# Patient Record
Sex: FEMALE | Race: WHITE | NOT HISPANIC OR LATINO | Employment: OTHER | URBAN - METROPOLITAN AREA
[De-identification: names, ages, dates, MRNs, and addresses within clinical notes are randomized per-mention and may not be internally consistent; named-entity substitution may affect disease eponyms.]

---

## 2017-02-07 ENCOUNTER — COMMUNICATION - HEALTHEAST (OUTPATIENT)
Dept: FAMILY MEDICINE | Facility: CLINIC | Age: 77
End: 2017-02-07

## 2017-02-10 ENCOUNTER — COMMUNICATION - HEALTHEAST (OUTPATIENT)
Dept: FAMILY MEDICINE | Facility: CLINIC | Age: 77
End: 2017-02-10

## 2017-02-18 ENCOUNTER — COMMUNICATION - HEALTHEAST (OUTPATIENT)
Dept: FAMILY MEDICINE | Facility: CLINIC | Age: 77
End: 2017-02-18

## 2017-02-18 DIAGNOSIS — F41.9 ANXIETY DISORDER: ICD-10-CM

## 2017-04-22 ENCOUNTER — OFFICE VISIT (OUTPATIENT)
Dept: URGENT CARE | Facility: URGENT CARE | Age: 77
End: 2017-04-22
Payer: COMMERCIAL

## 2017-04-22 VITALS
TEMPERATURE: 97.8 F | BODY MASS INDEX: 22.13 KG/M2 | WEIGHT: 121 LBS | HEART RATE: 89 BPM | OXYGEN SATURATION: 98 % | DIASTOLIC BLOOD PRESSURE: 95 MMHG | SYSTOLIC BLOOD PRESSURE: 186 MMHG

## 2017-04-22 DIAGNOSIS — K08.89 PAIN, DENTAL: ICD-10-CM

## 2017-04-22 DIAGNOSIS — M26.609 TMJ (TEMPOROMANDIBULAR JOINT SYNDROME): ICD-10-CM

## 2017-04-22 DIAGNOSIS — K04.7 DENTAL ABSCESS: Primary | ICD-10-CM

## 2017-04-22 PROCEDURE — 99213 OFFICE O/P EST LOW 20 MIN: CPT | Performed by: FAMILY MEDICINE

## 2017-04-22 RX ORDER — CLINDAMYCIN HCL 300 MG
300 CAPSULE ORAL 4 TIMES DAILY
Qty: 40 CAPSULE | Refills: 0 | Status: SHIPPED | OUTPATIENT
Start: 2017-04-22 | End: 2017-05-02

## 2017-04-22 RX ORDER — HYDROCODONE BITARTRATE AND ACETAMINOPHEN 5; 325 MG/1; MG/1
1 TABLET ORAL EVERY 6 HOURS PRN
Qty: 6 TABLET | Refills: 0 | Status: SHIPPED | OUTPATIENT
Start: 2017-04-22 | End: 2017-07-06

## 2017-04-22 NOTE — MR AVS SNAPSHOT
"              After Visit Summary   4/22/2017    July Huang    MRN: [id number]           Patient Information     Date Of Birth          1940        Visit Information        Provider Department      4/22/2017 9:15 AM Kahlil Bey MD [facility]        Today's Diagnoses     Dental abscess    -  1    TMJ (temporomandibular joint syndrome)        Pain, dental          Care Instructions    Take full course of antibiotic.  Okay to take norco for worse pain.      Dental Abscess    An abscess is a pocket of pus at the tip of a tooth root in your jaw bone. It is caused by an infection at the root of the tooth. It can cause pain and swelling of the gum, cheek, or jaw. Pain may spread from the tooth to your ear or the area of your jaw on the same side. If the abscess isn t treated, it spreads to the gum near the tooth. This causes more swelling and pain. More serious infections cause your face to swell.  A dental abscess usually starts with a crack or cavity in the tooth. The pain is often made worse by drinking hot or cold fluids, or biting on hard foods.  Home care  Follow these guidelines when caring for yourself at home:    Avoid hot and cold foods and drinks. Your tooth may be sensitive to changes in temperature. Don t chew on the side of the infected tooth.    If your tooth is chipped or cracked, or if there is a large open cavity, put oil of cloves directly on the tooth to relieve pain. You can buy oil of cloves at drugstores. Some pharmacies carry an over-the-counter \"toothache kit.\" This contains a paste that you can put on the exposed tooth to make it less sensitive.    Put a cold pack on your jaw over the sore area to help reduce pain.    You may use acetaminophen or ibuprofen to ease pain, unless another medicine was prescribed. Note: If you have chronic liver or kidney disease, talk with your health care provider before using these medicines. Also talk with your provider if " you ve had a stomach ulcer or GI bleeding.    An antibiotic will be prescribed. Take it until finished, even if you are feeling better after a few days.  Follow-up care  Follow up as directed with your dentist or an oral surgeon. Once an infection occurs in a tooth, it will continue to be a problem until the infection is drained. This is done through surgery or a root canal. Or you may need to have your tooth pulled.  When to seek medical advice  Call your health care provider right away if any of these occur:    Your face becomes more swollen or red    Your eyelids become swollen    Pain gets worse or spreads to your neck    Fever over 100.4  F (38.0  C)    Unusual drowsiness    Headache or stiff neck    Weakness or fainting    Pus drains from the tooth    Difficulty swallowing or breathing    7797-6810 [employer]. [address] [place] [address]. All rights reserved. This information is not intended as a substitute for professional medical care. Always follow your healthcare professional's instructions.              Follow-ups after your visit        Who to contact     If you have questions or need follow up information about today's clinic visit or your schedule please contact [facility] directly at [phone].  Normal or non-critical lab and imaging results will be communicated to you by Butl[employer]art, letter or phone within 4 business days after the clinic has received the results. If you do not hear from us within 7 days, please contact the clinic through Butlrhart or phone. If you have a critical or abnormal lab result, we will notify you by phone as soon as possible.  Submit refill requests through [employer] or call your pharmacy and they will forward the refill request to us. Please allow 3 business days for your refill to be completed.          Additional Information About Your Visit        [employer] Information     [employer] lets you send messages to your doctor, view  "your test results, renew your prescriptions, schedule appointments and more. To sign up, go to [email or web address] . Click on \"Log in\" on the left side of the screen, which will take you to the Welcome page. Then click on \"Sign up Now\" on the right side of the page.     You will be asked to enter the access code listed below, as well as some personal information. Please follow the directions to create your username and password.     Your access code is: [email or web address]  Expires: 2017  9:43 AM     Your access code will  in 90 days. If you need help or a new code, please call your [place] clinic or [phone].        Care EveryWhere ID     This is your Care EveryWhere ID. This could be used by other organizations to access your [place] medical records  [id number]        Your Vitals Were     Pulse Temperature Pulse Oximetry BMI (Body Mass Index)          89 97.8  F (36.6  C) (Tympanic) 98% 22.13 kg/m2         Blood Pressure from Last 3 Encounters:   17 (!) 186/95   14 (!) 138/92   12 168/80    Weight from Last 3 Encounters:   17 121 lb (54.9 kg)   14 120 lb (54.4 kg)   12 120 lb (54.4 kg)              Today, you had the following     No orders found for display         Today's Medication Changes          These changes are accurate as of: 17  9:43 AM.  If you have any questions, ask your nurse or doctor.               Start taking these medicines.        Dose/Directions    clindamycin 300 MG capsule   Commonly known as:  CLEOCIN   Used for:  Dental abscess   Started by:  Kahlil Bey MD        Dose:  300 mg   Take 1 capsule (300 mg) by mouth 4 times daily for 10 days   Quantity:  40 capsule   Refills:  0       HYDROcodone-acetaminophen 5-325 MG per tablet   Commonly known as:  NORCO   Used for:  Pain, dental   Started by:  Kahlil Bey MD        Dose:  1 tablet   Take 1 tablet by mouth every 6 hours as needed for moderate to severe pain   Quantity:  6 tablet "   Refills:  0       tiZANidine 4 MG tablet   Commonly known as:  ZANAFLEX   Used for:  TMJ (temporomandibular joint syndrome)   Started by:  Kahlil Bey MD        Dose:  4 mg   Take 1 tablet (4 mg) by mouth every 8 hours as needed for muscle spasms   Quantity:  30 tablet   Refills:  0            Where to get your medicines      These medications were sent to [id number] - [place], [place] - [address] AT [facility] [address]  [address], [place] [address]     Phone:  [phone]     clindamycin 300 MG capsule         Some of these will need a paper prescription and others can be bought over the counter.  Ask your nurse if you have questions.     Bring a paper prescription for each of these medications     HYDROcodone-acetaminophen 5-325 MG per tablet    tiZANidine 4 MG tablet                Primary Care Provider Office Phone # Fax #    Florence Koo [phone]       [address] [address]        Thank you!     Thank you for choosing [facility]  for your care. Our goal is always to provide you with excellent care. Hearing back from our patients is one way we can continue to improve our services. Please take a few minutes to complete the written survey that you may receive in the mail after your visit with us. Thank you!             Your Updated Medication List - Protect others around you: Learn how to safely use, store and throw away your medicines at www.disposemymeds.org.          This list is accurate as of: 4/22/17  9:43 AM.  Always use your most recent med list.                   Brand Name Dispense Instructions for use    TOMEKA-MAG PO      Daily       clindamycin 300 MG capsule    CLEOCIN    40 capsule    Take 1 capsule (300 mg) by mouth 4 times daily for 10 days       CO Q-10 PO      Take  by mouth.       COD LIVER OIL PO      Take  by mouth.       conjugated estrogens cream    PREMARIN    42.5 g     Place  vaginally twice a week.       fluconazole 150 MG tablet    DIFLUCAN    2 tablet    Take 1 tablet by mouth every 3 days.       HYDROcodone-acetaminophen 5-325 MG per tablet    NORCO    6 tablet    Take 1 tablet by mouth every 6 hours as needed for moderate to severe pain       MULTI-VITAMIN PO      Daily       tiZANidine 4 MG tablet    ZANAFLEX    30 tablet    Take 1 tablet (4 mg) by mouth every 8 hours as needed for muscle spasms       vitamin D 1000 UNITS capsule      Take 1 capsule by mouth daily. 5000 unit 2-3 times per week

## 2017-04-22 NOTE — NURSING NOTE
"Chief Complaint   Patient presents with     Urgent Care     Pt in clinic to have eval for tooth pain.     Dental Pain       Initial BP (!) 186/95 (BP Location: Right arm, Patient Position: Chair, Cuff Size: Adult Regular)  Pulse 89  Temp 97.8  F (36.6  C) (Tympanic)  Wt 121 lb (54.9 kg)  SpO2 98%  BMI 22.13 kg/m2 Estimated body mass index is 22.13 kg/(m^2) as calculated from the following:    Height as of 8/30/14: 5' 2\" (1.575 m).    Weight as of this encounter: 121 lb (54.9 kg).  Medication Reconciliation: complete   Abida Srivastava/ MA    "

## 2017-04-22 NOTE — PROGRESS NOTES
SUBJECTIVE:   July Huang is a 76 year old female presenting with a chief complaint of dental pain X 1week.    Patient states that was seen by new dentist for cleaning several months back and told that old fillings needed to be replaced, did not have any pain with teeth at that time.  Patient states that last week did have filling replaced but now developed pain in the tooth that had work done.  Patient states that had a lot of scraping done, when she tries to floss that will get stuck and rip so feels like the filling is too rough.  Patient states that had TMJ in the past and now having more clicking and pain in right jaw area.  Patient tried calling dentist when tooth pain started worsening 3 days ago but dentist was not available.  Patient contacted prior dentist and does have appointment on Monday.  Patient states that usually takes clindamycin for dental problems in the past and done well.    Patient with HTN, states that has struggled with this in the past and usually runs 130-140's.  Patient feels that BP elevated due to pain.    Past Medical History:   Diagnosis Date     Cancer of ovary (H)      Irregular heart beat 2009    nl CT angiogram     S/P coronary angiogram 2005    negative     Current Outpatient Prescriptions   Medication Sig Dispense Refill     conjugated estrogens (PREMARIN) vaginal cream Place  vaginally twice a week. 42.5 g 12     Coenzyme Q10 (CO Q-10 PO) Take  by mouth.       COD LIVER OIL PO Take  by mouth.       Cholecalciferol (VITAMIN D) 1000 UNIT capsule Take 1 capsule by mouth daily. 5000 unit 2-3 times per week       MULTI-VITAMIN OR Daily       TOMEKA-MAG OR Daily       fluconazole (DIFLUCAN) 150 MG tablet Take 1 tablet by mouth every 3 days. (Patient not taking: Reported on 4/22/2017) 2 tablet 0     Social History   Substance Use Topics     Smoking status: Former Smoker     Quit date: 11/17/1994     Smokeless tobacco: Never Used     Alcohol use Yes      Comment: ocas        ROS:  CONSTITUTIONAL:NEGATIVE for fever, chills, change in weight and POSITIVE  for fatigue  ENT/MOUTH: POSITIVE for tooth pain  RESP:NEGATIVE for significant cough or SOB  CV: NEGATIVE for chest pain, palpitations or peripheral edema  GI: NEGATIVE for nausea, abdominal pain, heartburn, or change in bowel habits  MUSCULOSKELETAL: POSITIVE  for jaw pain    OBJECTIVE  :BP (!) 186/95 (BP Location: Right arm, Patient Position: Chair, Cuff Size: Adult Regular)  Pulse 89  Temp 97.8  F (36.6  C) (Tympanic)  Wt 121 lb (54.9 kg)  SpO2 98%  BMI 22.13 kg/m2  GENERAL APPEARANCE: healthy, alert and no distress  HENT: mouth without ulcers, erythema or lesions.  Tenderness on right lower back molar  SKIN: no suspicious lesions or rashes  PSYCH: mentation appears normal and affect normal/bright    ASSESSMENT/PLAN:  (K04.7) Dental abscess  (primary encounter diagnosis)  Plan: clindamycin (CLEOCIN) 300 MG capsule            (M26.609) TMJ (temporomandibular joint syndrome)  Plan: tiZANidine (ZANAFLEX) 4 MG tablet            (K08.89) Pain, dental  Comment: dental infection  Plan: HYDROcodone-acetaminophen (NORCO) 5-325 MG per         tablet            Reviewed that due to recent dental work done that will empirically treat for dental abscess/infection - RX clindamycin given as patient has done well with this in the past.  Small quantity of Norco 5/325 mg #6 given to use sparingly for worse pain.  RX zanaflex given to take for TMJ spasm if needed.    Follow up with dentist as already scheduled.    Kahlil Bey MD  April 22, 2017 9:56 AM

## 2017-04-22 NOTE — PATIENT INSTRUCTIONS
"Take full course of antibiotic.  Okay to take norco for worse pain.      Dental Abscess    An abscess is a pocket of pus at the tip of a tooth root in your jaw bone. It is caused by an infection at the root of the tooth. It can cause pain and swelling of the gum, cheek, or jaw. Pain may spread from the tooth to your ear or the area of your jaw on the same side. If the abscess isn t treated, it spreads to the gum near the tooth. This causes more swelling and pain. More serious infections cause your face to swell.  A dental abscess usually starts with a crack or cavity in the tooth. The pain is often made worse by drinking hot or cold fluids, or biting on hard foods.  Home care  Follow these guidelines when caring for yourself at home:    Avoid hot and cold foods and drinks. Your tooth may be sensitive to changes in temperature. Don t chew on the side of the infected tooth.    If your tooth is chipped or cracked, or if there is a large open cavity, put oil of cloves directly on the tooth to relieve pain. You can buy oil of cloves at [employer]. Some pharmacies carry an over-the-counter \"toothache kit.\" This contains a paste that you can put on the exposed tooth to make it less sensitive.    Put a cold pack on your jaw over the sore area to help reduce pain.    You may use acetaminophen or ibuprofen to ease pain, unless another medicine was prescribed. Note: If you have chronic liver or kidney disease, talk with your health care provider before using these medicines. Also talk with your provider if you ve had a stomach ulcer or GI bleeding.    An antibiotic will be prescribed. Take it until finished, even if you are feeling better after a few days.  Follow-up care  Follow up as directed with your dentist or an oral surgeon. Once an infection occurs in a tooth, it will continue to be a problem until the infection is drained. This is done through surgery or a root canal. Or you may need to have your tooth pulled.  When " to seek medical advice  Call your health care provider right away if any of these occur:    Your face becomes more swollen or red    Your eyelids become swollen    Pain gets worse or spreads to your neck    Fever over 100.4  F (38.0  C)    Unusual drowsiness    Headache or stiff neck    Weakness or fainting    Pus drains from the tooth    Difficulty swallowing or breathing    4151-8519 [employer]. [address] [place] [address]. All rights reserved. This information is not intended as a substitute for professional medical care. Always follow your healthcare professional's instructions.

## 2017-04-25 ENCOUNTER — COMMUNICATION - HEALTHEAST (OUTPATIENT)
Dept: FAMILY MEDICINE | Facility: CLINIC | Age: 77
End: 2017-04-25

## 2017-05-19 ENCOUNTER — OFFICE VISIT - HEALTHEAST (OUTPATIENT)
Dept: FAMILY MEDICINE | Facility: CLINIC | Age: 77
End: 2017-05-19

## 2017-05-19 DIAGNOSIS — I10 ESSENTIAL HYPERTENSION, BENIGN: ICD-10-CM

## 2017-05-19 DIAGNOSIS — F41.9 ANXIETY DISORDER: ICD-10-CM

## 2017-07-06 ENCOUNTER — OFFICE VISIT (OUTPATIENT)
Dept: URGENT CARE | Facility: URGENT CARE | Age: 77
End: 2017-07-06
Payer: COMMERCIAL

## 2017-07-06 VITALS
HEART RATE: 70 BPM | HEIGHT: 62 IN | DIASTOLIC BLOOD PRESSURE: 94 MMHG | OXYGEN SATURATION: 97 % | RESPIRATION RATE: 16 BRPM | BODY MASS INDEX: 22.08 KG/M2 | TEMPERATURE: 97.5 F | WEIGHT: 120 LBS | SYSTOLIC BLOOD PRESSURE: 172 MMHG

## 2017-07-06 DIAGNOSIS — R52 BODY ACHES: ICD-10-CM

## 2017-07-06 DIAGNOSIS — R51.9 NONINTRACTABLE EPISODIC HEADACHE, UNSPECIFIED HEADACHE TYPE: Primary | ICD-10-CM

## 2017-07-06 DIAGNOSIS — R03.0 ELEVATED BLOOD PRESSURE READING WITHOUT DIAGNOSIS OF HYPERTENSION: ICD-10-CM

## 2017-07-06 PROCEDURE — 99213 OFFICE O/P EST LOW 20 MIN: CPT | Performed by: INTERNAL MEDICINE

## 2017-07-06 NOTE — MR AVS SNAPSHOT
"              After Visit Summary   7/6/2017    July Huang    MRN: [id number]           Patient Information     Date Of Birth          1940        Visit Information        Provider Department      7/6/2017 8:25 PM Debra Barth MD [facility]        Today's Diagnoses     Nonintractable episodic headache, unspecified headache type    -  1    Body aches        Elevated blood pressure reading without diagnosis of hypertension          Care Instructions    ibuprofen or aleve.  Also tylenol.  Over the weekend  Could be viral illness - observe for new symptoms.  Monitor for fever  Not an infected mosquito bite.    Fluids  Rest    Monitor blood pressure at home    Recheck with clinic next week.  Declined blood count today.    Call or return to clinic if symptoms worsen or fail to improve as anticipated.  Or ER if concerns for stroke.              Follow-ups after your visit        Who to contact     If you have questions or need follow up information about today's clinic visit or your schedule please contact [facility] directly at [phone].  Normal or non-critical lab and imaging results will be communicated to you by [employer], letter or phone within 4 business days after the clinic has received the results. If you do not hear from us within 7 days, please contact the clinic through [employer] or phone. If you have a critical or abnormal lab result, we will notify you by phone as soon as possible.  Submit refill requests through [employer] or call your pharmacy and they will forward the refill request to us. Please allow 3 business days for your refill to be completed.          Additional Information About Your Visit        [employer] Information     [employer] lets you send messages to your doctor, view your test results, renew your prescriptions, schedule appointments and more. To sign up, go to [email or web address] . Click on \"Log in\" on the left side of " "the screen, which will take you to the Welcome page. Then click on \"Sign up Now\" on the right side of the page.     You will be asked to enter the access code listed below, as well as some personal information. Please follow the directions to create your username and password.     Your access code is: [email or web address]  Expires: 2017  9:43 AM     Your access code will  in 90 days. If you need help or a new code, please call your [place] clinic or [phone].        Care EveryWhere ID     This is your Care EveryWhere ID. This could be used by other organizations to access your [place] medical records  [id number]        Your Vitals Were     Pulse Temperature Respirations Height Pulse Oximetry BMI (Body Mass Index)    70 97.5  F (36.4  C) (Oral) 16 5' 2\" (1.575 m) 97% 21.95 kg/m2       Blood Pressure from Last 3 Encounters:   17 (!) 172/94   17 (!) 186/95   14 (!) 138/92    Weight from Last 3 Encounters:   17 120 lb (54.4 kg)   17 121 lb (54.9 kg)   14 120 lb (54.4 kg)              Today, you had the following     No orders found for display         Today's Medication Changes          These changes are accurate as of: 17  9:14 PM.  If you have any questions, ask your nurse or doctor.               Stop taking these medicines if you haven't already. Please contact your care team if you have questions.     conjugated estrogens cream   Commonly known as:  PREMARIN   Stopped by:  Debra Barth MD           fluconazole 150 MG tablet   Commonly known as:  DIFLUCAN   Stopped by:  Debra Barth MD           HYDROcodone-acetaminophen 5-325 MG per tablet   Commonly known as:  NORCO   Stopped by:  Debra Barth MD                    Primary Care Provider Office Phone # Fax #    Florence Koo [phone]       [place] [address]        Equal Access to Services     [facility] Vista[facility]IL AH: Radha burks " katey Ozuna, wajuanda luqadaha, qaybta kaalmada lisa, sita missyin hayaadamon becerrilemeka tjjose e lasantanadamon ronal. So [facility] [phone].    ATENCIÓN: Si habla jax, tiene a senior disposición servicios gratuitos de asistencia lingüística. Serina al [phone].    We comply with applicable federal civil rights laws and [place] laws. We do not discriminate on the basis of race, color, national origin, age, disability sex, sexual orientation or gender identity.            Thank you!     Thank you for choosing [facility]  for your care. Our goal is always to provide you with excellent care. Hearing back from our patients is one way we can continue to improve our services. Please take a few minutes to complete the written survey that you may receive in the mail after your visit with us. Thank you!             Your Updated Medication List - Protect others around you: Learn how to safely use, store and throw away your medicines at www.disposemymeds.org.          This list is accurate as of: 7/6/17  9:14 PM.  Always use your most recent med list.                   Brand Name Dispense Instructions for use Diagnosis    TOMEKA-MAG PO      Daily        CO Q-10 PO      Take  by mouth.        COD LIVER OIL PO      Take  by mouth.        MULTI-VITAMIN PO      Daily        tiZANidine 4 MG tablet    ZANAFLEX    30 tablet    Take 1 tablet (4 mg) by mouth every 8 hours as needed for muscle spasms    TMJ (temporomandibular joint syndrome)       vitamin D 1000 UNITS capsule      Take 1 capsule by mouth daily. 5000 unit 2-3 times per week

## 2017-07-07 NOTE — NURSING NOTE
"Chief Complaint   Patient presents with     Urgent Care     Headache     headache and body aches started 2 days ago when she woke up with bad headache ( cluster) light changes in front of 1 eye but got better. OK yesterday and was ok this morning but horrible headache again this afternoon.        Initial BP (!) 172/94  Pulse 70  Temp 97.5  F (36.4  C) (Oral)  Resp 16  Ht 5' 2\" (1.575 m)  Wt 120 lb (54.4 kg)  SpO2 97%  BMI 21.95 kg/m2 Estimated body mass index is 21.95 kg/(m^2) as calculated from the following:    Height as of this encounter: 5' 2\" (1.575 m).    Weight as of this encounter: 120 lb (54.4 kg).  Medication Reconciliation: complete  "

## 2017-07-07 NOTE — PATIENT INSTRUCTIONS
ibuprofen or aleve.  Also tylenol.  Over the weekend  Could be viral illness - observe for new symptoms.  Monitor for fever  Not an infected mosquito bite.    Fluids  Rest    Monitor blood pressure at home    Recheck with clinic next week.  Declined blood count today.    Call or return to clinic if symptoms worsen or fail to improve as anticipated.  Or ER if concerns for stroke.

## 2017-07-07 NOTE — PROGRESS NOTES
SUBJECTIVE:  July Huang, a 77 year old female scheduled an appointment to discuss the following issues:  Chief Complaint   Patient presents with     Urgent Care     Headache     headache and body aches started 2 days ago when she woke up with bad headache ( cluster) light changes in front of 1 eye but got better. OK yesterday and was ok this morning but horrible headache again this afternoon.        left side headache   With visual change on right eye  Bright wavy line that went across the eye  Then rest of day it was gone  (15 years ago had similar headaches  Dx cluster headache with vision changes - same visual changes at that time)    Only other symptom was achiness.  Had upper back , neck pain, achy all over    Yesterday symptoms subsided    today had Dr masseyt routine, & Hx herpes/ with tx valtrex- needed refill      This afternoon, headache returned, body aches returned  headache causes dizziness   Vision changes did not recur. She did not try Tylenol or ibuprofen. She tried aspirin.      No contagious contacts  Had mosquito bite A few weeks ago and was wondering since she had some mild redness in the area if her current symptoms could be related.       Current Outpatient Prescriptions on File Prior to Visit:  Coenzyme Q10 (CO Q-10 PO) Take  by mouth.   COD LIVER OIL PO Take  by mouth.   Cholecalciferol (VITAMIN D) 1000 UNIT capsule Take 1 capsule by mouth daily. 5000 unit 2-3 times per week   TOMEKA-MAG OR Daily   tiZANidine (ZANAFLEX) 4 MG tablet Take 1 tablet (4 mg) by mouth every 8 hours as needed for muscle spasms   MULTI-VITAMIN OR Daily     No current facility-administered medications on file prior to visit.       Medical, social, surgical, and family histories reviewed and updated as of 7/6/2017.    ROS: no fever  C: NEGATIVE for fever, chills  ENT/MOUTH: no uri or sore throat   Just got over uri  R: NEGATIVE for significant cough or SOB  GI: NEGATIVE for nausea, abdominal pain, or change in bowel  "habits  : NEGATIVE for frequency, dysuria, or hematuria    N: NEGATIVE for focal weakness,  or paresthesias   No difficulty with speech    OBJECTIVE:  BP (!) 172/94  Pulse 70  Temp 97.5  F (36.4  C) (Oral)  Resp 16  Ht 5' 2\" (1.575 m)  Wt 120 lb (54.4 kg)  SpO2 97%  BMI 21.95 kg/m2  EXAM:  GENERAL APPEARANCE: healthy, alert and no distress  Well-appearing  EYES: EOMI,  PERRL  No visual field defect by gross exam  HENT: ear canals and TM's normal and nose and mouth without ulcers or lesions  RESP: lungs clear to auscultation - no rales, rhonchi or wheezes  CV: regular rates and rhythm, normal S1 S2, no S3 or S4 and no murmur, click or rub -  ABDOMEN:  soft, nontender, no HSM or masses and bowel sounds normal  SKIN: right breast- postinflam red justyn from mosquito bite - almost faded  NEURO: Normal strength and tone, sensory exam grossly normal, mentation intact and speech normal  Cranial nerves II through XII grossly intact.   PSYCH: mentation appears normal and affect normal/bright    bp at MD office 127/70s  Earlier today prefers not to recheck it as she gets nervous at the doctor's office      ASSESSMENT/PLAN:    ASSESSMENT/PLAN:      ICD-10-CM    1. Nonintractable episodic headache, unspecified headache type R51    2. Body aches R52    3. Elevated blood pressure reading without diagnosis of hypertension R03.0      Discussed would normally recommend workup of headache with these vision changes but she states this was the exact same thing that occurred 15 years ago.    she is to follow-up with her primary doctor and discuss these headaches vision changes with her current symptoms. At this time do not feel she needs it further referral out to the emergency room for these headache and vision change symptoms.    Patient Instructions   ibuprofen or aleve.  Also tylenol.  Over the weekend  Could be viral illness - observe for new symptoms.  Monitor for fever  Not an infected mosquito " bite.    Fluids  Rest    Monitor blood pressure at home    Recheck with clinic next week.  Declined blood count today.    Call or return to clinic if symptoms worsen or fail to improve as anticipated.  Or ER if concerns for stroke.            Debra Barth MD

## 2017-07-11 ENCOUNTER — OFFICE VISIT - HEALTHEAST (OUTPATIENT)
Dept: FAMILY MEDICINE | Facility: CLINIC | Age: 77
End: 2017-07-11
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
## 2017-07-11 ASSESSMENT — MIFFLIN-ST. JEOR: SCORE: 970.51

## 2017-07-12 ENCOUNTER — COMMUNICATION - HEALTHEAST (OUTPATIENT)
Dept: FAMILY MEDICINE | Facility: CLINIC | Age: 77
End: 2017-07-12

## 2017-08-20 ENCOUNTER — RADIANT APPOINTMENT (OUTPATIENT)
Dept: GENERAL RADIOLOGY | Facility: CLINIC | Age: 77
End: 2017-08-20
Attending: FAMILY MEDICINE
Payer: COMMERCIAL

## 2017-08-20 ENCOUNTER — OFFICE VISIT (OUTPATIENT)
Dept: URGENT CARE | Facility: URGENT CARE | Age: 77
End: 2017-08-20
Payer: COMMERCIAL

## 2017-08-20 VITALS
BODY MASS INDEX: 21.26 KG/M2 | HEIGHT: 63 IN | WEIGHT: 120 LBS | DIASTOLIC BLOOD PRESSURE: 80 MMHG | HEART RATE: 76 BPM | TEMPERATURE: 98.5 F | SYSTOLIC BLOOD PRESSURE: 142 MMHG | OXYGEN SATURATION: 97 %

## 2017-08-20 DIAGNOSIS — S40.011A CONTUSION OF RIGHT SHOULDER, INITIAL ENCOUNTER: Primary | ICD-10-CM

## 2017-08-20 DIAGNOSIS — S43.51XA SPRAIN OF RIGHT ACROMIOCLAVICULAR JOINT, INITIAL ENCOUNTER: ICD-10-CM

## 2017-08-20 DIAGNOSIS — S40.011A CONTUSION OF RIGHT SHOULDER, INITIAL ENCOUNTER: ICD-10-CM

## 2017-08-20 DIAGNOSIS — S46.111A STRAIN OF MUSCLE, FASCIA AND TENDON OF LONG HEAD OF BICEPS, RIGHT ARM, INITIAL ENCOUNTER: ICD-10-CM

## 2017-08-20 PROCEDURE — 73030 X-RAY EXAM OF SHOULDER: CPT | Mod: RT

## 2017-08-20 PROCEDURE — 73050 X-RAY EXAM OF SHOULDERS: CPT

## 2017-08-20 PROCEDURE — 99213 OFFICE O/P EST LOW 20 MIN: CPT | Performed by: FAMILY MEDICINE

## 2017-08-20 NOTE — PATIENT INSTRUCTIONS
Shoulder Sprain  A sprain is a stretching or tearing of the ligaments that hold a joint together. A sprain may take up to 8 weeks to fully heal, depending on how severe it is. Moderate to severe shoulder sprains are treated with a sling or shoulder immobilizer. Minor sprains can be treated without any special support.  Home care  The following guidelines will help you care for your injury at home:    If a sling was given to you, leave it in place for the time advised by your healthcare provider. If you aren t sure how long to wear it, ask for advice. If the sling becomes loose, adjust it so that your forearm is level with the ground. Your shoulder should feel well supported.    Put an ice pack on the injured area for 20 minutes every 1 to 2 hours the first day. You can make your own ice pack by putting ice cubes in a plastic bag. A bag of frozen peas or something similar works well too. Wrap the bag in a thin towel. Continue with ice packs 3 to 4 times a day for the next 2 to 3 days. Then use the pack as needed to ease pain and swelling.    You may use acetaminophen or ibuprofen to control pain, unless another pain medicine was prescribed. If you have chronic liver or kidney disease, talk with your healthcare provider before using these medicines. Also talk with your provider if you ve had a stomach ulcer or gastrointestinal bleeding.    Shoulder joints become stiff if left in a sling for too long. You should start range of motion exercises about 7 to 10 days after the injury. Talk with your provider to find out what type of exercises to do and how soon to start.  Follow-up care  Follow up with your healthcare provider, or as advised.  Any X-rays you had today don t show any broken bones, breaks, or fractures. Sometimes fractures don t show up on the first X-ray. Bruises and sprains can sometimes hurt as much as a fracture. These injuries can take time to heal completely. If your symptoms don t improve or they get  worse, talk with your provider. You may need a repeat X-ray or other treatments.  When to seek medical advice  Call your healthcare provider right away if any of these occur:    Shoulder pain or swelling in your arm that gets worse    Fingers become cold, blue, numb, or tingly    Large amount of bruising of the shoulder or upper arm    Fever or chills  Date Last Reviewed: 8/1/2016 2000-2017 [employer]. [address] [place] [address], [place] [address]. All rights reserved. This information is not intended as a substitute for professional medical care. Always follow your healthcare professional's instructions.

## 2017-08-20 NOTE — NURSING NOTE
"July Huang;   Chief Complaint   Patient presents with     Shoulder     8/11 was walking dog and she ran after a rabbit, fell, was out of town and has not been checked after fall, c/o right shoulder pain radiates down right arm      Urgent Care     Initial /80 (BP Location: Left arm, Patient Position: Chair, Cuff Size: Adult Regular)  Pulse 76  Temp 98.5  F (36.9  C) (Oral)  Ht 5' 2.5\" (1.588 m)  Wt 120 lb (54.4 kg)  SpO2 97%  BMI 21.6 kg/m2 Estimated body mass index is 21.6 kg/(m^2) as calculated from the following:    Height as of this encounter: 5' 2.5\" (1.588 m).    Weight as of this encounter: 120 lb (54.4 kg)..  BP completed using cuff size regular.  Destini Grant R.N.  "

## 2017-08-20 NOTE — MR AVS SNAPSHOT
After Visit Summary   8/20/2017    July Huang    MRN: [id number]           Patient Information     Date Of Birth          1940        Visit Information        Provider Department      8/20/2017 2:30 PM Araceli Vasquez MD [facility]        Today's Diagnoses     Contusion of right shoulder, initial encounter    -  1    Sprain of right acromioclavicular joint, initial encounter        Strain of muscle, fascia and tendon of long head of biceps, right arm, initial encounter          Care Instructions      Shoulder Sprain  A sprain is a stretching or tearing of the ligaments that hold a joint together. A sprain may take up to 8 weeks to fully heal, depending on how severe it is. Moderate to severe shoulder sprains are treated with a sling or shoulder immobilizer. Minor sprains can be treated without any special support.  Home care  The following guidelines will help you care for your injury at home:    If a sling was given to you, leave it in place for the time advised by your healthcare provider. If you aren t sure how long to wear it, ask for advice. If the sling becomes loose, adjust it so that your forearm is level with the ground. Your shoulder should feel well supported.    Put an ice pack on the injured area for 20 minutes every 1 to 2 hours the first day. You can make your own ice pack by putting ice cubes in a plastic bag. A bag of frozen peas or something similar works well too. Wrap the bag in a thin towel. Continue with ice packs 3 to 4 times a day for the next 2 to 3 days. Then use the pack as needed to ease pain and swelling.    You may use acetaminophen or ibuprofen to control pain, unless another pain medicine was prescribed. If you have chronic liver or kidney disease, talk with your healthcare provider before using these medicines. Also talk with your provider if you ve had a stomach ulcer or gastrointestinal bleeding.    Shoulder joints become stiff  if left in a sling for too long. You should start range of motion exercises about 7 to 10 days after the injury. Talk with your provider to find out what type of exercises to do and how soon to start.  Follow-up care  Follow up with your healthcare provider, or as advised.  Any X-rays you had today don t show any broken bones, breaks, or fractures. Sometimes fractures don t show up on the first X-ray. Bruises and sprains can sometimes hurt as much as a fracture. These injuries can take time to heal completely. If your symptoms don t improve or they get worse, talk with your provider. You may need a repeat X-ray or other treatments.  When to seek medical advice  Call your healthcare provider right away if any of these occur:    Shoulder pain or swelling in your arm that gets worse    Fingers become cold, blue, numb, or tingly    Large amount of bruising of the shoulder or upper arm    Fever or chills  Date Last Reviewed: 8/1/2016 2000-2017 [employer]. [address] [place] [address]. All rights reserved. This information is not intended as a substitute for professional medical care. Always follow your healthcare professional's instructions.                Follow-ups after your visit        Who to contact     If you have questions or need follow up information about today's clinic visit or your schedule please contact [facility] directly at [phone].  Normal or non-critical lab and imaging results will be communicated to you by MyChart, letter or phone within 4 business days after the clinic has received the results. If you do not hear from us within 7 days, please contact the clinic through MyChart or phone. If you have a critical or abnormal lab result, we will notify you by phone as soon as possible.  Submit refill requests through [employer] or call your pharmacy and they will forward the refill request to us. Please allow 3 business days for your refill to be  "completed.          Additional Information About Your Visit        [employer] Information     [employer] lets you send messages to your doctor, view your test results, renew your prescriptions, schedule appointments and more. To sign up, go to [email or web address] . Click on \"Log in\" on the left side of the screen, which will take you to the Welcome page. Then click on \"Sign up Now\" on the right side of the page.     You will be asked to enter the access code listed below, as well as some personal information. Please follow the directions to create your username and password.     Your access code is: [email or web address]  Expires: 2017  4:12 PM     Your access code will  in 90 days. If you need help or a new code, please call your [facility] clinic or [phone].        Care EveryWhere ID     This is your Care EveryWhere ID. This could be used by other organizations to access your [facility] medical records  [id number]        Your Vitals Were     Pulse Temperature Height Pulse Oximetry BMI (Body Mass Index)       76 98.5  F (36.9  C) (Oral) 5' 2.5\" (1.588 m) 97% 21.6 kg/m2        Blood Pressure from Last 3 Encounters:   17 142/80   17 (!) 172/94   17 (!) 186/95    Weight from Last 3 Encounters:   17 120 lb (54.4 kg)   17 120 lb (54.4 kg)   17 121 lb (54.9 kg)                 Today's Medication Changes          These changes are accurate as of: 17  4:12 PM.  If you have any questions, ask your nurse or doctor.               Stop taking these medicines if you haven't already. Please contact your care team if you have questions.     tiZANidine 4 MG tablet   Commonly known as:  ZANAFLEX   Stopped by:  Araceli Vasquez MD                    Primary Care Provider Office Phone # Fax #    Florence Koo [phone]       [address] [address]        Equal Access to Services     [facility] LANA AH: Radha vila " Laith, wajuanda lucharlieadaha, jwta kalien gonzalez, sita missyin hayaan joneemeka tjjose e laArunakranthi ronal. So [facility] [phone].    ATENCIÓN: Si habla jax, tiene a senior disposición servicios gratuitos de asistencia lingüística. Serina al [phone].    We comply with applicable federal civil rights laws and [place] laws. We do not discriminate on the basis of race, color, national origin, age, disability sex, sexual orientation or gender identity.            Thank you!     Thank you for choosing [facility]  for your care. Our goal is always to provide you with excellent care. Hearing back from our patients is one way we can continue to improve our services. Please take a few minutes to complete the written survey that you may receive in the mail after your visit with us. Thank you!             Your Updated Medication List - Protect others around you: Learn how to safely use, store and throw away your medicines at www.disposemymeds.org.          This list is accurate as of: 8/20/17  4:12 PM.  Always use your most recent med list.                   Brand Name Dispense Instructions for use Diagnosis    TOMEKA-MAG PO      Daily        CO Q-10 PO      Take  by mouth.        COD LIVER OIL PO      Take  by mouth.        MULTI-VITAMIN PO      Daily        vitamin D 1000 UNITS capsule      Take 1 capsule by mouth daily. 5000 unit 2-3 times per week

## 2017-08-20 NOTE — PROGRESS NOTES
SUBJECTIVE:  Chief Complaint   Patient presents with     Shoulder     8/11 was walking dog and she ran after a rabbit, fell, was out of town and has not been checked after fall, c/o right shoulder pain radiates down right arm      Urgent Care      July Huang is a 77 year old female who presents to the clinic today complaining of shoulder pain on the right side.    Onset of injury or pain was 1 week(s) ago and was sudden onset, still present and constant. The pain is sharp and present with movement and superior, anterior and posterior.  Injury history: while walking her dog, the dog suddenly chased a rabbit, jerking her right shoulder, then she was pulled over and fell onto the ground hitting the lateral right shoulder, with her arm along her ribs.  Exacerbated by: movement overhead, movement of shoulder, sleeping on that shoulder, lifting objects and reaching across chest   Relieved by: rest  Associated symptoms include Radiation of the pain.to the elbow, and pain in the biceps proximal and distal    The patient has tried nothing to improve symptoms.-  She was travelling in [place] last week and had to move and lift objects with pain  She does not have a history of shoulder pain/injury.    Past Medical History:   Diagnosis Date     Cancer of ovary (H)      Irregular heart beat 2009    nl CT angiogram     S/P coronary angiogram 2005    negative       ALLERGIES:  Cozaar; Amiloride/hydrochlorothiazide; Diltiazem; Lisinopril; Meclizine; and Metoprolol      Current Outpatient Prescriptions on File Prior to Visit:  Coenzyme Q10 (CO Q-10 PO) Take  by mouth.   COD LIVER OIL PO Take  by mouth.   Cholecalciferol (VITAMIN D) 1000 UNIT capsule Take 1 capsule by mouth daily. 5000 unit 2-3 times per week   MULTI-VITAMIN OR Daily   TOMEKA-MAG OR Daily     No current facility-administered medications on file prior to visit.     Social History   Substance Use Topics     Smoking status: Former Smoker     Quit date: 11/17/1994  "    Smokeless tobacco: Never Used     Alcohol use Yes      Comment: ocas       Family History   Problem Relation Age of Onset     CANCER Sister      lung cancer     Asthma Sister      CEREBROVASCULAR DISEASE Mother      Neurologic Disorder Sister      parkinson's        ROS:  CONSTITUTIONAL:NEGATIVE for fever, chills, change in weight  INTEGUMENTARY/SKIN: NEGATIVE for worrisome rashes, moles or lesions  EYES: NEGATIVE for vision changes or irritation  ENT/MOUTH: NEGATIVE for ear, mouth and throat problems  RESP:NEGATIVE for significant cough or SOB  CV: NEGATIVE for chest pain, palpitations or peripheral edema  GI: NEGATIVE for nausea, abdominal pain, heartburn, or change in bowel habits  NEURO: NEGATIVE for weakness, dizziness or paresthesias      EXAM:  /80 (BP Location: Left arm, Patient Position: Chair, Cuff Size: Adult Regular)  Pulse 76  Temp 98.5  F (36.9  C) (Oral)  Ht 5' 2.5\" (1.588 m)  Wt 120 lb (54.4 kg)  SpO2 97%  BMI 21.6 kg/m2  General: no acute distress  Shoulder Exam: right shoulder  biceps tendon tender to palpation, AC joint tender to palpation    pain with resisted flexion of the shoulder    pain with resisted abduction of the shoulder    pain with resisted extension of the shoulder    pain with resisted adduction of the shoulder  Passive ROM of the shoulder with moderate pain     tenderness over biceps tendon-  Long, short heads and distal attachment at the elbow    tenderness supraspinatous muscle    tenderness over the AC joint  Ext: pulses intact.    No pain with PROM of right fingers, wrist, elbow  Neuro: sensation intact to light touch. In hand  Chest:  No rib tenderness, no clavicle tenderness  Lungs clear to auscultation    Xray -  No right shoulder fracture,  No AC separation    ASSESSMENT:    Contusion of right shoulder, initial encounter     - XR Shoulder Right G/E 3 Views; Future  - XR Acromioclavicular Joint Bilateral; Future    Sprain of right acromioclavicular joint, " initial encounter       Strain of muscle, fascia and tendon of long head of biceps, right arm, initial encounter      she declined sling  Discussed ROM exercises to maintain mobility  Ibuprofen and/or acetaminophen as an alternative for pain     May use OTC Amarjit-caisllas or icy hot over the painful area  Follow-up with primary care or ortho if persistent symptoms after 2 weeks    Then warm packs prn for comfort  If persistent symptoms of pain or decreased mobility follow-up with primary care

## 2017-08-22 ENCOUNTER — OFFICE VISIT - HEALTHEAST (OUTPATIENT)
Dept: FAMILY MEDICINE | Facility: CLINIC | Age: 77
End: 2017-08-22

## 2017-08-22 DIAGNOSIS — S49.91XD RIGHT SHOULDER INJURY, SUBSEQUENT ENCOUNTER: ICD-10-CM

## 2017-08-22 DIAGNOSIS — M19.019 DJD OF SHOULDER: ICD-10-CM

## 2017-08-22 ASSESSMENT — MIFFLIN-ST. JEOR: SCORE: 965.98

## 2017-09-18 ENCOUNTER — OFFICE VISIT - HEALTHEAST (OUTPATIENT)
Dept: FAMILY MEDICINE | Facility: CLINIC | Age: 77
End: 2017-09-18

## 2017-09-18 ENCOUNTER — COMMUNICATION - HEALTHEAST (OUTPATIENT)
Dept: FAMILY MEDICINE | Facility: CLINIC | Age: 77
End: 2017-09-18

## 2017-09-18 DIAGNOSIS — R23.8 VESICULAR RASH: ICD-10-CM

## 2017-09-18 ASSESSMENT — MIFFLIN-ST. JEOR: SCORE: 965.98

## 2017-09-20 ENCOUNTER — COMMUNICATION - HEALTHEAST (OUTPATIENT)
Dept: FAMILY MEDICINE | Facility: CLINIC | Age: 77
End: 2017-09-20

## 2017-09-20 DIAGNOSIS — R21 RASH: ICD-10-CM

## 2017-09-26 ENCOUNTER — RECORDS - HEALTHEAST (OUTPATIENT)
Dept: ADMINISTRATIVE | Facility: OTHER | Age: 77
End: 2017-09-26

## 2017-10-18 ENCOUNTER — OFFICE VISIT - HEALTHEAST (OUTPATIENT)
Dept: FAMILY MEDICINE | Facility: CLINIC | Age: 77
End: 2017-10-18

## 2017-10-18 DIAGNOSIS — R23.8 VESICULAR RASH: ICD-10-CM
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
## 2017-10-18 DIAGNOSIS — F41.9 ANXIETY DISORDER: ICD-10-CM

## 2017-10-18 ASSESSMENT — MIFFLIN-ST. JEOR: SCORE: 970.51

## 2017-10-22 ENCOUNTER — COMMUNICATION - HEALTHEAST (OUTPATIENT)
Dept: FAMILY MEDICINE | Facility: CLINIC | Age: 77
End: 2017-10-22

## 2017-10-27 ENCOUNTER — COMMUNICATION - HEALTHEAST (OUTPATIENT)
Dept: FAMILY MEDICINE | Facility: CLINIC | Age: 77
End: 2017-10-27

## 2017-10-30 ENCOUNTER — COMMUNICATION - HEALTHEAST (OUTPATIENT)
Dept: FAMILY MEDICINE | Facility: CLINIC | Age: 77
End: 2017-10-30

## 2017-11-10 ENCOUNTER — COMMUNICATION - HEALTHEAST (OUTPATIENT)
Dept: FAMILY MEDICINE | Facility: CLINIC | Age: 77
End: 2017-11-10

## 2017-11-10 DIAGNOSIS — F41.9 ANXIETY DISORDER: ICD-10-CM

## 2017-11-11 ENCOUNTER — OFFICE VISIT (OUTPATIENT)
Dept: URGENT CARE | Facility: URGENT CARE | Age: 77
End: 2017-11-11
Payer: COMMERCIAL

## 2017-11-11 VITALS
HEIGHT: 62 IN | WEIGHT: 120 LBS | BODY MASS INDEX: 22.08 KG/M2 | HEART RATE: 71 BPM | TEMPERATURE: 99 F | DIASTOLIC BLOOD PRESSURE: 78 MMHG | OXYGEN SATURATION: 98 % | SYSTOLIC BLOOD PRESSURE: 133 MMHG

## 2017-11-11 DIAGNOSIS — R07.0 THROAT PAIN: Primary | ICD-10-CM

## 2017-11-11 LAB
DEPRECATED S PYO AG THROAT QL EIA: NORMAL
SPECIMEN SOURCE: NORMAL

## 2017-11-11 PROCEDURE — 87081 CULTURE SCREEN ONLY: CPT | Performed by: PHYSICIAN ASSISTANT

## 2017-11-11 PROCEDURE — 87880 STREP A ASSAY W/OPTIC: CPT | Performed by: PHYSICIAN ASSISTANT

## 2017-11-11 PROCEDURE — 99213 OFFICE O/P EST LOW 20 MIN: CPT | Performed by: FAMILY MEDICINE

## 2017-11-11 NOTE — NURSING NOTE
"Chief Complaint   Patient presents with     Urgent Care     Pharyngitis     c/o sore throat for 4 days       Initial /78  Pulse 71  Temp 99  F (37.2  C) (Tympanic)  Ht 5' 2\" (1.575 m)  Wt 120 lb (54.4 kg)  SpO2 98%  BMI 21.95 kg/m2 Estimated body mass index is 21.95 kg/(m^2) as calculated from the following:    Height as of this encounter: 5' 2\" (1.575 m).    Weight as of this encounter: 120 lb (54.4 kg).  Medication Reconciliation: complete   Shari Vega MA    "

## 2017-11-11 NOTE — PATIENT INSTRUCTIONS
Self-Care for Sore Throats    Sore throats happen for many reasons, such as colds, allergies, and infections caused by viruses or bacteria. In any case, your throat becomes red and sore. Your goal for self-care is to reduce your discomfort while giving your throat a chance to heal.  Moisten and soothe your throat  Tips include the following:    Try a sip of water first thing after waking up.    Keep your throat moist by drinking 6 or more glasses of clear liquids every day.    Run a cool-air humidifier in your room overnight.    Avoid cigarette smoke.     Suck on throat lozenges, cough drops, hard candy, ice chips, or frozen fruit-juice bars. Use the sugar-free versions if your diet or medical condition requires them.  Gargle to ease irritation  Gargling every hour or 2 can ease irritation. Try gargling with 1 of these solutions:    1/4 teaspoon of salt in 1/2 cup of warm water    An over-the-counter anesthetic gargle  Use medicine for more relief  Over-the-counter medicine can reduce sore throat symptoms. Ask your pharmacist if you have questions about which medicine to use:    Ease pain with anesthetic sprays. Aspirin or an aspirin substitute also helps. Remember, never give aspirin to anyone 18 or younger, or if you are already taking blood thinners.     For sore throats caused by allergies, try antihistamines to block the allergic reaction.    Remember: unless a sore throat is caused by a bacterial infection, antibiotics won t help you.  Prevent future sore throats  Prevention tips include the following:    Stop smoking or reduce contact with secondhand smoke. Smoke irritates the tender throat lining.    Limit contact with pets and with allergy-causing substances, such as pollen and mold.    When you re around someone with a sore throat or cold, wash your hands often to keep viruses or bacteria from spreading.    Don t strain your vocal cords.  Call your healthcare provider  Contact your healthcare provider if  you have:    A temperature over 101 F (38.3 C)    White spots on the throat    Great difficulty swallowing    Trouble breathing    A skin rash    Recent exposure to someone else with strep bacteria    Severe hoarseness and swollen glands in the neck or jaw   Date Last Reviewed: 8/1/2016 2000-2017 [employer]. [address] [place] [address] [place], [place] [id number]. All rights reserved. This information is not intended as a substitute for professional medical care. Always follow your healthcare professional's instructions.

## 2017-11-11 NOTE — PROGRESS NOTES
"SUBJECTIVE:   July Huang is a 77 year old female presenting with a chief complaint of a sore throat for the past 4 days, wondering about strep.  Other symptoms include chills, but no fevers.  She also c/o feeling like her tongue and mouth were sore this week.   She just finished prophylactic amoxicillin x 3 weeks for a tick bite this week.      ROS:  5-Point Review of Systems Negative-- Except as stated above.    OBJECTIVE  /78  Pulse 71  Temp 99  F (37.2  C) (Tympanic)  Ht 5' 2\" (1.575 m)  Wt 120 lb (54.4 kg)  SpO2 98%  BMI 21.95 kg/m2  GENERAL:  Awake, alert and interactive. No acute distress.  HEENT:   NC/AT, EOMI, clear conjunctiva.  Nose clear.  Oropharynx  --  tiny raised red rash to the anterior roof of the mouth, no ulcerations, lesions or erythema noted to tongue, interior oral cavity or posterior oropharynx other than the fine rash on anterior roof of the mouth.  Tonsils are benign in appearance.  TM's and EAC's benign.  NECK: supple and free of adenopathy  CHEST:  Lungs are clear, no rhonchi, wheezing or rales. Normal symmetric air entry throughout both lung fields.   HEART:  S1 and S2 normal, no murmurs, clicks, gallops or rubs. Regular rate and rhythm.    Results for orders placed or performed in visit on 11/11/17   Strep, Rapid Screen   Result Value Ref Range    Specimen Description Throat     Rapid Strep A Screen       NEGATIVE: No Group A streptococcal antigen detected by immunoassay, await culture report.       ASSESSMENT/PLAN    ICD-10-CM    1. Throat pain R07.0 Strep, Rapid Screen     Beta strep group A culture     Suspect viral illness.  Just off abx.    We discussed the expected course and symptomatic cares in detail, including return to care if symptoms not improving as expected, do not resolve completely, or if any new or worsening symptoms develop.      "

## 2017-11-11 NOTE — MR AVS SNAPSHOT
After Visit Summary   11/11/2017    July Huang    MRN: [id number]           Patient Information     Date Of Birth          1940        Visit Information        Provider Department      11/11/2017 10:25 AM Samaria Smith DO [facility]        Today's Diagnoses     Throat pain    -  1      Care Instructions      Self-Care for Sore Throats    Sore throats happen for many reasons, such as colds, allergies, and infections caused by viruses or bacteria. In any case, your throat becomes red and sore. Your goal for self-care is to reduce your discomfort while giving your throat a chance to heal.  Moisten and soothe your throat  Tips include the following:    Try a sip of water first thing after waking up.    Keep your throat moist by drinking 6 or more glasses of clear liquids every day.    Run a cool-air humidifier in your room overnight.    Avoid cigarette smoke.     Suck on throat lozenges, cough drops, hard candy, ice chips, or frozen fruit-juice bars. Use the sugar-free versions if your diet or medical condition requires them.  Gargle to ease irritation  Gargling every hour or 2 can ease irritation. Try gargling with 1 of these solutions:    1/4 teaspoon of salt in 1/2 cup of warm water    An over-the-counter anesthetic gargle  Use medicine for more relief  Over-the-counter medicine can reduce sore throat symptoms. Ask your pharmacist if you have questions about which medicine to use:    Ease pain with anesthetic sprays. Aspirin or an aspirin substitute also helps. Remember, never give aspirin to anyone 18 or younger, or if you are already taking blood thinners.     For sore throats caused by allergies, try antihistamines to block the allergic reaction.    Remember: unless a sore throat is caused by a bacterial infection, antibiotics won t help you.  Prevent future sore throats  Prevention tips include the following:    Stop smoking or reduce contact with  "secondhand smoke. Smoke irritates the tender throat lining.    Limit contact with pets and with allergy-causing substances, such as pollen and mold.    When you re around someone with a sore throat or cold, wash your hands often to keep viruses or bacteria from spreading.    Don t strain your vocal cords.  Call your healthcare provider  Contact your healthcare provider if you have:    A temperature over 101 F (38.3 C)    White spots on the throat    Great difficulty swallowing    Trouble breathing    A skin rash    Recent exposure to someone else with strep bacteria    Severe hoarseness and swollen glands in the neck or jaw   Date Last Reviewed: 8/1/2016 2000-2017 [employer]. [address] [place] [address]. All rights reserved. This information is not intended as a substitute for professional medical care. Always follow your healthcare professional's instructions.                Follow-ups after your visit        Who to contact     If you have questions or need follow up information about today's clinic visit or your schedule please contact [facility] directly at [phone].  Normal or non-critical lab and imaging results will be communicated to you by MyChart, letter or phone within 4 business days after the clinic has received the results. If you do not hear from us within 7 days, please contact the clinic through WalkMehart or phone. If you have a critical or abnormal lab result, we will notify you by phone as soon as possible.  Submit refill requests through [employer] or call your pharmacy and they will forward the refill request to us. Please allow 3 business days for your refill to be completed.          Additional Information About Your Visit        [employer] Network Information     [employer] lets you send messages to your doctor, view your test results, renew your prescriptions, schedule appointments and more. To sign up, go to [email or web address] . Click on \"Log " "in\" on the left side of the screen, which will take you to the Welcome page. Then click on \"Sign up Now\" on the right side of the page.     You will be asked to enter the access code listed below, as well as some personal information. Please follow the directions to create your username and password.     Your access code is: [email or web address]  Expires: 2017  3:12 PM     Your access code will  in 90 days. If you need help or a new code, please call your [place] clinic or [phone].        Care EveryWhere ID     This is your Care EveryWhere ID. This could be used by other organizations to access your [place] medical records  [id number]        Your Vitals Were     Pulse Temperature Height Pulse Oximetry BMI (Body Mass Index)       71 99  F (37.2  C) (Tympanic) 5' 2\" (1.575 m) 98% 21.95 kg/m2        Blood Pressure from Last 3 Encounters:   17 133/78   17 142/80   17 (!) 172/94    Weight from Last 3 Encounters:   17 120 lb (54.4 kg)   17 120 lb (54.4 kg)   17 120 lb (54.4 kg)              We Performed the Following     Beta strep group A culture     Strep, Rapid Screen        Primary Care Provider Office Phone # Fax #    Florence YONATAN Koo [phone]       [place] [address]        Equal Access to Services     [facility]: Hadii aad ku hadasho Soomaali, waaxda luqadaha, qaybta kaalmada adeegyada, sita villeda. So [facility] [phone].    ATENCIÓN: Si kevinla jax, tiene a senior disposición servicios gratuitos de asistencia lingüística. Llame al [phone].    We comply with applicable federal civil rights laws and [place] laws. We do not discriminate on the basis of race, color, national origin, age, disability, sex, sexual orientation, or gender identity.            Thank you!     Thank you for choosing [facility]  for your care. Our goal is always to provide " you with excellent care. Hearing back from our patients is one way we can continue to improve our services. Please take a few minutes to complete the written survey that you may receive in the mail after your visit with us. Thank you!             Your Updated Medication List - Protect others around you: Learn how to safely use, store and throw away your medicines at www.disposemymeds.org.          This list is accurate as of: 11/11/17 10:59 AM.  Always use your most recent med list.                   Brand Name Dispense Instructions for use Diagnosis    TOMEKA-MAG PO      Daily        CO Q-10 PO      Take  by mouth.        COD LIVER OIL PO      Take  by mouth.        MULTI-VITAMIN PO      Daily        vitamin D 1000 UNITS capsule      Take 1 capsule by mouth daily. 5000 unit 2-3 times per week

## 2017-11-12 LAB
BACTERIA SPEC CULT: NORMAL
SPECIMEN SOURCE: NORMAL

## 2017-11-15 ENCOUNTER — COMMUNICATION - HEALTHEAST (OUTPATIENT)
Dept: FAMILY MEDICINE | Facility: CLINIC | Age: 77
End: 2017-11-15

## 2017-11-15 DIAGNOSIS — F41.9 ANXIETY DISORDER: ICD-10-CM

## 2017-12-06 ENCOUNTER — COMMUNICATION - HEALTHEAST (OUTPATIENT)
Dept: FAMILY MEDICINE | Facility: CLINIC | Age: 77
End: 2017-12-06

## 2017-12-06 ENCOUNTER — AMBULATORY - HEALTHEAST (OUTPATIENT)
Dept: FAMILY MEDICINE | Facility: CLINIC | Age: 77
End: 2017-12-06

## 2017-12-06 DIAGNOSIS — R30.0 DYSURIA: ICD-10-CM

## 2017-12-07 ENCOUNTER — AMBULATORY - HEALTHEAST (OUTPATIENT)
Dept: LAB | Facility: CLINIC | Age: 77
End: 2017-12-07

## 2017-12-07 DIAGNOSIS — R30.0 DYSURIA: ICD-10-CM

## 2017-12-11 ENCOUNTER — COMMUNICATION - HEALTHEAST (OUTPATIENT)
Dept: FAMILY MEDICINE | Facility: CLINIC | Age: 77
End: 2017-12-11

## 2017-12-12 ENCOUNTER — AMBULATORY - HEALTHEAST (OUTPATIENT)
Dept: FAMILY MEDICINE | Facility: CLINIC | Age: 77
End: 2017-12-12

## 2017-12-13 ENCOUNTER — OFFICE VISIT - HEALTHEAST (OUTPATIENT)
Dept: FAMILY MEDICINE | Facility: CLINIC | Age: 77
End: 2017-12-13

## 2017-12-13 ENCOUNTER — COMMUNICATION - HEALTHEAST (OUTPATIENT)
Dept: FAMILY MEDICINE | Facility: CLINIC | Age: 77
End: 2017-12-13

## 2017-12-13 DIAGNOSIS — N90.89 BURNING SENSATION OF FEMALE PERINEUM: ICD-10-CM

## 2017-12-13 DIAGNOSIS — F41.9 ANXIETY DISORDER: ICD-10-CM

## 2018-06-17 ENCOUNTER — RECORDS - HEALTHEAST (OUTPATIENT)
Dept: ADMINISTRATIVE | Facility: OTHER | Age: 78
End: 2018-06-17

## 2018-08-14 ENCOUNTER — COMMUNICATION - HEALTHEAST (OUTPATIENT)
Dept: FAMILY MEDICINE | Facility: CLINIC | Age: 78
End: 2018-08-14

## 2018-08-14 DIAGNOSIS — F41.9 ANXIETY DISORDER: ICD-10-CM

## 2018-08-27 ENCOUNTER — OFFICE VISIT - HEALTHEAST (OUTPATIENT)
Dept: FAMILY MEDICINE | Facility: CLINIC | Age: 78
End: 2018-08-27

## 2018-08-27 DIAGNOSIS — F41.1 GENERALIZED ANXIETY DISORDER: ICD-10-CM

## 2018-08-27 DIAGNOSIS — G47.00 INSOMNIA: ICD-10-CM

## 2018-08-27 ASSESSMENT — MIFFLIN-ST. JEOR: SCORE: 956.9

## 2018-10-04 ENCOUNTER — RECORDS - HEALTHEAST (OUTPATIENT)
Dept: ADMINISTRATIVE | Facility: OTHER | Age: 78
End: 2018-10-04

## 2018-10-17 ENCOUNTER — OFFICE VISIT - HEALTHEAST (OUTPATIENT)
Dept: FAMILY MEDICINE | Facility: CLINIC | Age: 78
End: 2018-10-17

## 2018-10-17 DIAGNOSIS — Z00.00 ROUTINE GENERAL MEDICAL EXAMINATION AT A HEALTH CARE FACILITY: ICD-10-CM

## 2018-10-17 DIAGNOSIS — E78.00 HYPERCHOLESTEROLEMIA: ICD-10-CM

## 2018-10-17 DIAGNOSIS — Z12.31 ENCOUNTER FOR SCREENING MAMMOGRAM FOR BREAST CANCER: ICD-10-CM

## 2018-10-17 DIAGNOSIS — M54.42 LEFT-SIDED LOW BACK PAIN WITH LEFT-SIDED SCIATICA: ICD-10-CM

## 2018-10-17 ASSESSMENT — MIFFLIN-ST. JEOR: SCORE: 950.55

## 2018-10-19 ENCOUNTER — COMMUNICATION - HEALTHEAST (OUTPATIENT)
Dept: FAMILY MEDICINE | Facility: CLINIC | Age: 78
End: 2018-10-19

## 2018-10-19 DIAGNOSIS — Z12.31 ENCOUNTER FOR SCREENING MAMMOGRAM FOR BREAST CANCER: ICD-10-CM

## 2018-10-22 ENCOUNTER — COMMUNICATION - HEALTHEAST (OUTPATIENT)
Dept: FAMILY MEDICINE | Facility: CLINIC | Age: 78
End: 2018-10-22

## 2018-10-22 ENCOUNTER — TELEPHONE (OUTPATIENT)
Dept: FAMILY MEDICINE | Facility: CLINIC | Age: 78
End: 2018-10-22

## 2018-10-22 PROBLEM — M19.019 DJD OF SHOULDER: Status: ACTIVE | Noted: 2017-08-22

## 2018-10-22 PROBLEM — M81.0 OSTEOPOROSIS: Status: ACTIVE | Noted: 2018-10-22

## 2018-10-22 NOTE — TELEPHONE ENCOUNTER
Dr. Bardales asked triage to contact patient regarding today's appointment to establish care.    Per Dr. Bardales:  1. Upon review of Care Everywhere, patient has been previously prescribed Clonazepam for sleep  2. Dr. Bardales asks patient to be informed she does not prescribe ongoing controlled substance medications   A. Patient can be referred to psychiatry or Dr. Bardales can suggest alternative medication options      AAYE LomaxN, RN

## 2018-10-22 NOTE — TELEPHONE ENCOUNTER
Gave this message to pt.  She was hoping to transfer care closer to her home.    She is stable on the Clonazepam.( She is a former chem dep counselor).  She wants to stay on this med.  She will stay with her [facility] provider.    PT is on a low dose 0.025 mg of clonazepam.  She wanted Dr. Bardales to know the importance of tapering off this med. Risk of seizure.  KEZIAI    PT cancelled appt for today with Dr. Bardales.    OK to close.  PONCHO Laboy

## 2018-10-22 NOTE — TELEPHONE ENCOUNTER
Left message to call back and ask to speak with an available triage nurse.  BORA Fernandes, AYEN, RN

## 2018-10-30 ENCOUNTER — COMMUNICATION - HEALTHEAST (OUTPATIENT)
Dept: FAMILY MEDICINE | Facility: CLINIC | Age: 78
End: 2018-10-30

## 2018-11-04 ENCOUNTER — OFFICE VISIT (OUTPATIENT)
Dept: URGENT CARE | Facility: URGENT CARE | Age: 78
End: 2018-11-04
Payer: COMMERCIAL

## 2018-11-04 VITALS
WEIGHT: 121 LBS | BODY MASS INDEX: 22.13 KG/M2 | DIASTOLIC BLOOD PRESSURE: 92 MMHG | HEART RATE: 75 BPM | OXYGEN SATURATION: 98 % | SYSTOLIC BLOOD PRESSURE: 192 MMHG | TEMPERATURE: 98.4 F

## 2018-11-04 DIAGNOSIS — H61.21 IMPACTED CERUMEN OF RIGHT EAR: Primary | ICD-10-CM

## 2018-11-04 DIAGNOSIS — I10 HYPERTENSION, UNCONTROLLED: ICD-10-CM

## 2018-11-04 PROCEDURE — 99213 OFFICE O/P EST LOW 20 MIN: CPT | Performed by: FAMILY MEDICINE

## 2018-11-04 NOTE — PROGRESS NOTES
SUBJECTIVE:   July Huang is a 78 year old female presenting with a chief complaint of right ear pain.  Patient states that got water in her ear, was not improving so she used ear drops yesterday.  This made ear pain worse, now unable to hear well.  Patient has had ear wax irrigated before  Onset of symptoms was 3 day(s) ago.  Course of illness is worsening.    Severity moderate  Current and Associated symptoms: right ear pain, decrease hearing  Treatment measures tried include ear drops.  Predisposing factors include : sciatic back pain.    Patient has struggled with elevated BP, currently diet controlled as she had significant side effects with BP medications.      Past Medical History:   Diagnosis Date     Cancer of ovary (H)      Eye muscle weakness, left 2/17/2016     GERD (gastroesophageal reflux disease) 7/8/2009     Irregular heart beat 2009    nl CT angiogram     Non-cardiac chest pain 7/8/2009     S/P coronary angiogram 2005    negative     Stress 12/22/2010     Upper back pain 2/17/2016     Current Outpatient Prescriptions   Medication Sig Dispense Refill     TOMEKA-MAG OR Daily       Cholecalciferol (VITAMIN D) 1000 UNIT capsule Take 1 capsule by mouth daily. 5000 unit 2-3 times per week       ClonazePAM (KLONOPIN PO)        COD LIVER OIL PO Take  by mouth.       Coenzyme Q10 (CO Q-10 PO) Take  by mouth.       MULTI-VITAMIN OR Daily       Social History   Substance Use Topics     Smoking status: Former Smoker     Quit date: 11/17/1994     Smokeless tobacco: Never Used     Alcohol use Yes      Comment: ocas       ROS:  Review of systems negative except as stated above.    OBJECTIVE:  /80  Pulse 75  Temp 98.4  F (36.9  C) (Tympanic)  Wt 121 lb (54.9 kg)  SpO2 98%  BMI 22.13 kg/m2  GENERAL APPEARANCE: healthy, alert and no distress  EYES: EOMI,  PERRL, conjunctiva clear  HENT: left ear canal and TM normal, right ear canal with cerumen impaction, successful removal with irrigation, TM normal.   Nose and mouth without ulcers, erythema or lesions  RESP: lungs clear to auscultation - no rales, rhonchi or wheezes  CV: regular rates and rhythm, normal S1 S2, no murmur noted  SKIN: no suspicious lesions or rashes    ASSESSMENT/PLAN:  (H61.21) Impacted cerumen of right ear  (primary encounter diagnosis)  Plan: successful removal with irrigation by nursing staff    (I10) Hypertension, uncontrolled  Plan: will follow up with PCP      Reassurance given in regards to successful irrigation of cerumen impaction.  Okay to try debrox OTC to help with cerumen.    Discussed elevated BP, patient states that has been eating foods that have been more salty, has been struggling with sciatic though recent BP reading was relatively okay.  Patient has side effects with BP medications and not keen on taking, she will follow up with her PCP in regards to HTN.  Reviewed that if feels any headache, CP, SOB to go to ER.    Follow up with PCP for BP management in 1-2 days.    Kahlil Bey MD  November 4, 2018 4:41 PM

## 2018-11-04 NOTE — MR AVS SNAPSHOT
After Visit Summary   11/4/2018    July Huang    MRN: [id number]           Patient Information     Date Of Birth          1940        Visit Information        Provider Department      11/4/2018 2:20 PM Kahlil Bey MD [facility]        Today's Diagnoses     Impacted cerumen of right ear    -  1    Hypertension, uncontrolled          Care Instructions      Earwax, Home Treatment    Everyone produces earwax from the lining of the ear canal. It serves to lubricate and protect the ear. The wax that forms in the canal naturally moves toward the outside of the ear and falls out. Sometimes the ear canal may contain too much wax. This can cause a blockage and loss of hearing. Directions are given below for home treatment.  Home care  If your doctor has advised you to remove a wax blockage yourself, follow these directions:    Unless a medicine was prescribed, you may use an over-the-counter product made for clearing earwax. These contain carbamide peroxide. Lie down with the blocked ear facing upward. Apply one dropper full of medicine and wait a few minutes. Grasp the outer ear and wiggle it to help the solution enter the canal.    Lean over a sink or basin with the blocked ear facing downward. Use a bulb syringe filled with warm (not hot or cold) water to rinse the ear several times. Use gentle pressure only.    If you are having trouble draining the water out of your ear canal, put a few drops of rubbing alcohol (isopropyl alcohol) into the ear canal. This will help remove the remaining water.    Repeat this procedure once a day for up to three days, or until your hearing is back to normal. Do not use this treatment for more than three days in a row.  Don ts    Don t use cold water to rinse the ear. This will make you dizzy.    Don t perform this procedure if you have an ear infection.    Don t perform this procedure if you have a ruptured eardrum.    Don t use cotton  swabs, matches, hairpins, keys, or other objects to  clean  the ear canal. This can cause infection of the ear canal or rupture the eardrum. Because of their size and shape, cotton swabs can push earwax deeper into the ear canal instead of removing it.  Follow-up care  Follow up with your health care provider if you are not improving after three cleaning attempts, or as advised.  When to seek medical advice  Call your health care provider right away if any of these occur:    Worsening ear pain    Fever of 101 F (38.3 C) or higher, or as directed by your health care provider    Hearing does not return to normal after three days of treatment    Fluid drainage or bleeding from the ear canal    Swelling, redness, or tenderness of the outer ear    Headache, neck pain, or stiff neck    5016-8246 [employer]. [address] [place] [address]. All rights reserved. This information is not intended as a substitute for professional medical care. Always follow your healthcare professional's instructions.                Follow-ups after your visit        Who to contact     If you have questions or need follow up information about today's clinic visit or your schedule please contact [facility] directly at [phone].  Normal or non-critical lab and imaging results will be communicated to you by [employer].cohart, letter or phone within 4 business days after the clinic has received the results. If you do not hear from us within 7 days, please contact the clinic through [employer] or phone. If you have a critical or abnormal lab result, we will notify you by phone as soon as possible.  Submit refill requests through [employer] or call your pharmacy and they will forward the refill request to us. Please allow 3 business days for your refill to be completed.          Additional Information About Your Visit        [employer] Information     [employer] lets you send messages to your doctor, view your test  "results, renew your prescriptions, schedule appointments and more. To sign up, go to [email or web address] . Click on \"Log in\" on the left side of the screen, which will take you to the Welcome page. Then click on \"Sign up Now\" on the right side of the page.     You will be asked to enter the access code listed below, as well as some personal information. Please follow the directions to create your username and password.     Your access code is: [email or web address]  Expires: 2019  7:43 AM     Your access code will  in 90 days. If you need help or a new code, please call your [place] clinic or [phone].        Care EveryWhere ID     This is your Care EveryWhere ID. This could be used by other organizations to access your [place] medical records  [id number]        Your Vitals Were     Pulse Temperature Pulse Oximetry BMI (Body Mass Index)          75 98.4  F (36.9  C) (Tympanic) 98% 22.13 kg/m2         Blood Pressure from Last 3 Encounters:   18 (!) 192/92   17 133/78   17 142/80    Weight from Last 3 Encounters:   18 121 lb (54.9 kg)   17 120 lb (54.4 kg)   17 120 lb (54.4 kg)              Today, you had the following     No orders found for display       Primary Care Provider Office Phone # Fax #    Florence YONATAN Koo [phone]       [place] [address]        Equal Access to Services     [facility]: Hadii vitaliy del toro hadasho Soomaali, waaxda luqadaha, qaybta kaalmada lisa, sita villeda. So [facility] [phone].    ATENCIÓN: Si habla español, tiene a senior disposición servicios gratuitos de asistencia lingüística. Llame al [phone].    We comply with applicable federal civil rights laws and [place] laws. We do not discriminate on the basis of race, color, national origin, age, disability, sex, sexual orientation, or gender identity.            Thank you!     Thank you for " choosing [facility]  for your care. Our goal is always to provide you with excellent care. Hearing back from our patients is one way we can continue to improve our services. Please take a few minutes to complete the written survey that you may receive in the mail after your visit with us. Thank you!             Your Updated Medication List - Protect others around you: Learn how to safely use, store and throw away your medicines at www.disposemymeds.org.          This list is accurate as of 11/4/18  4:22 PM.  Always use your most recent med list.                   Brand Name Dispense Instructions for use Diagnosis    TOMEKA-MAG PO      Daily        CO Q-10 PO      Take  by mouth.        COD LIVER OIL PO      Take  by mouth.        KLONOPIN PO           MULTI-VITAMIN PO      Daily        vitamin D 1000 units capsule      Take 1 capsule by mouth daily. 5000 unit 2-3 times per week

## 2018-11-04 NOTE — PATIENT INSTRUCTIONS
Earwax, Home Treatment    Everyone produces earwax from the lining of the ear canal. It serves to lubricate and protect the ear. The wax that forms in the canal naturally moves toward the outside of the ear and falls out. Sometimes the ear canal may contain too much wax. This can cause a blockage and loss of hearing. Directions are given below for home treatment.  Home care  If your doctor has advised you to remove a wax blockage yourself, follow these directions:    Unless a medicine was prescribed, you may use an over-the-counter product made for clearing earwax. These contain carbamide peroxide. Lie down with the blocked ear facing upward. Apply one dropper full of medicine and wait a few minutes. Grasp the outer ear and wiggle it to help the solution enter the canal.    Lean over a sink or basin with the blocked ear facing downward. Use a bulb syringe filled with warm (not hot or cold) water to rinse the ear several times. Use gentle pressure only.    If you are having trouble draining the water out of your ear canal, put a few drops of rubbing alcohol (isopropyl alcohol) into the ear canal. This will help remove the remaining water.    Repeat this procedure once a day for up to three days, or until your hearing is back to normal. Do not use this treatment for more than three days in a row.  Don ts    Don t use cold water to rinse the ear. This will make you dizzy.    Don t perform this procedure if you have an ear infection.    Don t perform this procedure if you have a ruptured eardrum.    Don t use cotton swabs, matches, hairpins, keys, or other objects to  clean  the ear canal. This can cause infection of the ear canal or rupture the eardrum. Because of their size and shape, cotton swabs can push earwax deeper into the ear canal instead of removing it.  Follow-up care  Follow up with your health care provider if you are not improving after three cleaning attempts, or as advised.  When to seek medical  advice  Call your health care provider right away if any of these occur:    Worsening ear pain    Fever of 101 F (38.3 C) or higher, or as directed by your health care provider    Hearing does not return to normal after three days of treatment    Fluid drainage or bleeding from the ear canal    Swelling, redness, or tenderness of the outer ear    Headache, neck pain, or stiff neck    5601-7935 [employer]. [address] [place] [address]. All rights reserved. This information is not intended as a substitute for professional medical care. Always follow your healthcare professional's instructions.

## 2018-11-06 ENCOUNTER — OFFICE VISIT - HEALTHEAST (OUTPATIENT)
Dept: PHYSICAL THERAPY | Facility: REHABILITATION | Age: 78
End: 2018-11-06

## 2018-11-06 DIAGNOSIS — G57.02 PIRIFORMIS SYNDROME, LEFT: ICD-10-CM

## 2018-11-06 DIAGNOSIS — M54.32 SCIATICA OF LEFT SIDE: ICD-10-CM

## 2018-11-13 ENCOUNTER — OFFICE VISIT - HEALTHEAST (OUTPATIENT)
Dept: PHYSICAL THERAPY | Facility: REHABILITATION | Age: 78
End: 2018-11-13

## 2018-11-13 DIAGNOSIS — G57.02 PIRIFORMIS SYNDROME, LEFT: ICD-10-CM

## 2018-11-13 DIAGNOSIS — M54.32 SCIATICA OF LEFT SIDE: ICD-10-CM

## 2018-11-20 ENCOUNTER — OFFICE VISIT - HEALTHEAST (OUTPATIENT)
Dept: PHYSICAL THERAPY | Facility: REHABILITATION | Age: 78
End: 2018-11-20

## 2018-11-20 DIAGNOSIS — G57.02 PIRIFORMIS SYNDROME, LEFT: ICD-10-CM

## 2018-11-20 DIAGNOSIS — M54.32 SCIATICA OF LEFT SIDE: ICD-10-CM

## 2018-11-28 ENCOUNTER — OFFICE VISIT - HEALTHEAST (OUTPATIENT)
Dept: PHYSICAL THERAPY | Facility: REHABILITATION | Age: 78
End: 2018-11-28

## 2018-11-28 DIAGNOSIS — M54.32 SCIATICA OF LEFT SIDE: ICD-10-CM

## 2018-11-28 DIAGNOSIS — G57.02 PIRIFORMIS SYNDROME, LEFT: ICD-10-CM

## 2018-12-04 ENCOUNTER — OFFICE VISIT - HEALTHEAST (OUTPATIENT)
Dept: PHYSICAL THERAPY | Facility: REHABILITATION | Age: 78
End: 2018-12-04

## 2018-12-04 DIAGNOSIS — G57.02 PIRIFORMIS SYNDROME, LEFT: ICD-10-CM

## 2018-12-04 DIAGNOSIS — M54.32 SCIATICA OF LEFT SIDE: ICD-10-CM

## 2018-12-18 ENCOUNTER — OFFICE VISIT - HEALTHEAST (OUTPATIENT)
Dept: PHYSICAL THERAPY | Facility: REHABILITATION | Age: 78
End: 2018-12-18

## 2018-12-18 DIAGNOSIS — G57.02 PIRIFORMIS SYNDROME, LEFT: ICD-10-CM

## 2018-12-18 DIAGNOSIS — M54.32 SCIATICA OF LEFT SIDE: ICD-10-CM

## 2019-01-03 ENCOUNTER — OFFICE VISIT - HEALTHEAST (OUTPATIENT)
Dept: PHYSICAL THERAPY | Facility: REHABILITATION | Age: 79
End: 2019-01-03

## 2019-01-03 DIAGNOSIS — G57.02 PIRIFORMIS SYNDROME, LEFT: ICD-10-CM

## 2019-01-03 DIAGNOSIS — M54.32 SCIATICA OF LEFT SIDE: ICD-10-CM

## 2019-01-10 ENCOUNTER — OFFICE VISIT - HEALTHEAST (OUTPATIENT)
Dept: PHYSICAL THERAPY | Facility: REHABILITATION | Age: 79
End: 2019-01-10

## 2019-01-10 DIAGNOSIS — M54.32 SCIATICA OF LEFT SIDE: ICD-10-CM

## 2019-01-10 DIAGNOSIS — G57.02 PIRIFORMIS SYNDROME, LEFT: ICD-10-CM

## 2019-01-17 ENCOUNTER — OFFICE VISIT - HEALTHEAST (OUTPATIENT)
Dept: PHYSICAL THERAPY | Facility: REHABILITATION | Age: 79
End: 2019-01-17

## 2019-01-17 DIAGNOSIS — G57.02 PIRIFORMIS SYNDROME, LEFT: ICD-10-CM

## 2019-01-17 DIAGNOSIS — M54.32 SCIATICA OF LEFT SIDE: ICD-10-CM

## 2019-01-24 ENCOUNTER — OFFICE VISIT - HEALTHEAST (OUTPATIENT)
Dept: PHYSICAL THERAPY | Facility: REHABILITATION | Age: 79
End: 2019-01-24

## 2019-01-24 DIAGNOSIS — M54.32 SCIATICA OF LEFT SIDE: ICD-10-CM

## 2019-01-24 DIAGNOSIS — G57.02 PIRIFORMIS SYNDROME, LEFT: ICD-10-CM

## 2019-02-01 ENCOUNTER — OFFICE VISIT - HEALTHEAST (OUTPATIENT)
Dept: PHYSICAL THERAPY | Facility: REHABILITATION | Age: 79
End: 2019-02-01

## 2019-02-01 DIAGNOSIS — M54.32 SCIATICA OF LEFT SIDE: ICD-10-CM

## 2019-02-01 DIAGNOSIS — G57.02 PIRIFORMIS SYNDROME, LEFT: ICD-10-CM

## 2019-02-15 ENCOUNTER — OFFICE VISIT - HEALTHEAST (OUTPATIENT)
Dept: FAMILY MEDICINE | Facility: CLINIC | Age: 79
End: 2019-02-15

## 2019-02-15 DIAGNOSIS — M54.42 CHRONIC LEFT-SIDED LOW BACK PAIN WITH LEFT-SIDED SCIATICA: ICD-10-CM

## 2019-02-15 DIAGNOSIS — Z00.00 PREVENTATIVE HEALTH CARE: ICD-10-CM

## 2019-02-15 DIAGNOSIS — F41.1 GENERALIZED ANXIETY DISORDER: ICD-10-CM

## 2019-02-15 DIAGNOSIS — G89.29 CHRONIC LEFT-SIDED LOW BACK PAIN WITH LEFT-SIDED SCIATICA: ICD-10-CM

## 2019-02-19 ENCOUNTER — OFFICE VISIT - HEALTHEAST (OUTPATIENT)
Dept: PHYSICAL THERAPY | Facility: REHABILITATION | Age: 79
End: 2019-02-19

## 2019-02-19 DIAGNOSIS — M54.32 SCIATICA OF LEFT SIDE: ICD-10-CM

## 2019-02-19 DIAGNOSIS — G57.02 PIRIFORMIS SYNDROME, LEFT: ICD-10-CM

## 2019-03-18 ENCOUNTER — RECORDS - HEALTHEAST (OUTPATIENT)
Dept: ADMINISTRATIVE | Facility: OTHER | Age: 79
End: 2019-03-18

## 2019-03-20 ENCOUNTER — COMMUNICATION - HEALTHEAST (OUTPATIENT)
Dept: FAMILY MEDICINE | Facility: CLINIC | Age: 79
End: 2019-03-20

## 2019-03-21 ENCOUNTER — PATIENT OUTREACH (OUTPATIENT)
Dept: CARE COORDINATION | Facility: CLINIC | Age: 79
End: 2019-03-21

## 2019-03-21 ENCOUNTER — COMMUNICATION - HEALTHEAST (OUTPATIENT)
Dept: SCHEDULING | Facility: CLINIC | Age: 79
End: 2019-03-21

## 2019-03-21 ASSESSMENT — ACTIVITIES OF DAILY LIVING (ADL): DEPENDENT_IADLS:: INDEPENDENT

## 2019-03-21 NOTE — PROGRESS NOTES
Clinic Care Coordination Contact  [facility]/Voicemail    Referral Source: IP Report  From chart review:  -----------------------------------  ADMISSION DATE: 3/18/2019  DISCHARGE DATE: 3/20/2019     Dear Florence Koo MD    It was a pleasure taking care of July Huang during this hospitalization. She will be discharged from [facility] to home.     PRINCIPAL DIAGNOSIS CAUSING ADMISSION     Vertigo    PATIENT'S ACTIVE HOSPITAL PROBLEM LIST   Principal Problem:  Vertigo  Active Problems:  GERD (gastroesophageal reflux disease)  Benign meningioma of brain (HC)  History of ovarian cancer  Nausea and vomiting  Headache  Sciatica of left side  ADDITIONAL COMMENTS REGARDING DIAGNOSIS SPECIFICITY  Additional Diagnosis Information     BRIEF HOSPITAL COURSE     July Huang is a 78 y.o. female with a history of benign meningioma of brain, ovarian cancer, sciatica of left side, and GERD who was admitted to [facility] on 3/18/2019 after presenting to [facility] ED for evaluation of nausea, vomiting, and vertigo.        The morning of admission, the patient woke up with vertigo and posterior neck pain. Her vertigo continued with changes in position; she felt alright if she did not move. About an hour after her initial symptoms, she began to feel chills and nausea before vomiting up bile-like fluid. Her nausea went away after she laid down but came back after standing. Due to the continuation of her symptoms, the patient presented to [facility] ED for further evaluation.     While in the ED, the patient was hypertensive with blood pressure 199/89 with a slightly increased RR of 24. Labs were remarkable for potassium 3.3 and a negative troponin. MRA neck showed about 50% stenosis of the distal left vertebral artery and mild narrowing of the bilateral carotid bulbs but no evidence of distal ischemia. The patient was given Ativan, Zofran, IVF, and admitted for further evaluation.      There were no clinical features of  posterior circulation and neurology concurred with the diagnosis of acute vertigo. Patient was seen by physical therapy and was treated with Epley maneuvers. She had an excellent response to this; her resting vertigo resolved in total and she was left with only a mild sense of unsteadiness with activity. She will be discharged home today. If her vertigo does not resolve in total or recurs later on we recommend that she be seen by physical therapy to review  maneuvers, or to be seen at the [facility] for Dizziness and balance in [place].      Clinical Data: Care Coordinator Outreach  Outreach attempted x 1.  Left message on voicemail with call back information and requested return call.  Plan:  Care Coordinator will try to reach patient again in 1-2 business days.    Clinic Care Coordination Contact    Clinic Care Coordination Contact  OUTREACH    Referral Information:  Referral Source: IP Report    Primary Diagnosis: (vertigo)    Chief Complaint   Patient presents with     Clinic Care Coordination - Post Hospital     DC'd from [facility] on 3/20/19 to home self care        Clinical Concerns:  Current Medical Concerns:  Call back from patient who is not having vertigo, but is not feeling well. Has body aches, fatigue, chills, no fever, and head ache. Tylenol helps some.  Ate breakfast and is not feeling nausea yet.  No vertigo. Has PCP OV set up for 3-29.  Would like to be seen sooner to assess her symptoms.       Patient/Caregiver understanding: patient is usually active and independent.  Discussed options. She will call clinic and talk with triage nurse about her symptoms and will see if there is an appointment available prior to 3-29.  No need for ongoing care coordination.     Plan: No further outreach by this CC.      Shruti Stein,   [facility]  [email or web address]  [phone]

## 2019-03-21 NOTE — PATIENT INSTRUCTIONS
DC'd  from [facility] on 3/20/19 to home self care   Primary Problem: Vertigo  LACE Score: 53

## 2019-03-25 ENCOUNTER — COMMUNICATION - HEALTHEAST (OUTPATIENT)
Dept: FAMILY MEDICINE | Facility: CLINIC | Age: 79
End: 2019-03-25

## 2019-03-25 ENCOUNTER — OFFICE VISIT - HEALTHEAST (OUTPATIENT)
Dept: FAMILY MEDICINE | Facility: CLINIC | Age: 79
End: 2019-03-25

## 2019-03-25 DIAGNOSIS — D32.9 MENINGIOMA (H): ICD-10-CM

## 2019-03-25 DIAGNOSIS — I65.23 BILATERAL CAROTID ARTERY STENOSIS: ICD-10-CM

## 2019-03-25 DIAGNOSIS — R42 VERTIGO: ICD-10-CM

## 2019-03-25 DIAGNOSIS — R68.83 CHILLS: ICD-10-CM

## 2019-03-25 DIAGNOSIS — I10 ESSENTIAL HYPERTENSION, BENIGN: ICD-10-CM

## 2019-03-25 LAB
ALBUMIN UR-MCNC: NEGATIVE MG/DL
APPEARANCE UR: CLEAR
BILIRUB UR QL STRIP: NEGATIVE
COLOR UR AUTO: YELLOW
GLUCOSE UR STRIP-MCNC: NEGATIVE MG/DL
HGB UR QL STRIP: NEGATIVE
KETONES UR STRIP-MCNC: NEGATIVE MG/DL
LEUKOCYTE ESTERASE UR QL STRIP: NEGATIVE
NITRATE UR QL: NEGATIVE
PH UR STRIP: 7 [PH] (ref 5–8)
SP GR UR STRIP: 1.01 (ref 1–1.03)
UROBILINOGEN UR STRIP-ACNC: NORMAL

## 2019-03-26 ENCOUNTER — AMBULATORY - HEALTHEAST (OUTPATIENT)
Dept: FAMILY MEDICINE | Facility: CLINIC | Age: 79
End: 2019-03-26

## 2019-03-26 DIAGNOSIS — R42 VERTIGO: ICD-10-CM

## 2019-03-27 ENCOUNTER — COMMUNICATION - HEALTHEAST (OUTPATIENT)
Dept: FAMILY MEDICINE | Facility: CLINIC | Age: 79
End: 2019-03-27

## 2019-06-07 ENCOUNTER — AMBULATORY - HEALTHEAST (OUTPATIENT)
Dept: LAB | Facility: CLINIC | Age: 79
End: 2019-06-07

## 2019-06-07 DIAGNOSIS — E78.00 HYPERCHOLESTEROLEMIA: ICD-10-CM

## 2019-06-07 LAB
CHOLEST SERPL-MCNC: 261 MG/DL
FASTING STATUS PATIENT QL REPORTED: YES
HDLC SERPL-MCNC: 49 MG/DL
LDLC SERPL CALC-MCNC: 190 MG/DL
TRIGL SERPL-MCNC: 110 MG/DL

## 2019-06-26 ENCOUNTER — OFFICE VISIT - HEALTHEAST (OUTPATIENT)
Dept: FAMILY MEDICINE | Facility: CLINIC | Age: 79
End: 2019-06-26

## 2019-06-26 DIAGNOSIS — R03.0 ELEVATED BLOOD PRESSURE READING WITHOUT DIAGNOSIS OF HYPERTENSION: ICD-10-CM

## 2019-06-26 DIAGNOSIS — S29.011A MUSCLE STRAIN OF CHEST WALL, INITIAL ENCOUNTER: ICD-10-CM

## 2019-06-26 DIAGNOSIS — E78.00 HYPERCHOLESTEROLEMIA: ICD-10-CM

## 2019-06-26 ASSESSMENT — MIFFLIN-ST. JEOR: SCORE: 972.33

## 2019-06-28 ENCOUNTER — NURSE TRIAGE (OUTPATIENT)
Dept: NURSING | Facility: CLINIC | Age: 79
End: 2019-06-28

## 2019-06-29 ENCOUNTER — ANCILLARY PROCEDURE (OUTPATIENT)
Dept: GENERAL RADIOLOGY | Facility: CLINIC | Age: 79
End: 2019-06-29
Attending: PHYSICIAN ASSISTANT
Payer: COMMERCIAL

## 2019-06-29 ENCOUNTER — OFFICE VISIT (OUTPATIENT)
Dept: URGENT CARE | Facility: URGENT CARE | Age: 79
End: 2019-06-29
Payer: COMMERCIAL

## 2019-06-29 VITALS
TEMPERATURE: 97.8 F | HEART RATE: 89 BPM | OXYGEN SATURATION: 99 % | SYSTOLIC BLOOD PRESSURE: 137 MMHG | WEIGHT: 121 LBS | DIASTOLIC BLOOD PRESSURE: 88 MMHG | BODY MASS INDEX: 22.13 KG/M2

## 2019-06-29 DIAGNOSIS — R07.81 RIB PAIN: ICD-10-CM
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
## 2019-06-29 DIAGNOSIS — M94.0 COSTOCHONDRITIS: Primary | ICD-10-CM

## 2019-06-29 PROCEDURE — 99214 OFFICE O/P EST MOD 30 MIN: CPT | Performed by: PHYSICIAN ASSISTANT

## 2019-06-29 PROCEDURE — 71111 X-RAY EXAM RIBS/CHEST4/> VWS: CPT

## 2019-06-29 NOTE — TELEPHONE ENCOUNTER
Fell last week at George's house when out out onto patio there was a small step she did not see. Going to [place] urgent care on Saturday.      Reason for Disposition    [1] High-risk adult (e.g., age > 60, osteoporosis, chronic steroid use) AND [2] still hurts    Additional Information    Negative: Dangerous mechanism of injury (e.g., MVA, contact sports, trampoline, diving, fall > 10 feet or 3 meters)  (Exception: back pain began > 1 hour after injury)    Negative: [1] Weakness (i.e., paralysis, loss of muscle strength) of the leg(s) or foot AND [2] sudden onset after back injury    Negative: [1] Numbness (i.e., loss of sensation) of the leg(s) or foot AND [2] sudden onset after back injury    Negative: [1] Major bleeding (e.g., actively dripping or spurting) AND [2] can't be stopped    Negative: Shock suspected (e.g., cold/pale/clammy skin, too weak to stand, low BP, rapid pulse)    Negative: Bullet wound, knife wound, or other penetrating object    Negative: Sounds like a life-threatening emergency to the triager    Negative: [1] SEVERE PAIN in kidney area (flank) AND [2] follows direct blow to that site    Negative: Blood in urine (red, pink, or tea-colored)    Negative: [1] Urinary or bowel incontinence (i.e., loss of bladder or bowel control) AND [2] new onset    Negative: [1] Unable to urinate (or only a few drops) > 4 hours AND     [2] bladder feels very full (e.g., palpable bladder or strong urge to urinate)    Negative: Numbness (loss of sensation) in groin or rectal area    Negative: Skin is split open or gaping  (or length > 1/2 inch or 12 mm)    Negative: [1] Bleeding AND [2] won't stop after 10 minutes of direct pressure (using correct technique)    Negative: Sounds like a serious injury to the triager    Negative: Puncture wound of back    Negative: Weakness of a leg or foot (e.g., unable to bear weight, dragging foot)    Negative: Numbness of a leg or foot (i.e.., loss of sensation)    Negative:  "[1] SEVERE pain (e.g., excruciating) AND [2] not improved 2 hours after pain medicine/ice packs    Negative: Pain radiates into the thigh or further down the leg now    Negative: [1] Landed hard on feet or buttocks AND [2] pain over spine    Negative: Suspicious history for the injury    Negative: Patient is confused or is an unreliable provider of information (e.g., dementia, profound mental retardation, alcohol intoxication)    Answer Assessment - Initial Assessment Questions  1. MECHANISM: \"How did the injury happen?\" (Consider the possibility of domestic violence or elder abuse)      Fell forward off of step onto patio lading on knees and palms of hand.  2. ONSET: \"When did the injury happen?\" (Minutes or hours ago)      1 week ago  3. LOCATION: \"What part of the back is injured?\"      Upper back and sturnum  4. SEVERITY: \"Can you move the back normally?\"      Can do move upper back hurts  5. PAIN: \"Is there any pain?\" If so, ask: \"How bad is the pain?\"   (Scale 1-10; or mild, moderate, severe)      Moderate lays flat on back pain is   6. CORD SYMPTOMS: Any weakness or numbness of the arms or legs?\"      When lift something heavier get pain in sturnum  7. SIZE: For cuts, bruises, or swelling, ask: \"How large is it?\" (e.g., inches or centimeters)      R knee small cut.  8. TETANUS: For any breaks in the skin, ask: \"When was the last tetanus booster?\"      *No Answer*  9. OTHER SYMPTOMS: \"Do you have any other symptoms?\" (e.g., abdominal pain, blood in urine)      *No Answer*  10. PREGNANCY: \"Is there any chance you are pregnant?\" \"When was your last menstrual period?\"        N/E    Protocols used: BACK INJURY-A-AH      "

## 2019-06-29 NOTE — PATIENT INSTRUCTIONS
Your xrays did not show any broken ribs. The lungs looked normal.    Most likely your symptoms are due to muscular pain.    Recommend ibuprofen 600mg (3 regular strength tabs) every 6 hours for pain. Take with food. Try taking this on a scheduled basis over the next 2-3 days and see if you have improvement.    May use heating pad to help with pain.    Follow up with your doctor if no improvement in 1 week. Be seen sooner if worsening or new symptoms such as shortness of breath or cough.

## 2019-06-29 NOTE — PROGRESS NOTES
"SUBJECTIVE:   July Huang is a 78 year old female presenting for evaluation of   Chief Complaint   Patient presents with     Urgent Care     Back Pain     c/o back pain and chest wall pain after fall a week ago   .  Last week she missed step at her son's house and feel forward onto her chest. She caught herself on her chest and arms in a \"push up\" position. She did not hit her head or face.   She complains of pain in the sternum and her upper back. Describes a sensation of \"pressure.\" she thought her pain was getting better over the last week. She saw an acupuncturist and felt much worse after having to lay down flat on her back. She normally sleeps on her side so does not have pain with sleeping.  Pain radiates up and outward from the sternal area, and is worse when she lifts her arms up over her head.  She has mild pain in both arm muscles. She can move her arms and legs just fine, however. She is walking fine.  She has not seen any bruising.   She denies pain with breathing normally, but states she feels pain in her sternum and upper back when she takes a deep breath. Denies shortness of breath. No cough.   No headache, blurry vision, nausea, vomiting, dizziness.   She does not take any blood thinners.  She took some Advil for pain initially and has been taking Tylenol since then.    ROS   See HPI      PMH:  Past Medical History:   Diagnosis Date     Cancer of ovary (H)      Eye muscle weakness, left 2/17/2016     GERD (gastroesophageal reflux disease) 7/8/2009     Irregular heart beat 2009    nl CT angiogram     Non-cardiac chest pain 7/8/2009     S/P coronary angiogram 2005    negative     Stress 12/22/2010     Upper back pain 2/17/2016     Patient Active Problem List    Diagnosis Date Noted     Osteoporosis 10/22/2018     Priority: Medium     Overview:   Created by Conversion    Replacement Utility updated for latest IMO load       Palpitations 10/22/2018     Priority: Medium     Overview:   Created by " Conversion       DJD of shoulder 2017     Priority: Medium     Anxiety disorder 2016     Priority: Medium     Pelvic prolapse 2016     Priority: Medium     Benign meningioma of brain (H) 2016     Priority: Medium     Hypercholesterolemia 2016     Priority: Medium     Malignant neoplasm of urinary bladder (H) 2016     Priority: Medium     Overview:   Treated by cystoscopy       Advanced directives, counseling/discussion 2011     Priority: Medium     Advance Directive Problem List Overview:   Name Relationship Phone    Primary Health Care Agent            Alternative Health Care Agent          Discussed advance care planning with patient; information given to patient to review. 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 5000 unit 2-3 times per week       ClonazePAM (KLONOPIN PO)        COD LIVER OIL PO Take  by mouth.       Coenzyme Q10 (CO Q-10 PO) Take  by mouth.       MULTI-VITAMIN OR Daily         Surgical History:  Past Surgical History:   Procedure Laterality Date     HYSTERECTOMY, PAP NO LONGER INDICATED      ELZBIETA BSO     OVARY SURGERY         Family history:  Family History   Problem Relation Age of Onset     Cerebrovascular Disease Mother      Cancer Sister         lung cancer     Asthma Sister      Neurologic Disorder Sister         parkinson's          Social History:  Social History     Tobacco Use     Smoking status: Former Smoker     Last attempt to quit: 1994     Years since quittin.6     Smokeless tobacco: Never Used   Substance Use Topics     Alcohol use: Yes     Comment: ocas         OBJECTIVE  BP  137/88   Pulse 89   Temp 97.8  F (36.6  C) (Tympanic)   Wt 54.9 kg (121 lb)   SpO2 99%   Breastfeeding? No   BMI 22.13 kg/m      Physical Exam    General Appearance:  Alert, cooperative, no distress, appears stated age. Afebrile. Appears comfortable sitting in chair. Rises from seated position without difficulty.  Integument: Warm, dry, no rashes or lesions.  There is a dime-sized, yellow, fading ecchymosis over the right lower sternal border. No other ecchymosis of chest or back.  HEENT: Atraumatic, normocephalic. Face nontraumatic. Conjunctiva clear, Lids normal.  Respiratory: No distress. Lungs clear to ausculation bilaterally. No crackles, wheezes, rhonchi or stridor.  Cardiovascular: Regular rate and rhythm, no murmur, rub or gallop. No obvious chest wall deformities.  Abdomen: soft, nontender, non-distended. No masses.  Musculoskeletal: Back: no midline spinal tenderness.  Chest wall: There is point tenderness over right lower rib-sternal border. No crepitus. No other chest wall tenderness.    Gait: observed, no antalgia or abnormalities. Steady gait.  Neurologic: Alert and orientated appropriately. No focal deficits.  Psych: Normal mood and affect.            Labs:  Results for orders placed or performed in visit on 06/29/19 (from the past 24 hour(s))   XR Ribs & Chest Bilateral G/E 4 Views    Narrative    RIBS AND CHEST BILATERAL FOUR OR MORE VIEWS   6/29/2019 11:57 AM     HISTORY: Anterior rib pain, sternocostal border, fall. Fall due to  stumbling, initial encounter. Rib pain.    COMPARISON: None.      Impression    IMPRESSION: Cardiomediastinal silhouette is normal. Lungs are clear.  No pleural fluid. No pneumothorax. No definite evidence of rib  fracture.       X-Ray was done, my findings are: no rib fractures. Lungs clear.        ASSESSMENT/PLAN:      ICD-10-CM    1. Costochondritis M94.0    2. Fall (on) (from) other stairs and steps, initial encounter W10.8XXA XR Ribs & Chest Bilateral G/E 4  Views   3. Rib pain R07.81 XR Ribs & Chest Bilateral G/E 4 Views        Medical Decision Making:    Serious Comorbid Conditions: HTN, well-controlled    Patient presents 1 week after a mechanical fall- tripped down a step- in which she landed on her chest with her arms bracing her fall, almost in push-up position. Patient was vitally normal here, neurologically grossly intact. No head strike, nor symptoms to suggest head injury resultant from the fall. Do not think she needs head imaging today given that this was 1 week ago and again, low risk incident given no head strike.  Patient cpmplains of pain in central chest/sternum area, radiating to the upper back. She has an area of focal tenderness on the right lower rib-sternal border. I did obtain rib xrays to ensure no rib fracture. Highly doubt sternal fracture with this mechanism.  Rib and chest xrays were clear- no fracture, clear lungs.  I highly suspect that pain is muscular pain and/or costochondritis as a result of the fall.  Spine was nontender, thus think upper back pain is likely muscular as well.  Recommend ibuprofen scheduled over the next few days. Recommend heating pad. F/up with PCP if no improvement in 1 week, sooner if worsening.    At the end of the encounter, I discussed all available results with patient. Discussed diagnosis and treatment plan.   Return precautions provided to patient and printed below in patient instructions.  Patient understood and agreed to plan. Patient was appropriate for discharge.        Patient Instructions   Your xrays did not show any broken ribs. The lungs looked normal.    Most likely your symptoms are due to muscular pain.    Recommend ibuprofen 600mg (3 regular strength tabs) every 6 hours for pain. Take with food. Try taking this on a scheduled basis over the next 2-3 days and see if you have improvement.    May use heating pad to help with pain.    Follow up with your doctor if no improvement in 1 week. Be seen  sooner if worsening or new symptoms such as shortness of breath or cough.            Cathi Duckworth PA-C  06/29/19 12:46 PM

## 2019-06-30 ENCOUNTER — COMMUNICATION - HEALTHEAST (OUTPATIENT)
Dept: FAMILY MEDICINE | Facility: CLINIC | Age: 79
End: 2019-06-30

## 2019-07-08 ENCOUNTER — COMMUNICATION - HEALTHEAST (OUTPATIENT)
Dept: FAMILY MEDICINE | Facility: CLINIC | Age: 79
End: 2019-07-08

## 2019-07-08 DIAGNOSIS — M94.0 COSTOCHONDRITIS: ICD-10-CM

## 2019-07-10 ENCOUNTER — OFFICE VISIT - HEALTHEAST (OUTPATIENT)
Dept: PHYSICAL THERAPY | Facility: REHABILITATION | Age: 79
End: 2019-07-10

## 2019-07-10 DIAGNOSIS — R07.81 RIB PAIN: ICD-10-CM

## 2019-07-10 DIAGNOSIS — M54.6 ACUTE BILATERAL THORACIC BACK PAIN: ICD-10-CM

## 2019-07-10 DIAGNOSIS — R07.89 STERNUM PAIN: ICD-10-CM

## 2019-08-15 ENCOUNTER — AMBULATORY - HEALTHEAST (OUTPATIENT)
Dept: LAB | Facility: CLINIC | Age: 79
End: 2019-08-15

## 2019-08-15 DIAGNOSIS — E78.00 HYPERCHOLESTEROLEMIA: ICD-10-CM

## 2019-08-15 LAB
CHOLEST SERPL-MCNC: 276 MG/DL
FASTING STATUS PATIENT QL REPORTED: YES
HDLC SERPL-MCNC: 39 MG/DL
LDLC SERPL CALC-MCNC: 203 MG/DL
TRIGL SERPL-MCNC: 169 MG/DL

## 2019-08-18 ENCOUNTER — COMMUNICATION - HEALTHEAST (OUTPATIENT)
Dept: FAMILY MEDICINE | Facility: CLINIC | Age: 79
End: 2019-08-18

## 2019-08-23 ENCOUNTER — OFFICE VISIT - HEALTHEAST (OUTPATIENT)
Dept: FAMILY MEDICINE | Facility: CLINIC | Age: 79
End: 2019-08-23

## 2019-08-23 DIAGNOSIS — S29.011D MUSCLE STRAIN OF CHEST WALL, SUBSEQUENT ENCOUNTER: ICD-10-CM

## 2019-08-23 DIAGNOSIS — E78.00 HYPERCHOLESTEROLEMIA: ICD-10-CM

## 2019-08-23 DIAGNOSIS — F41.1 GENERALIZED ANXIETY DISORDER: ICD-10-CM

## 2019-10-25 ENCOUNTER — COMMUNICATION - HEALTHEAST (OUTPATIENT)
Dept: FAMILY MEDICINE | Facility: CLINIC | Age: 79
End: 2019-10-25

## 2019-11-19 ENCOUNTER — OFFICE VISIT (OUTPATIENT)
Dept: URGENT CARE | Facility: URGENT CARE | Age: 79
End: 2019-11-19
Payer: COMMERCIAL

## 2019-11-19 VITALS
HEART RATE: 81 BPM | HEIGHT: 62 IN | DIASTOLIC BLOOD PRESSURE: 84 MMHG | BODY MASS INDEX: 22.08 KG/M2 | WEIGHT: 120 LBS | TEMPERATURE: 97.3 F | SYSTOLIC BLOOD PRESSURE: 139 MMHG | OXYGEN SATURATION: 97 %

## 2019-11-19 DIAGNOSIS — J06.9 UPPER RESPIRATORY TRACT INFECTION, UNSPECIFIED TYPE: ICD-10-CM

## 2019-11-19 DIAGNOSIS — H61.21 IMPACTED CERUMEN OF RIGHT EAR: Primary | ICD-10-CM

## 2019-11-19 PROCEDURE — 69209 REMOVE IMPACTED EAR WAX UNI: CPT | Mod: RT | Performed by: PREVENTIVE MEDICINE

## 2019-11-19 PROCEDURE — 99213 OFFICE O/P EST LOW 20 MIN: CPT | Mod: 25 | Performed by: PREVENTIVE MEDICINE

## 2019-11-19 ASSESSMENT — MIFFLIN-ST. JEOR: SCORE: 972.57

## 2019-11-19 NOTE — PATIENT INSTRUCTIONS
Cerumen impaction r ear - my MA irrigated to remove ear wax at which point ear canal and tm looked fine  Advise debrox ear gtt in future as needed  URI - can use flonase to help  With congestion  Follow up if not improving.

## 2019-11-19 NOTE — PROGRESS NOTES
"SUBJECTIVE:  July Huang is a 79 year old female who presents with right ear pain for 2 day(s).   Severity: mild   Timing:sudden onset  Additional symptoms include congestion.      History of recurrent otitis: no    Past Medical History:   Diagnosis Date     Cancer of ovary (H)      Eye muscle weakness, left 2016     GERD (gastroesophageal reflux disease) 2009     Irregular heart beat 2009    nl CT angiogram     Non-cardiac chest pain 2009     S/P coronary angiogram     negative     Stress 2010     Upper back pain 2016     Current Outpatient Medications   Medication Sig Dispense Refill     TOMEKA-MAG OR Daily       Cholecalciferol (VITAMIN D) 1000 UNIT capsule Take 1 capsule by mouth daily. 5000 unit 2-3 times per week       ClonazePAM (KLONOPIN PO)        COD LIVER OIL PO Take  by mouth.       Coenzyme Q10 (CO Q-10 PO) Take  by mouth.       MULTI-VITAMIN OR Daily       Social History     Tobacco Use     Smoking status: Former Smoker     Last attempt to quit: 1994     Years since quittin.0     Smokeless tobacco: Never Used   Substance Use Topics     Alcohol use: Yes     Comment: ocas       ROS:   CONSTITUTIONAL:NEGATIVE for fever, chills, change in weight  INTEGUMENTARY/SKIN: NEGATIVE for worrisome rashes, moles or lesions  EYES: NEGATIVE for vision changes or irritation  RESP:NEGATIVE for significant cough or SOB  CV: NEGATIVE for chest pain, palpitations or peripheral edema  GI: NEGATIVE for nausea, abdominal pain, heartburn, or change in bowel habits  MUSCULOSKELETAL: NEGATIVE for significant arthralgias or myalgia  NEURO: NEGATIVE for weakness, dizziness or paresthesias  ENDOCRINE: NEGATIVE for temperature intolerance, skin/hair changes    OBJECTIVE:  /84   Pulse 81   Temp 97.3  F (36.3  C) (Oral)   Ht 1.575 m (5' 2\")   Wt 54.4 kg (120 lb)   SpO2 97%   BMI 21.95 kg/m     EXAM:  The right TM is normal: no effusions, no erythema, and normal landmarks     The " right auditory canal is obstructed with cerumen  The left TM is normal: no effusions, no erythema, and normal landmarks  The left auditory canal is normal and without drainage, edema or erythema  Oropharynx exam is normal: no lesions, erythema, adenopathy or exudate.  GENERAL: no acute distress  EYES: EOMI,  PERRL, conjunctiva clear  NECK: supple, non-tender to palpation, no adenopathy noted  RESP: lungs clear to auscultation - no rales, rhonchi or wheezes  CV: regular rates and rhythm, normal S1 S2, no murmur noted  SKIN: no suspicious lesions or rashes     ASSESSMENT:  Cerumen impaction r ear - my MA irrigated to remove ear wax at which point ear canal and tm looked fine  Advise debrox ear gtt in future as needed  URI - can use flonase to help  With congestion  Follow up if not improving.    PLAN:  See orders in Epic

## 2019-12-11 ENCOUNTER — COMMUNICATION - HEALTHEAST (OUTPATIENT)
Dept: SCHEDULING | Facility: CLINIC | Age: 79
End: 2019-12-11

## 2019-12-16 ENCOUNTER — OFFICE VISIT (OUTPATIENT)
Dept: URGENT CARE | Facility: URGENT CARE | Age: 79
End: 2019-12-16
Payer: COMMERCIAL

## 2019-12-16 VITALS
TEMPERATURE: 97.5 F | DIASTOLIC BLOOD PRESSURE: 82 MMHG | HEART RATE: 69 BPM | BODY MASS INDEX: 22.08 KG/M2 | HEIGHT: 62 IN | RESPIRATION RATE: 14 BRPM | SYSTOLIC BLOOD PRESSURE: 146 MMHG | WEIGHT: 120 LBS | OXYGEN SATURATION: 97 %

## 2019-12-16 DIAGNOSIS — I10 HYPERTENSION GOAL BP (BLOOD PRESSURE) < 140/90: ICD-10-CM

## 2019-12-16 DIAGNOSIS — J06.9 VIRAL URI WITH COUGH: Primary | ICD-10-CM

## 2019-12-16 PROCEDURE — 99213 OFFICE O/P EST LOW 20 MIN: CPT | Performed by: FAMILY MEDICINE

## 2019-12-16 ASSESSMENT — MIFFLIN-ST. JEOR: SCORE: 972.57

## 2019-12-16 NOTE — PATIENT INSTRUCTIONS
Tylenol every 4-6 hours for discomfort    For cough  1) honey lozenges cough drops  2) dextromethorphan (robitussin/delsym)     If your symptoms get worse (fever, pain with breathing, dizziness) then return to be seen

## 2019-12-16 NOTE — PROGRESS NOTES
Subjective:   July Huang is a 79 year old female who presents for   Chief Complaint   Patient presents with     Urgent Care     URI     sick x 8 days. bad cold symptoms. aches, chills but sweats now. coughing. symptoms come and go, gets worse in the evening, head congestion and ears bothering her.      NO others sick at home. Absent of recorded fevers. Main complaint is of chills thru arms and legs. Discomfort in her chest possibly from cough.   No vomiting/diarrhea. She did receive her flu shot a couple months ago. No obvious postnasal drip. She has been sleeping fine but if she wakes up her cough will be bothersome.   Meds attempted: corcidin       Patient Active Problem List    Diagnosis Date Noted     Osteoporosis 10/22/2018     Priority: Medium     Overview:   Created by Conversion    Replacement Utility updated for latest IMO load       Palpitations 10/22/2018     Priority: Medium     Overview:   Created by Conversion       DJD of shoulder 08/22/2017     Priority: Medium     Anxiety disorder 11/18/2016     Priority: Medium     Pelvic prolapse 11/18/2016     Priority: Medium     Benign meningioma of brain (H) 09/16/2016     Priority: Medium     Hypercholesterolemia 02/17/2016     Priority: Medium     Malignant neoplasm of urinary bladder (H) 01/21/2016     Priority: Medium     Overview:   Treated by cystoscopy       Advanced directives, counseling/discussion 12/16/2011     Priority: Medium     Advance Directive Problem List Overview:   Name Relationship Phone    Primary Health Care Agent            Alternative Health Care Agent          Discussed advance care planning with patient; information given to patient to review. 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      ROS:  As above per HPI    Objective:   BP (!) 146/82   Pulse 69   Temp 97.5  F (36.4  C) (Oral)   Resp 14   Ht 1.575 m (5' 2\")   Wt 54.4 kg (120 lb)   SpO2 97%   BMI 21.95 kg/m  , Body mass index is 21.95 kg/m .  Gen:  NAD, well-nourished, sitting in chair comfortably  HEENT: EOMI, sclera anicteric, Head normocephalic, ; nares patent; moist mucous membranes, no enlarged tonsils, no trismus, congested right TM, normal left TM  Neck: trachea midline, no thyromegaly  CV:  Hemodynamically stable, RRR  Pulm:  no increased work of breathing , CTAB, no wheezes/rales/rhonchi   ABD: soft, non-distended  Extrem: no cyanosis, edema or clubbing  Skin: no obvious rashes or abnormalities  Psych: Euthymic, linear thoughts, normal rate of speech    No results found for any visits on 12/16/19.    Assessment & Plan:   July Huang, 79 year old female who presents with:  Viral URI with cough  Evidence for pneumonia is low at this time. Given timeframe of symptoms and presentation flu testing deferred. Normal lung exam at this time, I recommended supportive care with honey and dextromethorphan for her mild cough. Return if symptoms worsen.     Hypertension goal BP (blood pressure) < 140/90  Mild elevation , may be elevated due to her symptoms. On no medications. Will continue to monitor at future appointments. 150 systolic may be more appropriate goal given her age.       Darrick Ba MD   [facility]    The use of Dragon/Cambiatta dictation services may have been used to construct the content in this note; any grammatical or spelling errors are non-intentional. Please contact the author of this note directly if you are in need of any clarification.   "

## 2020-01-17 ENCOUNTER — COMMUNICATION - HEALTHEAST (OUTPATIENT)
Dept: FAMILY MEDICINE | Facility: CLINIC | Age: 80
End: 2020-01-17

## 2020-01-17 ENCOUNTER — OFFICE VISIT - HEALTHEAST (OUTPATIENT)
Dept: FAMILY MEDICINE | Facility: CLINIC | Age: 80
End: 2020-01-17

## 2020-01-17 DIAGNOSIS — F41.1 GENERALIZED ANXIETY DISORDER: ICD-10-CM

## 2020-01-17 DIAGNOSIS — Z00.00 ROUTINE GENERAL MEDICAL EXAMINATION AT A HEALTH CARE FACILITY: ICD-10-CM

## 2020-01-17 DIAGNOSIS — E78.00 HYPERCHOLESTEROLEMIA: ICD-10-CM

## 2020-01-17 DIAGNOSIS — R03.0 ELEVATED BLOOD PRESSURE READING WITHOUT DIAGNOSIS OF HYPERTENSION: ICD-10-CM

## 2020-01-17 ASSESSMENT — MIFFLIN-ST. JEOR: SCORE: 959.63

## 2020-02-08 ENCOUNTER — HEALTH MAINTENANCE LETTER (OUTPATIENT)
Age: 80
End: 2020-02-08

## 2020-04-02 ENCOUNTER — COMMUNICATION - HEALTHEAST (OUTPATIENT)
Dept: SCHEDULING | Facility: CLINIC | Age: 80
End: 2020-04-02

## 2020-04-24 ENCOUNTER — COMMUNICATION - HEALTHEAST (OUTPATIENT)
Dept: FAMILY MEDICINE | Facility: CLINIC | Age: 80
End: 2020-04-24

## 2020-04-24 ENCOUNTER — OFFICE VISIT - HEALTHEAST (OUTPATIENT)
Dept: FAMILY MEDICINE | Facility: CLINIC | Age: 80
End: 2020-04-24

## 2020-04-24 ENCOUNTER — COMMUNICATION - HEALTHEAST (OUTPATIENT)
Dept: SCHEDULING | Facility: CLINIC | Age: 80
End: 2020-04-24

## 2020-04-24 DIAGNOSIS — R52 BODY ACHES: ICD-10-CM

## 2020-04-24 DIAGNOSIS — Z71.89 ADVICE GIVEN ABOUT COVID-19 VIRUS BY TELEPHONE: ICD-10-CM

## 2020-04-24 DIAGNOSIS — R50.9 FEVER, UNSPECIFIED FEVER CAUSE: ICD-10-CM

## 2020-04-26 ENCOUNTER — COMMUNICATION - HEALTHEAST (OUTPATIENT)
Dept: FAMILY MEDICINE | Facility: CLINIC | Age: 80
End: 2020-04-26

## 2020-06-14 ENCOUNTER — NURSE TRIAGE (OUTPATIENT)
Dept: NURSING | Facility: CLINIC | Age: 80
End: 2020-06-14

## 2020-06-14 ENCOUNTER — OFFICE VISIT (OUTPATIENT)
Dept: URGENT CARE | Facility: URGENT CARE | Age: 80
End: 2020-06-14
Payer: COMMERCIAL

## 2020-06-14 VITALS
HEIGHT: 62 IN | TEMPERATURE: 98.2 F | WEIGHT: 120 LBS | HEART RATE: 72 BPM | BODY MASS INDEX: 22.08 KG/M2 | DIASTOLIC BLOOD PRESSURE: 84 MMHG | SYSTOLIC BLOOD PRESSURE: 182 MMHG | OXYGEN SATURATION: 97 %

## 2020-06-14 DIAGNOSIS — L03.211 CELLULITIS OF FACE: Primary | ICD-10-CM

## 2020-06-14 PROCEDURE — 99213 OFFICE O/P EST LOW 20 MIN: CPT | Performed by: PHYSICIAN ASSISTANT

## 2020-06-14 RX ORDER — CEPHALEXIN 500 MG/1
500 CAPSULE ORAL 3 TIMES DAILY
Qty: 30 CAPSULE | Refills: 0 | Status: SHIPPED | OUTPATIENT
Start: 2020-06-14 | End: 2020-06-24

## 2020-06-14 ASSESSMENT — ENCOUNTER SYMPTOMS
CARDIOVASCULAR NEGATIVE: 1
CONSTITUTIONAL NEGATIVE: 1
GASTROINTESTINAL NEGATIVE: 1
PSYCHIATRIC NEGATIVE: 1
MUSCULOSKELETAL NEGATIVE: 1
NEUROLOGICAL NEGATIVE: 1
RESPIRATORY NEGATIVE: 1
EYES NEGATIVE: 1

## 2020-06-14 ASSESSMENT — MIFFLIN-ST. JEOR: SCORE: 972.57

## 2020-06-14 NOTE — TELEPHONE ENCOUNTER
"Trying to decide if she needs to be seen - was out walking her dog and went through a wooded area. She got a bunch of bug bites - maybe gnats? - on her cheeks. Happened a couple days ago. Area is reddened and inflamed - warm to touch. Some relief with calamine lotion. Became redder and hotter overnight - now size of couple of silver dollars. Started on one cheek - now spreading upwards. Feels quite warm -   Some relief with aloe-vera. States she is now feeling a little dizzy - \"could it be systemic\"? Took a couple of tylenol today. T 97.5 oral. Denies dizziness right now.    Per protocol - advised UC evaluation.    Debby Hu RN on 6/14/2020 at 6:39 PM    Additional Information    Negative: [1] Life-threatening reaction (anaphylaxis) in the past to same insect bite AND [2] < 2 hours since bite    Negative: Passed out (i.e., lost consciousness, collapsed and was not responding)    Negative: Difficulty breathing or wheezing    Negative: [1] Hoarseness or cough AND [2] sudden onset following bite    Negative: [1] Difficulty swallowing or slurred speech AND [2] sudden onset following bite    Negative: Sounds like a life-threatening emergency to the triager    Negative: Bee sting(s)    Negative: Spider bite(s)    Negative: Tick bite(s)    Negative: Mosquito bite(s)    Negative: Bed bug bite(s)    Negative: Boil suspected (i.e., painful red lump and NO insect bite)    Negative: Doesn't sound like an insect bite    Negative: Patient sounds very sick or weak to the triager    [1] Severe bite pain AND [2] not improved after 2 hours of pain medicine    Protocols used: INSECT BITE-A-AH      "

## 2020-06-15 NOTE — PATIENT INSTRUCTIONS
Patient Education     Facial Cellulitis  Cellulitis is an infection of the deep layers of skin. A break in the skin, such as a cut or scratch, can let bacteria under the skin. It may also occur from an infected oil gland (pimple) or hair follicle. If the bacteria get to deep layers of the skin, it can be serious. If not treated, cellulitis can get into the bloodstream and lymph nodes. The infection can then spread throughout the body. This causes serious illness.  Cellulitis causes the affected skin to become red, swollen, warm, and sore. The reddened areas have a visible border. You may have a fever, chills, and pain.  Cellulitis is treated with antibiotics taken for 7 to 10 days. Symptoms should get better 1 to 2 days after treatment is started. Make sure to take all the antibiotics for the full number of days until they are gone. Keep taking the medication even if your symptoms go away.  Home care  Follow these tips:    Take all of the antibiotic medicine exactly as directed until it is gone. Don t miss any doses, especially during the first 7 days. Don t stop taking it when your symptoms get better.    Use a cool compress (face cloth soaked in cool water) on your face to help reduce swelling and pain.    You may use acetaminophen or ibuprofen to reduce pain. Don t use these if you have chronic liver or kidney disease, or ever had a stomach ulcer or gastrointestinal bleeding. Talk with your healthcare provider first.  Follow-up care  Follow up with your healthcare provider, or as advised. If your infection does not go away on the first antibiotic, your healthcare provider will prescribe a different one.  When to seek medical advice  Call your healthcare provider right away if any of these occur:    Fever higher of 100.4  F (38.0  C) or higher after 2 days on antibiotics    Red areas that spread    Swelling or pain that gets worse    Fluid leaking from the skin (pus)    An eyelid that swells shut or leaks fluid  (pus)    Headache or neck pain that gets worse    Unusual drowsiness or confusion    Convulsions (seizure)    Change in eyesight     Date Last Reviewed: 9/1/2016 2000-2019 [employer]. [address] [place] [address], [place] [address]. All rights reserved. This information is not intended as a substitute for professional medical care. Always follow your healthcare professional's instructions.

## 2020-06-15 NOTE — PROGRESS NOTES
SUBJECTIVE:   July Huang is a 79 year old female presenting with a chief complaint of   Chief Complaint   Patient presents with     Urgent Care     Pt in clinic c/o left side facial swelling, redness, and pain.     Facial Swelling       She is an established patient of [facility].    Bite/Sting    Onset of symptoms was 3 day(s) ago.  Course of illness is worsening.    Severity moderate  Location: left side of upper cheek and eye  Context: Patient was outside and believes that she was bitten by nats.   Current and Associated symptoms: redness, warmth, swelling and pain  Treatment measures tried include: calamine lotion and aloe vera  Relief from treatment: minor    Patient's blood pressure is usually mildly elevated during previous visits with her PCP and she states that she has been working on lifestyle changes in order to address this as BP medications have made her ill in the past.     Review of Systems   Constitutional: Negative.    HENT: Negative.    Eyes: Negative.    Respiratory: Negative.    Cardiovascular: Negative.    Gastrointestinal: Negative.    Musculoskeletal: Negative.    Neurological: Negative.    Psychiatric/Behavioral: Negative.        Past Medical History:   Diagnosis Date     Cancer of ovary (H)      Eye muscle weakness, left 2/17/2016     GERD (gastroesophageal reflux disease) 7/8/2009     Irregular heart beat 2009    nl CT angiogram     Non-cardiac chest pain 7/8/2009     S/P coronary angiogram 2005    negative     Stress 12/22/2010     Upper back pain 2/17/2016     Family History   Problem Relation Age of Onset     Cerebrovascular Disease Mother      Cancer Sister         lung cancer     Asthma Sister      Neurologic Disorder Sister         parkinson's      Current Outpatient Medications   Medication Sig Dispense Refill     TOMEKA-MAG OR Daily       Cholecalciferol (VITAMIN D) 1000 UNIT capsule Take 1 capsule by mouth daily. 5000 unit 2-3 times per week       ClonazePAM (KLONOPIN PO)         "COD LIVER OIL PO Take  by mouth.       Coenzyme Q10 (CO Q-10 PO) Take  by mouth.       MULTI-VITAMIN OR Daily       Social History     Tobacco Use     Smoking status: Former Smoker     Last attempt to quit: 1994     Years since quittin.5     Smokeless tobacco: Never Used   Substance Use Topics     Alcohol use: Yes     Comment: ocas       OBJECTIVE  BP (!) 182/84   Pulse 72   Temp 98.2  F (36.8  C) (Oral)   Ht 1.575 m (5' 2\")   Wt 54.4 kg (120 lb)   SpO2 97%   BMI 21.95 kg/m      Physical Exam  Constitutional:       Appearance: Normal appearance. She is normal weight.   HENT:      Head: Normocephalic and atraumatic.   Cardiovascular:      Rate and Rhythm: Normal rate and regular rhythm.      Pulses: Normal pulses.      Heart sounds: Normal heart sounds. No murmur. No friction rub. No gallop.    Pulmonary:      Effort: Pulmonary effort is normal. No respiratory distress.      Breath sounds: Normal breath sounds. No stridor. No wheezing, rhonchi or rales.   Chest:      Chest wall: No tenderness.   Skin:     General: Skin is warm and dry.      Coloration: Skin is not jaundiced or pale.      Findings: Erythema present. No bruising.      Comments: Left side of the patient's face (Upper cheek and medial brim of nose bridge) is erythematous, swollen, tender, and warm to touch.    Neurological:      General: No focal deficit present.      Mental Status: She is alert and oriented to person, place, and time. Mental status is at baseline.      Gait: Gait normal.   Psychiatric:         Mood and Affect: Mood normal.         Behavior: Behavior normal.         Thought Content: Thought content normal.         Judgment: Judgment normal.           ASSESSMENT/PLAN:    (L03.211) Cellulitis of face  (primary encounter diagnosis)  Plan: cephALEXin (KEFLEX) 500 MG capsule    Patient was advised to return to clinic if symptoms do not improve in the amount of time specified in the AVS or if symptoms worsen. Patient educated " on red flag symptoms and asked to go directly to the ED if symptoms present themselves.     Jose Esposito PA-C on 6/14/2020 at 7:40 PM

## 2020-07-01 ENCOUNTER — COMMUNICATION - HEALTHEAST (OUTPATIENT)
Dept: FAMILY MEDICINE | Facility: CLINIC | Age: 80
End: 2020-07-01

## 2020-07-24 ENCOUNTER — OFFICE VISIT - HEALTHEAST (OUTPATIENT)
Dept: FAMILY MEDICINE | Facility: CLINIC | Age: 80
End: 2020-07-24

## 2020-07-24 DIAGNOSIS — I10 ESSENTIAL HYPERTENSION, BENIGN: ICD-10-CM

## 2020-07-24 DIAGNOSIS — Z20.822 SUSPECTED COVID-19 VIRUS INFECTION: ICD-10-CM

## 2020-07-24 DIAGNOSIS — F41.1 GENERALIZED ANXIETY DISORDER: ICD-10-CM

## 2020-07-29 DIAGNOSIS — Z20.828 EXPOSURE TO SARS-ASSOCIATED CORONAVIRUS: Primary | ICD-10-CM

## 2020-07-29 DIAGNOSIS — Z20.828 EXPOSURE TO SARS-ASSOCIATED CORONAVIRUS: ICD-10-CM

## 2020-07-29 PROCEDURE — 86769 SARS-COV-2 COVID-19 ANTIBODY: CPT | Performed by: FAMILY MEDICINE

## 2020-07-29 PROCEDURE — 36415 COLL VENOUS BLD VENIPUNCTURE: CPT | Performed by: FAMILY MEDICINE

## 2020-07-31 LAB
COVID-19 ANTIBODY IGG: NEGATIVE
LAB TEST METHOD: NORMAL

## 2020-08-01 ENCOUNTER — COMMUNICATION - HEALTHEAST (OUTPATIENT)
Dept: FAMILY MEDICINE | Facility: CLINIC | Age: 80
End: 2020-08-01

## 2020-08-02 ENCOUNTER — COMMUNICATION - HEALTHEAST (OUTPATIENT)
Dept: FAMILY MEDICINE | Facility: CLINIC | Age: 80
End: 2020-08-02

## 2020-08-13 ENCOUNTER — NURSE TRIAGE (OUTPATIENT)
Dept: NURSING | Facility: CLINIC | Age: 80
End: 2020-08-13

## 2020-08-13 NOTE — TELEPHONE ENCOUNTER
Had symptoms early on. Unable to find testing site at the time. She said to wait and get antibody test. Patient didn't get a letter in [facility].  got a letter. They said he tested negative. She received no information.     Ref Range & Units  2wk ago      COVID-19 Antibody, IgG  NEG^Negative  Negative      Анна Quijano, RN  [facility]    Additional Information    Negative: Nursing judgment    Negative: Nursing judgment    Negative: Nursing judgment    Negative: Nursing judgment    Information only question and nurse able to answer    Protocols used: NO PROTOCOL AVAILABLE - INFORMATION ONLY-A-OH

## 2020-08-21 ENCOUNTER — COMMUNICATION - HEALTHEAST (OUTPATIENT)
Dept: FAMILY MEDICINE | Facility: CLINIC | Age: 80
End: 2020-08-21

## 2020-08-21 DIAGNOSIS — I10 ESSENTIAL HYPERTENSION, BENIGN: ICD-10-CM

## 2020-10-13 ENCOUNTER — HOSPITAL ENCOUNTER (OUTPATIENT)
Dept: MAMMOGRAPHY | Facility: CLINIC | Age: 80
Discharge: HOME | End: 2020-10-13
Attending: OBSTETRICS & GYNECOLOGY | Admitting: OBSTETRICS & GYNECOLOGY
Payer: COMMERCIAL

## 2020-10-13 DIAGNOSIS — Z12.31 VISIT FOR SCREENING MAMMOGRAM: ICD-10-CM

## 2020-10-13 PROCEDURE — 77067 SCR MAMMO BI INCL CAD: CPT

## 2020-11-08 ENCOUNTER — HEALTH MAINTENANCE LETTER (OUTPATIENT)
Age: 80
End: 2020-11-08

## 2020-12-03 ENCOUNTER — RECORDS - HEALTHEAST (OUTPATIENT)
Dept: ADMINISTRATIVE | Facility: OTHER | Age: 80
End: 2020-12-03

## 2020-12-29 ENCOUNTER — COMMUNICATION - HEALTHEAST (OUTPATIENT)
Dept: FAMILY MEDICINE | Facility: CLINIC | Age: 80
End: 2020-12-29

## 2020-12-29 DIAGNOSIS — F41.1 GENERALIZED ANXIETY DISORDER: ICD-10-CM

## 2020-12-31 ENCOUNTER — OFFICE VISIT - HEALTHEAST (OUTPATIENT)
Dept: FAMILY MEDICINE | Facility: CLINIC | Age: 80
End: 2020-12-31

## 2020-12-31 ENCOUNTER — RECORDS - HEALTHEAST (OUTPATIENT)
Dept: ADMINISTRATIVE | Facility: OTHER | Age: 80
End: 2020-12-31

## 2020-12-31 DIAGNOSIS — E78.00 HYPERCHOLESTEROLEMIA: ICD-10-CM

## 2020-12-31 DIAGNOSIS — I10 ESSENTIAL HYPERTENSION, BENIGN: ICD-10-CM

## 2020-12-31 DIAGNOSIS — D32.9 MENINGIOMA (H): ICD-10-CM

## 2020-12-31 DIAGNOSIS — F41.1 GENERALIZED ANXIETY DISORDER: ICD-10-CM

## 2020-12-31 DIAGNOSIS — I63.9 INFARCTION OF RIGHT BASAL GANGLIA (H): ICD-10-CM

## 2021-01-05 ENCOUNTER — RECORDS - HEALTHEAST (OUTPATIENT)
Dept: ADMINISTRATIVE | Facility: OTHER | Age: 81
End: 2021-01-05

## 2021-01-07 ENCOUNTER — RECORDS - HEALTHEAST (OUTPATIENT)
Dept: ADMINISTRATIVE | Facility: OTHER | Age: 81
End: 2021-01-07

## 2021-01-08 ENCOUNTER — COMMUNICATION - HEALTHEAST (OUTPATIENT)
Dept: FAMILY MEDICINE | Facility: CLINIC | Age: 81
End: 2021-01-08

## 2021-01-15 ENCOUNTER — COMMUNICATION - HEALTHEAST (OUTPATIENT)
Dept: FAMILY MEDICINE | Facility: CLINIC | Age: 81
End: 2021-01-15

## 2021-01-15 ENCOUNTER — RECORDS - HEALTHEAST (OUTPATIENT)
Dept: ADMINISTRATIVE | Facility: OTHER | Age: 81
End: 2021-01-15

## 2021-01-19 ENCOUNTER — RECORDS - HEALTHEAST (OUTPATIENT)
Dept: ADMINISTRATIVE | Facility: OTHER | Age: 81
End: 2021-01-19

## 2021-01-22 ENCOUNTER — RECORDS - HEALTHEAST (OUTPATIENT)
Dept: ADMINISTRATIVE | Facility: OTHER | Age: 81
End: 2021-01-22

## 2021-01-25 ENCOUNTER — OFFICE VISIT - HEALTHEAST (OUTPATIENT)
Dept: FAMILY MEDICINE | Facility: CLINIC | Age: 81
End: 2021-01-25

## 2021-01-25 DIAGNOSIS — F41.1 GENERALIZED ANXIETY DISORDER: ICD-10-CM

## 2021-01-25 DIAGNOSIS — E78.00 HYPERCHOLESTEROLEMIA: ICD-10-CM

## 2021-01-25 DIAGNOSIS — I10 ESSENTIAL HYPERTENSION, BENIGN: ICD-10-CM

## 2021-01-26 ENCOUNTER — COMMUNICATION - HEALTHEAST (OUTPATIENT)
Dept: ADMINISTRATIVE | Facility: CLINIC | Age: 81
End: 2021-01-26

## 2021-01-26 DIAGNOSIS — E78.00 HYPERCHOLESTEROLEMIA: ICD-10-CM

## 2021-01-26 DIAGNOSIS — I10 ESSENTIAL HYPERTENSION, BENIGN: ICD-10-CM

## 2021-01-28 ENCOUNTER — RECORDS - HEALTHEAST (OUTPATIENT)
Dept: ADMINISTRATIVE | Facility: OTHER | Age: 81
End: 2021-01-28

## 2021-02-01 ENCOUNTER — RECORDS - HEALTHEAST (OUTPATIENT)
Dept: ADMINISTRATIVE | Facility: OTHER | Age: 81
End: 2021-02-01

## 2021-02-11 ENCOUNTER — COMMUNICATION - HEALTHEAST (OUTPATIENT)
Dept: FAMILY MEDICINE | Facility: CLINIC | Age: 81
End: 2021-02-11

## 2021-02-17 ENCOUNTER — COMMUNICATION - HEALTHEAST (OUTPATIENT)
Dept: FAMILY MEDICINE | Facility: CLINIC | Age: 81
End: 2021-02-17

## 2021-02-17 DIAGNOSIS — I10 ESSENTIAL HYPERTENSION, BENIGN: ICD-10-CM

## 2021-02-18 ENCOUNTER — RECORDS - HEALTHEAST (OUTPATIENT)
Dept: ADMINISTRATIVE | Facility: OTHER | Age: 81
End: 2021-02-18

## 2021-02-22 ENCOUNTER — COMMUNICATION - HEALTHEAST (OUTPATIENT)
Dept: FAMILY MEDICINE | Facility: CLINIC | Age: 81
End: 2021-02-22

## 2021-02-22 DIAGNOSIS — F41.1 GENERALIZED ANXIETY DISORDER: ICD-10-CM

## 2021-02-26 ENCOUNTER — RECORDS - HEALTHEAST (OUTPATIENT)
Dept: ADMINISTRATIVE | Facility: OTHER | Age: 81
End: 2021-02-26

## 2021-03-01 ENCOUNTER — COMMUNICATION - HEALTHEAST (OUTPATIENT)
Dept: FAMILY MEDICINE | Facility: CLINIC | Age: 81
End: 2021-03-01

## 2021-03-02 ENCOUNTER — COMMUNICATION - HEALTHEAST (OUTPATIENT)
Dept: FAMILY MEDICINE | Facility: CLINIC | Age: 81
End: 2021-03-02

## 2021-03-02 ENCOUNTER — RECORDS - HEALTHEAST (OUTPATIENT)
Dept: ADMINISTRATIVE | Facility: OTHER | Age: 81
End: 2021-03-02

## 2021-03-08 ENCOUNTER — RECORDS - HEALTHEAST (OUTPATIENT)
Dept: ADMINISTRATIVE | Facility: OTHER | Age: 81
End: 2021-03-08

## 2021-03-09 ENCOUNTER — COMMUNICATION - HEALTHEAST (OUTPATIENT)
Dept: ADMINISTRATIVE | Facility: CLINIC | Age: 81
End: 2021-03-09

## 2021-03-11 ENCOUNTER — OFFICE VISIT - HEALTHEAST (OUTPATIENT)
Dept: FAMILY MEDICINE | Facility: CLINIC | Age: 81
End: 2021-03-11

## 2021-03-11 DIAGNOSIS — I10 ESSENTIAL HYPERTENSION, BENIGN: ICD-10-CM

## 2021-03-11 DIAGNOSIS — R26.9 ABNORMALITY OF GAIT AS LATE EFFECT OF CEREBROVASCULAR ACCIDENT (CVA): ICD-10-CM

## 2021-03-11 DIAGNOSIS — I69.398 ABNORMALITY OF GAIT AS LATE EFFECT OF CEREBROVASCULAR ACCIDENT (CVA): ICD-10-CM

## 2021-03-11 DIAGNOSIS — E78.00 HYPERCHOLESTEROLEMIA: ICD-10-CM

## 2021-03-16 ENCOUNTER — COMMUNICATION - HEALTHEAST (OUTPATIENT)
Dept: ADMINISTRATIVE | Facility: CLINIC | Age: 81
End: 2021-03-16

## 2021-03-16 ENCOUNTER — OFFICE VISIT - HEALTHEAST (OUTPATIENT)
Dept: FAMILY MEDICINE | Facility: CLINIC | Age: 81
End: 2021-03-16

## 2021-03-16 DIAGNOSIS — M54.42 BILATERAL LOW BACK PAIN WITH LEFT-SIDED SCIATICA, UNSPECIFIED CHRONICITY: ICD-10-CM

## 2021-03-20 ENCOUNTER — COMMUNICATION - HEALTHEAST (OUTPATIENT)
Dept: FAMILY MEDICINE | Facility: CLINIC | Age: 81
End: 2021-03-20

## 2021-03-20 DIAGNOSIS — F41.1 GENERALIZED ANXIETY DISORDER: ICD-10-CM

## 2021-03-23 ENCOUNTER — COMMUNICATION - HEALTHEAST (OUTPATIENT)
Dept: ADMINISTRATIVE | Facility: CLINIC | Age: 81
End: 2021-03-23

## 2021-03-23 DIAGNOSIS — I63.9 CEREBROVASCULAR ACCIDENT (CVA), UNSPECIFIED MECHANISM (H): ICD-10-CM

## 2021-03-23 DIAGNOSIS — M26.609 TMJ (TEMPOROMANDIBULAR JOINT SYNDROME): ICD-10-CM

## 2021-03-26 ENCOUNTER — RECORDS - HEALTHEAST (OUTPATIENT)
Dept: ADMINISTRATIVE | Facility: OTHER | Age: 81
End: 2021-03-26

## 2021-03-28 ENCOUNTER — HEALTH MAINTENANCE LETTER (OUTPATIENT)
Age: 81
End: 2021-03-28

## 2021-03-29 ENCOUNTER — RECORDS - HEALTHEAST (OUTPATIENT)
Dept: ADMINISTRATIVE | Facility: OTHER | Age: 81
End: 2021-03-29

## 2021-03-30 ENCOUNTER — RECORDS - HEALTHEAST (OUTPATIENT)
Dept: ADMINISTRATIVE | Facility: OTHER | Age: 81
End: 2021-03-30

## 2021-04-05 ENCOUNTER — COMMUNICATION - HEALTHEAST (OUTPATIENT)
Dept: FAMILY MEDICINE | Facility: CLINIC | Age: 81
End: 2021-04-05

## 2021-04-05 DIAGNOSIS — R26.9 ABNORMALITY OF GAIT AS LATE EFFECT OF CEREBROVASCULAR ACCIDENT (CVA): ICD-10-CM

## 2021-04-05 DIAGNOSIS — I69.398 ABNORMALITY OF GAIT AS LATE EFFECT OF CEREBROVASCULAR ACCIDENT (CVA): ICD-10-CM

## 2021-04-06 ENCOUNTER — COMMUNICATION - HEALTHEAST (OUTPATIENT)
Dept: FAMILY MEDICINE | Facility: CLINIC | Age: 81
End: 2021-04-06

## 2021-04-07 ENCOUNTER — RECORDS - HEALTHEAST (OUTPATIENT)
Dept: ADMINISTRATIVE | Facility: OTHER | Age: 81
End: 2021-04-07

## 2021-04-07 ENCOUNTER — COMMUNICATION - HEALTHEAST (OUTPATIENT)
Dept: FAMILY MEDICINE | Facility: CLINIC | Age: 81
End: 2021-04-07

## 2021-04-08 ENCOUNTER — RECORDS - HEALTHEAST (OUTPATIENT)
Dept: ADMINISTRATIVE | Facility: OTHER | Age: 81
End: 2021-04-08

## 2021-05-18 ENCOUNTER — COMMUNICATION - HEALTHEAST (OUTPATIENT)
Dept: ADMINISTRATIVE | Facility: CLINIC | Age: 81
End: 2021-05-18

## 2021-05-18 DIAGNOSIS — I69.398 ABNORMALITY OF GAIT AS LATE EFFECT OF CEREBROVASCULAR ACCIDENT (CVA): ICD-10-CM

## 2021-05-18 DIAGNOSIS — R26.9 ABNORMALITY OF GAIT AS LATE EFFECT OF CEREBROVASCULAR ACCIDENT (CVA): ICD-10-CM

## 2021-05-25 ENCOUNTER — RECORDS - HEALTHEAST (OUTPATIENT)
Dept: ADMINISTRATIVE | Facility: OTHER | Age: 81
End: 2021-05-25

## 2021-05-27 ENCOUNTER — MYC MEDICAL ADVICE (OUTPATIENT)
Dept: FAMILY MEDICINE | Facility: CLINIC | Age: 81
End: 2021-05-27

## 2021-05-27 NOTE — TELEPHONE ENCOUNTER
Spoke with patient and she said she will wait to see Dr. Koo. She has been scheduled for 04/12/19 at 2pm

## 2021-05-27 NOTE — PROGRESS NOTES
While in the ED, the patient was hypertensive with blood pressure 199/89 with a slightly increased RR of 24. Labs were remarkable for potassium 3.3 and a negative troponin. MRA neck showed about 50% stenosis of the distal left vertebral artery and mild narrowing of the bilateral carotid bulbs but no evidence of distal ischemia. The patient was given Ativan, Zofran, IVF, and admitted for further evaluation.      There were no clinical features of posterior circulation and neurology concurred with the diagnosis of acute vertigo. Patient was seen by physical therapy and was treated with Epley maneuvers. She had an excellent response to this; her resting vertigo resolved in total and she was left with only a mild sense of unsteadiness with activity. She will be discharged home today. If her vertigo does not resolve in total or recurs later on we recommend that she be seen by physical therapy to review  maneuvers, or to be seen at the [facility] in [place].

## 2021-05-27 NOTE — TELEPHONE ENCOUNTER
New Appointment Needed  What is the reason for the visit:    follow up after hospital stay at [facility]  Provider Preference: PCP only  How soon do you need to be seen?: 3-29-19, patient had an appointment scheduled for 3-29-19 at 2:20 pm with Dr Koo but she accidentally cancelled the appointment on MyChart. It is not documented if it is okay to take the reserved spot as she has already had a INF appointment with Dr Gale on 3-25-19.   Waitlist offered?: No  Okay to leave a detailed message:  Yes

## 2021-05-30 VITALS — BODY MASS INDEX: 22.2 KG/M2 | WEIGHT: 121 LBS

## 2021-05-30 NOTE — PROGRESS NOTES
"SUBJECTIVE: July Huang is a 78 y.o. female with:  Chief Complaint   Patient presents with     Fall     Patient fell on grandson's patio. Having back, chest and shoulder pain     She fell on a stone patio while visiting her family in [place].  She tripped on a small step she did not see.  Put out her arms and fell on arms/ knees with most of the pain in upper chest radiating to upper back.  This occurred 4 days ago and she returned home yesterday afternoon.  Still having lot of pain in her mid chest which radiates to upper back.  She took some Advil initially which helped.  No shortness of breath.  She feels better if sitting with back supported.  Uncomfortable if she moves around too much.    She has had slight elevation in her blood pressure.  Currently not taking any antihypertensives.    She has elevated LDL cholesterol.  She prefers not to take a statin.  No h/o smoking / diabetes mellitus or heart disease    SH: .  Retired.    OBJECTIVE: /84 (Patient Site: Left Arm, Patient Position: Sitting)   Pulse 70   Ht 5' 1.5\" (1.562 m)   Wt 122 lb 12.8 oz (55.7 kg)   BMI 22.83 kg/m   no distress.  Chest: Tender over sternum but no ecchymoses or swelling.  Shoulders: Full range of motion.  Lungs: Clear to auscultation.  No retractions or tachypnea.  CV: RRR. S1 and S2 normal.  No murmurs, rubs or gallops.  Neuro: AAOx3. Normal strength and tone. Normal gait.    BP Readings from Last 3 Encounters:   06/26/19 150/84   03/25/19 148/84   02/15/19 132/80         Lab Results   Component Value Date    CHOL 261 (H) 06/07/2019    CHOL 311 (H) 02/12/2016    CHOL 279 (H) 11/16/2015     Lab Results   Component Value Date    HDL 49 (L) 06/07/2019    HDL 48 (L) 02/12/2016    HDL 40 (L) 11/16/2015     Lab Results   Component Value Date    LDLCALC 190 (H) 06/07/2019    LDLCALC 243 (H) 02/12/2016    LDLCALC 203 (H) 11/16/2015     Lab Results   Component Value Date    TRIG 110 06/07/2019    TRIG 100 02/12/2016    " TRIG 178 (H) 11/16/2015     No components found for: CHOLHDL    Virginia was seen today for fall.    Diagnoses and all orders for this visit:    Muscle strain of chest wall, initial encounter- No worrisome findings on exam.  Observe for now.  I do not think we need to do any imaging.    Hypercholesterolemia- She is going to try supplement and return for recheck.  -     Lipid Cascade; Future    Elevated blood pressure reading without diagnosis of hypertension- Monitor BP at home.       Patient Instructions   Bergamot BPF by [employer] - 2 capsules daily- [facility]    Recheck fasting lipids in 6 weeks       Florence Koo

## 2021-05-30 NOTE — PATIENT INSTRUCTIONS - HE
Bergamot BPF by [employer] - 2 capsules daily- [facility]    Recheck fasting lipids in 6 weeks

## 2021-05-30 NOTE — PROGRESS NOTES
[facility] Certification Request    July 10, 2019      Patient: July Huang  MR Number: [id number]  YOB: 1940  Date of Visit: 7/10/2019      Dear Dr. Florence Koo:    Thank you for this referral.   We are seeing July Huang in Physical Therapy for costochondritis.    Medicare and/or Medicaid requires physician review and approval of the treatment plan. Please review the plan of care and verify that you agree with the therapy plan of care by co-signing this note.      Plan of Care  Authorization / Certification Start Date: 07/10/19  Authorization / Certification End Date: 09/08/19  Authorization / Certification Number of Visits: 8  Communication with: Referral Source  Patient Related Instruction: Nature of Condition;Treatment plan and rationale;Self Care instruction;Basis of treatment;Body mechanics;Posture;Precautions;Next steps;Expected outcome  Times per Week: 1-2  Number of Weeks: 6  Number of Visits: 8  Therapeutic Exercise: ROM;Stretching;Strengthening  Neuromuscular Reeducation: posture  Manual Therapy: soft tissue mobilization;myofascial release;joint mobilization  Modalities: hot pack;cold pack      Goals:  Pt. will be independent with home exercise program in : 6 weeks  Pt. will decrease use of medication for pain for improved quality of life in : 6 weeks  Pt. will have improved quality of sleep: waking less times/night;in 6 weeks;Comment  Comment:: and able to lie flat on her back without increased pain    Patient will show increased : strength for ____;in 6 weeks  Patient will show increased strength for : lifting/carrying heavier/household items without increased pain  Pt will: be able to return to full water aerobics routine without increased pain in 6 weeks.  Pt will: be able to cough for proper airway clearance without increased pain in 6 weeks.        If you have any questions or concerns, please don't hesitate to call.    Sincerely,      Irvin  Dorina, PT        Physician recommendation:     ___ Follow therapist's recommendation        ___ Modify therapy      *Physician co-signature indicates they certify the need for these services furnished within this plan and while under their care.    [facility]   Initial Evaluation    Patient Name: July Huang  Date of evaluation: 7/10/2019  PRECAUTIONS: osteoporosis, carotid stenosis  Referral Diagnosis: Costochondritis  Referring provider: Florence Koo  Visit Diagnosis:     ICD-10-CM    1. Sternum pain R07.89    2. Rib pain R07.81    3. Acute bilateral thoracic back pain M54.6        Assessment:      Pt. is appropriate for skilled PT intervention as outlined in the Plan of Care (POC).  Pt. is a good candidate for skilled PT services to improve pain levels and function.  Goals and POC established in collaboration with the patient.  Pt presents to PT with acute sternal, bilat rib, and thoracic pain after recent fall forwards into her hands and knees. Examination most consistent with joint mobility deficits and soft tissue strain/sprain, for which skilled PT should be beneficial to ensure successful return to PLOF.    Goals:  Pt. will be independent with home exercise program in : 6 weeks  Pt. will decrease use of medication for pain for improved quality of life in : 6 weeks  Pt. will have improved quality of sleep: waking less times/night;in 6 weeks;Comment  Comment:: and able to lie flat on her back without increased pain    Patient will show increased : strength for ____;in 6 weeks  Patient will show increased strength for : lifting/carrying heavier/household items without increased pain  Pt will: be able to return to full water aerobics routine without increased pain in 6 weeks.  Pt will: be able to cough for proper airway clearance without increased pain in 6 weeks.      Patient's expectations/goals are realistic.    Barriers to Learning or Achieving Goals:  No Barriers.       Plan /  "Patient Instructions:        Plan of Care:   Authorization / Certification Start Date: 07/10/19  Authorization / Certification End Date: 19  Authorization / Certification Number of Visits: 8  Communication with: Referral Source  Patient Related Instruction: Nature of Condition;Treatment plan and rationale;Self Care instruction;Basis of treatment;Body mechanics;Posture;Precautions;Next steps;Expected outcome  Times per Week: 1-2  Number of Weeks: 6  Number of Visits: 8  Therapeutic Exercise: ROM;Stretching;Strengthening  Neuromuscular Reeducation: posture  Manual Therapy: soft tissue mobilization;myofascial release;joint mobilization  Modalities: hot pack;cold pack      Plan for next visit: Review HEP, adding thoracic mobility ex over towel roll as able (T lying, alternating UE flexion, pec fly, and shoulder pro- and retraction).     Subjective:       History of Present Illness:    Virginia is a 79 y.o. female who presents to therapy today with complaints of pain in her sternum, ribs, upper back.   Onset: About 2.5 weeks ago when she fell forwards when missing a step, visiting her son in [place]. Believes she landed on her hands and knees, like in a push up position.  Xrays negative for fracture. Notes pain not too terrible initially, but increased after several days.  Duration:   Worse with lying flat on her back, coughing, sneezing, lifting/carrying heavier household items  Better with pain medication, water exercises  Pain Medication: Taking Tylenol  Sleep: Denies waking due to pain, but unable to lie flat on her back at night.    Pt seeks PT to \"evaluation and exercise.\"    Pain Ratin  Pain rating at best: 3  Pain rating at worst: 10  Pain description: aching, dull, pain and soreness     Objective:      Patient Outcome Measures :    None completed    Examination  1. Sternum pain     2. Rib pain     3. Acute bilateral thoracic back pain       Involved side: Bilateral  Posture Observation:      General sitting " posture is  fair.  General standing posture is fair.  Cervical:  Mild forward head  Shoulder/Thoracic complex: Mild bilateral scapular protraction   Mildly increased upper thoracic kyphosis    Cervical AROM with mild loss and ERP t/o except forward flexion.  Thoracic spine ROT approx. 25% L/R.  Bilateral shoulder AROM with mild loss and ERP t/o.  Increased muscle spasm and TTP present thoracic paraspinals, bilat pec major/minor, intercostals.  Pain and hypomobility present with central PAs to thoracic spine.      Treatment Today     TREATMENT MINUTES COMMENTS   Evaluation 21    Self-care/ Home management     Manual therapy     Neuromuscular Re-education     Therapeutic Activity     Therapeutic Exercises 26 -Discussed therapy diagnosis, prognosis, POC, relevant anatomy and basis for tx.  -Reviewed and performed HEP with handouts provided.   Gait training     Modality__________________                Total 47    Blank areas are intentional and mean the treatment did not include these items.            PT Evaluation Code: (Please list factors)  Patient History/Comorbidities: none  Examination: Acute sternal/rib/thoracic pain  Clinical Presentation: Stable  Clinical Decision Making: Low    Patient History/  Comorbidities Examination  (body structures and functions, activity limitations, and/or participation restrictions) Clinical Presentation Clinical Decision Making (Complexity)   No documented Comorbidities or personal factors 1-2 Elements Stable and/or uncomplicated Low   1-2 documented comorbidities or personal factor 3 Elements Evolving clinical presentation with changing characteristics Moderate   3-4 documented comorbidities or personal factors 4 or more Unstable and unpredictable High           Irvin Cam, PT, DPT  7/10/2019  1:20 PM    [facility] Discharge Summary  Patient Name: July Huang  Date: 8/19/2019  Referral Diagnosis: Costochondritis  Referring provider: Florence Koo  L*  Visit Diagnosis:   1. Sternum pain     2. Rib pain     3. Acute bilateral thoracic back pain         Goals: *Unable to comment due to lack of further follow-up with PT.  Pt. will be independent with home exercise program in : 6 weeks  Pt. will decrease use of medication for pain for improved quality of life in : 6 weeks  Pt. will have improved quality of sleep: waking less times/night;in 6 weeks;Comment  Comment:: and able to lie flat on her back without increased pain    Patient will show increased : strength for ____;in 6 weeks  Patient will show increased strength for : lifting/carrying heavier/household items without increased pain  Pt will: be able to return to full water aerobics routine without increased pain in 6 weeks.  Pt will: be able to cough for proper airway clearance without increased pain in 6 weeks.      Patient was seen for initial evaluation and treatment on 7/10/19 only.   Patient received a home program .  The patient discontinued therapy, did not return.    Therapy will be discontinued at this time.  The patient will need a new referral to resume.    Thank you for your referral.  Irvin Cam  8/19/2019  1:32 PM

## 2021-05-31 VITALS — WEIGHT: 120 LBS | BODY MASS INDEX: 21.95 KG/M2

## 2021-05-31 VITALS — HEIGHT: 62 IN | WEIGHT: 120 LBS | BODY MASS INDEX: 22.08 KG/M2

## 2021-05-31 VITALS — HEIGHT: 62 IN | WEIGHT: 121 LBS | BODY MASS INDEX: 22.26 KG/M2

## 2021-05-31 VITALS — WEIGHT: 121 LBS | BODY MASS INDEX: 22.26 KG/M2 | HEIGHT: 62 IN

## 2021-05-31 VITALS — HEIGHT: 62 IN | BODY MASS INDEX: 22.08 KG/M2 | WEIGHT: 120 LBS

## 2021-05-31 NOTE — PROGRESS NOTES
SUBJECTIVE: July Huang is a 79 y.o. female with:  Chief Complaint   Patient presents with     Follow-up    1) Chest pain - She feel this summer and injured her chest / upper body.  Was diagnosed with costochondritis.  Pain was persistent so she was referred.  Went to PT and doing better.  Only needed a few sessions.     2) Insomnia / anxiety - Has long history of trouble sleeping / anxiety.  She has been taking clonazepam for years.  Started out with 0.5 mg at bedtime and has weaned down to 0.25 mg at bedtime.  Will take another 0.25 mg if she wakes up during the night and can't get back to sleep.  Uses extra dose about 3-4 x a month. She would like to eventually wean off med.  She went on vacation and forgot to take med and had insomnia and increased anxiety.  Not interested in switching to trazodone or other med. Has not done CBT in past.    3) Elevated LDL - There is family history of elevated LDL but no FH heart disease. She does not want to take a statin medication.  She tried the Bergamot BPF OrthoMolecular supplement but no change in her LDL after about 8 weeks on the supplement. She does not eat a lot of meat/ saturated fats in her diet.    SH: .  Retired.    OBJECTIVE: /78 (Patient Site: Right Arm, Patient Position: Sitting, Cuff Size: Adult Regular)   Pulse 63   Wt 117 lb 1.3 oz (53.1 kg)   SpO2 98%   BMI 21.76 kg/m   no distress  PSYCH:  Awake, alert, and oriented x 3.  Mood and affect are appropriate.  Good eye contact.  Speech is normal.  No suicidal ideation.  No hallucinations.        Lab Results   Component Value Date    CHOL 276 (H) 08/15/2019    CHOL 261 (H) 06/07/2019    CHOL 311 (H) 02/12/2016     Lab Results   Component Value Date    HDL 39 (L) 08/15/2019    HDL 49 (L) 06/07/2019    HDL 48 (L) 02/12/2016     Lab Results   Component Value Date    LDLCALC 203 (H) 08/15/2019    LDLCALC 190 (H) 06/07/2019    LDLCALC 243 (H) 02/12/2016     Lab Results   Component Value Date     TRIG 169 (H) 08/15/2019    TRIG 110 06/07/2019    TRIG 100 02/12/2016     No components found for: CHOLHDL    Virginia was seen today for follow-up.    Diagnoses and all orders for this visit:    Generalized anxiety disorder- Discussed goal would be to wean off benzo in future as it interferes with deep sleep which is important for health.  Would have her go down to 0.125 mg nightly then wean from there. She is not ready to wean now.  I also recommend CBT for insomnia.    -     clonazePAM (KLONOPIN) 0.5 MG tablet; Take 0.5-1 tablets (0.25-0.5 mg total) by mouth at bedtime.    Muscle strain of chest wall- resolved    Hypercholesterolemia-Trial of medical foods.  Consider PharmD consult.  Recheck LDL after 8 weeks of diet changes.       Patient Instructions   [employer] - [employer]    Consider a consult with [facility] pharmacist to look into other cholesterol lowering meds    Call if you would like to cut back on the clonazepam in the future    Follow-up in 6 months         Florence Koo

## 2021-05-31 NOTE — PATIENT INSTRUCTIONS - HE
[employer] - [employer]    Consider a consult with [facility] pharmacist to look into other cholesterol lowering meds    Call if you would like to cut back on the clonazepam in the future    Follow-up in 6 months

## 2021-06-01 VITALS — BODY MASS INDEX: 21.71 KG/M2 | HEIGHT: 62 IN | WEIGHT: 118 LBS

## 2021-06-01 VITALS — WEIGHT: 118 LBS | BODY MASS INDEX: 21.71 KG/M2 | HEIGHT: 62 IN

## 2021-06-02 VITALS — WEIGHT: 126 LBS | BODY MASS INDEX: 23.42 KG/M2

## 2021-06-02 VITALS — WEIGHT: 123 LBS | BODY MASS INDEX: 22.86 KG/M2

## 2021-06-03 VITALS — WEIGHT: 117.08 LBS | BODY MASS INDEX: 21.76 KG/M2

## 2021-06-03 VITALS — HEIGHT: 62 IN | BODY MASS INDEX: 22.6 KG/M2 | WEIGHT: 122.8 LBS

## 2021-06-04 VITALS
RESPIRATION RATE: 16 BRPM | OXYGEN SATURATION: 99 % | SYSTOLIC BLOOD PRESSURE: 170 MMHG | HEIGHT: 62 IN | BODY MASS INDEX: 22.08 KG/M2 | DIASTOLIC BLOOD PRESSURE: 80 MMHG | WEIGHT: 120 LBS | HEART RATE: 77 BPM | TEMPERATURE: 97.6 F

## 2021-06-04 NOTE — TELEPHONE ENCOUNTER
Last few days cough and cold.  Congestion in head.    Has hypertension. Asking about taking Coricidin, she will start it today.  Chest hurts just when coughing.   No fever.    No shortness of breath     Advised rest, humidifier ( or warm steamy bathroom ), saline nose spray or sinus wash,  lozenges and extra fluids for hydration.  Tylenol or ibuprofen for fever over 100.    Harper De Luna RN Triage and refills      Reason for Disposition    Cold with no complications    Protocols used: COMMON COLD-A-AH       Adryan

## 2021-06-05 NOTE — TELEPHONE ENCOUNTER
Controlled Substance Refill Request  Medication Name:   Requested Prescriptions     Pending Prescriptions Disp Refills     clonazePAM (KLONOPIN) 0.5 MG tablet [Pharmacy Med Name: CLONAZEPAM 0.5MG TABLETS] 30 tablet 0     Sig: TAKE 1/2 TO 1 TABLET(0.25 TO 0.5 MG) BY MOUTH AT BEDTIME     Date Last Fill: 8/23/19  [facility]ed [facility]acy: [facility]  Submit electronically to pharmacy  Controlled Substance Agreement on file:   Encounter-Level CSA Scan Date - 08/27/2018:    Scan on 8/27/2018:  [facility]           Encounter-Level CSA Scan Date - 10/18/2017:    Scan on 10/24/2017 11:48 AM           Encounter-Level CSA Scan Date - 11/18/2016:    Scan on 11/18/2016           Encounter-Level CSA Scan Date - 11/16/2015:    Scan on 11/16/2015:  [facility]        Last office visit:  1/17/20

## 2021-06-05 NOTE — PROGRESS NOTES
Assessment and Plan:         Virginia was seen today for annual wellness visit.    Routine general medical examination at a health care facility  She will consider Shingrix vaccine.  She feels ready to stop mammograms at this time.    Generalized anxiety disorder - Stable on clonazepam. Controlled substance signed.    Hypercholesterolemia- She will return for fasting lipids.    Elevated blood pressure reading without diagnosis of hypertension- She will return for recheck of BP.        The patient's current medical problems were reviewed.    I have had an Advance Directives discussion with the patient.  The following health maintenance schedule was reviewed with the patient and provided in printed form in the after visit summary:   Health Maintenance   Topic Date Due     ZOSTER VACCINES (1 of 2) 06/30/1990     DXA SCAN  06/30/2005     MEDICARE ANNUAL WELLNESS VISIT  11/16/2016     FALL RISK ASSESSMENT  08/23/2020     ADVANCE CARE PLANNING  10/17/2023     TD 18+ HE  02/15/2029     PNEUMOCOCCAL IMMUNIZATION 65+ LOW/MEDIUM RISK  Completed     INFLUENZA VACCINE RULE BASED  Completed        Subjective:   Chief Complaint: July Huang is an 79 y.o. female here for an Annual Wellness visit.   HPI:  1) Getting over URI infection.  Has taken long time to improve.  Went to  and no worrisome findings.  2) Anxiety - Continues on clonazepam 0.5 mg 1/2 at bedtime.Lately she took whole tab as not sleeping well with med. 3) Bladder cancer- had polyp removed in 2012 with negative check-ups since then.  Last seen in 2018.  Overdue for check-up.    Review of Systems:    Please see above.  The rest of the review of systems are negative for all systems.    Patient Care Team:  Florence Koo MD as PCP - General  Florence Koo MD as Assigned PCP     Patient Active Problem List   Diagnosis     Benign Essential Hypertension     Osteoporosis     Palpitations     Ovarian cancer (H)     Bladder cancer (H)      Cystocele     Hypercholesterolemia     Eye muscle weakness, left     Meningioma (H)     Pelvic prolapse     Anxiety disorder     DJD of shoulder     Bilateral carotid artery stenosis  < 50%      Past Medical History:   Diagnosis Date     H/O total hysterectomy with bilateral salpingo-oophorectomy (BSO) 1995    ovarian cancer      Past Surgical History:   Procedure Laterality Date     BREAST BIOPSY       TOTAL ABDOMINAL HYSTERECTOMY W/ BILATERAL SALPINGOOPHORECTOMY  1995      Family History   Problem Relation Age of Onset     Stroke Mother      Heart disease Father      Diabetes Father      Lung cancer Sister      Breast cancer Neg Hx      Colon cancer Neg Hx      Parkinsonism Sister      Asthma Sister       Social History     Socioeconomic History     Marital status:      Spouse name: Not on file     Number of children: Not on file     Years of education: Not on file     Highest education level: Not on file   Occupational History     Not on file   Social Needs     Financial resource strain: Not on file     Food insecurity:     Worry: Not on file     Inability: Not on file     Transportation needs:     Medical: Not on file     Non-medical: Not on file   Tobacco Use     Smoking status: Former Smoker     Smokeless tobacco: Never Used   Substance and Sexual Activity     Alcohol use: Yes     Alcohol/week: 0.0 - 2.0 standard drinks     Drug use: No     Sexual activity: Not on file   Lifestyle     Physical activity:     Days per week: Not on file     Minutes per session: Not on file     Stress: Not on file   Relationships     Social connections:     Talks on phone: Not on file     Gets together: Not on file     Attends [employer] service: Not on file     Active member of club or organization: Not on file     Attends meetings of clubs or organizations: Not on file     Relationship status: Not on file     Intimate partner violence:     Fear of current or ex partner: Not on file     Emotionally abused: Not on file      "Physically abused: Not on file     Forced sexual activity: Not on file   Other Topics Concern     Not on file   Social History Narrative     Not on file      Current Outpatient Medications   Medication Sig Dispense Refill     ASCORBATE CALCIUM (VITAMIN C ORAL) Take 1 tablet by mouth daily as needed.        CALCIUM CITRATE/VITAMIN D3 (CALCIUM CITRATE + ORAL) Take 1 tablet by mouth daily. TABS       cholecalciferol, vitamin D3, 25 mcg (1,000 unit) capsule Take 1 capsule by mouth.       clonazePAM (KLONOPIN) 0.5 MG tablet Take 0.5-1 tablets (0.25-0.5 mg total) by mouth at bedtime. 30 tablet 2     cod liver oil Oil Take 5 mL by mouth.       CRANBERRY EXTRACT (CRANBERRY ORAL) Take 1 capsule by mouth daily. CAPS       lutein 10 mg Tab Take 1 capsule by mouth daily. Take once daily for eyes.       MAGNESIUM ORAL CAPS.  Take 1 capsule at bedtime (constipation and bones).       melatonin 5 mg Tab tablet Take 5 mg by mouth at bedtime as needed.       progesterone (PROMETRIUM) 100 MG capsule TK ONE C PO  QHS       TAURINE ORAL Take by mouth.       TURMERIC ROOT EXTRACT ORAL Take 1 capsule by mouth daily.       UNABLE TO FIND Med Name: L-theanine (takes sometime only)       UNABLE TO FIND Med Name: Heart tension       valACYclovir (VALTREX) 500 MG tablet TK 1 T PO  BID FOR 3 DAYS  3     VITAMIN A ORAL Take by mouth.       ZINC ORAL Take 1 tablet by mouth.       No current facility-administered medications for this visit.       Objective:   Vital Signs:   Visit Vitals  /82 (Patient Site: Right Arm, Patient Position: Sitting, Cuff Size: Adult Regular)   Pulse 77   Temp 97.6  F (36.4  C)   Resp 16   Ht 5' 1.5\" (1.562 m)   Wt 120 lb (54.4 kg)   SpO2 99%   BMI 22.31 kg/m         VisionScreening:  No exam data present     PHYSICAL EXAM  /82 (Patient Site: Right Arm, Patient Position: Sitting, Cuff Size: Adult Regular)   Pulse 77   Temp 97.6  F (36.4  C)   Resp 16   Ht 5' 1.5\" (1.562 m)   Wt 120 lb (54.4 kg)   SpO2 " 99%   BMI 22.31 kg/m    No acute distress  HEENT: Head atraumatic / normocephalic.  PERRL.  Conjunctiva clear. Nose with no discharge.  Tympanic membranes grey with normal landmarks.  OP - pink and moist.  Normal dentition.  Neck: Supple.  No lymphadenopathy or thyromegaly.  Lungs: CTA.  No retractions or tachypnea.  CV: RRR. S1 and S2 normal.  No murmurs / rubs / gallops.  No lower extremity edema.    Breasts: Symmetric with normal shape and contour.  No lumps or masses.  No axillary lymphadenopathy.  Abdomen: Soft. Non tender. Non distended.  No HSM or masses.  Skin: No rashes or lesions.  Neuro: AAOx3.  Normal strength and tone.  Normal gait.  DTRs in lower extremities equal bilaterally.  Psych: Mood and affect normal.  Good eye contact.  Normal speech.     Assessment Results 1/17/2020   Activities of Daily Living No help needed   Instrumental Activities of Daily Living No help needed   Get Up and Go Score Less than 12 seconds   Mini Cog Total Score 4   Some recent data might be hidden     A Mini-Cog score of 0-2 suggests the possibility of dementia, score of 3-5 suggests no dementia    Identified Health Risks:     She is at risk for falling and has been provided with information to reduce the risk of falling at home.  Patient's advanced directive was discussed and I am comfortable with the patient's wishes.    Florence Koo

## 2021-06-07 NOTE — TELEPHONE ENCOUNTER
Extreme fatigue    Body aches cleopatra    Chills and then warm - not sure she has a fever    Symptoms started 3/31/2020    Breathing fine    No cough    Is walking around her house just fine    Home treatments recommended.    Mari Griffin RN   [facility] Medication Refill and Triage Nurse  1:56 PM  4/2/2020      COVID 19 Nurse Triage Plan    Please be aware that novel coronavirus (COVID-19) may be circulating in the community. If you develop symptoms such as fever, cough, or SOB or if you have concerns about the presence of another infection including coronavirus (COVID-19), please contact your health care provider or visit [email or web address].     Patient HAS NO known exposure, fever, cough or SOB in addition to reason for call.    Additional General Information About COVID-19    COVID-19 - General Information:  Regardless of if you have been tested or not:  Patient who have symptoms (cough, fever, or shortness of breath), need to isolate for 7 days from when symptoms started AND 72 hours after fever resolves (without fever reducing medications) AND improvement of respiratory symptoms (whichever is longer).      Isolate yourself at home (in own room/own bathroom if possible)    Do Not allow any visitors    Do Not go to work or school    Do Not go to [employer],  centers, shopping, or other public places.    Do Not shake hands.    Avoid close and intimate contact with others (hugging, kissing).    Follow CDC recommendations for household cleaning of frequently touched services.     After the initial 7 days, continue to isolate yourself from household members as much as possible. To continue decrease the risk of community spread and exposure, you and any members of your household should limit activities in public for 14 days after starting home isolation.     You can reference the following CDC link for helpful home isolation/care  tips:  https://www.cdc.gov/coronavirus/2019-ncov/downloads/10Things.pdf    COVID-19 - Symptoms:     The COVID-19 can cause a respiratory illness, such as bronchitis or pneumonia.    The most common symptoms are: cough, fever, and shortness of breath.     Other symptoms are: body aches, chills, diarrhea, fatigue, headache, runny nose, and sore throat     COVID-19 - Exposure Risk Factors:    Exposure to a person who has been diagnosed with COVID-19 .    Travel from an area with recent local transmission of COVID-19 .    The CDC (www.cdc.gov) has the most up-to-date list of where the COVID-19 outbreak is occurring.    COVID-19 - Spreading:     The virus likely spreads through respiratory droplets produced when a person coughs or sneezes. These respiratory droplets can travel approximately 6 feet and can remain on surfaces.  Common disinfectants will kill the virus.    The CDC currently does not recommend healthy people wearing masks.    COVID-19 - Protect Yourself:     Avoid close contact with people known to have this new COVID-19 infection.    Wash hands often with soap and water or alcohol-based hand .    Avoid touching the eyes, nose or mouth.       Thank you for limiting contact with others, wearing a simple mask to cover your cough, practice good hand hygiene habits and accessing our virtual services where possible to limit the spread of this virus.    For more information about COVID19 and options for caring for yourself at home, please visit the CDC website at https://www.cdc.gov/coronavirus/2019-ncov/about/steps-when-sick.html  For more options for care at [facility], please visit our website at [email or web address]      Reason for Disposition    MILD weakness or fatigue with acute minor illness (e.g., colds)    Protocols used: WEAKNESS (GENERALIZED) AND FATIGUE-A-OH

## 2021-06-07 NOTE — TELEPHONE ENCOUNTER
COVID 19 Nurse Triage Plan/Patient Instructions    Please be aware that novel coronavirus (COVID-19) may be circulating in the community. If you develop symptoms such as fever, cough, or SOB or if you have concerns about the presence of another infection including coronavirus (COVID-19), please contact your health care provider or visit [email or web address].     Disposition/Instructions    Patient to have scheduled Telephone Visit with a provider. Follow System Ambulatory Workflow for COVID 19.     The clinic staff will assist you to schedule an appointment to complete the Telephone Visit with a provider during normal clinic hours.       Call Back If: Your symptoms worsen before you are able to complete your Telephone Visit with a provider.        Thank you for limiting contact with others, wearing a simple mask to cover your cough, practice good hand hygiene habits and accessing our virtual services where possible to limit the spread of this virus.    For more information about COVID19 and options for caring for yourself at home, please visit the CDC website at https://www.cdc.gov/coronavirus/2019-ncov/about/steps-when-sick.html  For more options for care at [facility], please visit our website at [email or web address]    For more information, please use the [place] Department of Health COVID-19 Website: https://www.health.[facility].[place].us/diseases/coronavirus/index.html  [place] Department of Health ([facility]) COVID-19 Hotlines (Interpreters available):      Health questions: Phone Number: 815.805.1103 or 1-499.368.2773 and Hours: 7 a.m. to 7 p.m.    Schools and  questions: Phone Number: [phone] or 1-190.344.7644 and Hours 7 a.m. to 7 p.m.          RN triage:   Call from pt   Pt sick since 3/31 -- still having   Fevers most days -- up to 101 - last fever yesterday 101  Still having daily episodes of hot and chills -- -and lots of fatigue- and body aches - and internal aches and  ' burning ' -- headaches -- head and nasal congestion - clogged ears -   No pain facial pain when pressing on cheekbones and forehead   Still having diff sleeping   No cough -- no diff breathing   Pt concerned that she may have/had covid 19 -- and wants testing or antibody testing  Reviewed home care advice for symptoms   Per current protocol = transferred to  for telephone appt    Sangeetha Valenzuela RN [facility] RN triage              Reason for Disposition    HIGH RISK patient (e.g., age > 64 years, diabetes, heart or lung disease, weak immune system)    Protocols used: CORONAVIRUS (COVID-19) DIAGNOSED OR [id number] 3.30.20

## 2021-06-07 NOTE — TELEPHONE ENCOUNTER
Who is calling:  Patient  Reason for Call:  Patient spoke with nurse triage today and was advised to create a telephone appt with primary care provider. She also stated that she did [facility] a couple weeks ago and they told her if she's still not feeling well after the 14 days to follow up with her primary care. She doesn't understand why it was cancelled as the person who left her a message stated that she needs to go to [facility]. She was upset and also mentioned that on her MyChart is shows that she cancelled her appt and she wanted us to know that she wasn't the one that cancelled it as she really feels as if she needs to speak to someone. I scheduled her with Oralia Childs for 3pm today. She wanted me to let Dr. Koo know.   Date of last appointment with primary care: NA  Okay to leave a detailed message: Yes

## 2021-06-07 NOTE — PROGRESS NOTES
"July Huang is a 79 y.o. female who is being evaluated via a billable telephone visit.      The patient has been notified of following:     \"This telephone visit will be conducted via a call between you and your physician/provider. We have found that certain health care needs can be provided without the need for a physical exam.  This service lets us provide the care you need with a short phone conversation.  If a prescription is necessary we can send it directly to your pharmacy.  If lab work is needed we can place an order for that and you can then stop by our lab to have the test done at a later time.    Telephone visits are billed at different rates depending on your insurance coverage. During this emergency period, for some insurers they may be billed the same as an in-person visit.  Please reach out to your insurance provider with any questions.    If during the course of the call the physician/provider feels a telephone visit is not appropriate, you will not be charged for this service.\"    Patient has given verbal consent to a Telephone visit? Yes    Patient would like to receive their AVS by AVS Preference: Mail a copy.    Additional provider notes:   She has been sick off and on for about a month.  She had a triage call into the nurses on 4/2/2020.  I reviewed that today.  She had achiness chills but no cough.  She said symptoms started around 3/31/2020.  She treated her symptoms at home.  She states she went on care a few weeks ago and also reviewed her symptoms.  Do not have that note available to me today.  She says she was actually feeling better for about a week, however then symptoms return to this Monday, 4/20/2020.  She had a triage nurse call today and I reviewed that in her chart.  She told the nurse that she had 101  F fever yesterday.  Today she says that she had a temperature of 100  F, not an hour or 2 after she took Tylenol.  She feels like body aches and intermittent headaches may be " that her worst symptoms.  She gets frequent hot flashes and chills.  Her  had fairly similar symptoms to her but he seems to be improving faster than she did.  She did get her flu shot this year.  No shortness of breath today, no coughing, no dysuria.  She has been out with her  occasionally, but has observed all recommended guidelines to shelter in place, and then wear masks and social distance when they are out walking.  She is wondering if she should get tested for COVID-19.    Of note, no cough during our phone visit, no wheezing or shortness of breath heard.  Alert and oriented x3.    Assessment/Plan:  1. Fever, unspecified fever cause  2. Body aches  3. Advice Given About Covid-19 Virus by Telephone  Differential includes COVID-19, other viral illness, versus other.  Patient does not have any other symptoms other than body aches, fever, some headaches.  No urinary symptoms, no pulmonary symptoms.  No obvious focal signs for infection.  I reviewed with her that it is possible that she has COVID-19 or had it at some point during the past month.  She is interested in getting tested for COVID-19.  I reviewed with her that as of time of this phone call, there was plans to start increased testing next week, but as of right now I am not sure what kind of testing is available.  I discussed that if she goes to the emergency room, it is possible she could get tested.  If she wants to go to urgent care I would suggest calling them first to ask about testing.  I also discussed with her that, whether she has COVID-19 or not, she may want to be seen anyway.  She has had symptoms off and on for 1 month, including fever, chills, body aches.  She does not feel like she is sick enough right now to go to the emergency room.  I recommended she closely monitor her symptoms over the weekend.  Her  lives with her and he can also monitor her.  I discussed specific reasons she should seek follow-up care this  weekend, including consistent or high fever, any respiratory issues, or any other new or concerning symptoms.  I discussed with her that, given her age, current symptoms, and high risk for COVID complications, she could present to the emergency room tonight for further evaluation if she chooses.  She thinks she would like to wait and watch over the weekend.  All of her questions were answered today.    Phone call duration:  25 minutes  15:04 15:29    Oralia Childs PA-C

## 2021-06-09 NOTE — PROGRESS NOTES
"July Huang is a 80 y.o. female who is being evaluated via a billable telephone visit.      The patient has been notified of following:     \"This telephone visit will be conducted via a call between you and your physician/provider. We have found that certain health care needs can be provided without the need for a physical exam.  This service lets us provide the care you need with a short phone conversation.  If a prescription is necessary we can send it directly to your pharmacy.  If lab work is needed we can place an order for that and you can then stop by our lab to have the test done at a later time.    Telephone visits are billed at different rates depending on your insurance coverage. During this emergency period, for some insurers they may be billed the same as an in-person visit.  Please reach out to your insurance provider with any questions.    If during the course of the call the physician/provider feels a telephone visit is not appropriate, you will not be charged for this service.\"    Patient has given verbal consent to a Telephone visit? Yes    What phone number would you like to be contacted at? [phone]     Patient would like to receive their AVS by AVS Preference: José Miguel.    Additional provider notes: no     SUBJECTIVE: July Huang is a 80 y.o. female with:  Chief Complaint   Patient presents with     Follow-up     med check     BP medication consult    1) HTN - Currently not on any anti-hypertensive medicines.  Has had side effects from numerous medicines.  Side effects include palpitations. She is willing to try another BP medicine.  BP running 160s / 80s on a home wrist BP cuff.  She is getting a new home BP cuff.     2) Anxiety - She takes clonazepam for sleep / anxiety.  Doing well on the medicine.  No side effects.  She is due for a refill.    3) COVID 19 - She had illness suspicious for COVID 19 but could not get PCR test at the time.  She had headache/ fevers/ chills / " myalgias / loss of smell and taste in early April.   did have similar symptoms.  She is interested in having antibody test.    4) She had non-invasive bladder tumor in 2012.  Last scope exam was 2 years ago.      4) She has h/o elevated cholesterol.  She is due for a cholesterol recheck.    SH: .  Retired.  Patient Active Problem List   Diagnosis     Benign Essential Hypertension     Osteoporosis     Palpitations     Ovarian cancer (H)     Bladder cancer (H)     Cystocele     Hypercholesterolemia     Eye muscle weakness, left     Meningioma (H)     Pelvic prolapse     Anxiety disorder     DJD of shoulder     Bilateral carotid artery stenosis  < 50%         OBJECTIVE: no distress    BP Readings from Last 3 Encounters:   01/17/20 170/80   08/23/19 148/78   06/26/19 150/84         Assessment/Plan:  1. Generalized anxiety disorder  Stable.  F/U every 6 months.  Controlled substance contract in place.  - clonazePAM (KLONOPIN) 0.5 MG tablet; TAKE 1/2 TO 1 TABLET(0.25 TO 0.5 MG) BY MOUTH AT BEDTIME  Dispense: 30 tablet; Refill: 2    2. Suspected COVID-19 virus infection  She will get antibody test.  - COVID-19 Virus (Coronavirus) Antibody Screen, IgG; Future    3. Benign Essential Hypertension  Start on low dose amlodipine and update me with BP readings from home in 2 weeks.  - amLODIPine (NORVASC) 2.5 MG tablet; Take 1 tablet (2.5 mg total) by mouth daily.  Dispense: 30 tablet; Refill: 0    I also recommend she contact urology as due for cystoscopy.    Phone call duration:  18 minutes    ROSA Feliciano

## 2021-06-09 NOTE — TELEPHONE ENCOUNTER
New Appointment Needed  What is the reason for the visit:    Office visit, med check & blood pressure check  Provider Preference: PCP only  How soon do you need to be seen?: last week in July, or week before if not available. Tues or Thurs later in the day or anytime on other days.  Waitlist offered?: No  Okay to leave a detailed message:  Yes, please leave dates & times if she doesn't answer & she will call back to confirm a time.

## 2021-06-10 NOTE — TELEPHONE ENCOUNTER
Refill Approved    Rx renewed per Medication Renewal Policy. Medication was last renewed on 7/24/20.    Arminda Massey, [facility] Triage/Med Refill 8/24/2020     Requested Prescriptions   Pending Prescriptions Disp Refills     amLODIPine (NORVASC) 2.5 MG tablet [Pharmacy Med Name: AMLODIPINE BESYLATE 2.5 MG TAB] 30 tablet 0     Sig: TAKE 1 TABLET BY MOUTH EVERY DAY       Calcium-Channel Blockers Protocol Passed - 8/21/2020  8:30 AM        Passed - PCP or prescribing provider visit in past 12 months or next 3 months     Last office visit with prescriber/PCP: 8/23/2019 Florence Koo MD OR same dept: Visit date not found OR same specialty: 8/23/2019 Florence Koo MD  Last physical: 1/17/2020 Last MTM visit: Visit date not found   Next visit within 3 mo: Visit date not found  Next physical within 3 mo: Visit date not found  Prescriber OR PCP: Florence Koo MD  Last diagnosis associated with med order: 1. Benign Essential Hypertension  - amLODIPine (NORVASC) 2.5 MG tablet [Pharmacy Med Name: AMLODIPINE BESYLATE 2.5 MG TAB]; TAKE 1 TABLET BY MOUTH EVERY DAY  Dispense: 30 tablet; Refill: 0    If protocol passes may refill for 12 months if within 3 months of last provider visit (or a total of 15 months).             Passed - Blood pressure filed in past 12 months     BP Readings from Last 1 Encounters:   01/17/20 170/80

## 2021-06-10 NOTE — PROGRESS NOTES
SUBJECTIVE: July Huang is a 76 y.o. female with:  Chief Complaint   Patient presents with     Medication Management     klonopin, suppliments     Hypertension     Anxiety   1)  HTN- Blood pressure doing well with exercise and diet.  She is seeing damian Palafox.  She is taking a supplement called Heart Tension for blood pressure. Also taking taurine.  No prescription meds.    2) Anxiety - She still has some anxiety at times. She uses Klonopin 0.25 mg at night if she wakes up at night.  She also takes L-theanine at bedtime.  She has added melatonin at bedtime which helps her fall asleep.  No daytime grogginess.  She does water aerobics twice a week and rides bike/ walks dog.     3) Meningioma- She is going to have a repeat MRI of the head.  She is followed by neurologist that she sees at [facility].  No headaches.  Her left eye does still have double vision if she looks to the side.  Her last MRI was 7 months ago.    SH: .  Retired.    OBJECTIVE: /60 (Patient Site: Right Arm, Patient Position: Sitting, Cuff Size: Adult Regular)  Pulse 68  Resp 16  Wt 121 lb (54.9 kg)  Breastfeeding? No  BMI 22.2 kg/m2 no distress  Lungs: Clear to auscultation.  No retractions or tachypnea.  CV: RRR. S1 and S2 normal.  No murmurs, rubs or gallops.  PSYCH:  Awake, alert, and oriented x 3.  Mood and affect are appropriate.  Good eye contact.  Speech is normal.  No suicidal ideation.  No hallucinations.    LEANDRO-7: 4    Virginia was seen today for medication management, hypertension and anxiety.    Diagnoses and all orders for this visit:    Anxiety disorder  - Stable.  Refill clonazepam.  She is interested in taking my Mind-Body Skills class in the fall.  -     clonazePAM (KLONOPIN) 0.5 MG tablet; TAKE 1/2 TO 1 TABLET BY MOUTH EVERY NIGHT AT BEDTIME    Benign Essential Hypertension - Stable on supplements.    F/U in 6 months.    Florence Koo

## 2021-06-13 NOTE — PROGRESS NOTES
"SUBJECTIVE: July Huang is a 77 y.o. female with:  Chief Complaint   Patient presents with     Insect Bite     tick bit on Friday - got tick and identify\" black leg deer tick, left upper under arm   1) She has been doing volunteer work outdoors. She got a deer tick bite 6 days ago on the back of her left upper arm. She was able to remove the tick.  She is using some antibiotic ointment to the area.  She denies any rash/ fever / chills / swollen glands or malaise.  She was treated for possible Lyme's disease 1 year ago in May when she presented with a rash.  She is very worried about Lyme's diease and is here to discuss possible antibiotic course.  She is allergic to tetracycline.    2) Vesicular rash left groin/ left knee - She saw dermatology for the rash and it was felt to be some type of contact dermatitis and treated with steroids topically.  She was tested for HSV/ Zoster and the PCR test was negative.  The rash has resolved but there are still some changes in the skin. No pain or itching.    3) Anxiety - She continues to use a small amount of clonazepam at bedtime to help her sleep.  She has been taking between 1/2 to 1 of 0.5 mg tab.  She has been on the medicine for many years.  Denies any daytime drowsiness or other side effects.  She does wish to continue the medicine for now.  Has tried to wean off unsuccessfully in the past.    SH: .  Retired.  Patient Active Problem List   Diagnosis     Benign Essential Hypertension     Osteopenia     Osteoporosis     Anxiety     Palpitations     Ovarian cancer     Bladder cancer     Cystocele     Hypercholesterolemia     Upper back pain     Eye muscle weakness, left     Meningioma     Pelvic prolapse     Anxiety disorder     DJD of shoulder      OBJECTIVE: /80 (Patient Site: Left Arm, Patient Position: Sitting, Cuff Size: Adult Regular)  Pulse 72  Temp 97.5  F (36.4  C) (Oral)   Resp 12  Ht 5' 1.9\" (1.572 m)  Wt 121 lb (54.9 kg)  Breastfeeding? " No  BMI 22.2 kg/m2 no distress  Skin: Left arm: 7 mm erythematous macule with central scab.  Left knee:  Fading area of erythema.  Psych Exam:  Mood: upbeat and pleasant  Affect: normal   Behavior: appropriate  ThoughtContent: logical  Judgement: appropriate  Insight: normal  Neuro: AAOx3.  Normal strength and tone.  Normal gait.    LEANDRO-7 Total: 4 (11/18/2016  1:00 PM)     Virginia was seen today for insect bite.    Diagnoses and all orders for this visit:    Tick bite - We discussed the difficulty in Lyme disease diagnosis.  There is not a lot of evidence to support prophylactic treatment before signs of illness and she understands this.  She is going to submit the tick for analysis and see if it carries the Lyme spirochete.  If positive, she would like to start on a course of amoxicillin.  She understands the risks of antibiotics including allergic reaction/ diarrhea/ disturbance of microbiome.  She will update me when she gets results back from lab.  -     amoxicillin (AMOXIL) 500 MG tablet; Take 1 tablet (500 mg total) by mouth 3 (three) times a day for 14 days. May substitute capsules    Anxiety disorder- Stable.  Controlled substance signed. Continue clonazepam since helpful with no adverse side effects.  She needs at least yearly follow-ups.    Vesicular rash- Resolved.    Florence Koo

## 2021-06-13 NOTE — PROGRESS NOTES
"SUBJECTIVE: July Huang is a 77 y.o. female with:  Chief Complaint   Patient presents with     Sore     left knee sore x last week, gottwn worse and spread, starting to dry up, does not itch, no pain     Rash     left thigh area x today, left inner elbow area x 3 days ago - no pain, no itchy    Rash - She has a history of genital HSV and takes Valtrex PRN.  She has been taking Valtrex 500 mg twice a day for 1 week prescribed by her OB/ GYN.  She developed a vesicular rash on the anterior knee 1 week ago.  No itching.  It tingles a little and feels slightly burning.  She has little redness in the left groin starting yesterday.  She also has 2 lesion on the left arm that tingle. She is wondering if she has a shingles infection.  She has felt a little stressed lately.    OBJECTIVE: /80 (Patient Site: Right Arm, Patient Position: Sitting, Cuff Size: Adult Regular)  Pulse 60  Temp 97.4  F (36.3  C) (Oral)   Resp 12  Ht 5' 1.9\" (1.572 m)  Wt 120 lb (54.4 kg)  Breastfeeding? No  BMI 22.02 kg/m2 no distress    Skin: Left knee with vesicular rash over anterior knee.  Left groin with several red macules.  Left arm with 2 - 3 mm red papules.    Virginia was seen today for sore and rash.    Diagnoses and all orders for this visit:    Vesicular rash  -     Herpes Simplex/V. Zoster by PCR  -     valACYclovir (VALTREX) 1000 MG tablet; Take 1 po TID for 1 week     Will treat for possible herpes zoster and do testing from rash on knee.    Florence Koo   "

## 2021-06-14 NOTE — TELEPHONE ENCOUNTER
Pharmacy called they did not get amlodipine or atorvastatin yesterday. They also said amlodipine too small to cut in half.  Pt requesting 30 day supply of 5 mg amlodipine she will send you BP readings via my chart at the end of each week. And agrees to go back to 10 mg if numbers get too high.  Please advise on sending rx's to [facility] today pt would like to  yet today

## 2021-06-14 NOTE — TELEPHONE ENCOUNTER
Call pt - I refilled both meds at [facility] and [facility] yesterday.      In regards to reducing her amlodipine, she can try cutting pill in half- 5 mg daily but needs to monitor BP daily then update me in a week.  I would want to know if her BP goes above 140 systolic.

## 2021-06-14 NOTE — PROGRESS NOTES
July Huang is a 80 y.o. female who is being evaluated via a billable telephone visit.      What phone number would you like to be contacted at? [phone]  How would you like to obtain your AVS? AVS Preference: José Miguel.  Assessment & Plan     Virginia was seen today for medication education visit.    Diagnoses and all orders for this visit:    Benign Essential Hypertension- Stable  -     amLODIPine (NORVASC) 10 MG tablet; Take 1 tablet (10 mg total) by mouth daily.    Generalized anxiety disorder- Stable  -     -     clonazePAM (KLONOPIN) 0.5 MG tablet; TAKE 1/2 TO 1 TABLET(0.25 TO 0.5 MG) BY MOUTH AT BEDTIME    Hypercholesterolemia- She will retry the statin with CoQ 10 supplementation ( 100-200 mg daily).  Will need to check FLP in 8 weeks.  -     atorvastatin (LIPITOR) 10 MG tablet; Take 1 tablet (10 mg total) by mouth daily.          Return in about 1 month (around 2/25/2021).    Florence Koo MD  [facility]     July Huang is 80 y.o. and presents to clinic today for the following health issues   HPI   She is here to f/u on hospitalization for CVA.  She is getting home care services.  She feels she is making progress with her recovery.  She stopped her statin- she was discharged on atorvastatin 10 mg daily and is worried she could have had some side effects from the medication.  She had muscle spasms in the hospital.  When she came home she stopped the statin but she has still had some muscle spasms.  Also has had some dizziness but not sure if from statin or her CVA.    She is taking amlodipine 10 mg daily for BP control.  Her BP was 122 systolic today.  Systolic has been mostly in the teens. No side effects on the medicine.      She continues on clonazepam for anxiety.  She tries not to take the medicine daily if she does not need it.  She has a lot of anxiety in the evening / night.          Review of Systems  As above     Objective        Vitals:  No vitals were obtained today due to virtual visit.    Physical Exam  no            Phone call duration: 8 minutes

## 2021-06-14 NOTE — TELEPHONE ENCOUNTER
Rx request refill from: [facility]    Rx number: [id number]  Drug: clonazepam 0.5mg  Sig: take 1/2 to 1 tablet po at bedtime  Qty: 30  Last refill: 11/12/20    Rx pend for signature. Please sign if okay.   Last virtual visit: 7/24/20  Last refill Rx written on: 7/24/20  Future appointment: 12/31/20

## 2021-06-14 NOTE — TELEPHONE ENCOUNTER
I would recommend a virtual visit with pt to discuss statin medicine/ blood in stool.  Please also let homecare know of my plan.

## 2021-06-14 NOTE — TELEPHONE ENCOUNTER
Tita from [facility] calling to report :  Virginia said she is not taking atorvastatin states it is making her tired and achy.  She feels better when not taking  She is taking Protonix prn not daily like her med list says  Does have blood with stool when wiping not mixed with stool.  Please advise

## 2021-06-14 NOTE — TELEPHONE ENCOUNTER
Reason for Call:  Medication or medication refill:    atorvastatin (LIPITOR) 10 MG tablet  amLODIPine (NORVASC) 10 MG tablet (possible to reduce to 5mg)    Do you use a [facility]?  Name of the pharmacy and phone number for the current request: [facility], [phone].    Name of the medication requested: Patient wants to reduce to 5mg for the Norvasc due to swollen ankles.     Other request:     Can we leave a detailed message on this number? Yes    Phone number patient can be reached at: Cell: [phone]    Best Time:     Call taken on 1/26/2021 at 8:33 AM by Harper Belcher

## 2021-06-14 NOTE — PROGRESS NOTES
SUBJECTIVE: July Huang is a 77 y.o. female with:  Chief Complaint   Patient presents with     Results     urine test     Follow-up     medication  -klonopin    1) Anxiety/ insomnia- She is taking a small dose of Klonopin at bedtime.   She is taking 1/2 to 1 tab at night.  She has been on the medicine for a long time and it is working well.  She will start out with 1/2 tab and take a second dose if she cannot sleep.  Her anxiety has been doing better but she still feels she needs the medicine.    2) She started to have burning in the perineal area a few months ago.  Initially it felt like a herpes outbreak and she started taking Valtrex.  She felt a little better but symptoms returned.  She was prescribed the medicine by her OB/ GYN.  She has taken 3-4 courses of Valtrex but symptoms never cleared up.  No rash or lesions.  No internal burning.  The last time she took the Valtrex she got a headache/ muscle aches and dry mouth so she quit the medicine after 1 day.  She called and got a urine analysis and culture.  She has only mild frequency / urgency.  She has some low back pain but no abdominal pain.  No fevers/ chills. She has also been very fatigued.  No vaginal discharge.  She was taking amoxicillin in October for possible Lyme's for 2 weeks.  She does have a bladder prolapse and was seen a urogynecologist in the past.  She is reluctant to do any procedures.  She has had TAHBSO for ovarian cancer.    SH: .  Retired.  Patient Active Problem List   Diagnosis     Benign Essential Hypertension     Osteoporosis     Palpitations     Ovarian cancer     Bladder cancer     Cystocele     Hypercholesterolemia     Eye muscle weakness, left     Meningioma     Pelvic prolapse     Anxiety disorder     DJD of shoulder      OBJECTIVE: /72 (Patient Site: Left Arm, Patient Position: Sitting, Cuff Size: Adult Regular)  Pulse 74  Wt 120 lb (54.4 kg)  Breastfeeding? No  BMI 22.02 kg/m2 no distress  PSYCH:   Awake, alert, and oriented x 3.  Mood and affect are slightly anxious.  Good eye contact.  Speech is normal.  No suicidal ideation.  No hallucinations.  : Normal female genitalia with atrophy.  No rash/ erythema.  Cystocele present.  Anus: No rash / erythema or lesions.    Recent Results (from the past 240 hour(s))   Urinalysis-UC if Indicated   Result Value Ref Range    Color, UA Yellow Colorless, Yellow, Straw, Light Yellow    Clarity, UA Clear Clear    Glucose, UA Negative Negative    Bilirubin, UA Negative Negative    Ketones, UA Negative Negative    Specific Gravity, UA 1.010 1.005 - 1.030    Blood, UA Negative Negative    pH, UA 7.0 5.0 - 8.0    Protein, UA Negative Negative mg/dL    Urobilinogen, UA 0.2 E.U./dL 0.2 E.U./dL, 1.0 E.U./dL    Nitrite, UA Negative Negative    Leukocytes, UA Moderate (!) Negative    Bacteria, UA Few (!) None Seen hpf    RBC, UA None Seen None Seen, 0-2 hpf    WBC, UA 0-5 None Seen, 0-5 hpf    Squam Epithel, UA 5-10 (!) None Seen, 0-5 lpf    Trans Epithel, UA 5-10 (!) None Seen lpf   Culture, Urine   Result Value Ref Range    Culture Mixture of urogenital organisms with     Culture 10,000-50,000 col/ml Staphylococcus haemolyticus (!)    Wet Prep, Vaginal   Result Value Ref Range    Yeast Result No yeast seen No yeast seen    Trichomonas No Trichomonas seen No Trichomonas seen    Clue Cells, Wet Prep No Clue cells seen No Clue cells seen      Virginia was seen today for results and follow-up.    Diagnoses and all orders for this visit:    Burning sensation of female perineum - I think the staph in the urine culture is contaminant as she had no WBCs on the u/a.  No evidence on yeast infection on exam or on wet prep.  She could be having symptoms from cystocele or vaginal atrophy.  I recommend she start on topical estrogen which she has at home from her GYN but has not started.  If not improving in a couple of weeks I would like her to see her gynecologist again.  -     Wet Prep,  Vaginal    Anxiety disorder - Stable will refill her clonazepam.  Controlled substance contract is on file.  Follow-up yearly.  -     clonazePAM (KLONOPIN) 0.5 MG tablet; Take 0.5-1 tablets (0.25-0.5 mg total) by mouth at bedtime.    25 minutes spent with the patient.  Over 50% were spent in counseling and coordination of care.       Florence Koo

## 2021-06-14 NOTE — TELEPHONE ENCOUNTER
Looking for  Verbal orders to continue  OT visit 2 x a week for 3 weeks  1 x per week for 1 week

## 2021-06-14 NOTE — PROGRESS NOTES
"July Huang is a 80 y.o. female who is being evaluated via a billable telephone visit.      The patient has been notified of following:     \"This telephone visit will be conducted via a call between you and your physician/provider. We have found that certain health care needs can be provided without the need for a physical exam.  This service lets us provide the care you need with a short phone conversation.  If a prescription is necessary we can send it directly to your pharmacy.  If lab work is needed we can place an order for that and you can then stop by our lab to have the test done at a later time.    Telephone visits are billed at different rates depending on your insurance coverage. During this emergency period, for some insurers they may be billed the same as an in-person visit.  Please reach out to your insurance provider with any questions.    If during the course of the call the physician/provider feels a telephone visit is not appropriate, you will not be charged for this service.\"    Patient has given verbal consent to a Telephone visit? Yes    What phone number would you like to be contacted at? [phone]     Patient would like to receive their AVS by AVS Preference: José Miguel.    Additional provider notes: no    Hospital Follow-up Visit:    Hospital/Nursing Home/IP Rehab Facility: [facility]   Date of Admission: 12/3/2020  Date of Discharge: 12/31/2020  Reason(s) for Admission: Stroke- infarct posterior right corona radiata and basal ganglia / HTN    Was your hospitalization related to COVID-19? No  Problems taking medications regularly:  Chosen not to take Plavix  Medication changes since discharge: yes  Problems adhering to non-medication therapy:  no    Summary of hospitalization:  CareEverywhere information obtained and reviewed  Diagnostic Tests/Treatments reviewed.  Follow up needed: FLP/ CMP in 6-8 weeks, repeat MRI of brain 1/2021 per neurosurgery  Other Healthcare " Providers Involved in Patient s Care:         Homecare, Physical Therapy and OT and speech therapy  Update since discharge: stable.      Post Discharge Medication Reconciliation: discharge medications reconciled and changed, per note/orders.  Plan of care communicated with patient          She was just discharged from rehab and is home with her .  She is getting home health care with PT/ OT / nurse.  She has persistent left sided weakness / dysarthria.  Has a cane and able to get around her home.  She will be going to outpatient rehab at [facility].    She is eating.  She feels her jaw is out of alignment.  Had issues with this in the past.      She is taking baby asa/ Norvasc 10 mg / Lipitor 10 mg.  She did not want to continue on Plavix due to nosebleeds.  Initially Lipitor was at 40 mg but went down to 10 mg daily.    She continues on clonazepam at bedtime.  She also takes melatonin for sleep.  Having some leg spasms and taking Flexeril 5 mg PRN.    In October she had evaluation with neurosurgery for her meningioma.  MRI revealed it was enlarged but she did not want intervention.     She had some initial hyponatremia- - that resolved.  She had normal cardiac echo.    Assessment/Plan:  1. Benign Essential Hypertension  Will have home care check her BP.    - amLODIPine (NORVASC) 10 MG tablet; Take 1 tablet (10 mg total) by mouth daily.  Dispense: 30 tablet; Refill: 11    2. Hypercholesterolemia  Recheck FLP/ CMP in 6-8 weeks.  Add Coenzyme Q 10 supplement.  - atorvastatin (LIPITOR) 10 MG tablet; Take 1 tablet (10 mg total) by mouth daily.  Dispense: 30 tablet; Refill: 11    3. Generalized anxiety disorder  Stable.  Continue clonazepam for now.  She can use L-theanine / lavender essential oil to supplement benzo.    4. Infarction of right basal ganglia (H)  Continue PT/ OT/ Speech. Control BP.  Continue asa.    5. Meningioma (H)  Recheck MRI of brain next month        Phone call duration:  20  rhina Luz, ROSA Koo

## 2021-06-15 NOTE — TELEPHONE ENCOUNTER
Left message to call back for: VV appt  Information to relay to patient:  Need ofv to discuss walker so insurance will cover.

## 2021-06-15 NOTE — TELEPHONE ENCOUNTER
Reason for Call: Request for an order or referral: [facility] (OT) is calling for an order for Tubigrip    Order or referral being requested: jamshid lower extremities to trial for edema management    Date needed: as soon as possible    Has the patient been seen by the PCP for this problem? YES    Additional comments:     Phone number Patient can be reached at:  Other phone number:  Ai [phone]    Best Time:  any    Can we leave a detailed message on this number?  Yes    Call taken on 2/11/2021 at 10:22 AM by Wendi Goodman

## 2021-06-15 NOTE — TELEPHONE ENCOUNTER
TERRY: Spoke with pt today and informed pt she is due for AWV and HTN f/u. She was recently hospitalized for Stroke and will schedule when she feel better. See below for her most recent BP:     2/10/21: 124/72  2/11/21: 124/74  2/12/21: 128/78  2/13/21: 136/76  2/14/21: 136/78  2/15/21: 146/80  2/16/21: 134/70  2/17/21: 142/80 Taking by home nurse PT toady    Pt need refill on Norvasc. Rx is pend for signature. Please sign if okay.     Last virtual visit: 1/25/21    xl

## 2021-06-15 NOTE — TELEPHONE ENCOUNTER
Reason for Call: patient is confused, thinks she has an appointment for next Wednesday. Does not want this appointment, I tried to tell her she does NOT have any appointments.    Detailed comments: also has issues with her medications, wants to discontinue one.    Phone Number Patient can be reached at:   Cell number on file:    Telephone Information:   Mobile [phone]       Best Time:any. This morning if possible.    Can we leave a detailed message on this number?: Yes    Call taken on 2/22/2021 at 8:46 AM by Wendi Goodman

## 2021-06-15 NOTE — PROGRESS NOTES
July Huang is a 80 y.o. female who is being evaluated via a billable video visit.      How would you like to obtain your AVS? Mail a copy.  If dropped from the video visit, the video invitation should be resent by: Text to cell phone: see demographics  Will anyone else be joining your video visit? No      Video Start Time: 11:24 am    Assessment & Plan     Virginia was seen today for orders request.    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  Continue PT/ OT.  -     Walker 4 Wheel Walker (with seat)    Benign Essential Hypertension- Stable on amlodipine.    Hypercholesterolemia- Pt does not want to take a statin at this time.    F/U for AWV in 3 months.        Review of external notes as documented in note   :[id number]}     No follow-ups on file.    Florence Koo MD  [facility]   July Huang is 80 y.o. and presents today for the following health issues   HPI   1) CVA- Recovering slowly.  Still difficult to ambulate.  She has home care - physical therapy and OT.  PT feels pt would benefit from a walker.  She is now walking with a cane.  She is using her 's walker but feels it is not working for her. She did fall going to the bathroom and did get a bruise 3 weeks ago but thinks she got tangled up in some clothing..    2) HTN- She went down on amlodipine 5 mg in evening and side effects are better.  BP running 130s/ 80.    3) Anxiety - She continues on clonazepam as needed.  Has been on this med for a long time.    4) Hypercholesterolemia - She did not retry the statin medicine that was prescribed.  She thought she had side effects first time she took it so she does not want to retry med.    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    Physical Exam  none    This was converted to a phone visit.  Total time on phone- 11 minutes.    Florence Koo

## 2021-06-15 NOTE — TELEPHONE ENCOUNTER
She can take her medicine at noon tomorrow then the next day take med in the evening.  I recommend calling back with a few BP readings after she has made the switch.

## 2021-06-15 NOTE — TELEPHONE ENCOUNTER
Reason for Call: patient is calling to see if she can take her blood pressure medicine at night instead of morning.    Detailed comments:she wants to know how to switch that. Took her medicine this morning, so should she wait to take it tomorrow evening? Or take another dose this evening.    Phone Number Patient can be reached at:   Cell number on file:    Telephone Information:   Mobile [phone]       Best Time: any    Can we leave a detailed message on this number?: Yes    Call taken on 3/1/2021 at 11:54 AM by Wendi Goodman

## 2021-06-15 NOTE — TELEPHONE ENCOUNTER
Controlled Substance Refill Request  Medication Name:   Requested Prescriptions     Pending Prescriptions Disp Refills     clonazePAM (KLONOPIN) 0.5 MG tablet 30 tablet 0     Sig: TAKE 1/2 TO 1 TABLET(0.25 TO 0.5 MG) BY MOUTH AT BEDTIME     Date Last Fill: 1/25/21  [facility]: [facility]  Submit electronically to pharmacy  Controlled Substance Agreement on file:   Encounter-Level CSA Scan Date - 08/27/2018:    Scan on 8/27/2018:  [facility]           Encounter-Level CSA Scan Date - 10/18/2017:    Scan on 10/24/2017 11:48 AM           Encounter-Level CSA Scan Date - 11/18/2016:    Scan on 11/18/2016           Encounter-Level CSA Scan Date - 11/16/2015:    Scan on 11/16/2015:  [facility]        Last office visit:  1/25/21

## 2021-06-15 NOTE — TELEPHONE ENCOUNTER
Reason for Call: Request for an order or referral:    Order or referral being requested: Walker with 4 wheels and seat    Date needed: as soon as possible    Has the patient been seen by the PCP for this problem? YES    Additional comments: Need one copy faxed to [employer] [facility] @ [phone] and another copy mailed to the patient's home.    Phone number Patient can be reached at:  Home number on file [phone] (home)    Best Time:      Can we leave a detailed message on this number?  No call back needed    Call taken on 3/9/2021 at 8:29 AM by Harper Belcher

## 2021-06-16 NOTE — TELEPHONE ENCOUNTER
Reason for Call: Request for an order or referral:    Order or referral being requested: Need external Physical therapy order and Occupational Order. Do not used PT/OT order option. Need each one separate for Erasto An for DX: Stroke. Pt is currently doing home are and will be discharged from home care and doing outpatient.     Patient is also having TMJ pain and would like an order to be seen by [facility].     Date needed: as soon as possible    Has the patient been seen by the PCP for this problem? YES    Additional comments:     Phone number Patient can be reached at:  Home number on file [phone] (home)    Best Time:      Can we leave a detailed message on this number?  No call back needed    Call taken on 3/23/2021 at 2:49 PM by Harper Belcher

## 2021-06-16 NOTE — TELEPHONE ENCOUNTER
Please see note below from [facility].  They are not asking for verbal orders but orders to be placed in chart. Detailed information below on exactly what is needed

## 2021-06-16 NOTE — TELEPHONE ENCOUNTER
PA or Drug Change request from [facility] for Cyclobenzaprine 5mg     Message: Plan does not cover medication prescribed. Per Rx benefit plan alternative medications include: Tizanidine HCL Please fax back with approval along with strength, directions, quantity, and refills.

## 2021-06-16 NOTE — TELEPHONE ENCOUNTER
Reason for Call:  Need to send walker 4 wheel walker with seat to medical supply store    Detailed comments: Need to send order from 3/11/21 faxed to [phone].    [facility]  [address] [place] [address]  Main # [phone]    Phone Number Patient can be reached at: Home number on file [phone] (home)    Best Time:     Can we leave a detailed message on this number?: No call back needed    Call taken on 3/16/2021 at 1:38 PM by Harper Belcher

## 2021-06-16 NOTE — PROGRESS NOTES
July Huang is a 80 y.o. female who is being evaluated via a billable telephone visit.      What phone number would you like to be contacted at? [phone]   How would you like to obtain your AVS? AVS Preference: José Miguel.    Assessment & Plan     Virginia was seen today for leg pain.    Diagnoses and all orders for this visit:    Bilateral low back pain with left-sided sciatica, unspecified chronicity  -     baclofen (LIORESAL) 10 MG tablet; Take one three times a day prn as needed    I am going to have her use Tylenol 2 tabs two times a day scheduled and add in Baclofen.  If not improving, needs exam and I am ok with her osteopath doing that evaluation.  She can also add in heat PRN.      20 minutes spent on the date of the encounter doing chart review, history and exam, documentation and further activities as noted above  [id number]}     No follow-ups on file.    Florence Koo MD  [facility]   July Huang is 80 y.o. and presents today for the following health issues   HPI She has back pain.  Going on in last 1-2 weeks.  Getting worse.  Hard to lie flat and hurts to sit for long time.  No injury or fall.  Located in lower back.  Some pain goes up the spine.  Has some pain in left leg down to foot.  Leg feels heavy - same side as affected by CVA.  Has some numbness.  She thinks the leg has gotten weaker since her hospitalization.  No changes in bowel or bladder function.  Hard to do exercises for her CVA. She is being seen by PT.  Had sciatica in the past.  Has h/o scoliosis.  She does have appointment for her osteopath.  Tried Tylenol and it helps some but still has pain.        Review of Systems  As above      Objective       Vitals:  No vitals were obtained today due to virtual visit.    Physical Exam  no        Phone call duration: 16 minutes

## 2021-06-16 NOTE — TELEPHONE ENCOUNTER
Pt calling stating baclofen is not working as well as she hopped.  Requesting flexeril sent to pharm as that one works better.

## 2021-06-16 NOTE — TELEPHONE ENCOUNTER
Left detail message on Pt's VM letting her know that we faxed order to [facility] today.

## 2021-06-16 NOTE — TELEPHONE ENCOUNTER
Please call pt and see if she is willing to try tizanidine for her muscle spasms as her plan does not cover Flexeril.

## 2021-06-17 NOTE — PATIENT INSTRUCTIONS - HE
Patient Instructions by Florence Koo MD at 1/17/2020  1:40 PM     Author: Florence Koo MD Service: -- Author Type: Physician    Filed: 1/17/2020  2:49 PM Encounter Date: 1/17/2020 Status: Addendum    : Florence Koo MD (Physician)    Related Notes: Original Note by Florence Koo MD (Physician) filed at 1/17/2020  2:13 PM         Patient Education   Preventing Falls in the Home  As you get older, falls are more likely. Thats because your reaction time slows. Your muscles and joints may also get stiffer, making them less flexible. Illness, medications, and vision changes can also affect your balance. A fall could leave you unable to live on your own. To make your home safer, follow these tips:    Floors    Put nonskid pads under area rugs.    Remove throw rugs.    Replace worn floor coverings.    Tack carpets firmly to each step on carpeted stairs. Put nonskid strips on the edges of uncarpeted stairs.    Keep floors and stairs free of clutter and cords.    Arrange furniture so there are clear pathways.    Clean up any spills right away.    Bathrooms    Install grab bars in the tub or shower.    Apply nonskid strips or put a nonskid rubber mat in the tub or shower.    Sit on a bath chair to bathe.    Use bathmats with nonskid backing.    Lighting    Keep a flashlight in each room.    Put a nightlight along the pathway between the bedroom and the bathroom.    [id number] [employer]. [address] [place] [id number]. All rights reserved. This information is not intended as a substitute for professional medical care. Always follow your healthcare professional's instructions.           Advance Directive  Patients advance directive was discussed and I am comfortable with the patients wishes.  Patient Education   Personalized Prevention Plan  You are due for the preventive services outlined below.  Your care team is available to assist you in  scheduling these services.  If you have already completed any of these items, please share that information with your care team to update in your medical record.  Health Maintenance   Topic Date Due   ? ZOSTER VACCINES (1 of 2) 06/30/1990   ? DXA SCAN  06/30/2005   ? MEDICARE ANNUAL WELLNESS VISIT  11/16/2016   ? FALL RISK ASSESSMENT  08/23/2020   ? ADVANCE CARE PLANNING  10/17/2023   ? TD 18+ HE  02/15/2029   ? PNEUMOCOCCAL IMMUNIZATION 65+ LOW/MEDIUM RISK  Completed   ? INFLUENZA VACCINE RULE BASED  Completed      Stop taking cold medicines    Return for a BP recheck and update me on results next week.

## 2021-06-17 NOTE — TELEPHONE ENCOUNTER
Reason for Call:  Medication or medication refill:    cyclobenzaprine (FLEXERIL) 5 MG tablet    Do you use a [facility]?  [place]    Name of the medication requested: cyclobenzaprine (FLEXERIL) 5 MG tablet    Other request: Ok to wait until Wednesday when Dr. Koo returns per patient.     Can we leave a detailed message on this number? No call back needed    Phone number patient can be reached at: Home number on file [phone] (home)    Best Time:     Call taken on 5/18/2021 at 8:16 AM by Harper Belcher

## 2021-06-17 NOTE — PATIENT INSTRUCTIONS - HE
Patient Instructions by Irvin Cam PT at 1/3/2019  1:30 PM     Author: Irvin Cam PT Service: -- Author Type: Physical Therapist    Filed: 1/3/2019  1:55 PM Encounter Date: 1/3/2019 Status: Signed    : Irvin Cam PT (Physical Therapist)        CLAM SHELLS    While lying on your side with your knees bent, draw up the top knee while keeping contact of your feet together.    Do not let your pelvis roll back during the lifting movement.  *If in doubt, roll forwards.    15-20x each leg.  2x/day.        HIP ABDUCTION - SIDELYING    While lying on your side, slowly raise up your top leg to the side. Keep your knee straight and maintain your toes pointed forward the entire time.     The bottom leg can be bent to stabilize your body.    *Make sure leg is slightly behind you.  10x each leg. 1x/day.

## 2021-06-17 NOTE — PATIENT INSTRUCTIONS - HE
Patient Instructions by Irvin Cam PT at 1/24/2019  1:30 PM     Author: Irvin Cam PT Service: -- Author Type: Physical Therapist    Filed: 1/24/2019  1:58 PM Encounter Date: 1/24/2019 Status: Signed    : Irvin Cam PT (Physical Therapist)           Step Ups    Using the bottom step of your stairs, step up with your affected leg followed by your unaffected leg. Step back with your affected leg followed by your unaffected leg.  Do 15x forwards, 15x sideways.  1-2x/day as tolerated.

## 2021-06-17 NOTE — PATIENT INSTRUCTIONS - HE
Patient Instructions by Irvin Cam PT at 7/10/2019  3:00 PM     Author: Irvin Cam PT Service: -- Author Type: Physical Therapist    Filed: 7/10/2019  3:44 PM Encounter Date: 7/10/2019 Status: Signed    : Irvin Cam PT (Physical Therapist)        Pec corner stretch    Find a comfortable position for your hands and lightly lean forward into the corner until you feel a good stretch in your pecs. Hold 10-20 sec as able, x2-3 reps    2x/day.          SCAPULAR RETRACTIONS    Draw your shoulder blades back and down.    Hold 2 seconds. 10 reps.    2-3x/day.      WALL WALK    Place your affected hand on the wall with the palm facing the wall. Next, walk your fingers up the wall towards overhead. Lastly, slide your hand back down the wall to the starting position.    Hold 1-2 seconds. 5 reps each arm.  2x/day.

## 2021-06-17 NOTE — PATIENT INSTRUCTIONS - HE
Patient Instructions by Irvin Cam PT at 1/10/2019  1:30 PM     Author: Irvin Cam PT Service: -- Author Type: Physical Therapist    Filed: 1/10/2019  1:59 PM Encounter Date: 1/10/2019 Status: Signed    : Irvin Cam PT (Physical Therapist)        SIT TO STAND    While making sure you bend at the hip, SLOWLY sit down    Your chest may come forward over your toes, but your spine should stay in neutral as shown.    *Make sure to keep knees in-line with your hips, and weight equal between the feet.    15-20x. 2-3x throughout the day as you think of it.           *For stairs, you can try one foot over the other, as long as pain does not increase more than 2 points from baseline on the 0-10 pain scale.    *For sidelying exercises, you can try lying on your left side, as long as pain does not go down into the thigh.

## 2021-06-17 NOTE — PATIENT INSTRUCTIONS - HE
Patient Instructions by Kolby Gale MD at 3/25/2019 10:40 AM     Author: Kolby Gale MD Service: -- Author Type: Physician    Filed: 3/25/2019 11:43 AM Encounter Date: 3/25/2019 Status: Addendum    : Kolby Gale MD (Physician)    Related Notes: Original Note by Kolby Gale MD (Physician) filed at 3/25/2019 11:34 AM       Patient Education   Patient Education     Benign Paroxysmal Positional Vertigo     Your health care provider may move your head in certain ways to treat your BPPV.     Benign paroxysmal positional vertigo (BPPV) is a problem with the inner ear. The inner ear contains the vestibular system. This system is what helps you keep your balance. BPPV causes a feeling of spinning. It is a common problem of the vestibular system.  Understanding the vestibular system  The vestibular system of the ear is made up of very tiny parts. They include the utricle, saccule, and semicircular canals. The utricle is a tiny organ that contains calcium crystals. In some people, the crystals can move into the semicircular canals. When this happens, the system no longer works as it should. This causes BPPV. Benign means it is not life threatening. Paroxysmal means it happens suddenly. Positional means that it happens when you move your head. Vertigo is a feeling of spinning.  What causes BPPV?  Causes include injury to your head or neck. Other problems with the vestibular system may cause BPPV. In many people, the cause of BPPV is not known.  Symptoms of BPPV  You many have repeated feelings of spinning (vertigo). The vertigo usually lasts less than 1 minute. Some movements, such as rolling over in bed, can bring on vertigo.  Diagnosing BPPV  Your primary healthcare provider may diagnose and treat your BPPV. Or you may see an ear, nose, and throat doctor (otolaryngologist). In some cases, you may see a nervous system doctor (neurologist).  The healthcare provider will ask about your symptoms and  your medical history. He or she will examine you. You may have hearing and balance tests. As part of the exam, your healthcare provider may have you move your head and body in certain ways. If you have BPPV, the movements can bring on vertigo. Your provider will also look for abnormal movements of your eyes. You may have other tests to check your vestibular or nervous systems.  Treatment for BPPV  Your healthcare provider may try to move the calcium crystals. This is done by having you move your head and neck in certain ways. This treatment is safe and often works well. You may also be told to do these movements at home. You may still have vertigo for a few weeks. Your healthcare provider will recheck your symptoms, usually in about a month. Special physical therapy may also be part of treatment. In rare cases, surgery may be needed for BPPV that does not go away.     When to call the healthcare provider  Call your healthcare provider right away if you have any of these:    Symptoms that do not go away with treatment    Symptoms that get worse    New symptoms   Date Last Reviewed: 5/1/2017 2000-2017 [employer]. [address] [place] [address]. All rights reserved. This information is not intended as a substitute for professional medical care. Always follow your healthcare professional's instructions.      Epley Maneuvers     https://www.google.com/search?q=epley&source=lnms&tbm=isch&sa=X&jyoti=[id number]      Blood pressure  Control:    Nutrition Interventions to Improve Blood Pressure  Many diet and lifestyle habits are associated with abnormal blood pressure. Remediating the effects of these habits is  critical to regaining health. The table below addresses some of the nutritional interventions that have been shown to have  a beneficial effect on hypertension.   (To view specific citations, please refer to the   Guide.)    ACEI = Angiotension converting enzyme inhibitor; EFA = essential fatty acid; EPA = eicosapentaenoic acid; K = potassium; N = nitrogen; NFkB = nuclear factor kappa B;  PPARs = peroxisomal proliferator-activated receptors  IFM n Blood Pressure n N Sight Videos and Resources: nsight.org  Nutrient Effect on Blood Pressure    Protein Reduces sympathetic activity, induces natriuresis, inhibits tyrosine kinase, reduces vascular  smooth muscle hypertrophy, lowers superoxide anion, decreases aldosterone. Whey protein  stimulates glutathione production.18-20 Carnitine limits end organ damage.22,23     1.5-1.8 g/kg body weight /day of non-animal protein  Caveat:  If you have Kidney Compromise, then limit.      Whey Protein 30 g/day (ACEI)     Carnitine 1g/bid     Sardine muscle protein 3 g/d (ACEI)     Roberta protein 1.5 g/d (natural ACEI)    Soy protein 40 g/d (activates PPARs)     Arginine up to 10 g/d from food or supplement; precursor vascular nitric oxide.23     Taurine 6 g/day induces mild diuresis, vasodilation, increases atrial natriuretic factor,  improves insulin sensitivity, reduces homocysteine levels  Fats Emphasize polyunsaturated/monounsaturated fats, limited saturated fats, no trans fats     EPA and DHA 1:1-4:1 ratio 3-4 g/d     Olive oil 40 gm/d Extra Virgin Uncooked    Sesame oil : 35 g/d Raw and Ground     Carbohydrates in  Fiber: Oatmeal fiber, psyllium, glucomannan, or betaglucan improves insulin sensitivity    Minerals in Sodium (Na): reduce to 2400 mg/d     Potassium (K): increase to  mEq/d; 5:1 ratio K:Na     Magnesium: 500-1000 mg/d     Calcium: 500-1000 mg/d in food or mixed supplement.     Zinc: 25-50 mg/d inhibits NFkB    Vitamins n Vitamin D to maintain adequate serum levels (36-50 ng/mL); many people need 5000 iu/d  to obtain and maintain optimum levels    Vitamin E: 100 iu Vitamin E/1 g EFA     Vitamin C: 250-500 mg twice a day if lack of adequate ascorbates from  fruits/vegetables     B2 (riboflavin): 25 mg/d     Vitamin B6: 5 mg/kg/d reduces sympathetic nervous system activity, improves  insulin sensitivity     Methyl folate or folinic acid if the patient is hyperhomocysteinemic   B12 If the patient is hyperhomocysteinemic  Phytonutrients  (or foods providing  phytonutrients)     Dark chocolate and cocoa: up to 100 gm/d    Lycopene: 10 mg; found in tomato, guava, watermelon, papaya, apricots     Green tea, black tea: >600cc     Quercetin: 500 mg twice a day     Celery: high in 3-N butyl phthalide, apigeniin; 4 large sticks/d     Garlic: 2-4 cloves/d     [place] seaweed: 3-4 g/d     Pomegranate juice: 330 ml/d    You can make big improvements to your health and energy levels by making small and deliberate healthy lifestyle  choices that involve moving more every day. Many people feel daunted by the thought of changing their lives and  starting a new routine to be more active, but there are only a few key tips to remember to be successful:  Walking or Biking:      Emphasize fun. What is something you love? Whether thats  music, birds, friends, trampolines, or books, you can shape  your activity plan around the things you love.     Attach activity to habits. Taking a walk after dinner is a  time-honored way to get moving. What is something you  do regularly?     Involve others. Chances are, your friends, family, and co-workers want to be more active, too.     Add Audio. Most phones can play music or podcasts, so challenge yourself to walk for at least one song, or one podcast.     Be inventive. Rather than thinking of movement as calisthenics or a workout, challenge yourself to be inventive  with your active living. Do an extra lap around the grocery store. Stand while watching a television show, instead  of sitting.       2016 [employer]  Walking: the most ancient exercise and still the best modern exercise. --Destiney Fernández    Be forgiving. If you  have a sedentary day, let it go. Dont overwork  the next day or punish yourself--just try to be active every day!  Encourage yourself the way you would encourage your best friend.  It is about progress, not perfection.    Track your progress. Consider using a pedometer bryson on your  phone or purchasing a simple pedometer and have fun with it.  How many steps do you take on an average work day? How many  do you take on the weekend? Striving for 10,000 steps a day is  recommended. However, some is better than none. See how it goes.     Power of Rest in Creating Optimal Health and Wellness     In a world full of stress and demands, rest is one of the most important factors for creating wellness. Restorative activities  can include mental, physical, and/or emotional activities that help to promote resilience. Particularly if you are under chronic stress, restful activities can break the cycle of stress and assist you on the path to health.    Get Unstuck    Knowing that the brain is not static, but instead has plasticity, underlines one of the key reasons to practice restful habits: you can learn to be healthier, happier, and more connected. Focusing on the process of restoration, rather than current complaints or states, allows you to take charge of your own health and wellness.  You can become more resilient and healthier.    The Right Activities    Many people turn to television, alcohol, or other semi-harmful activities to counteract stress. Replacing even a few minutes a day of those activities with ones that scientifically promote healing and restoration can trigger lasting improvements.    Restoration activities like meditation, guided imagery, breathing techniques, gratitude, and mindfulness are time-honored, science-backed ways to make a difference internally. They have calming effects not only on the state of mind, but also helping to balance hormonal and immune function.  Dont let stress run the show.    Take  back a few minutes a day of your life, balance your emotions, and your health and wellness will follow.    Restorative Activities    Meditation  Gratitude Journaling  Deep breathing and breathing techniques  Guided Imagery and/or Visualization  Massage, Sauna, or Water Therapy  Mindful Eating, Walking, or Body Scan      Follow with Dr Koo Blood pressure and carotid artery blockage  ? Daily aspirin  Supplement for Cholesterol lowerering?    Ondansetron for nausea

## 2021-06-18 NOTE — LETTER
Letter by Florence Koo MD at      Author: Florence Koo MD Service: -- Author Type: --    Filed:  Encounter Date: 2/15/2019 Status: (Other)         [facility] FAMILY MEDICINE/OB  02/15/19    Patient: July Huang  YOB: 1940  Medical Record Number: [id number]  CSN: [id number]                                                                              Non-opioid Controlled Substance Agreement    I understand that my care provider has prescribed a controlled substance to help manage my condition(s). I am taking this medicine to help me function or work. I know this is strong medicine, and that it can cause serious side effects. Controlled substances can be sedating, addicting and may cause a dependency on the drug. They can affect my ability to drive or think, and cause depression. They need to be taken exactly as prescribed. Combining controlled substances with certain medicines or chemicals (such as cocaine, sedatives and tranquilizers, sleeping pills, meth) can be dangerous or even fatal. Also, if I stop controlled substances suddenly, I may have severe withdrawal symptoms.  If not helpful, I may be asked to stop them.    The risks, benefits, and side effects of these medicine(s) were explained to me. I agree that:    1. I will take part in other treatments as advised by my care team. This may be psychiatry or counseling, physical therapy, behavioral therapy, group treatment or a referral to a pain clinic. I will reduce or stop my medicine when my care team tells me to do so.  2. I will take my medicines as prescribed. I will not change the dose or schedule unless my care team tells me to. There will be no refills if I run out early.  I may be contactedwithout warning and asked to complete a urine drug test or pill count at any time.   3. I will keep all my appointments, and understand this is part of the monitoring of controlled substances. My care team may  require an office visit for EVERY controlled substance refill. If I miss appointments or dont follow instructions, my care team may stop my medicine.  4. I will not ask other providers to prescribe controlled substances, and I will not accept controlled substances from other people. If I need another prescribed controlled substance for a new reason, I will tell my care team within 1 business day.  5. I will use one pharmacy to fill all of my controlled substance prescriptions, and it is up to me to make sure that I do not run out of my medicines on weekends or holidays. If my care team is willing to refill my controlled substance prescription without a visit, I must request refills only during office hours, refills may take up to 3 days to process, and it may take up to 5 to 7 days for my medicine to be mailed and ready at my pharmacy. Prescriptions will not be mailed anywhere except my pharmacy.    6. I am responsible for my prescriptions. If the medicine/prescription is lost or stolen, it will not be replaced. I also agree not to share controlled substance medicines with anyone.          [facility] FAMILY MEDICINE/OB  02/15/19  Patient:  July Huang  YOB: 1940  Medical Record Number: [id number]  CSN: [id number]    7. I agree to not use ANY illegal or recreational drugs. This includes marijuana, cocaine, bath salts or other drugs. I agree not to use alcohol unless my care team says I may. I agree to give urine samples whenever asked. If I dont give a urine sample, the care team may stop my medicine.    8. If I enroll in the [place] Medical Marijuana program, I will tell my care team. I will also sign an agreement to share my medical records with my care team.    9. I will bring in my list of medicines (or my medicine bottles) each time I come to the clinic.   10. I will tell my care team right away if I become pregnant or have a new medical problem treated outside of my regular  clinic.  11. I understand that this medicine can affect my thinking and judgment. It may be unsafe for me to drive, use machinery and do dangerous tasks. I will not do any of these things until I know how the medicine affects me. If my dose changes, I will wait to see how it affects me. I will contact my care team if I have concerns about medicine side effects.    I understand that if I do not follow any of the conditions above, my prescriptions or treatment may be stopped.      I agree that my provider, clinic care team, and pharmacy may work with any city, state or federal law enforcement agency that investigates the misuse, sale, or other diversion of my controlled medicine. I will allow my provider to discuss my care with or share a copy of this agreement with any other treating provider, pharmacy or emergency room where I receive care. I agree to give up (waive) any right of privacy or confidentiality with respect to these consents.   I have read this agreement and have asked questions about anything I did not understand.    ___________________________________________________________________________  Patient signature - Date/Time  -July Huang                                      ___________________________________________________________________________  Witness signature                                                                    ___________________________________________________________________________  Provider signature- Florence Koo MD

## 2021-06-20 NOTE — PROGRESS NOTES
"SUBJECTIVE: July Huang is a 78 y.o. female with:  Chief Complaint   Patient presents with     Medication Management    Has not been feeling the best lately. She feels like she is getting a cold at times. Has lot of body aches.  She was walking and slipped and fell once this summer.  Seeing osteopath who is helping with her muscle pain.      She went to [place] for family visit and forgot her Klonopin.  She went several nights without the Klonopin and had trouble with insomnia. Only was getting a few hours of sleep.  Also started with some myalgias.  She is doing better but still does not feel great.  Melatonin does help at times.  She also has some anxiety at bedtime.   She would eventually like to wean off the Klonopin.  Wonders about CBD oil for anxiety/ sleep issues.  Would like to wait til she is feeling better before starting a wean.    SH: .  Retired.    OBJECTIVE: /78 (Patient Site: Right Arm, Patient Position: Sitting, Cuff Size: Adult Regular)  Pulse 81  Ht 5' 1.9\" (1.572 m)  Wt 118 lb (53.5 kg)  SpO2 98%  BMI 21.65 kg/m2 no distress  PSYCH:  Awake, alert, and oriented x 3.  Mood and affect are appropriate.  Good eye contact.  Speech is normal.  No suicidal ideation.  No hallucinations.    [place] was seen today for medication management.    Diagnoses and all orders for this visit:    Insomnia   I agree with a slow taper off clonazepam.  Lots of options to try for sleep- botanicals/ 5-HTP.  She will discuss at her physical.    Generalized anxiety disorder  -     clonazePAM (KLONOPIN) 0.5 MG tablet; Take 0.5-1 tablets (0.25-0.5 mg total) by mouth at bedtime.     Florence Koo   "

## 2021-06-20 NOTE — LETTER
Letter by Florence Koo MD at      Author: Florence Koo MD Service: -- Author Type: --    Filed:  Encounter Date: 1/17/2020 Status: Signed         [facility] FAMILY MEDICINE/OB  01/17/20    Patient: July Huang  YOB: 1940  Medical Record Number: [id number]  CSN: [id number]                                                                              Non-opioid Controlled Substance Agreement    I understand that my care provider has prescribed a controlled substance to help manage my condition(s). I am taking this medicine to help me function or work. I know this is strong medicine, and that it can cause serious side effects. Controlled substances can be sedating, addicting and may cause a dependency on the drug. They can affect my ability to drive or think, and cause depression. They need to be taken exactly as prescribed. Combining controlled substances with certain medicines or chemicals (such as cocaine, sedatives and tranquilizers, sleeping pills, meth) can be dangerous or even fatal. Also, if I stop controlled substances suddenly, I may have severe withdrawal symptoms.  If not helpful, I may be asked to stop them.    The risks, benefits, and side effects of these medicine(s) were explained to me. I agree that:    1. I will take part in other treatments as advised by my care team. This may be psychiatry or counseling, physical therapy, behavioral therapy, group treatment or a referral to a pain clinic. I will reduce or stop my medicine when my care team tells me to do so.  2. I will take my medicines as prescribed. I will not change the dose or schedule unless my care team tells me to. There will be no refills if I run out early.  I may be contactedwithout warning and asked to complete a urine drug test or pill count at any time.   3. I will keep all my appointments, and understand this is part of the monitoring of controlled substances. My care team may require  an office visit for EVERY controlled substance refill. If I miss appointments or dont follow instructions, my care team may stop my medicine.  4. I will not ask other providers to prescribe controlled substances, and I will not accept controlled substances from other people. If I need another prescribed controlled substance for a new reason, I will tell my care team within 1 business day.  5. I will use one pharmacy to fill all of my controlled substance prescriptions, and it is up to me to make sure that I do not run out of my medicines on weekends or holidays. If my care team is willing to refill my controlled substance prescription without a visit, I must request refills only during office hours, refills may take up to 3 days to process, and it may take up to 5 to 7 days for my medicine to be mailed and ready at my pharmacy. Prescriptions will not be mailed anywhere except my pharmacy.    6. I am responsible for my prescriptions. If the medicine/prescription is lost or stolen, it will not be replaced. I also agree not to share controlled substance medicines with anyone.          [facility] FAMILY MEDICINE/OB  01/17/20  Patient:  July Huang  YOB: 1940  Medical Record Number: [id number]  CSN: [id number]    7. I agree to not use ANY illegal or recreational drugs. This includes marijuana, cocaine, bath salts or other drugs. I agree not to use alcohol unless my care team says I may. I agree to give urine samples whenever asked. If I dont give a urine sample, the care team may stop my medicine.    8. If I enroll in the [place] Medical Marijuana program, I will tell my care team. I will also sign an agreement to share my medical records with my care team.    9. I will bring in my list of medicines (or my medicine bottles) each time I come to the clinic.   10. I will tell my care team right away if I become pregnant or have a new medical problem treated outside of my regular clinic.  11. I  understand that this medicine can affect my thinking and judgment. It may be unsafe for me to drive, use machinery and do dangerous tasks. I will not do any of these things until I know how the medicine affects me. If my dose changes, I will wait to see how it affects me. I will contact my care team if I have concerns about medicine side effects.    I understand that if I do not follow any of the conditions above, my prescriptions or treatment may be stopped.      I agree that my provider, clinic care team, and pharmacy may work with any city, state or federal law enforcement agency that investigates the misuse, sale, or other diversion of my controlled medicine. I will allow my provider to discuss my care with or share a copy of this agreement with any other treating provider, pharmacy or emergency room where I receive care. I agree to give up (waive) any right of privacy or confidentiality with respect to these consents.   I have read this agreement and have asked questions about anything I did not understand.    Clonazepam 0.5 mg 1/2 to 1 at bedtime  #30 for 1 month. Visits every 6 months.  ___________________________________________________________________________  Patient signature - Date/Time  -July Huang                                      ___________________________________________________________________________  Witness signature                                                                    ___________________________________________________________________________  Provider signature- Florence Koo MD

## 2021-06-21 NOTE — PROGRESS NOTES
"[facility] Daily Progress     Patient Name: July Huang  Date: 11/13/2018  Visit #: 2/12 through 2/4/18  PTA visit #:    PRECAUTIONS: osteoporosis, h/o ovarian and bladder CA  Referral Diagnosis: Left-sided low back pain with left-sided sciatica  Referring provider: Florence Koo  Visit Diagnosis:     ICD-10-CM    1. Sciatica of left side M54.32    2. Piriformis syndrome, left G57.02          Assessment:     Pt presents to PT with acute left-sided sciatica pain, most consistent with piriformis syndrome.  HEP/POC compliance is  good .  Patient is benefitting from skilled physical therapy and is making steady progress toward functional goals.  Patient is appropriate to continue with skilled physical therapy intervention, as indicated by initial plan of care.    Goal Status:  Pt. will be independent with home exercise program in : 6 weeks  Pt. will be able to walk : Comment  Comment:: 60 min, 2x/day to walk the dog without increased pain and at normal pace in 6 weeks.  Patient will ascend / descend: stairs;with no pain;with recipricol gait;in 6 weeks  Patient will sit: Comment  Comment: >= 2 hours for travel without increased pain    Patient will decrease : HANSEL score;for improved quality of life;in 6 weeks;by _ points  by ___ points: >= 30% from IE      Plan / Patient Education:     Review current ex next session.  Add STM to L piriformis and L LE sciatic nerve mobilizations.    Subjective:     Pain Rating: \"Better\"  Notes intensity of pain to be better from last visit. Standing and walking are getting much better. Pain still worst with sitting. Nervous about upcoming trip to [place] later this week, because of all the sitting and walking through the airport.    Objective:     Moderate/significant verbal, tactile, and demonstrative cueing required for correct performance of ex.  Added YTB sidestepping to HEP (attempted OTB, but patient demonstrating fairly significant difficulty due to " weakness).    Treatment Today     TREATMENT MINUTES COMMENTS   Evaluation     Self-care/ Home management     Manual therapy     Neuromuscular Re-education     Therapeutic Activity     Therapeutic Exercises 32 Ex per flow sheet   Gait training     Modality__________________                Total 32    Blank areas are intentional and mean the treatment did not include these items.       Irvin Cam, PT, DPT  11/13/2018

## 2021-06-21 NOTE — PROGRESS NOTES
[facility] Daily Progress     Patient Name: Juyl Huang  Date: 2018  Visit #: 3/12 through 18 ([facility] auth)  PTA visit #:    PRECAUTIONS: osteoporosis, h/o ovarian and bladder CA  Referral Diagnosis: Left-sided low back pain with left-sided sciatica  Referring provider: Florence Koo  Visit Diagnosis:     ICD-10-CM    1. Sciatica of left side M54.32    2. Piriformis syndrome, left G57.02          Assessment:     Pt presents to PT with acute left-sided sciatica pain, most consistent with piriformis syndrome.  HEP/POC compliance is  good .  Patient is benefitting from skilled physical therapy and is making steady progress toward functional goals.  Patient is appropriate to continue with skilled physical therapy intervention, as indicated by initial plan of care.    Goal Status:  Pt. will be independent with home exercise program in : 6 weeks  Pt. will be able to walk : Comment  Comment:: 60 min, 2x/day to walk the dog without increased pain and at normal pace in 6 weeks.  Patient will ascend / descend: stairs;with no pain;with recipricol gait;in 6 weeks  Patient will sit: Comment  Comment: >= 2 hours for travel without increased pain    Patient will decrease : HANSEL score;for improved quality of life;in 6 weeks;by _ points  by ___ points: >= 30% from IE      Plan / Patient Education:     Continue STM to L piriformis and L LE sciatic nerve mobilizations.  Review and update HEP as necessary.    Subjective:     Pain Ratin/10 L posterior hip/buttock  She is encouraged by how relatively well her recent trip back and forth to [place] went. Normally, all the sitting and walking would really flare things up, but honestly, it does not feel that bad, per patient report. She does note the area to feel a bit tight, but otherwise, has not had much pain that actually travels down into the lower leg or ankle.    Objective:     Verbally reviewed HEP.  Increased muscle spasm and TTP present L  piriformis; decreased moderately post STM.    Treatment Today     TREATMENT MINUTES COMMENTS   Evaluation     Self-care/ Home management     Manual therapy 26 -Supine manual L LE sciatic nerve glides: 3x10 reps each SKTC, PIRI ER, and knee extension  -Prone STM to L piriformis to decrease muscle spasm and pain   Neuromuscular Re-education     Therapeutic Activity     Therapeutic Exercises     Gait training     Modality__________________                Total 26    Blank areas are intentional and mean the treatment did not include these items.       Irvin Cam, PT, DPT  11/20/2018

## 2021-06-21 NOTE — PROGRESS NOTES
Assessment and Plan:       [place]ia was seen today for annual wellness visit and hip pain.    Routine general medical examination at a health care facility    Encounter for screening mammogram for breast cancer  -     Mammo Screening Bilateral; Future  Up to date with colon cancer screening.  Consider Shingles vaccine.  She will return for tetanus vaccine.    Hypercholesterolemia  -     Lipid Cascade; Future    Left-sided low back pain with left-sided sciatica  -     Ambulatory referral to PT/OT    Other orders  -     Tdap vaccine,  6yo or older,  IM; Future  -     Influenza High Dose, Seasonal 65+ yrs         The patient's current medical problems were reviewed.    I have had an Advance Directives discussion with the patient.  The following health maintenance schedule was reviewed with the patient and provided in printed form in the after visit summary:   Health Maintenance   Topic Date Due     ZOSTER VACCINE  06/30/2000     DXA SCAN  06/30/2005     INFLUENZA VACCINE RULE BASED (1) 08/01/2018     TD 18+ HE  08/22/2019     FALL RISK ASSESSMENT  08/27/2019     ADVANCE DIRECTIVES DISCUSSED WITH PATIENT  05/17/2021     PNEUMOCOCCAL POLYSACCHARIDE VACCINE AGE 65 AND OVER  Completed     PNEUMOCOCCAL CONJUGATE VACCINE FOR ADULTS (PCV13 OR PREVNAR)  Completed        Subjective:   Chief Complaint: July Huang is an 78 y.o. female here for an Annual Wellness visit.   HPI:  1) She took a fall when hiking and has persistent hip pain.  [place] and [place] on left hip in June.  No bruise.  She has been seeing her osteopath for this problem.  Initially did not bother her.  Pain has gotten worse.  Pain starts in left low back and goes down her left leg to ankle.  Worse in left lateral hip.  No numbness/tingling / weakness.  She has been using a cane at times.  She had normal xray of the left hip and back xray shows some degenerative changes.  Has had 2 osteopathic manipulations.  Plans to go more  frequently.  Has been doing lot of traveling to [place].  2) She is getting a repeat MRI of the brain to monitor her meningioma.    Review of Systems:    Please see above.  The rest of the review of systems are negative for all systems.    SH: Lives with her .  Retired.    Patient Care Team:  Florence Koo MD as PCP - General     Patient Active Problem List   Diagnosis     Benign Essential Hypertension     Osteoporosis     Palpitations     Ovarian cancer (H)     Bladder cancer (H)     Cystocele     Hypercholesterolemia     Eye muscle weakness, left     Meningioma (H)     Pelvic prolapse     Anxiety disorder     DJD of shoulder     Past Medical History:   Diagnosis Date     H/O total hysterectomy with bilateral salpingo-oophorectomy (BSO) 1995    ovarian cancer      Past Surgical History:   Procedure Laterality Date     BREAST BIOPSY       TOTAL ABDOMINAL HYSTERECTOMY W/ BILATERAL SALPINGOOPHORECTOMY  1995      Family History   Problem Relation Age of Onset     Stroke Mother      Heart disease Father      Diabetes Father      Lung cancer Sister      Breast cancer Neg Hx      Colon cancer Neg Hx      Parkinsonism Sister      Asthma Sister       Social History     Social History     Marital status:      Spouse name: N/A     Number of children: N/A     Years of education: N/A     Occupational History     Not on file.     Social History Main Topics     Smoking status: Former Smoker     Smokeless tobacco: Never Used     Alcohol use 0.0 - 1.2 oz/week     0 - 2 Standard drinks or equivalent per week     Drug use: No     Sexual activity: Not on file     Other Topics Concern     Not on file     Social History Narrative      Current Outpatient Prescriptions   Medication Sig Dispense Refill     ASCORBATE CALCIUM (VITAMIN C ORAL) Take 1 tablet by mouth daily as needed.        CALCIUM CITRATE/VITAMIN D3 (CALCIUM CITRATE + ORAL) Take 1 tablet by mouth daily. TABS       clonazePAM (KLONOPIN) 0.5 MG tablet  "Take 0.5-1 tablets (0.25-0.5 mg total) by mouth at bedtime. 30 tablet 2     CRANBERRY EXTRACT (CRANBERRY ORAL) Take 1 capsule by mouth daily. CAPS       lutein 10 mg Tab Take 1 capsule by mouth daily. Take once daily for eyes.       MAGNESIUM ORAL CAPS.  Take 1 capsule at bedtime (constipation and bones).       melatonin 5 mg Tab tablet Take 5 mg by mouth at bedtime as needed.       TAURINE ORAL Take by mouth.       TURMERIC ROOT EXTRACT ORAL Take 1 capsule by mouth daily.       UNABLE TO FIND Med Name: L-theanine       UNABLE TO FIND Med Name: Heart tension       valACYclovir (VALTREX) 500 MG tablet TK 1 T PO  BID FOR 3 DAYS  3     VITAMIN A/C/D3/COD LIVER OIL (KIDS COD LIVER OIL & VIT D ORAL) 1 teaspoonful by mouth once daily.       UBIDECARENONE (COQ-10 ORAL) CAPS.  Take 300 mg every 3 days )total 100 mg daily).       No current facility-administered medications for this visit.       Objective:   Vital Signs:   Visit Vitals     /78 (Patient Site: Left Arm, Patient Position: Sitting, Cuff Size: Adult Regular)     Pulse 86     Temp 97.8  F (36.6  C) (Oral)     Resp 12     Ht 5' 1.5\" (1.562 m)     Wt 118 lb (53.5 kg)     SpO2 96%     BMI 21.93 kg/m2        VisionScreening:  No exam data present     PHYSICAL EXAM  /78 (Patient Site: Left Arm, Patient Position: Sitting, Cuff Size: Adult Regular)  Pulse 86  Temp 97.8  F (36.6  C) (Oral)   Resp 12  Ht 5' 1.5\" (1.562 m)  Wt 118 lb (53.5 kg)  SpO2 96%  BMI 21.93 kg/m2  No acute distress  HEENT: Head atraumatic / normocephalic.  PERRL.  Conjunctiva clear. Nose with no discharge.  Tympanic membranes grey with normal landmarks.  OP - pink and moist.  Normal dentition.  Neck: Supple.  No lymphadenopathy or thyromegaly.  Lungs: CTA.  No retractions or tachypnea.  CV: RRR. S1 and S2 normal.  No murmurs / rubs / gallops.  No lower extremity edema.    Abdomen: Soft. Non tender. Non distended.  No HSM or masses.  Skin: No rashes or lesions.  Neuro: AAOx3.  " Normal strength and tone.  Normal gait.  DTRs in lower extremities equal bilaterally.  Psych: Mood and affect normal.  Good eye contact.  Normal speech.  Back: No vertebral tenderness.  Tender over left sciatic notch.  Her straight leg-raise is negative bilaterally.  lower extremities: DTRs symmetric 3+ bilaterally.  Sensation intact bilaterally on lower legs.  Motor 5/5 equal bilaterally.    Assessment Results 10/17/2018   Activities of Daily Living No help needed   Instrumental Activities of Daily Living 2-4 - Moderate impairment   Get Up and Go Score Less than 12 seconds   Mini Cog Total Score 4   Some recent data might be hidden     A Mini-Cog score of 0-2 suggests the possibility of dementia, score of 3-5 suggests no dementia    Identified Health Risks:     The patient reports that she has difficulty with instrumental activities of daily living.  She was provided with a list of local organizations that provide support services and referred to PT to help with her mobility. She is at risk for falling and has been provided with information to reduce the risk of falling at home.  Patient's advanced directive was discussed and I am comfortable with the patient's wishes.    Florence Koo

## 2021-06-21 NOTE — PROGRESS NOTES
[facility] Certification Request    November 6, 2018      Patient: July Huang  MR Number: [id number]  YOB: 1940  Date of Visit: 11/6/2018      Dear Dr. Florence Koo:    Thank you for this referral.   We are seeing July Huang in Physical Therapy for left-sided sciatica.    Medicare and/or Medicaid requires physician review and approval of the treatment plan. Please review the plan of care and verify that you agree with the therapy plan of care by co-signing this note.      Plan of Care  Authorization / Certification Start Date: 11/06/18  Authorization / Certification End Date: 02/04/19  Authorization / Certification Number of Visits: 12  Communication with: Referral Source  Patient Related Instruction: Nature of Condition;Treatment plan and rationale;Self Care instruction;Basis of treatment;Body mechanics;Precautions;Next steps;Expected outcome  Times per Week: 1-2  Number of Weeks: 6  Number of Visits: 12  Therapeutic Exercise: Stretching;Strengthening  Neuromuscular Reeducation: core (nerve glides)  Manual Therapy: soft tissue mobilization;myofascial release    Goals:  Pt. will be independent with home exercise program in : 6 weeks  Pt. will be able to walk : Comment  Comment:: 60 min, 2x/day to walk the dog without increased pain and at normal pace in 6 weeks.  Patient will ascend / descend: stairs;with no pain;with recipricol gait;in 6 weeks  Patient will sit: Comment  Comment: >= 2 hours for travel without increased pain  Patient will decrease : HANSEL score;for improved quality of life;in 6 weeks;by _ points  by ___ points: >= 30% from IE      If you have any questions or concerns, please don't hesitate to call.    Sincerely,      Irivn Cam, PT        Physician recommendation:     ___ Follow therapist's recommendation        ___ Modify therapy      *Physician co-signature indicates they certify the need for these services furnished within this plan and while  under their care.    [facility]   Initial Evaluation    Patient Name: July Huang  Date of evaluation: 11/6/2018  PRECAUTIONS: osteoporosis, h/o ovarian and bladder CA  Referral Diagnosis: Left-sided low back pain with left-sided sciatica  Referring provider: Florence Koo  Visit Diagnosis:     ICD-10-CM    1. Sciatica of left side M54.32    2. Piriformis syndrome, left G57.02        Assessment:      Pt. is appropriate for skilled PT intervention as outlined in the Plan of Care (POC).  Pt. is a good candidate for skilled PT services to improve pain levels and function.  Goals and POC established in collaboration with the patient.  Pt presents to PT with acute left-sided sciatica pain, most consistent with piriformis syndrome.  HEP initiated today and patient with good tolerance for all ex.      Goals:  Pt. will be independent with home exercise program in : 6 weeks  Pt. will be able to walk : Comment  Comment:: 60 min, 2x/day to walk the dog without increased pain and at normal pace in 6 weeks.  Patient will ascend / descend: stairs;with no pain;with recipricol gait;in 6 weeks  Patient will sit: Comment  Comment: >= 2 hours for travel without increased pain  Patient will decrease : HANSEL score;for improved quality of life;in 6 weeks;by _ points  by ___ points: >= 30% from IE    Patient's expectations/goals are realistic.    Barriers to Learning or Achieving Goals:  No Barriers.       Plan / Patient Instructions:        Plan of Care:   Authorization / Certification Start Date: 11/06/18  Authorization / Certification End Date: 02/04/19  Authorization / Certification Number of Visits: 12  Communication with: Referral Source  Patient Related Instruction: Nature of Condition;Treatment plan and rationale;Self Care instruction;Basis of treatment;Body mechanics;Precautions;Next steps;Expected outcome  Times per Week: 1-2  Number of Weeks: 6  Number of Visits: 12  Therapeutic Exercise:  "Stretching;Strengthening  Neuromuscular Reeducation: core (nerve glides)  Manual Therapy: soft tissue mobilization;myofascial release    Plan for next visit: Review HEP. Add TB sidestepping to HEP. Continue manual STM and L LE sciatic nerve mobilizations as well.     Subjective:       History of Present Illness:    July Cramer) is a 78 y.o. female who presents to therapy today with complaints of left posterolateral hip pain that extends down the lateral leg into the ankle.   Gets some numbness and tingling into the lower lateral leg.  Onset: On and off over the last year, but worse this past month. Has been back and forth to [place] about once a month since April.  Duration: Constant  Worse with sitting (30-40 min max), walking (30-40 min max), going up and down stairs. Normally water aerobics feels pretty good, but notes increased pain after this last visit.  Better with (walking/standing more recently)  Pain Medication: Will take OTC pain medication when pain is more severe.  Sleep: Reports occasionally waking at night due to pain.    Normally goes for 40-60 min walks with her dog, 2x/day.  Xrays performed. May refer to EMR. L hip essentially negative.  Enjoys walking, hiking, water aerobic exercise.    Pt seeks PT to \"help sciatica pain so I walk without pain.\"    Pain Rating:10  Pain rating at best: 3  Pain rating at worst: 10  Pain description: aching, dull, numbness, pain and tingling     Objective:      Patient Outcome Measures :    Modified Oswestry Low Back Pain Disablity Questionnaire  in %: 32   Scores range from 0-100%, where a score of 0% represents minimal pain and maximal function. The minimal clinically important difference is a score reduction of 12%.    Examination  1. Sciatica of left side     2. Piriformis syndrome, left       Involved side: Left  Posture Observation:      General sitting posture is  fair.  General standing posture is fair.  R convex scoliotic curve noted T-L " spine    Lumbar ROM: All % of WNL  Date:      *Indicate scale AROM AROM AROM   Lumbar Flexion 90     Lumbar Extension 75      Right Left Right Left Right Left   Lumbar Sidebending 100 100       Lumbar Rotation 100 100       Thoracic Flexion      Thoracic Extension      Thoracic Sidebending         Thoracic Rotation           Slump Test: Positive bilat  SLR: 70* on R, 60* on L with (+) structural differentiation  Hip PROM: WNL and NP t/o on R. WNL ROM, but with end-range pain into ER on L.  Palpation and PIVM: Increased muscle spasm and TTP L glute, most notably L piriformis.    Treatment Today     TREATMENT MINUTES COMMENTS   Evaluation 20    Self-care/ Home management     Manual therapy 10 -Prone STM to L piriformis to decrease muscle spasm and pain  -Demonstrated and encouraged use of tennis ball for self STM to L piriformis muscle.   Neuromuscular Re-education     Therapeutic Activity     Therapeutic Exercises 20 -Discussed therapy diagnosis, prognosis, POC, relevant anatomy and neurodynamics and basis for tx.  -Reviewed and performed HEP with handouts provided.   Gait training     Modality__________________                Total 50    Blank areas are intentional and mean the treatment did not include these items.            PT Evaluation Code: (Please list factors)  Patient History/Comorbidities: none  Examination: Piriformis syndrome  Clinical Presentation: Stable  Clinical Decision Making: Low    Patient History/  Comorbidities Examination  (body structures and functions, activity limitations, and/or participation restrictions) Clinical Presentation Clinical Decision Making (Complexity)   No documented Comorbidities or personal factors 1-2 Elements Stable and/or uncomplicated Low   1-2 documented comorbidities or personal factor 3 Elements Evolving clinical presentation with changing characteristics Moderate   3-4 documented comorbidities or personal factors 4 or more Unstable and unpredictable High            Irvin Cam, PT, DPT  11/6/2018  1:13 PM
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
## 2021-06-22 NOTE — PROGRESS NOTES
[facility] Daily Progress     Patient Name: July Huang  Date: 2018  Visit #:  through 18 (are [facility])  PTA visit #:    PRECAUTIONS: osteoporosis, h/o ovarian and bladder CA  Referral Diagnosis: Left-sided low back pain with left-sided sciatica  Referring provider: Florence Koo  Visit Diagnosis:     ICD-10-CM    1. Sciatica of left side M54.32    2. Piriformis syndrome, left G57.02          Assessment:     Pt presents to PT with acute left-sided sciatica pain, most consistent with piriformis syndrome.  HEP/POC compliance is  good .  Patient is benefitting from skilled physical therapy and is making steady progress toward functional goals.  Patient is appropriate to continue with skilled physical therapy intervention, as indicated by initial plan of care.    Goal Status:  Pt. will be independent with home exercise program in : 6 weeks  Pt. will be able to walk : Comment  Comment:: 60 min, 2x/day to walk the dog without increased pain and at normal pace in 6 weeks.  Patient will ascend / descend: stairs;with no pain;with recipricol gait;in 6 weeks  Patient will sit: Comment  Comment: >= 2 hours for travel without increased pain    Patient will decrease : HANSEL score;for improved quality of life;in 6 weeks;by _ points  by ___ points: >= 30% from IE      Plan / Patient Education:     Continue STM to L piriformis and L LE sciatic nerve mobilizations.  Review SKTC and add PPT training as able next visit.    Subjective:     Pain Ratin/10 L posterior hip/buttock  Doing better overall. Able to walk through the dog park/trails well without much pain or difficulty. It is still worse with prolonged standing, but can start to kick in with a lot of walking.    Objective:     Verbally reviewed HEP.  Increased muscle spasm and TTP present L piriformis; decreased moderately post STM.    Treatment Today     TREATMENT MINUTES COMMENTS   Evaluation     Self-care/ Home management     Manual  therapy 24 -Supine manual L LE sciatic nerve glides: 3x10 reps each SKTC, PIRI ER, and knee extension  -Prone STM to L piriformis to decrease muscle spasm and pain   Neuromuscular Re-education     Therapeutic Activity     Therapeutic Exercises 10 Ex per flow sheet   Gait training     Modality__________________                Total 34    Blank areas are intentional and mean the treatment did not include these items.       Irvin Cam, PT, DPT  11/28/2018

## 2021-06-22 NOTE — PROGRESS NOTES
[facility] Daily Progress     Patient Name: July Huang  Date: 1/3/2019  Visit #: 7/12 through 2/4/18 ([facility] auth)  PTA visit #:    PRECAUTIONS: osteoporosis, h/o ovarian and bladder CA  Referral Diagnosis: Left-sided low back pain with left-sided sciatica  Referring provider: Florence Koo  Visit Diagnosis:     ICD-10-CM    1. Sciatica of left side M54.32    2. Piriformis syndrome, left G57.02          Assessment:     Pt presents to PT with acute left-sided sciatica pain, most consistent with piriformis syndrome.  HEP/POC compliance is  good .  Patient is benefitting from skilled physical therapy and is making steady progress toward functional goals.  Patient is appropriate to continue with skilled physical therapy intervention, as indicated by initial plan of care.    Goal Status:  Pt. will be independent with home exercise program in : 6 weeks. Progressing  Pt. will be able to walk : Comment  Comment:: 60 min, 2x/day to walk the dog without increased pain and at normal pace in 6 weeks. Met  Patient will ascend / descend: stairs;with no pain;with recipricol gait;in 6 weeks. Met  Patient will sit: Comment  Comment: >= 2 hours for travel without increased pain. Progressing  Patient will decrease : HANSEL score;for improved quality of life;in 6 weeks;by _ points  by ___ points: >= 30% from IE    Plan / Patient Education:     Review newly updated ex (clamshells and SL hip ABD).  Continue STM to L piriformis and L LE sciatic nerve mobilizations PRN.    Subjective:     Doing better. Still having a tough time doing PIRI ER stretch in seated, so has just been doing in supine on the floor. Doing HEP regularly, and finding all to be helpful.  Has her own car back as well, which is helpful too.  Will start water aerobics at the beginning of the this month again.  Reports minimal to no pain down the leg since last visit.    Objective:     Updated HEP per flow sheets. Pt continues to demonstrate  fairly significant bilat hip ABD and ER weakness.    Treatment Today     TREATMENT MINUTES COMMENTS   Evaluation     Self-care/ Home management     Manual therapy  -Supine manual L LE sciatic nerve glides: 2x10 reps each SKTC, PIRI ER, and knee extension  -Prone STM to L piriformis to decrease muscle spasm and pain    (not performed today)   Neuromuscular Re-education     Therapeutic Activity     Therapeutic Exercises 26 Ex per flow sheet   Gait training     Modality__________________                Total 26    Blank areas are intentional and mean the treatment did not include these items.       Irvin Cam, PT, DPT  1/3/2019

## 2021-06-22 NOTE — PROGRESS NOTES
[facility] Daily Progress     Patient Name: July Huang  Date: 2018  Visit #:  through 18 (are auth)  PTA visit #:    PRECAUTIONS: osteoporosis, h/o ovarian and bladder CA  Referral Diagnosis: Left-sided low back pain with left-sided sciatica  Referring provider: Florence Koo  Visit Diagnosis:     ICD-10-CM    1. Sciatica of left side M54.32    2. Piriformis syndrome, left G57.02          Assessment:     Pt presents to PT with acute left-sided sciatica pain, most consistent with piriformis syndrome.  HEP/POC compliance is  good .  Patient is benefitting from skilled physical therapy and is making steady progress toward functional goals.  Patient is appropriate to continue with skilled physical therapy intervention, as indicated by initial plan of care.    Goal Status:  Pt. will be independent with home exercise program in : 6 weeks  Pt. will be able to walk : Comment  Comment:: 60 min, 2x/day to walk the dog without increased pain and at normal pace in 6 weeks.  Patient will ascend / descend: stairs;with no pain;with recipricol gait;in 6 weeks  Patient will sit: Comment  Comment: >= 2 hours for travel without increased pain    Patient will decrease : HANSEL score;for improved quality of life;in 6 weeks;by _ points  by ___ points: >= 30% from IE      Plan / Patient Education:     Next visit, recheck goals, HANSEL.  Review PPT and progress as able.  Continue STM to L piriformis and L LE sciatic nerve mobilizations PRN.    Subjective:     Pain Ratin/10 L posterior hip/buttock  Doing better overall. No new questions or concerns.    Objective:     Added PPT to HEP.  Education on option to purchase foam roller as a means for self-STM to relieve muscular tightness to piriformis.  Mildly increased muscle spasm and TTP present L piriformis; decreased post MT.    Treatment Today     TREATMENT MINUTES COMMENTS   Evaluation     Self-care/ Home management     Manual therapy 20 -Supine  manual L LE sciatic nerve glides: 2x10 reps each SKTC, PIRI ER, and knee extension  -Foam rolling to L gluteal/piriformis region  -Prone STM to L piriformis to decrease muscle spasm and pain   Neuromuscular Re-education     Therapeutic Activity     Therapeutic Exercises 12 Ex per flow sheet   Gait training     Modality__________________                Total 32    Blank areas are intentional and mean the treatment did not include these items.       Irvin Cam, PT, DPT  12/4/2018

## 2021-06-23 NOTE — PROGRESS NOTES
[facility] Daily Progress     Patient Name: July Huang  Date: 1/17/2019  Visit #: 9/12 through 2/4/18 (are auth)  PTA visit #:    PRECAUTIONS: osteoporosis, h/o ovarian and bladder CA  Referral Diagnosis: Left-sided low back pain with left-sided sciatica  Referring provider: Florence Koo  Visit Diagnosis:     ICD-10-CM    1. Sciatica of left side M54.32    2. Piriformis syndrome, left G57.02          Assessment:     Pt presents to PT with acute left-sided sciatica pain, most consistent with piriformis syndrome. This appears to be resolving quite well; however, she is with continued and significant L>R hip weakness, causing difficulty with stairs and lying on the L side at night.  HEP/POC compliance is  good .  Patient is benefitting from skilled physical therapy and is making steady progress toward functional goals.  Patient is appropriate to continue with skilled physical therapy intervention, as indicated by initial plan of care.    Goal Status:  Pt. will be independent with home exercise program in : 6 weeks. Progressing  Pt. will be able to walk : Comment  Comment:: 60 min, 2x/day to walk the dog without increased pain and at normal pace in 6 weeks. Met  Patient will ascend / descend: stairs;with no pain;with recipricol gait;in 6 weeks. Met  Patient will sit: Comment  Comment: >= 2 hours for travel without increased pain. Progressing  Patient will decrease : HANSEL score;for improved quality of life;in 6 weeks;by _ points  by ___ points: >= 30% from IE  Update 1/10/19: Pt will be able to lie on the L side without increased pain in 8 weeks.    Plan / Patient Education:     Review clams, SL hip ABD and sit<>stands, adding forward and lateral step ups as able.  Continue STM to L piriformis and L LE sciatic nerve mobilizations PRN.    Subjective:     Reports to be doing much better from last week. Able to lie on the L side now for more time before it becomes painful. Has been able to do  reciprocal pattern going up stairs now at least part of the way, listening to her hip until it becomes too painful, at which point she will go step-to with R LE lead.  Hip does still bother her if sitting in a chair or car for too long, when she goes to stand up.  Did start up with her water exercises as well.    Objective:     Reviewed clams, SL hip ABD, and sit<>stands with moderate verbal and tactile cueing for correct performance. Pt continues to demonstrate fairly significant bilat hip ABD and ER weakness, although improving.  Pt able to perform reciprocal stair ascending and descending 2x15 steps, although still antalgic on L LE.    Treatment Today     TREATMENT MINUTES COMMENTS   Evaluation     Self-care/ Home management     Manual therapy  -Supine manual L LE sciatic nerve glides: 2x10 reps each SKTC, PIRI ER, and knee extension  -Prone STM to L piriformis to decrease muscle spasm and pain    (not performed today)   Neuromuscular Re-education     Therapeutic Activity     Therapeutic Exercises 28 Ex per flow sheet   Gait training     Modality__________________                Total 28    Blank areas are intentional and mean the treatment did not include these items.       Irvin Cam, PT, DPT  1/17/2019

## 2021-06-23 NOTE — PROGRESS NOTES
[facility] Daily Progress     Patient Name: July Huang  Date: 1/10/2019  Visit #: 8/12 through 2/4/18 (are [facility])  PTA visit #:    PRECAUTIONS: osteoporosis, h/o ovarian and bladder CA  Referral Diagnosis: Left-sided low back pain with left-sided sciatica  Referring provider: Florence Koo  Visit Diagnosis:     ICD-10-CM    1. Sciatica of left side M54.32    2. Piriformis syndrome, left G57.02          Assessment:     Pt presents to PT with acute left-sided sciatica pain, most consistent with piriformis syndrome. This appears to be resolving quite well; however, she is with continued and significant L>R hip weakness, causing difficulty with stairs and lying on the L side at night.  HEP/POC compliance is  good .  Patient is benefitting from skilled physical therapy and is making steady progress toward functional goals.  Patient is appropriate to continue with skilled physical therapy intervention, as indicated by initial plan of care.    Goal Status:  Pt. will be independent with home exercise program in : 6 weeks. Progressing  Pt. will be able to walk : Comment  Comment:: 60 min, 2x/day to walk the dog without increased pain and at normal pace in 6 weeks. Met  Patient will ascend / descend: stairs;with no pain;with recipricol gait;in 6 weeks. Met  Patient will sit: Comment  Comment: >= 2 hours for travel without increased pain. Progressing  Patient will decrease : HANSEL score;for improved quality of life;in 6 weeks;by _ points  by ___ points: >= 30% from IE  Update 1/10/19: Pt will be able to lie on the L side without increased pain in 8 weeks.    Plan / Patient Education:     Review clams, SL hip ABD and sit<>stands, adding forward and lateral step ups as able.  Continue STM to L piriformis and L LE sciatic nerve mobilizations PRN.    Subjective:     Doing better, but comes in with several questions/concerns today. Notes she still has to perform step-to pattern (R LE first) for  ascending stairs. Able to reciprocally descend just fine.  Also having a tough time lying on the L side when doing new exercises. She wasn't sure if she should just do the one side only, so she really did not do much of the new clamshells or SL hip ABD exercises.   Encouraged to continue clams and SL hip ABD, even if only able to perform on the L LE. Discussed her R LE likely stronger anyways, as she is performing step-to pattern on stairs all the time now.    Objective:     Reviewed clams and SL hip ABD with moderate verbal and tactile cueing for correct performance. Pt continues to demonstrate fairly significant bilat hip ABD and ER weakness.  Added sit<>Stands, with emphasis on equal WB through bilat LE, as well as prevention of bilat genu valgum.    Treatment Today     TREATMENT MINUTES COMMENTS   Evaluation     Self-care/ Home management     Manual therapy  -Supine manual L LE sciatic nerve glides: 2x10 reps each SKTC, PIRI ER, and knee extension  -Prone STM to L piriformis to decrease muscle spasm and pain    (not performed today)   Neuromuscular Re-education     Therapeutic Activity     Therapeutic Exercises 31 Ex per flow sheet   Gait training     Modality__________________                Total 31    Blank areas are intentional and mean the treatment did not include these items.       Irvin Cam, PT, DPT  1/10/2019

## 2021-06-23 NOTE — PROGRESS NOTES
[facility] Re-Certification Request    February 1, 2019      Patient: July Huang  MR Number: [id number]  YOB: 1940  Date of Visit: 2/1/2019  Onset Date:  11/2018  Date of Eval: 11/6/18    Dear Dr. Florence Koo:    As you may recall, we have been seeing July Huang in Physical Therapy for L-hip and LE pain.    For therapy to continue, Medicare and/or Medicaid requires periodic physician review of the treatment plan. Please review the summary of the patient's progress and our plan for continued therapy, and verify  that you agree therapy should continue by entering certification dates at the bottom of this note and co-signing this note.    Plan of Care  Authorization / Certification Start Date: 02/01/19  Authorization / Certification End Date: 04/02/19  Authorization / Certification Number of Visits: 16 total   Communication with: Referral Source  Patient Related Instruction: Nature of Condition;Treatment plan and rationale;Self Care instruction;Basis of treatment;Body mechanics;Precautions;Next steps;Expected outcome  Times per Week: 1-2  Number of Weeks: 8  Number of Visits: 16  Therapeutic Exercise: Stretching;Strengthening  Neuromuscular Reeducation: core;balance/proprioception (nerve glides)  Manual Therapy: soft tissue mobilization;myofascial release;joint mobilization;muscle energy      Goal  Pt. will be independent with home exercise program in : 6 weeks  Pt. will be able to walk : Comment  Comment:: 60 min, 2x/day to walk the dog without increased pain and at normal pace in 6 weeks.  Patient will ascend / descend: stairs;with no pain;with recipricol gait;in 6 weeks  Patient will sit: Comment  Comment: >= 2 hours for travel without increased pain    Patient will decrease : HANSEL score;for improved quality of life;in 6 weeks;by _ points  by ___ points: >= 30% from IE        If you have any questions or concerns, please don't hesitate to  call.    Sincerely,      Irvin Cam, PT        Physician recommendation:     ___ Follow therapist's recommendation        ___ Modify therapy      *Physician co-signature indicates they certify the need for these services furnished within this plan and while under their care.      [facility] Daily Progress     Patient Name: July Huang  Date: 2/1/2019  Visit #: 11/12 through 2/4/19 (Medicare cert.)  PTA visit #:    PRECAUTIONS: osteoporosis, h/o ovarian and bladder CA  Referral Diagnosis: Left-sided low back pain with left-sided sciatica  Referring provider: Florence Koo  Visit Diagnosis:     ICD-10-CM    1. Sciatica of left side M54.32    2. Piriformis syndrome, left G57.02          Assessment:     Pt presents to PT with acute left-sided sciatica pain, most consistent with piriformis syndrome. This appears to be resolving quite well; however, she is with continued and significant L>R hip weakness, causing difficulty with stairs and lying on the L side at night.  HEP/POC compliance is  good .  Patient is benefitting from skilled physical therapy and is making steady progress toward functional goals.  Patient is appropriate to continue with skilled physical therapy intervention, as indicated by initial plan of care.    Goal Status:  Pt. will be independent with home exercise program in : 6 weeks. Progressing  Pt. will be able to walk : Comment  Comment:: 60 min, 2x/day to walk the dog without increased pain and at normal pace in 6 weeks. Met  Patient will ascend / descend: stairs;with no pain;with recipricol gait;in 6 weeks. Progressing  Patient will sit: Comment  Comment: >= 2 hours for travel without increased pain. Progressing  Patient will decrease : HANSEL score;for improved quality of life;in 6 weeks;by _ points  by ___ points: >= 30% from IE. Progressing  Update 1/10/19: Pt will be able to lie on the L side without increased pain in 8 weeks. Progressing    Plan / Patient  Education:     Pending physician approval:  Review and progress HEP as able.  Continue STM to L piriformis and L LE sciatic nerve mobilizations PRN.  Follow-up on status of newer L low back pain.    Subjective:     Reports things to be going pretty good, except has been having some more left-sided low back pain the past few days. The more she reports, patient/therapist believe this is related to decreased activity level with recent cold weather and not being able to get out and walk the dog.  Has been able to do the stairs more consistently reciprocally, but still uncomfortable.   Limited to about half an hour or less of being able to lie on the L side at night. Improved from being unable at all to lie on that side.    Objective:     Pt able to demonstrate reciprocal stair ascending and descending 2x15 steps. Mild/moderately antaglic, improved significantly with use of R handrail as compared to the L.  HANSEL: 22% - moderately improved from 32% on IE.    Treatment Today     TREATMENT MINUTES COMMENTS   Evaluation     Self-care/ Home management     Manual therapy 5 -L SL lumbopelvic rotational harmonics grade II to decrease pain and improve ROM   Neuromuscular Re-education     Therapeutic Activity     Therapeutic Exercises 25 Ex per flow sheet  Outcomes assessment  Discussion of POC, progress   Gait training     Modality__________________                Total 30    Blank areas are intentional and mean the treatment did not include these items.       Irvin Cam, PT, DPT  2/1/2019

## 2021-06-23 NOTE — PROGRESS NOTES
[facility] Daily Progress     Patient Name: July Huang  Date: 1/24/2019  Visit #: 10/12 through 2/4/19 (are auth)  PTA visit #:    PRECAUTIONS: osteoporosis, h/o ovarian and bladder CA  Referral Diagnosis: Left-sided low back pain with left-sided sciatica  Referring provider: Florence Koo  Visit Diagnosis:     ICD-10-CM    1. Sciatica of left side M54.32    2. Piriformis syndrome, left G57.02          Assessment:     Pt presents to PT with acute left-sided sciatica pain, most consistent with piriformis syndrome. This appears to be resolving quite well; however, she is with continued and significant L>R hip weakness, causing difficulty with stairs and lying on the L side at night.  HEP/POC compliance is  good .  Patient is benefitting from skilled physical therapy and is making steady progress toward functional goals.  Patient is appropriate to continue with skilled physical therapy intervention, as indicated by initial plan of care.    Goal Status:  Pt. will be independent with home exercise program in : 6 weeks. Progressing  Pt. will be able to walk : Comment  Comment:: 60 min, 2x/day to walk the dog without increased pain and at normal pace in 6 weeks. Met  Patient will ascend / descend: stairs;with no pain;with recipricol gait;in 6 weeks. Met  Patient will sit: Comment  Comment: >= 2 hours for travel without increased pain. Progressing  Patient will decrease : HANSEL score;for improved quality of life;in 6 weeks;by _ points  by ___ points: >= 30% from IE  Update 1/10/19: Pt will be able to lie on the L side without increased pain in 8 weeks.    Plan / Patient Education:     Next visit: Reassess goals, LEFS.  Review clams, SL hip ABD and sit<>stands, and forward and lateral step ups as able.  Continue STM to L piriformis and L LE sciatic nerve mobilizations PRN.    Subjective:     Reports things to be going well.  Back to water aerobics 1-2x/week. Went yesterday and was a little sore  this AM.  Working towards getting more reciprocal stair climbing, but still not able to do the full flight of stairs this way.    Objective:     Added forward and lateral step ups to HEP. Pt mildly antalgic intermittently with step up forwards on left. This is improved with use of R handrail, and encouraged to perform as such at home.    Treatment Today     TREATMENT MINUTES COMMENTS   Evaluation     Self-care/ Home management     Manual therapy  -Supine manual L LE sciatic nerve glides: 2x10 reps each SKTC, PIRI ER, and knee extension  -Prone STM to L piriformis to decrease muscle spasm and pain    (not performed today)   Neuromuscular Re-education     Therapeutic Activity     Therapeutic Exercises 30 Ex per flow sheet   Gait training     Modality__________________                Total 30    Blank areas are intentional and mean the treatment did not include these items.       Irvin Cam, PT, DPT  1/24/2019

## 2021-06-24 ENCOUNTER — COMMUNICATION - HEALTHEAST (OUTPATIENT)
Dept: FAMILY MEDICINE | Facility: CLINIC | Age: 81
End: 2021-06-24

## 2021-06-24 DIAGNOSIS — I10 ESSENTIAL HYPERTENSION, BENIGN: ICD-10-CM

## 2021-06-24 NOTE — PROGRESS NOTES
[facility] Daily Progress     Patient Name: July Huang  Date: 2/19/2019  Visit #: 12/16 through 4/2/19 (Medicare re-cert.)  PTA visit #:    PRECAUTIONS: osteoporosis, h/o ovarian and bladder CA  Referral Diagnosis: Left-sided low back pain with left-sided sciatica  Referring provider: Florence Koo  Visit Diagnosis:     ICD-10-CM    1. Sciatica of left side M54.32    2. Piriformis syndrome, left G57.02          Assessment:     Pt independent and compliant with HEP, demonstrating significantly improved pain and function from start of care. At this time, it is most appropriate for patient to continue with independent self-management via HEP.    Goal Status:  Pt. will be independent with home exercise program in : 6 weeks. Met  Pt. will be able to walk : Comment  Comment:: 60 min, 2x/day to walk the dog without increased pain and at normal pace in 6 weeks. Met  Patient will ascend / descend: stairs;with no pain;with recipricol gait;in 6 weeks. Progressing  Patient will sit: Comment  Comment: >= 2 hours for travel without increased pain. Progressing  Patient will decrease : HANSEL score;for improved quality of life;in 6 weeks;by _ points  by ___ points: >= 30% from IE. Progressing  Update 1/10/19: Pt will be able to lie on the L side without increased pain in 8 weeks. Progressing    Plan / Patient Education:     Discharge to HEP at this time.    Subjective:     No longer having much sciatic nerve pain, but still some achiness in the low back and posterior left hip.  Going up stairs or larger inclines can still be tough from time to time. Going down steps still pretty good.  Has not been able to exercise as much as she likes to due to current weather.  Sitting tolerance is improving. Sat for 2 hours last night at the movies. The issue more so now is when she tries to stand up after prolonged sitting, especially with driving, but it will work its way out quickly after taking a few  steps.    Objective:     Pt able to demonstrate reciprocal stair ascending and descending 2x15 steps. Mildly antaglic, improved significantly with use of R handrail as compared to the L.  HANSEL: 22% - moderately improved from 32% on IE.    Treatment Today     TREATMENT MINUTES COMMENTS   Evaluation     Self-care/ Home management 41 Discussion of POC, progress, goals, positioning in car and various seated surfaces, verbal review of HEP   Manual therapy     Neuromuscular Re-education     Therapeutic Activity     Therapeutic Exercises     Gait training     Modality__________________                Total 41    Blank areas are intentional and mean the treatment did not include these items.       Irvin Cam, PT, DPT  2/19/2019     [facility] Discharge Summary  Patient Name: July Huang  Date: 2/19/2019  Referral Diagnosis: Left-sided low back pain with left-sided sciatica  Referring provider: Florence Koo*  Visit Diagnosis:   1. Sciatica of left side     2. Piriformis syndrome, left         Goals:  Goal Status:  Pt. will be independent with home exercise program in : 6 weeks. Met  Pt. will be able to walk : Comment  Comment:: 60 min, 2x/day to walk the dog without increased pain and at normal pace in 6 weeks. Met  Patient will ascend / descend: stairs;with no pain;with recipricol gait;in 6 weeks. Progressing  Patient will sit: Comment  Comment: >= 2 hours for travel without increased pain. Progressing  Patient will decrease : HANSEL score;for improved quality of life;in 6 weeks;by _ points  by ___ points: >= 30% from IE. Progressing  Update 1/10/19: Pt will be able to lie on the L side without increased pain in 8 weeks. Progressing    Patient was seen for 12 visits from 11/6/18 to 2/19/19 with 0 missed appointments.  The patient met goals and has demonstrated understanding of and independence in the home program for self-care, and progression to next steps.  She will initiate contact if  questions or concerns arise.    Therapy will be discontinued at this time.  The patient will need a new referral to resume.    Thank you for your referral.  Irvin Cam  2/19/2019  1:12 PM

## 2021-06-24 NOTE — PROGRESS NOTES
SUBJECTIVE: July Huang is a 78 y.o. female with:  Chief Complaint   Patient presents with     Follow-up     med check     Medication Refill    1) Doing PT for her back pain and she is doing better. She had piriformis syndrome and left sided sciatica.  She continues to be active. Does some pool exercise as well.  Harder to get outdoors to walk in cold weather. She has few more PT sessions left.    2) Insomnia/ anxiety - Doing well.  Feels like she still needs some clonazepam to help her get to sleep. She would like to try and taper off med in future.  Med helps control her anxiety at night as well.  She is also taking melatonin.    3) She is due for tetanus.    SH: .  Retired.    OBJECTIVE: /80 (Patient Site: Right Arm, Patient Position: Sitting, Cuff Size: Adult Regular)   Pulse 75   Wt 126 lb (57.2 kg)   SpO2 99%   BMI 23.42 kg/m   no distress  Psych Exam:  Mood: upbeat and pleasant  Affect: normal   Behavior: appropriate  ThoughtContent: logical  Judgement: appropriate  Insight: normal    [place] was seen today for follow-up and medication refill.    Diagnoses and all orders for this visit:    Generalized anxiety disorder- Controlled substance contract signed.  We can try cutting down to 0.25 mg for her next refill and she agrees.  -     clonazePAM (KLONOPIN) 0.5 MG tablet; Take 0.5-1 tablets (0.25-0.5 mg total) by mouth at bedtime.    Preventative health care  -     Tdap vaccine greater than or equal to 6yo IM    Chronic left-sided low back pain with left-sided sciatica- Improved with PT.       Florence Koo

## 2021-06-25 NOTE — TELEPHONE ENCOUNTER
New Appointment Needed  What is the reason for the visit:    Inpatient/ED Follow Up Appt Request  At what hospital or facility were you seen?: [facility]  What is the reason you were seen?: vertigo  What date were you admitted?: date: 3.18.2019  What date were you discharged?: date: 3.20.2019  What was the recommended timeframe for your follow up appointment?: 1-5 days  Provider Preference: Any available  How soon do you need to be seen?: 1-5 days  Waitlist offered?: No  Okay to leave a detailed message:  Yes

## 2021-06-25 NOTE — TELEPHONE ENCOUNTER
RN Triage:    Was hospitalized from 3/18/18-3/20/19, for vertigo,myalgias, chills.  Discharged yesterday.  Brain tumor was ruled out, but no diagnosis was made.  Vertigo is better, though still present mildly.  She still has alternate hot and cold episodes, all over myalgias and fatigue.  She has not had a fever throughout the course of the illness.  She has a decreased, but OK appetite.  Is sleeping as usual.  Home care and precautions discussed.  She plans to monitor at home for now.  Appointment set up for early next week for follow up.  She will call back if symptoms worsen.    Gianna Broderick, RN   [facility]              Reason for Disposition    Nursing judgment    Protocols used: NO GUIDELINE OR REFERENCE [id number]

## 2021-06-25 NOTE — TELEPHONE ENCOUNTER
Clinic Care Coordination Contact  [facility]/Voicemail    Referral Source: IP Report  From chart review:  -----------------------------------  ADMISSION DATE: 3/18/2019  DISCHARGE DATE: 3/20/2019     Dear Florence Koo MD    It was a pleasure taking care of July Huang during this hospitalization. She will be discharged from [facility] to home.     PRINCIPAL DIAGNOSIS CAUSING ADMISSION     Vertigo    PATIENT'S ACTIVE HOSPITAL PROBLEM LIST   Principal Problem:  Vertigo  Active Problems:  GERD (gastroesophageal reflux disease)  Benign meningioma of brain (HC)  History of ovarian cancer  Nausea and vomiting  Headache  Sciatica of left side  ADDITIONAL COMMENTS REGARDING DIAGNOSIS SPECIFICITY  Additional Diagnosis Information     BRIEF HOSPITAL COURSE     July Huang is a 78 y.o. female with a history of benign meningioma of brain, ovarian cancer, sciatica of left side, and GERD who was admitted to [facility] on 3/18/2019 after presenting to [facility] ED for evaluation of nausea, vomiting, and vertigo.        The morning of admission, the patient woke up with vertigo and posterior neck pain. Her vertigo continued with changes in position; she felt alright if she did not move. About an hour after her initial symptoms, she began to feel chills and nausea before vomiting up bile-like fluid. Her nausea went away after she laid down but came back after standing. Due to the continuation of her symptoms, the patient presented to [facility] ED for further evaluation.     While in the ED, the patient was hypertensive with blood pressure 199/89 with a slightly increased RR of 24. Labs were remarkable for potassium 3.3 and a negative troponin. MRA neck showed about 50% stenosis of the distal left vertebral artery and mild narrowing of the bilateral carotid bulbs but no evidence of distal ischemia. The patient was given Ativan, Zofran, IVF, and admitted for further evaluation.      There were no clinical features of  posterior circulation and neurology concurred with the diagnosis of acute vertigo. Patient was seen by physical therapy and was treated with Epley maneuvers. She had an excellent response to this; her resting vertigo resolved in total and she was left with only a mild sense of unsteadiness with activity. She will be discharged home today. If her vertigo does not resolve in total or recurs later on we recommend that she be seen by physical therapy to review  maneuvers, or to be seen at the [facility] for Dizziness and balance in [place].      Clinical Data: Care Coordinator Outreach  Outreach attempted x 1.  Left message on voicemail with call back information and requested return call.  Plan:  Care Coordinator will try to reach patient again in 1-2 business days.    Clinic Care Coordination Contact    Clinic Care Coordination Contact  OUTREACH    Referral Information:  Referral Source: IP Report    Primary Diagnosis: (vertigo)    Chief Complaint   Patient presents with     Clinic Care Coordination - Post Hospital     DC'd from [facility] on 3/20/19 to home self care        Clinical Concerns:  Current Medical Concerns:  Call back from patient who is not having vertigo, but is not feeling well. Has body aches, fatigue, chills, no fever, and head ache. Tylenol helps some.  Ate breakfast and is not feeling nausea yet.  No vertigo. Has PCP OV set up for 3-29.  Would like to be seen sooner to assess her symptoms.       Patient/Caregiver understanding: patient is usually active and independent.  Discussed options. She will call clinic and talk with triage nurse about her symptoms and will see if there is an appointment available prior to 3-29.  No need for ongoing care coordination.     Plan: No further outreach by this CC.      Shruti Stein,   [facility]  [email or web address]  [phone]        Clinic Care Coordination Contact  [facility]/Voicemail    Referral Source: IP  Report  From chart review:  -----------------------------------  ADMISSION DATE: 3/18/2019  DISCHARGE DATE: 3/20/2019     Dear Florence Koo MD    It was a pleasure taking care of July Huang during this hospitalization. She will be discharged from [facility] to home.     PRINCIPAL DIAGNOSIS CAUSING ADMISSION     Vertigo    PATIENT'S ACTIVE HOSPITAL PROBLEM LIST   Principal Problem:  Vertigo  Active Problems:  GERD (gastroesophageal reflux disease)  Benign meningioma of brain (HC)  History of ovarian cancer  Nausea and vomiting  Headache  Sciatica of left side  ADDITIONAL COMMENTS REGARDING DIAGNOSIS SPECIFICITY  Additional Diagnosis Information     BRIEF HOSPITAL COURSE     July Huang is a 78 y.o. female with a history of benign meningioma of brain, ovarian cancer, sciatica of left side, and GERD who was admitted to [facility] on 3/18/2019 after presenting to [facility] ED for evaluation of nausea, vomiting, and vertigo.        The morning of admission, the patient woke up with vertigo and posterior neck pain. Her vertigo continued with changes in position; she felt alright if she did not move. About an hour after her initial symptoms, she began to feel chills and nausea before vomiting up bile-like fluid. Her nausea went away after she laid down but came back after standing. Due to the continuation of her symptoms, the patient presented to [facility] ED for further evaluation.     While in the ED, the patient was hypertensive with blood pressure 199/89 with a slightly increased RR of 24. Labs were remarkable for potassium 3.3 and a negative troponin. MRA neck showed about 50% stenosis of the distal left vertebral artery and mild narrowing of the bilateral carotid bulbs but no evidence of distal ischemia. The patient was given Ativan, Zofran, IVF, and admitted for further evaluation.      There were no clinical features of posterior circulation and neurology concurred with the diagnosis of  acute vertigo. Patient was seen by physical therapy and was treated with Epley maneuvers. She had an excellent response to this; her resting vertigo resolved in total and she was left with only a mild sense of unsteadiness with activity. She will be discharged home today. If her vertigo does not resolve in total or recurs later on we recommend that she be seen by physical therapy to review  maneuvers, or to be seen at the [facility] for Dizziness and balance in [place].      Clinical Data: Care Coordinator Outreach  Outreach attempted x 1.  Left message on voicemail with call back information and requested return call.  Plan:  Care Coordinator will try to reach patient again in 1-2 business days.  Shruti Stein,   [facility]  [email or web address]  [phone]

## 2021-06-25 NOTE — PROGRESS NOTES
"Progress Notes by Jose Harp MD at 8/22/2017 11:00 AM     Author: Jose Harp MD Service: -- Author Type: Physician    Filed: 8/22/2017 11:45 AM Encounter Date: 8/22/2017 Status: Signed    : Jose Harp MD (Physician)       OFFICE VISIT - FAMILY MEDICINE     ASSESSMENT AND PLAN     1. Right shoulder injury, subsequent encounter  HYDROcodone-acetaminophen 5-325 mg per tablet   2. DJD of shoulder,right  HYDROcodone-acetaminophen 5-325 mg per tablet   Shoulder sprain with degenerative joint disease of the humeral head, treatment option were discussed with the patient, physical therapy was offered, she prefers to wait another week or so, steroid injection was also discussed, she prefers to wait another week, in the meantime she would like to take Vicodin at night for sleep, possible side effect of the medication discussed with her including risk of addiction, sedation and respiratory depression.    CHIEF COMPLAINT   Fall (\"DOG CHASED RABBIT\" - SEEN [facility] - \"WORSE PM\" X 08/118) and Shoulder Injury (\"SHOULDER STRAIN, FALL\")    HPI   July Huang is a 77 y.o. female.  Updated MIIC - Needs DXA/Zostavax    On August 11, patient was walking her dog, he saw a rabbit, and started chasing him and pulled on the leash to the right side, patient fell on her right shoulder.  She was traveling the day after, she did not seek medical care did not do icing was not bothering her that much, 1 week after that she went to urgent care for evaluation because she was still having pain and her daughter who is a nurse told her that she needs to be evaluated.  She was diagnosis of shoulder sprain, urgent care note x-rays reports were reviewed, degenerative joint disease of the right humeral head.  She still having mild pain, with limited range of motion, pain is described as throbbing, exacerbated with movement usually better with rest in the first part of the day, and was flares up " "at the end of the day or when she sleep on that right side.  Has been doing some heating pad, and taking some over-the-counter analgesics like Tylenol or ibuprofen, she is having a hard time sleeping at night because of the pain.  Has been on the Vicodin in the past for dental extraction and surgical pain, and it did help for night pain, she is inquiring about that.    Review of Systems As per HPI, otherwise negative.    OBJECTIVE   /70 (Patient Site: Left Arm)  Pulse 79  Resp 14  Ht 5' 1.9\" (1.572 m)  Wt 120 lb (54.4 kg)  SpO2 99%  BMI 22.02 kg/m2  Physical Exam   Constitutional: She appears well-developed and well-nourished.   HENT:   Head: Normocephalic and atraumatic.   Neck: Normal range of motion. Neck supple. No JVD present. No tracheal deviation present. No thyromegaly present.   Cardiovascular: Normal rate, regular rhythm, normal heart sounds and intact distal pulses.  Exam reveals no gallop and no friction rub.    No murmur heard.  Pulmonary/Chest: Effort normal and breath sounds normal. No respiratory distress. She has no wheezes. She has no rales.   Musculoskeletal: She exhibits no edema.        Right shoulder: She exhibits tenderness. She exhibits normal range of motion, no swelling, no effusion and no crepitus.        Arms:  Mild tenderness in the anterior aspect of the humeral head, no swelling or redness.  Has limited active and passive range of motion of her right shoulder.   Lymphadenopathy:     She has no cervical adenopathy.   Psychiatric: She has a normal mood and affect. Judgment and thought content normal.       PFSH     Family History   Problem Relation Age of Onset   ? Stroke Mother    ? Heart disease Father    ? Diabetes Father    ? Lung cancer Sister    ? Breast cancer Neg Hx    ? Colon cancer Neg Hx    ? Parkinsonism Sister    ? Asthma Sister      Social History     Social History   ? Marital status:      Spouse name: N/A   ? Number of children: N/A   ? Years of " education: N/A     Occupational History   ? Not on file.     Social History Main Topics   ? Smoking status: Former Smoker   ? Smokeless tobacco: Never Used   ? Alcohol use 0.0 - 1.2 oz/week     0 - 2 Standard drinks or equivalent per week   ? Drug use: No   ? Sexual activity: Not on file     Other Topics Concern   ? Not on file     Social History Narrative       [employer] [facility]     Patient Active Problem List    Diagnosis Date Noted   ? DJD of shoulder 08/22/2017   ? Pelvic prolapse 11/18/2016   ? Anxiety disorder 11/18/2016   ? Meningioma 06/16/2016     Overview Note:     Followed by neurosurgeon.  Repeat MRI due this fall.       ? Hypercholesterolemia 02/17/2016   ? Upper back pain 02/17/2016   ? Eye muscle weakness, left 02/17/2016   ? Bladder cancer 01/21/2016     Overview Note:     Treated by cystoscopy     ? Cystocele 01/21/2016   ? Ovarian cancer 11/16/2015     Overview Note:     1991- Stage 1, S/P TAHBSO     ? Anxiety      Overview Note:     Created by Conversion    Replacement Utility updated for latest IMO load     ? Palpitations      Overview Note:     Created by Conversion       ? Benign Essential Hypertension      Overview Note:     Created by Conversion       ? Osteopenia      Overview Note:     Created by Conversion    Replacement Utility updated for latest IMO load     ? Osteoporosis      Overview Note:     Created by Conversion    Replacement Utility updated for latest IMO load       Past Surgical History:   Procedure Laterality Date   ? BREAST BIOPSY     ? TOTAL ABDOMINAL HYSTERECTOMY W/ BILATERAL SALPINGOOPHORECTOMY  1995       RESULTS/CONSULTS (Lab/Rad)   No results found for this or any previous visit (from the past 168 hour(s)).  No results found.  MEDICATIONS     Current Outpatient Prescriptions on File Prior to Visit   Medication Sig Dispense Refill   ? ASCORBATE CALCIUM (VITAMIN C ORAL) Take 1 tablet by mouth daily as needed.      ? CALCIUM CITRATE/VITAMIN D3 (CALCIUM CITRATE + ORAL) Take 1 tablet by  mouth daily. TABS     ? clonazePAM (KLONOPIN) 0.5 MG tablet TAKE 1/2 TO 1 TABLET BY MOUTH EVERY NIGHT AT BEDTIME 30 tablet 2   ? CRANBERRY EXTRACT (CRANBERRY ORAL) Take 1 capsule by mouth daily. CAPS     ? lutein 10 mg Tab Take 1 capsule by mouth daily. Take once daily for eyes.     ? MAGNESIUM ORAL CAPS.  Take 1 capsule at bedtime (constipation and bones).     ? melatonin 3 mg Tab tablet Take 1 tablet (3 mg total) by mouth bedtime as needed.  0   ? TAURINE ORAL Take by mouth.     ? TURMERIC ROOT EXTRACT ORAL Take 1 capsule by mouth daily.     ? UBIDECARENONE (COQ-10 ORAL) CAPS.  Take 300 mg every 3 days )total 100 mg daily).     ? UNABLE TO FIND Med Name: L-theanine     ? UNABLE TO FIND Med Name: Heart tension     ? VITAMIN A/C/D3/COD LIVER OIL (KIDS COD LIVER OIL & VIT D ORAL) 1 teaspoonful by mouth once daily.     ? [DISCONTINUED] CHOLECALCIFEROL, VITAMIN D3, (VITAMIN D3 ORAL) Take 1 capsule by mouth daily. CAPS       No current facility-administered medications on file prior to visit.        HEALTH MAINTENANCE / SCREENING   PHQ-2 Total Score: 0 (5/19/2017 11:00 AM), No Data Recorded,LEANDRO-7 Total: 4 (11/18/2016  1:00 PM)  Immunization History   Administered Date(s) Administered   ? Influenza, inj, historic 01/05/2011   ? Influenza, seasonal,quad inj 36+ mos 11/16/2015   ? Influenza, seasonal,quad inj 6-35 mos 10/12/2011, 09/25/2012, 11/25/2013, 10/14/2014   ? Influenza,seasonal quad, PF, 36+MOS 11/18/2016   ? Pneumo Conj 13-V (2010&after) 11/16/2015   ? Pneumo Polysac 23-V 07/21/2011   ? Td, historic 08/22/2009     Health Maintenance   Topic   ? ZOSTER VACCINE    ? DXA SCAN    ? INFLUENZA VACCINE RULE BASED (1)   ? FALL RISK ASSESSMENT    ? TD 18+ HE    ? ADVANCE DIRECTIVES DISCUSSED WITH PATIENT    ? PNEUMOCOCCAL POLYSACCHARIDE VACCINE AGE 65 AND OVER    ? PNEUMOCOCCAL CONJUGATE VACCINE FOR ADULTS (PCV13 OR PREVNAR)        Jose Harp MD  Family Medicine, [facility]   This  transcription uses voice recognition software, which may contain typographical errors.

## 2021-06-26 NOTE — TELEPHONE ENCOUNTER
Pt calling today to report her BP has been on the rise  Several reading with systolic in the 160's at home and today at PT appt it was in the 170's  She would like to go back to taking 10 mg amlodipine daily  Please advise and send in new rx if you agree

## 2021-06-27 NOTE — PROGRESS NOTES
Progress Notes by Kolby Gale MD at 3/25/2019 10:40 AM     Author: Kolby Gale MD Service: -- Author Type: Physician    Filed: 3/26/2019  5:24 PM Encounter Date: 3/25/2019 Status: Signed    : Kolby Gale MD (Physician)       ASSESSMENT & PLAN    No problem-specific Assessment & Plan notes found for this encounter.      [place] was seen today for hospital visit follow up.    Diagnoses and all orders for this visit:    Meningioma (H)    Benign Essential Hypertension    Vertigo  -     Ambulatory referral to Physical Therapy    Bilateral carotid artery stenosis  < 50%     Chills  -     Urinalysis-UC if Indicated        Patient Instructions     Patient Education   Patient Education     Benign Paroxysmal Positional Vertigo     Your health care provider may move your head in certain ways to treat your BPPV.     Benign paroxysmal positional vertigo (BPPV) is a problem with the inner ear. The inner ear contains the vestibular system. This system is what helps you keep your balance. BPPV causes a feeling of spinning. It is a common problem of the vestibular system.  Understanding the vestibular system  The vestibular system of the ear is made up of very tiny parts. They include the utricle, saccule, and semicircular canals. The utricle is a tiny organ that contains calcium crystals. In some people, the crystals can move into the semicircular canals. When this happens, the system no longer works as it should. This causes BPPV. Benign means it is not life threatening. Paroxysmal means it happens suddenly. Positional means that it happens when you move your head. Vertigo is a feeling of spinning.  What causes BPPV?  Causes include injury to your head or neck. Other problems with the vestibular system may cause BPPV. In many people, the cause of BPPV is not known.  Symptoms of BPPV  You many have repeated feelings of spinning (vertigo). The vertigo usually lasts less than 1 minute. Some movements, such as  rolling over in bed, can bring on vertigo.  Diagnosing BPPV  Your primary healthcare provider may diagnose and treat your BPPV. Or you may see an ear, nose, and throat doctor (otolaryngologist). In some cases, you may see a nervous system doctor (neurologist).  The healthcare provider will ask about your symptoms and your medical history. He or she will examine you. You may have hearing and balance tests. As part of the exam, your healthcare provider may have you move your head and body in certain ways. If you have BPPV, the movements can bring on vertigo. Your provider will also look for abnormal movements of your eyes. You may have other tests to check your vestibular or nervous systems.  Treatment for BPPV  Your healthcare provider may try to move the calcium crystals. This is done by having you move your head and neck in certain ways. This treatment is safe and often works well. You may also be told to do these movements at home. You may still have vertigo for a few weeks. Your healthcare provider will recheck your symptoms, usually in about a month. Special physical therapy may also be part of treatment. In rare cases, surgery may be needed for BPPV that does not go away.     When to call the healthcare provider  Call your healthcare provider right away if you have any of these:    Symptoms that do not go away with treatment    Symptoms that get worse    New symptoms   Date Last Reviewed: 5/1/2017 2000-2017 [employer]. [address] [place] [address]. All rights reserved. This information is not intended as a substitute for professional medical care. Always follow your healthcare professional's instructions.      Epley Maneuvers     https://www.google.com/search?q=epley&source=lnms&tbm=isch&sa=X&jyoti=[email or web address]      Blood pressure  Control:    Nutrition Interventions to Improve Blood Pressure  Many diet and  lifestyle habits are associated with abnormal blood pressure. Remediating the effects of these habits is  critical to regaining health. The table below addresses some of the nutritional interventions that have been shown to have  a beneficial effect on hypertension.   (To view specific citations, please refer to the  Guide.)    ACEI = Angiotension converting enzyme inhibitor; EFA = essential fatty acid; EPA = eicosapentaenoic acid; K = potassium; N = nitrogen; NFkB = nuclear factor kappa B;  PPARs = peroxisomal proliferator-activated receptors  IFM n Blood Pressure n N Sight Videos and Resources: nsight.org  Nutrient Effect on Blood Pressure    Protein Reduces sympathetic activity, induces natriuresis, inhibits tyrosine kinase, reduces vascular  smooth muscle hypertrophy, lowers superoxide anion, decreases aldosterone. Whey protein  stimulates glutathione production.18-20 Carnitine limits end organ damage.22,23     1.5-1.8 g/kg body weight /day of non-animal protein  Caveat:  If you have Kidney Compromise, then limit.      Whey Protein 30 g/day (ACEI)     Carnitine 1g/bid     Sardine muscle protein 3 g/d (ACEI)     Roberta protein 1.5 g/d (natural ACEI)    Soy protein 40 g/d (activates PPARs)     Arginine up to 10 g/d from food or supplement; precursor vascular nitric oxide.23     Taurine 6 g/day induces mild diuresis, vasodilation, increases atrial natriuretic factor,  improves insulin sensitivity, reduces homocysteine levels  Fats Emphasize polyunsaturated/monounsaturated fats, limited saturated fats, no trans fats     EPA and DHA 1:1-4:1 ratio 3-4 g/d     Olive oil 40 gm/d Extra Virgin Uncooked    Sesame oil : 35 g/d Raw and Ground     Carbohydrates in  Fiber: Oatmeal fiber, psyllium, glucomannan, or betaglucan improves insulin sensitivity    Minerals in Sodium (Na): reduce to 2400 mg/d     Potassium (K): increase to  mEq/d; 5:1 ratio K:Na     Magnesium: 500-1000 mg/d     Calcium: 500-1000 mg/d in  food or mixed supplement.     Zinc: 25-50 mg/d inhibits NFkB    Vitamins n Vitamin D to maintain adequate serum levels (36-50 ng/mL); many people need 5000 iu/d  to obtain and maintain optimum levels    Vitamin E: 100 iu Vitamin E/1 g EFA     Vitamin C: 250-500 mg twice a day if lack of adequate ascorbates from fruits/vegetables     B2 (riboflavin): 25 mg/d     Vitamin B6: 5 mg/kg/d reduces sympathetic nervous system activity, improves  insulin sensitivity     Methyl folate or folinic acid if the patient is hyperhomocysteinemic   B12 If the patient is hyperhomocysteinemic  Phytonutrients  (or foods providing  phytonutrients)     Dark chocolate and cocoa: up to 100 gm/d    Lycopene: 10 mg; found in tomato, guava, watermelon, papaya, apricots     Green tea, black tea: >600cc     Quercetin: 500 mg twice a day     Celery: high in 3-N butyl phthalide, apigeniin; 4 large sticks/d     Garlic: 2-4 cloves/d     [place] seaweed: 3-4 g/d     Pomegranate juice: 330 ml/d    You can make big improvements to your health and energy levels by making small and deliberate healthy lifestyle  choices that involve moving more every day. Many people feel daunted by the thought of changing their lives and  starting a new routine to be more active, but there are only a few key tips to remember to be successful:  Walking or Biking:      Emphasize fun. What is something you love? Whether thats  music, birds, friends, trampolines, or books, you can shape  your activity plan around the things you love.     Attach activity to habits. Taking a walk after dinner is a  time-honored way to get moving. What is something you  do regularly?     Involve others. Chances are, your friends, family, and co-workers want to be more active, too.     Add Audio. Most phones can play music or podcasts, so challenge yourself to walk for at least one song, or one podcast.     Be inventive. Rather than thinking of movement as calisthenics or a workout, challenge  yourself to be inventive  with your active living. Do an extra lap around the grocery store. Stand while watching a television show, instead  of sitting.       2016 [employer]  Walking: the most ancient exercise and still the best modern exercise. --Destiney Fernández    Be forgiving. If you have a sedentary day, let it go. Dont overwork  the next day or punish yourself--just try to be active every day!  Encourage yourself the way you would encourage your best friend.  It is about progress, not perfection.    Track your progress. Consider using a pedometer bryson on your  phone or purchasing a simple pedometer and have fun with it.  How many steps do you take on an average work day? How many  do you take on the weekend? Striving for 10,000 steps a day is  recommended. However, some is better than none. See how it goes.     Power of Rest in Creating Optimal Health and Wellness     In a world full of stress and demands, rest is one of the most important factors for creating wellness. Restorative activities  can include mental, physical, and/or emotional activities that help to promote resilience. Particularly if you are under chronic stress, restful activities can break the cycle of stress and assist you on the path to health.    Get Unstuck    Knowing that the brain is not static, but instead has plasticity, underlines one of the key reasons to practice restful habits: you can learn to be healthier, happier, and more connected. Focusing on the process of restoration, rather than current complaints or states, allows you to take charge of your own health and wellness.  You can become more resilient and healthier.    The Right Activities    Many people turn to television, alcohol, or other semi-harmful activities to counteract stress. Replacing even a few minutes a day of those activities with ones that scientifically promote healing and restoration can trigger lasting improvements.    Restoration  activities like meditation, guided imagery, breathing techniques, gratitude, and mindfulness are time-honored, science-backed ways to make a difference internally. They have calming effects not only on the state of mind, but also helping to balance hormonal and immune function.  Dont let stress run the show.    Take back a few minutes a day of your life, balance your emotions, and your health and wellness will follow.    Restorative Activities    Meditation  Gratitude Journaling  Deep breathing and breathing techniques  Guided Imagery and/or Visualization  Massage, Sauna, or Water Therapy  Mindful Eating, Walking, or Body Scan      Follow with Dr Koo Blood pressure and carotid artery blockage  ? Daily aspirin  Supplement for Cholesterol lowerering?    Ondansetron for nausea           Return in about 3 months (around 6/25/2019).            CHIEF COMPLAINT: July Huang had concerns including Hospital Visit Follow Up (3/18 - 3/20 for dizziness).    [place]: 1.............. had concerns including Hospital Visit Follow Up (3/18 - 3/20 for dizziness).    1. Meningioma (H)    2. Benign Essential Hypertension    3. Vertigo    4. Bilateral carotid artery stenosis  < 50%     5. Chills          CC:             Why are you here today?                              Hospital follow up       1 week ago 318 woke up room was spinning and had to walk holding onto the walls she states it was worse with turning too fast  Worse with getting up into the standing position  Worse with walking    She felt a heaviness and fullness in her head  At times when it was bad she felt like her vision was double having trouble tracking    She describes a low level of tinnitus  And a little bit of a muffled sensation when she hears things  The other thing she is felt is a small amount of chills she did have some nausea she felt this was related to the vertigo and subsequent vomiting but no fever    Past medical history reviewed  History of  ovarian cancer  Imaging performed showed vertebral stenosis left 50% carotid stenosis bilateral less than 50%    The nausea is now resolved  She is a day ondansetron this was very helpful    She still does not feel like she is in the counter shape where she could just get up and go out walking like she has been in the past        Any other Problems in order of Priority:            SUBJECTIVE:  July Huang is a 78 y.o. female    Past Medical History:   Diagnosis Date   ? H/O total hysterectomy with bilateral salpingo-oophorectomy (BSO) 1995    ovarian cancer     Past Surgical History:   Procedure Laterality Date   ? BREAST BIOPSY     ? TOTAL ABDOMINAL HYSTERECTOMY W/ BILATERAL SALPINGOOPHORECTOMY  1995     Losartan; Tetracycline; Amiloride-hydrochlorothiazide; Diltiazem; Lisinopril; Meclizine; and Metoprolol  Current Outpatient Medications   Medication Sig Dispense Refill   ? ASCORBATE CALCIUM (VITAMIN C ORAL) Take 1 tablet by mouth daily as needed.      ? CALCIUM CITRATE/VITAMIN D3 (CALCIUM CITRATE + ORAL) Take 1 tablet by mouth daily. TABS     ? clonazePAM (KLONOPIN) 0.5 MG tablet Take 0.5-1 tablets (0.25-0.5 mg total) by mouth at bedtime. 30 tablet 2   ? CRANBERRY EXTRACT (CRANBERRY ORAL) Take 1 capsule by mouth daily. CAPS     ? lutein 10 mg Tab Take 1 capsule by mouth daily. Take once daily for eyes.     ? MAGNESIUM ORAL CAPS.  Take 1 capsule at bedtime (constipation and bones).     ? melatonin 5 mg Tab tablet Take 5 mg by mouth at bedtime as needed.     ? TAURINE ORAL Take by mouth.     ? TURMERIC ROOT EXTRACT ORAL Take 1 capsule by mouth daily.     ? UNABLE TO FIND Med Name: L-theanine     ? UNABLE TO FIND Med Name: Heart tension     ? valACYclovir (VALTREX) 500 MG tablet TK 1 T PO  BID FOR 3 DAYS  3   ? VITAMIN A/C/D3/COD LIVER OIL (KIDS COD LIVER OIL & VIT D ORAL) 1 teaspoonful by mouth once daily.       No current facility-administered medications for this visit.      Family History   Problem Relation  Age of Onset   ? Stroke Mother    ? Heart disease Father    ? Diabetes Father    ? Lung cancer Sister    ? Breast cancer Neg Hx    ? Colon cancer Neg Hx    ? Parkinsonism Sister    ? Asthma Sister      Social History     Socioeconomic History   ? Marital status:      Spouse name: None   ? Number of children: None   ? Years of education: None   ? Highest education level: None   Occupational History   ? None   Social Needs   ? Financial resource strain: None   ? Food insecurity:     Worry: None     Inability: None   ? Transportation needs:     Medical: None     Non-medical: None   Tobacco Use   ? Smoking status: Former Smoker   ? Smokeless tobacco: Never Used   Substance and Sexual Activity   ? Alcohol use: Yes     Alcohol/week: 0.0 - 1.2 oz   ? Drug use: No   ? Sexual activity: None   Lifestyle   ? Physical activity:     Days per week: None     Minutes per session: None   ? Stress: None   Relationships   ? Social connections:     Talks on phone: None     Gets together: None     Attends Baptism service: None     Active member of club or organization: None     Attends meetings of clubs or organizations: None     Relationship status: None   ? Intimate partner violence:     Fear of current or ex partner: None     Emotionally abused: None     Physically abused: None     Forced sexual activity: None   Other Topics Concern   ? None   Social History Narrative   ? None     Patient Active Problem List   Diagnosis   ? Benign Essential Hypertension   ? Osteoporosis   ? Palpitations   ? Ovarian cancer (H)   ? Bladder cancer (H)   ? Cystocele   ? Hypercholesterolemia   ? Eye muscle weakness, left   ? Meningioma (H)   ? Pelvic prolapse   ? Anxiety disorder   ? DJD of shoulder   ? Bilateral carotid artery stenosis  < 50%                                               SOCIAL: She  reports that she has quit smoking. she has never used smokeless tobacco. She reports that she drinks alcohol. She reports that she does not use  drugs.    REVIEW OF SYSTEMS:   Family history not pertinent to chief complaint or presenting problem    Review of Systems:      Nervous System:   positive dizzinesso                                 Ears: No hearing loss or ringing in the ears    Eyes: Intermittent blurry and double vision            No redness, itching or dryness.    Nose: No nosebleed or loss of smell    Mouth: No mouth sores or  coated tongue    Throat: No hoarseness or difficulty swallowing    Neck: No enlarged thyroid or lymph nodes.    Heart: No chest pain, palpitation or irregular heartbeat.                  Lungs: No shortness of breath, wheezing or hemoptysis.    Gastrointestinal: No nausea or vomiting, melena or blood in stools.    Kidney/Bladdr: No polyuria, polydipsia, or hematuria.                             Genital/Sexual: No Sex function Changes                                Skin: No rash    Muscles/Joints/Bones: No Muscle morning stiffness, No Effusion of a Joint     Review of systems otherwise negative as requested from patient, except   Those positive ROS outlined and discussed in [place].    OBJECTIVE:  /84 (Patient Site: Left Arm, Patient Position: Sitting, Cuff Size: Adult Regular)   Wt 123 lb (55.8 kg)   BMI 22.86 kg/m      GENERAL:     No acute distress.   Alert and oriented X 3         Physical:    Pupils equal reactive  Cataracts there  Facial muscles symmetric  Nasal mucosa mildly congested  TM left clear no fluid  TM right small amount of cerumen but does not occlude the canal  No cervical or subclavicular nodes  Carotid pulses palpable she does have bruits bilaterally left greater than right  Cardiac S1-S2 regular sinus rhythm no appreciable gallop soft murmur 1/6 right upper sternal border  No diastolic component  No tremulousness  Osteoarthritis changes  Calves are supple  Speech is fluent        ASSESSMENT & PLAN  Patient has known vertigo  Likely related to positional vertigo  Differential diagnosis includes  recent viral infection and labyrinthitis  She does have 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 Koo for recommendations that may help to lower blood pressure we did do a list of lifestyle interventions as well as supplements which may improve blood pressure      [place] was seen today for hospital visit follow up.    Diagnoses and all orders for this visit:    Meningioma (H)    Benign Essential Hypertension    Vertigo  -     Ambulatory referral to Physical Therapy    Bilateral carotid artery stenosis  < 50%     Chills  -     Urinalysis-UC if Indicated        Return in about 3 months (around 6/25/2019).       Anticipatory Guidance and Symptomatic Cares Discussed   Advised to call back directly if there are further questions, or if these symptoms fail to improve as anticipated or worsen.  Return to clinic if patient has a clinical concern that warrants an exam.         I spent 30 minutes with this patient face to face, of which 50% or greater was spent in counseling and coordination of care with regards to [place] was seen today for hospital visit follow up.    Diagnoses and all orders for this visit:    Meningioma (H)    Benign Essential Hypertension    Vertigo  -     Ambulatory referral to Physical Therapy    Bilateral carotid artery stenosis  < 50%     Chills  -     Urinalysis-UC if Indicated        Kolby Gale MD  Family Medicine   [facility] [id number] [phone]

## 2021-07-03 ENCOUNTER — OFFICE VISIT (OUTPATIENT)
Dept: URGENT CARE | Facility: URGENT CARE | Age: 81
End: 2021-07-03
Payer: COMMERCIAL

## 2021-07-03 VITALS
HEART RATE: 102 BPM | TEMPERATURE: 97.8 F | OXYGEN SATURATION: 99 % | SYSTOLIC BLOOD PRESSURE: 155 MMHG | DIASTOLIC BLOOD PRESSURE: 85 MMHG | WEIGHT: 120 LBS | BODY MASS INDEX: 21.95 KG/M2

## 2021-07-03 DIAGNOSIS — N30.00 ACUTE CYSTITIS WITHOUT HEMATURIA: Primary | ICD-10-CM

## 2021-07-03 LAB

## 2021-07-03 PROCEDURE — 81001 URINALYSIS AUTO W/SCOPE: CPT | Performed by: PHYSICIAN ASSISTANT

## 2021-07-03 PROCEDURE — 87086 URINE CULTURE/COLONY COUNT: CPT | Performed by: PHYSICIAN ASSISTANT

## 2021-07-03 PROCEDURE — 99213 OFFICE O/P EST LOW 20 MIN: CPT | Performed by: PHYSICIAN ASSISTANT

## 2021-07-03 PROCEDURE — 87186 SC STD MICRODIL/AGAR DIL: CPT | Performed by: PHYSICIAN ASSISTANT

## 2021-07-03 PROCEDURE — 87088 URINE BACTERIA CULTURE: CPT | Performed by: PHYSICIAN ASSISTANT

## 2021-07-03 RX ORDER — AMLODIPINE BESYLATE 10 MG/1
TABLET ORAL
COMMUNITY
Start: 2021-06-24 | End: 2021-11-16

## 2021-07-03 RX ORDER — CEPHALEXIN 500 MG/1
500 CAPSULE ORAL 2 TIMES DAILY
Qty: 14 CAPSULE | Refills: 0 | Status: SHIPPED | OUTPATIENT
Start: 2021-07-03 | End: 2021-07-10

## 2021-07-03 NOTE — PROGRESS NOTES
Assessment & Plan     1. Dysuria  ***  - *UA reflex to Microscopic and Culture ([place] [facility] (except [place] and [place])  - Urine Microscopic      No follow-ups on file.    Diagnosis and treatment plan was reviewed with patient and/or family.   We went over any labs or imaging. Discussed worsening symptoms or little to no relief despite treatment plan to follow-up with PCP or return to clinic.  Patient verbalizes understanding. All questions were addressed and answered.     NATASHA [facility] [place]    CHIEF COMPLAINT:   Chief Complaint   Patient presents with     Urgent Care     UTI     c/o dysuria      Subjective     [place] is a 81 year old female who presents to clinic today for evaluation ***      Past Medical History:   Diagnosis Date     Cancer of ovary (H)      Eye muscle weakness, left 2016     GERD (gastroesophageal reflux disease) 2009     H/O total hysterectomy with bilateral salpingo-oophorectomy (BSO)     ovarian cancer     Irregular heart beat     nl CT angiogram     Non-cardiac chest pain 2009     S/P coronary angiogram     negative     Stress 2010     Upper back pain 2016     Past Surgical History:   Procedure Laterality Date     BIOPSY BREAST       HYSTERECTOMY TOTAL ABDOMINAL, BILATERAL SALPINGO-OOPHORECTOMY, COMBINED       HYSTERECTOMY, PAP NO LONGER INDICATED      ELZBIETA BSO     OVARY SURGERY       Social History     Tobacco Use     Smoking status: Former Smoker     Quit date: 1994     Years since quittin.6     Smokeless tobacco: Never Used   Substance Use Topics     Alcohol use: Yes     Comment: ocas     Current Outpatient Medications   Medication     TOMEKA-MAG OR     Cholecalciferol (VITAMIN D) 1000 UNIT capsule     ClonazePAM (KLONOPIN PO)     COD LIVER OIL PO     Coenzyme Q10 (CO Q-10 PO)     MULTI-VITAMIN OR     No current facility-administered medications for this visit.       Allergies   Allergen Reactions     Cozaar Visual Disturbance     Amiloride/Hydrochlorothiazide Palpitations     Diltiazem Palpitations     Lisinopril Palpitations     Meclizine Palpitations     Metoprolol Palpitations       10 point ROS of systems were all negative except for pertinent positives noted in my HPI.      Exam: ***  BP (!) 155/85   Pulse 102   Temp 97.8  F (36.6  C) (Tympanic)   Wt 54.4 kg (120 lb)   SpO2 99%   BMI 21.95 kg/m    Constitutional: healthy, alert and no distress  Head: Normocephalic, atraumatic.  Eyes: conjunctiva clear, no drainage  ENT: TMs clear and shiny jamshid, nasal mucosa pink and moist, throat without tonsillar hypertrophy or erythema  Neck: neck is supple, no cervical lymphadenopathy or nuchal rigidity  Cardiovascular: RRR  Respiratory: CTA bilaterally, no rhonchi or rales  Gastrointestinal: soft and nontender  Skin: no rashes  Neurologic: Speech clear, gait normal. Moves all extremities.    Results for orders placed or performed in visit on 07/03/21   *UA reflex to Microscopic and Culture ([place] [facility] (except [place] and [place])     Status: Abnormal    Specimen: Midstream Urine   Result Value Ref Range    Color Urine Yellow     Appearance Urine Clear     Glucose Urine Negative NEG^Negative mg/dL    Bilirubin Urine Negative NEG^Negative    Ketones Urine Negative NEG^Negative mg/dL    Specific Gravity Urine 1.015 1.003 - 1.035    Blood Urine Small (A) NEG^Negative    pH Urine 7.5 (H) 5.0 - 7.0 pH    Protein Albumin Urine Negative NEG^Negative mg/dL    Urobilinogen Urine 0.2 0.2 - 1.0 EU/dL    Nitrite Urine Negative NEG^Negative    Leukocyte Esterase Urine Moderate (A) NEG^Negative    Source Midstream Urine    Urine Microscopic     Status: Abnormal   Result Value Ref Range    WBC Urine 5-10 (A) OTO5^0 - 5 /HPF    RBC Urine 2-5 (A) OTO2^O - 2 /HPF    Squamous Epithelial /LPF Urine Few FEW^Few /LPF    Bacteria Urine Few (A) NEG^Negative /HPF

## 2021-07-03 NOTE — PROGRESS NOTES
Assessment & Plan     1. Acute cystitis without hematuria  Symptoms and exam are consistent with UTI. No evidence of pyelonephritis, urosepsis or nephrolithiasis.   Encouraged fluids  Take medication as prescribed  S&S of pyelonephritis mentioned.   UC pending  - *UA reflex to Microscopic and Culture ([place] and [facility] (except [place] and [place])  - Urine Microscopic  - cephALEXin (KEFLEX) 500 MG capsule; Take 1 capsule (500 mg) by mouth 2 times daily for 7 days  Dispense: 14 capsule; Refill: 0  - Urine Culture Aerobic Bacterial      Return in about 2 days (around 7/5/2021), or if symptoms worsen or fail to improve.    Diagnosis and treatment plan was reviewed with patient and/or family.   We went over any labs or imaging. Discussed worsening symptoms or little to no relief despite treatment plan to follow-up with PCP or return to clinic.  Patient verbalizes understanding. All questions were addressed and answered.     Janae Mera, NATASHA  [facility] [place]    CHIEF COMPLAINT:   Chief Complaint   Patient presents with     Urgent Care     UTI     c/o dysuria      Roxana Mcdowell is a 81 year old female who presents to clinic today for evaluation of dysuria, urinary frequency, urgency and cloudy urine.   Reports that symptoms started 3 days ago. Does not have a history of UTI. She denies having fever, chills, flank pain, nausea, vomiting or abdominal pain.       Past Medical History:   Diagnosis Date     Cancer of ovary (H)      Eye muscle weakness, left 2/17/2016     GERD (gastroesophageal reflux disease) 7/8/2009     H/O total hysterectomy with bilateral salpingo-oophorectomy (BSO) 1995    ovarian cancer     Irregular heart beat 2009    nl CT angiogram     Non-cardiac chest pain 7/8/2009     S/P coronary angiogram 2005    negative     Stress 12/22/2010     Upper back pain 2/17/2016     Past Surgical History:   Procedure Laterality Date     BIOPSY BREAST        HYSTERECTOMY TOTAL ABDOMINAL, BILATERAL SALPINGO-OOPHORECTOMY, COMBINED       HYSTERECTOMY, PAP NO LONGER INDICATED      ELZBIETA BSO     OVARY SURGERY       Social History     Tobacco Use     Smoking status: Former Smoker     Quit date: 1994     Years since quittin.6     Smokeless tobacco: Never Used   Substance Use Topics     Alcohol use: Yes     Comment: ocas     Current Outpatient Medications   Medication     TOMEKA-MAG OR     cephALEXin (KEFLEX) 500 MG capsule     Cholecalciferol (VITAMIN D) 1000 UNIT capsule     ClonazePAM (KLONOPIN PO)     COD LIVER OIL PO     Coenzyme Q10 (CO Q-10 PO)     MULTI-VITAMIN OR     amLODIPine (NORVASC) 10 MG tablet     No current facility-administered medications for this visit.      Allergies   Allergen Reactions     Cozaar Visual Disturbance     Amiloride/Hydrochlorothiazide Palpitations     Diltiazem Palpitations     Lisinopril Palpitations     Meclizine Palpitations     Metoprolol Palpitations       10 point ROS of systems were all negative except for pertinent positives noted in my HPI.      Exam:   BP (!) 155/85   Pulse 102   Temp 97.8  F (36.6  C) (Tympanic)   Wt 54.4 kg (120 lb)   SpO2 99%   BMI 21.95 kg/m    Constitutional: healthy, alert and no distress  Head: Normocephalic, atraumatic.  ENT: MMM  Neck: neck is supple, no cervical lymphadenopathy or nuchal rigidity  Cardiovascular: RRR  Respiratory: CTA bilaterally, no rhonchi or rales  Gastrointestinal: soft and nontender.  Back: NO CVAT b/l  Skin: no rashes    Results for orders placed or performed in visit on 21   *UA reflex to Microscopic and Culture ([place] and [facility] (except [place] and [place])     Status: Abnormal    Specimen: Midstream Urine   Result Value Ref Range    Color Urine Yellow     Appearance Urine Clear     Glucose Urine Negative NEG^Negative mg/dL    Bilirubin Urine Negative NEG^Negative    Ketones Urine Negative NEG^Negative mg/dL    Specific Gravity Urine 1.015 1.003  - 1.035    Blood Urine Small (A) NEG^Negative    pH Urine 7.5 (H) 5.0 - 7.0 pH    Protein Albumin Urine Negative NEG^Negative mg/dL    Urobilinogen Urine 0.2 0.2 - 1.0 EU/dL    Nitrite Urine Negative NEG^Negative    Leukocyte Esterase Urine Moderate (A) NEG^Negative    Source Midstream Urine    Urine Microscopic     Status: Abnormal   Result Value Ref Range    WBC Urine 5-10 (A) OTO5^0 - 5 /HPF    RBC Urine 2-5 (A) OTO2^O - 2 /HPF    Squamous Epithelial /LPF Urine Few FEW^Few /LPF    Bacteria Urine Few (A) NEG^Negative /HPF

## 2021-07-03 NOTE — PATIENT INSTRUCTIONS

## 2021-07-04 LAB
BACTERIA SPEC CULT: ABNORMAL
Lab: ABNORMAL
SPECIMEN SOURCE: ABNORMAL

## 2021-07-11 DIAGNOSIS — F41.1 GENERALIZED ANXIETY DISORDER: Primary | ICD-10-CM

## 2021-07-12 RX ORDER — CLONAZEPAM 0.5 MG/1
TABLET ORAL
Qty: 30 TABLET | Refills: 2 | Status: SHIPPED | OUTPATIENT
Start: 2021-07-12 | End: 2022-01-18

## 2021-07-14 PROBLEM — I65.23 BILATERAL CAROTID ARTERY STENOSIS: Status: RESOLVED | Noted: 2019-03-25 | Resolved: 2020-12-06

## 2021-07-15 ENCOUNTER — OFFICE VISIT (OUTPATIENT)
Dept: URGENT CARE | Facility: URGENT CARE | Age: 81
End: 2021-07-15
Payer: COMMERCIAL

## 2021-07-15 VITALS
SYSTOLIC BLOOD PRESSURE: 158 MMHG | HEART RATE: 104 BPM | DIASTOLIC BLOOD PRESSURE: 80 MMHG | TEMPERATURE: 98.2 F | RESPIRATION RATE: 16 BRPM | OXYGEN SATURATION: 98 % | HEIGHT: 62 IN | WEIGHT: 110 LBS | BODY MASS INDEX: 20.24 KG/M2

## 2021-07-15 DIAGNOSIS — N30.00 ACUTE CYSTITIS WITHOUT HEMATURIA: Primary | ICD-10-CM

## 2021-07-15 DIAGNOSIS — R30.0 DYSURIA: ICD-10-CM

## 2021-07-15 PROCEDURE — 99213 OFFICE O/P EST LOW 20 MIN: CPT | Performed by: PHYSICIAN ASSISTANT

## 2021-07-15 RX ORDER — PHENAZOPYRIDINE HYDROCHLORIDE 95 MG/1
190 TABLET ORAL 3 TIMES DAILY
Qty: 42 TABLET | Refills: 0 | Status: SHIPPED | OUTPATIENT
Start: 2021-07-15 | End: 2021-07-22

## 2021-07-15 RX ORDER — CEPHALEXIN 500 MG/1
500 CAPSULE ORAL 4 TIMES DAILY
Qty: 28 CAPSULE | Refills: 0 | Status: SHIPPED | OUTPATIENT
Start: 2021-07-15 | End: 2021-07-22

## 2021-07-15 ASSESSMENT — MIFFLIN-ST. JEOR: SCORE: 917.21

## 2021-07-15 NOTE — PATIENT INSTRUCTIONS

## 2021-07-15 NOTE — PROGRESS NOTES
Dysuria  - phenazopyridine (AZO URINARY PAIN RELIEF) 95 MG tablet; Take 2 tablets (190 mg) by mouth 3 times daily for 7 days  - cephALEXin (KEFLEX) 500 MG capsule; Take 1 capsule (500 mg) by mouth 4 times daily for 7 days    Acute cystitis without hematuria  - phenazopyridine (AZO URINARY PAIN RELIEF) 95 MG tablet; Take 2 tablets (190 mg) by mouth 3 times daily for 7 days  - cephALEXin (KEFLEX) 500 MG capsule; Take 1 capsule (500 mg) by mouth 4 times daily for 7 days    20 minutes spent on the date of the encounter doing chart review, history and exam, documentation and further activities per the note     See Patient Instructions  Patient Instructions     Patient Education     Bladder Infection, Female (Adult)     Urine normally doesn't have any germs (bacteria) in it. But bacteria can get into the urinary tract from the skin around the rectum. Or they can travel in the blood from other parts of the body. Once they are in your urinary tract, they can cause infection in these areas:    The urethra (urethritis)    The bladder (cystitis)    The kidneys (pyelonephritis)  The most common place for an infection is in the bladder. This is called a bladder infection. This is one of the most common infections in women. Most bladder infections are easily treated. They are not serious unless the infection spreads to the kidney.  The terms bladder infection, UTI, and cystitis are often used to describe the same thing. But they are not always the same. Cystitis is an inflammation of the bladder. The most common cause of cystitis is an infection.  Symptoms  The infection causes inflammation in the urethra and bladder. This causes many of the symptoms. The most common symptoms of a bladder infection are:    Pain or burning when urinating    Having to urinate more often than normal    Urgent need to urinate    Only a small amount of urine comes out    Blood in urine    Belly (abdominal) discomfort. This is often in the lower  belly above the pubic bone.    Cloudy urine    Strong- or bad-smelling urine    Unable to urinate (urinary retention)    Unable to hold urine in (urinary incontinence)    Fever    Loss of appetite    Confusion (in older adults)  Causes  Bladder infections are not contagious. You can't get one from someone else, from a toilet seat, or from sharing a bath.  The most common cause of bladder infections is bacteria from the bowels. The bacteria get onto the skin around the opening of the urethra. From there, they can get into the urine. Then they travel up to the bladder, causing inflammation and infection. This often happens because of:    Wiping incorrectly after urinating. Always wipe from front to back.    Bowel incontinence    Pregnancy    Procedures such as having a catheter put in    Older age    Not emptying your bladder. This can give bacteria a chance to grow in your urine.    Fluid loss (dehydration)    Constipation    Having sex    Using a diaphragm for birth control   Treatment  Bladder infections are diagnosed by a urine test and urine culture. They are treated with antibiotics. They often clear up quickly without problems. Treatment helps prevent a more serious kidney infection.  Medicines  Medicines can help in the treatment of a bladder infection:    Take antibiotics until they are used up, even if you feel better. It's important to finish them to make sure the infection has cleared.    You can use acetaminophen or ibuprofen for pain, fever, or discomfort, unless another medicine was prescribed. If you have long-term (chronic) liver or kidney disease, talk with your healthcare provider before using these medicines. Also talk with your provider if you've ever had a stomach ulcer or GI (gastrointestinal) bleeding, or are taking blood-thinner medicines.    If you are given phenazopydridine to reduce burning with urination, it will make your urine a bright orange color. This can stain clothing.  Care and  prevention  These self-care steps can help prevent future infections:    Drink plenty of fluids. This helps to prevent dehydration and flush out your bladder. Do this unless you must restrict fluids for other health reasons, or your healthcare provider told you not to.    Clean yourself correctly after going to the bathroom. Wipe from front to back after using the toilet. This helps prevent the spread of bacteria.    Urinate more often. Don't try to hold urine in for a long time.    Wear loose-fitting clothes and cotton underwear. Don't wear tight-fitting pants.    Improve your diet and prevent constipation. Eat more fresh fruits and vegetables, and fiber. Eat less junk foods and fatty foods.    Don't have sex until your symptoms are gone.    Don't have caffeine, alcohol, and spicy foods. These can irritate your bladder.    Urinate right after you have sex to flush out your bladder.    If you use birth control pills and have frequent bladder infections, discuss it with your healthcare provider.  Follow-up care  Call your healthcare provider if all symptoms are not gone after 3 days of treatment. This is especially important if you have repeat infections.  If a culture was done, you will be told if your treatment needs to be changed. If directed, you can call to find out the results.  If X-rays were done, you will be told if the results will affect your treatment.  Call 911  Call 911 if any of the following occur:    Trouble breathing    Hard to wake up or confusion    Fainting (loss of consciousness)    Fast heart rate  When to get medical advice  Call your healthcare provider right away if any of these occur:    Fever of 100.4 F (38.0 C) or higher, or as directed by your healthcare provider    Symptoms are not better after 3 days of treatment    Back or belly pain that gets worse    Repeated vomiting, or unable to keep medicine down    Weakness or dizziness    Vaginal discharge    Pain, redness, or swelling in the  outer vaginal area (labia)  Final[employer] last reviewed this educational content on 11/1/2019 2000-2021 [employer]. All rights reserved. This information is not intended as a substitute for professional medical care. Always follow your healthcare professional's instructions.               NATASHA [facility]    Subjective   81 year old who presents to clinic today for the following health issues:    Urgent Care and UTI       HPI     Genitourinary - Female  Onset/Duration: off and one for the last 1-2 weeks  Description:   Painful urination (Dysuria): YES           Frequency: YES  Blood in urine (Hematuria): no  Delay in urine (Hesitency): YES  Intensity: moderate  Progression of Symptoms:  worsening  Accompanying Signs & Symptoms:  Fever/chills: YES  Nausea and vomiting: no  Vaginal symptoms: none  Abdominal/Pelvic Pain: YES  History:   History of frequent UTI s: YES  History of kidney stones: no  Precipitating or alleviating factors: None  Therapies tried and outcome: Patient was treated with Keflex 1 week ago and was given 500 mg to take twice daily for 7 days. This seemed to have improved her symptoms temporarily but did not make them disappear completely. Symptoms returned fully after the patient finished antibiotic.        Review of Systems   Review of Systems   See HPI     Objective    Temp: 98.2  F (36.8  C) Temp src: Oral BP: (!) 158/80 Pulse: 104   Resp: 16 SpO2: 98 %       Physical Exam   Physical Exam  Constitutional:       General: She is not in acute distress.     Appearance: Normal appearance. She is normal weight. She is not ill-appearing, toxic-appearing or diaphoretic.   HENT:      Head: Normocephalic and atraumatic.   Cardiovascular:      Rate and Rhythm: Normal rate and regular rhythm.      Pulses: Normal pulses.      Heart sounds: Normal heart sounds.   Pulmonary:      Effort: Pulmonary effort is normal. No respiratory distress.      Breath sounds:  Normal breath sounds.   Abdominal:      Tenderness: There is no right CVA tenderness or left CVA tenderness.   Neurological:      General: No focal deficit present.      Mental Status: She is alert and oriented to person, place, and time. Mental status is at baseline.      Gait: Gait normal.   Psychiatric:         Mood and Affect: Mood normal.         Behavior: Behavior normal.         Thought Content: Thought content normal.         Judgment: Judgment normal.          No results found for this or any previous visit (from the past 24 hour(s)).

## 2021-07-28 ENCOUNTER — TRANSFERRED RECORDS (OUTPATIENT)
Dept: HEALTH INFORMATION MANAGEMENT | Facility: CLINIC | Age: 81
End: 2021-07-28

## 2021-07-31 ENCOUNTER — OFFICE VISIT (OUTPATIENT)
Dept: URGENT CARE | Facility: URGENT CARE | Age: 81
End: 2021-07-31
Payer: COMMERCIAL

## 2021-07-31 VITALS
BODY MASS INDEX: 20.12 KG/M2 | DIASTOLIC BLOOD PRESSURE: 81 MMHG | SYSTOLIC BLOOD PRESSURE: 161 MMHG | OXYGEN SATURATION: 97 % | WEIGHT: 110 LBS | TEMPERATURE: 98.3 F | HEART RATE: 103 BPM

## 2021-07-31 DIAGNOSIS — R50.9 FEVER AND CHILLS: Primary | ICD-10-CM

## 2021-07-31 LAB
ALBUMIN UR-MCNC: NEGATIVE MG/DL
APPEARANCE UR: CLEAR
BASOPHILS # BLD AUTO: 0.1 10E3/UL (ref 0–0.2)
BASOPHILS NFR BLD AUTO: 1 %
BILIRUB UR QL STRIP: NEGATIVE
COLOR UR AUTO: YELLOW
EOSINOPHIL # BLD AUTO: 0.1 10E3/UL (ref 0–0.7)
EOSINOPHIL NFR BLD AUTO: 2 %
ERYTHROCYTE [DISTWIDTH] IN BLOOD BY AUTOMATED COUNT: 12.4 % (ref 10–15)
GLUCOSE UR STRIP-MCNC: NEGATIVE MG/DL
HCT VFR BLD AUTO: 38.2 % (ref 35–47)
HGB BLD-MCNC: 13.1 G/DL (ref 11.7–15.7)
HGB UR QL STRIP: ABNORMAL
IMM GRANULOCYTES # BLD: 0 10E3/UL
IMM GRANULOCYTES NFR BLD: 0 %
KETONES UR STRIP-MCNC: NEGATIVE MG/DL
LEUKOCYTE ESTERASE UR QL STRIP: NEGATIVE
LYMPHOCYTES # BLD AUTO: 1.8 10E3/UL (ref 0.8–5.3)
LYMPHOCYTES NFR BLD AUTO: 25 %
MCH RBC QN AUTO: 31.2 PG (ref 26.5–33)
MCHC RBC AUTO-ENTMCNC: 34.3 G/DL (ref 31.5–36.5)
MCV RBC AUTO: 91 FL (ref 78–100)
MONOCYTES # BLD AUTO: 0.7 10E3/UL (ref 0–1.3)
MONOCYTES NFR BLD AUTO: 9 %
NEUTROPHILS # BLD AUTO: 4.5 10E3/UL (ref 1.6–8.3)
NEUTROPHILS NFR BLD AUTO: 62 %
NITRATE UR QL: NEGATIVE
PH UR STRIP: 7 [PH] (ref 5–7)
PLATELET # BLD AUTO: 413 10E3/UL (ref 150–450)
RBC # BLD AUTO: 4.2 10E6/UL (ref 3.8–5.2)
RBC #/AREA URNS AUTO: NORMAL /HPF
SP GR UR STRIP: 1.01 (ref 1–1.03)
UROBILINOGEN UR STRIP-ACNC: 0.2 E.U./DL
WBC # BLD AUTO: 7.2 10E3/UL (ref 4–11)
WBC #/AREA URNS AUTO: NORMAL /HPF

## 2021-07-31 PROCEDURE — 80053 COMPREHEN METABOLIC PANEL: CPT | Performed by: FAMILY MEDICINE

## 2021-07-31 PROCEDURE — 36415 COLL VENOUS BLD VENIPUNCTURE: CPT | Performed by: FAMILY MEDICINE

## 2021-07-31 PROCEDURE — 85025 COMPLETE CBC W/AUTO DIFF WBC: CPT | Performed by: FAMILY MEDICINE

## 2021-07-31 PROCEDURE — 81001 URINALYSIS AUTO W/SCOPE: CPT | Performed by: FAMILY MEDICINE

## 2021-07-31 PROCEDURE — 99213 OFFICE O/P EST LOW 20 MIN: CPT | Performed by: FAMILY MEDICINE

## 2021-07-31 NOTE — PROGRESS NOTES
Subjective: Patient has been here 2 times in the last month with urinary tract infection symptoms, had chills when that happened, felt better while taking antibiotics but it keeps coming back.  Right now she is taking amoxicillin for a dental infection but it does not seem to be helping in any way as far as her chills.  She does not have diarrhea.  She does not have upper respiratory symptoms.  A couple of days ago she gave a urine sample at her regular doctor's office and they said it did not look like the infection was there but I do not have access to those records.  She was told she may need blood tests that sound like blood cultures    Objective: Vitals are stable.  ENT is normal.  Neck is normal.  Lungs are clear.  Heart is regular without murmurs.  Abdomen benign.  No CVA tenderness.  CBC is totally normal.  Comprehensive is pending and she will have to bring a urine sample and tomorrow because she is unable to obtain 1 now.    Assessment and plan: Sensation of hot flashes and chills although she feels them right now and does not have a fever.  It is unclear whether she has had a fever.  I see no signs of sepsis.  I would like to make sure that she does not have an ongoing urinary tract infection since that is what caused the original sensation of fevers and chills.  Otherwise I would simply observe and follow-up with her regular doctor.

## 2021-08-01 LAB
ALBUMIN SERPL-MCNC: 4.2 G/DL (ref 3.4–5)
ALP SERPL-CCNC: 67 U/L (ref 40–150)
ALT SERPL W P-5'-P-CCNC: 27 U/L (ref 0–50)
ANION GAP SERPL CALCULATED.3IONS-SCNC: 3 MMOL/L (ref 3–14)
AST SERPL W P-5'-P-CCNC: 17 U/L (ref 0–45)
BILIRUB SERPL-MCNC: 0.3 MG/DL (ref 0.2–1.3)
BUN SERPL-MCNC: 11 MG/DL (ref 7–30)
CALCIUM SERPL-MCNC: 9.2 MG/DL (ref 8.5–10.1)
CHLORIDE BLD-SCNC: 100 MMOL/L (ref 94–109)
CO2 SERPL-SCNC: 29 MMOL/L (ref 20–32)
CREAT SERPL-MCNC: 0.67 MG/DL (ref 0.52–1.04)
GFR SERPL CREATININE-BSD FRML MDRD: 83 ML/MIN/1.73M2
GLUCOSE BLD-MCNC: 122 MG/DL (ref 70–99)
POTASSIUM BLD-SCNC: 4.3 MMOL/L (ref 3.4–5.3)
PROT SERPL-MCNC: 8.2 G/DL (ref 6.8–8.8)
SODIUM SERPL-SCNC: 132 MMOL/L (ref 133–144)

## 2021-08-03 ENCOUNTER — NURSE TRIAGE (OUTPATIENT)
Dept: NURSING | Facility: CLINIC | Age: 81
End: 2021-08-03

## 2021-08-03 NOTE — TELEPHONE ENCOUNTER
RN Triage:    Had symptoms of Covid-19, including loss of taste and smell in February, 2021.   Was fully vaccinated against Covid-19 in March, 2021 with Pfizer.    She has symptoms of Covid-19 again, getting worse over the last week.  Myalgias  Fatigue  Chills  Mild fever  Decreased appetite  Loss of taste and smell    No cough breathing difficulty.    Wondering if she contacted it again at weekly PT sessions.  Home care discussed.  Writer assisted [place] in finding community testing sites for Covid-19.  She plans to be tested.    Gianna Broderick RN 08/03/21 4:51 PM  [facility] Nurse Advisor    Reason for Disposition    COVID-19 Testing, questions about    Additional Information    Negative: SEVERE difficulty breathing (e.g., struggling for each breath, speaks in single words)    Negative: Difficult to awaken or acting confused (e.g., disoriented, slurred speech)    Negative: Bluish (or gray) lips or face now    Negative: Shock suspected (e.g., cold/pale/clammy skin, too weak to stand, low BP, rapid pulse)    Negative: Sounds like a life-threatening emergency to the triager    Protocols used: CORONAVIRUS (COVID-19) DIAGNOSED OR [id number] 3.25

## 2021-08-09 ENCOUNTER — OFFICE VISIT (OUTPATIENT)
Dept: FAMILY MEDICINE | Facility: CLINIC | Age: 81
End: 2021-08-09
Payer: COMMERCIAL

## 2021-08-09 VITALS
DIASTOLIC BLOOD PRESSURE: 78 MMHG | OXYGEN SATURATION: 98 % | HEART RATE: 90 BPM | SYSTOLIC BLOOD PRESSURE: 138 MMHG | BODY MASS INDEX: 18.82 KG/M2 | HEIGHT: 62 IN | WEIGHT: 102.3 LBS

## 2021-08-09 DIAGNOSIS — R30.0 DYSURIA: Primary | ICD-10-CM

## 2021-08-09 DIAGNOSIS — R25.2 LEG CRAMPS: ICD-10-CM

## 2021-08-09 DIAGNOSIS — R68.83 CHILLS: ICD-10-CM

## 2021-08-09 LAB
ALBUMIN SERPL-MCNC: 4.1 G/DL (ref 3.5–5)
ALBUMIN UR-MCNC: NEGATIVE MG/DL
ALP SERPL-CCNC: 63 U/L (ref 45–120)
ALT SERPL W P-5'-P-CCNC: 15 U/L (ref 0–45)
ANION GAP SERPL CALCULATED.3IONS-SCNC: 12 MMOL/L (ref 5–18)
APPEARANCE UR: CLEAR
AST SERPL W P-5'-P-CCNC: 18 U/L (ref 0–40)
BASOPHILS # BLD AUTO: 0.1 10E3/UL (ref 0–0.2)
BASOPHILS NFR BLD AUTO: 1 %
BILIRUB SERPL-MCNC: 0.3 MG/DL (ref 0–1)
BILIRUB UR QL STRIP: NEGATIVE
BUN SERPL-MCNC: 12 MG/DL (ref 8–28)
C REACTIVE PROTEIN LHE: <0.1 MG/DL (ref 0–0.8)
CALCIUM SERPL-MCNC: 9.5 MG/DL (ref 8.5–10.5)
CHLORIDE BLD-SCNC: 100 MMOL/L (ref 98–107)
CO2 SERPL-SCNC: 23 MMOL/L (ref 22–31)
COLOR UR AUTO: YELLOW
CREAT SERPL-MCNC: 0.69 MG/DL (ref 0.6–1.1)
EOSINOPHIL # BLD AUTO: 0.1 10E3/UL (ref 0–0.7)
EOSINOPHIL NFR BLD AUTO: 1 %
ERYTHROCYTE [DISTWIDTH] IN BLOOD BY AUTOMATED COUNT: 12.5 % (ref 10–15)
ERYTHROCYTE [SEDIMENTATION RATE] IN BLOOD BY WESTERGREN METHOD: 17 MM/HR (ref 0–20)
GFR SERPL CREATININE-BSD FRML MDRD: 82 ML/MIN/1.73M2
GLUCOSE BLD-MCNC: 99 MG/DL (ref 70–125)
GLUCOSE UR STRIP-MCNC: NEGATIVE MG/DL
HCT VFR BLD AUTO: 36.3 % (ref 35–47)
HGB BLD-MCNC: 12.5 G/DL (ref 11.7–15.7)
HGB UR QL STRIP: NEGATIVE
IMM GRANULOCYTES # BLD: 0 10E3/UL
IMM GRANULOCYTES NFR BLD: 0 %
KETONES UR STRIP-MCNC: NEGATIVE MG/DL
LEUKOCYTE ESTERASE UR QL STRIP: NEGATIVE
LYMPHOCYTES # BLD AUTO: 1.2 10E3/UL (ref 0.8–5.3)
LYMPHOCYTES NFR BLD AUTO: 16 %
MAGNESIUM SERPL-MCNC: 2.1 MG/DL (ref 1.8–2.6)
MCH RBC QN AUTO: 31.5 PG (ref 26.5–33)
MCHC RBC AUTO-ENTMCNC: 34.4 G/DL (ref 31.5–36.5)
MCV RBC AUTO: 91 FL (ref 78–100)
MONOCYTES # BLD AUTO: 0.5 10E3/UL (ref 0–1.3)
MONOCYTES NFR BLD AUTO: 7 %
NEUTROPHILS # BLD AUTO: 5.5 10E3/UL (ref 1.6–8.3)
NEUTROPHILS NFR BLD AUTO: 76 %
NITRATE UR QL: NEGATIVE
PH UR STRIP: 7 [PH] (ref 5–8)
PLATELET # BLD AUTO: 370 10E3/UL (ref 150–450)
POTASSIUM BLD-SCNC: 3.8 MMOL/L (ref 3.5–5)
PROT SERPL-MCNC: 7.6 G/DL (ref 6–8)
RBC # BLD AUTO: 3.97 10E6/UL (ref 3.8–5.2)
SODIUM SERPL-SCNC: 135 MMOL/L (ref 136–145)
SP GR UR STRIP: 1.01 (ref 1–1.03)
UROBILINOGEN UR STRIP-ACNC: 0.2 E.U./DL
WBC # BLD AUTO: 7.3 10E3/UL (ref 4–11)

## 2021-08-09 PROCEDURE — U0003 INFECTIOUS AGENT DETECTION BY NUCLEIC ACID (DNA OR RNA); SEVERE ACUTE RESPIRATORY SYNDROME CORONAVIRUS 2 (SARS-COV-2) (CORONAVIRUS DISEASE [COVID-19]), AMPLIFIED PROBE TECHNIQUE, MAKING USE OF HIGH THROUGHPUT TECHNOLOGIES AS DESCRIBED BY CMS-2020-01-R: HCPCS | Performed by: FAMILY MEDICINE

## 2021-08-09 PROCEDURE — 99214 OFFICE O/P EST MOD 30 MIN: CPT | Performed by: FAMILY MEDICINE

## 2021-08-09 PROCEDURE — 85652 RBC SED RATE AUTOMATED: CPT | Performed by: FAMILY MEDICINE

## 2021-08-09 PROCEDURE — 86141 C-REACTIVE PROTEIN HS: CPT | Performed by: FAMILY MEDICINE

## 2021-08-09 PROCEDURE — 83735 ASSAY OF MAGNESIUM: CPT | Performed by: FAMILY MEDICINE

## 2021-08-09 PROCEDURE — U0005 INFEC AGEN DETEC AMPLI PROBE: HCPCS | Performed by: FAMILY MEDICINE

## 2021-08-09 PROCEDURE — 80053 COMPREHEN METABOLIC PANEL: CPT | Performed by: FAMILY MEDICINE

## 2021-08-09 PROCEDURE — 85025 COMPLETE CBC W/AUTO DIFF WBC: CPT | Performed by: FAMILY MEDICINE

## 2021-08-09 PROCEDURE — 36415 COLL VENOUS BLD VENIPUNCTURE: CPT | Performed by: FAMILY MEDICINE

## 2021-08-09 PROCEDURE — 81003 URINALYSIS AUTO W/O SCOPE: CPT | Performed by: FAMILY MEDICINE

## 2021-08-09 RX ORDER — CYCLOBENZAPRINE HCL 5 MG
TABLET ORAL PRN
COMMUNITY
Start: 2021-05-19 | End: 2022-01-27

## 2021-08-09 RX ORDER — MAGNESIUM GLYCINATE 100 MG
100 TABLET ORAL
COMMUNITY
End: 2023-12-29

## 2021-08-09 RX ORDER — VALACYCLOVIR HYDROCHLORIDE 1 G/1
1000 TABLET, FILM COATED ORAL 2 TIMES DAILY PRN
Status: ON HOLD | COMMUNITY
End: 2024-04-04

## 2021-08-09 RX ORDER — IBUPROFEN 200 MG
500 CAPSULE ORAL
COMMUNITY
End: 2023-12-29

## 2021-08-09 ASSESSMENT — MIFFLIN-ST. JEOR: SCORE: 882.28

## 2021-08-09 NOTE — PROGRESS NOTES
[place] was seen today for uti and er f/u.    Diagnoses and all orders for this visit:    Dysuria  -     UA Macro with Reflex to Micro and Culture - lab collect  Her UTI has been treated.  UA negative.  If dysuria persists, refer to urology.    Chills- R/O COVID break-through infection.  Check for inflammation.  Normal CBC and diff reassuring.  Tick-borne illness unlikely since pt has been inside since her CVA.  -     CBC with platelets and differential  -     Comprehensive metabolic panel (BMP + Alb, Alk Phos, ALT, AST, Total. Bili, TP)  -     ESR: Erythrocyte sedimentation rate  -     CRP, inflammation  -     Symptomatic COVID-19 Virus (Coronavirus) by PCR Nasopharyngeal    Leg cramps  -     Magnesium          SUBJECTIVE: July Huang is a 81 year old female who presents with the following:  Patient presents with:  UTI: f/u from Urgent care visit since 7/3/21  ER F/U: Urgent care visit f/u on 7/31/21 for fever and chills    She has had 2 bouts of UTIs.  She has taken 2 rounds of antibiotics.  First episode she got Keflex 500 mg twice daily for a week.  She felt only a little better.  Symptoms returned in a few days so went back to  and got Keflex 500 mg 4x a day.  Felt a little better for few days then chills came back. She still has chills / headache / myalgias / feels cold.  Has slight headache over forehead.    Saw her gyn and had yearly check.  She had urine test that was normal.  She continued to have chills and so went to Urgent Care for blood work which was normal.  She has persistent dysuria since July 1.  No blood in urine.  No frequency / urgency.    ROS: No shortness of breath / cough.  Has some nasal congestion but no sinus pain.  No diarrhea.  No abdominal pain.  No rash.  No recent falls.  Has poor appetite.  No change in taste / smell.  No sore throat.  No vaginal discharge.    Had UTIs years ago.  Only occasional stress incontinence.  Has cystocele so harder to empty bladder.  She is fully  "vaccinated for COVID.    No tick bite or possible exposures.  No obvious exposure to COVID 19 case.    OBJECTIVE: /78 (BP Location: Left arm, Patient Position: Sitting, Cuff Size: Adult Regular)   Pulse 90   Ht 1.575 m (5' 2\")   Wt 46.4 kg (102 lb 4.8 oz)   SpO2 98%   BMI 18.71 kg/m     No acute distress.  HEENT: Head is atraumatic and/normocephalic.  PERRL.  Conjunctiva clear.  Tympanic membranes left grey with normal landmarks and normal light reflexes.  Right ear with cerumen in canal. No nasal discharge.  Oropharynx is pink and moist.    Neck: Supple.  No lymphadenopathy or thyromegaly.  Lungs: Clear to auscultation.  No wheezing, retractions, or tachypnea.  Heart: RRR. S1 and S2 normal.  No murmurs, rubs, or gallops.  Neuro: Awake, alert, oriented x 3.      Recent Results (from the past 24 hour(s))   UA Macro with Reflex to Micro and Culture - lab collect    Collection Time: 08/09/21 11:32 AM    Specimen: Urine, NOS   Result Value Ref Range    Color Urine Yellow Colorless, Straw, Light Yellow, Yellow    Appearance Urine Clear Clear    Glucose Urine Negative Negative mg/dL    Bilirubin Urine Negative Negative    Ketones Urine Negative Negative mg/dL    Specific Gravity Urine 1.010 1.005 - 1.030    Blood Urine Negative Negative    pH Urine 7.0 5.0 - 8.0    Protein Albumin Urine Negative Negative mg/dL    Urobilinogen Urine 0.2 0.2, 1.0 E.U./dL    Nitrite Urine Negative Negative    Leukocyte Esterase Urine Negative Negative   CBC with platelets and differential    Collection Time: 08/09/21 11:35 AM   Result Value Ref Range    WBC Count 7.3 4.0 - 11.0 10e3/uL    RBC Count 3.97 3.80 - 5.20 10e6/uL    Hemoglobin 12.5 11.7 - 15.7 g/dL    Hematocrit 36.3 35.0 - 47.0 %    MCV 91 78 - 100 fL    MCH 31.5 26.5 - 33.0 pg    MCHC 34.4 31.5 - 36.5 g/dL    RDW 12.5 10.0 - 15.0 %    Platelet Count 370 150 - 450 10e3/uL    % Neutrophils 76 %    % Lymphocytes 16 %    % Monocytes 7 %    % Eosinophils 1 %    % Basophils " 1 %    % Immature Granulocytes 0 %    Absolute Neutrophils 5.5 1.6 - 8.3 10e3/uL    Absolute Lymphocytes 1.2 0.8 - 5.3 10e3/uL    Absolute Monocytes 0.5 0.0 - 1.3 10e3/uL    Absolute Eosinophils 0.1 0.0 - 0.7 10e3/uL    Absolute Basophils 0.1 0.0 - 0.2 10e3/uL    Absolute Immature Granulocytes 0.0 <=0.0 10e3/uL             Florence Koo MD

## 2021-08-10 LAB — SARS-COV-2 RNA RESP QL NAA+PROBE: NEGATIVE

## 2021-08-11 NOTE — RESULT ENCOUNTER NOTE
I notified pt of labs.  Everything normal / negative except slightly low sodium.  She is seeing urogynecology today for scope exam.  Still has dysuria.    She continues to feel poorly.  Has nasal congestion.      I suspect she may have another respiratory virus.  Recommend fluticasone nasal spray OTC.  She has appointment with me next week so can F/U then.

## 2021-08-12 ENCOUNTER — TRANSFERRED RECORDS (OUTPATIENT)
Dept: HEALTH INFORMATION MANAGEMENT | Facility: CLINIC | Age: 81
End: 2021-08-12

## 2021-08-13 ENCOUNTER — TELEPHONE (OUTPATIENT)
Dept: FAMILY MEDICINE | Facility: CLINIC | Age: 81
End: 2021-08-13

## 2021-08-13 NOTE — TELEPHONE ENCOUNTER
Spoke to [place] today as she was on Dr. Olvera's schedule as a 20 min OV for establish care. I explained that Dr. Olvera isn't taking new patients right now and she asked if she would make an exception as she came recommended by Dr. Koo. She says [place] is too far for her to go to see Dr. Koo. If yes, I will reschedule her for a 40 min slot.

## 2021-08-17 ENCOUNTER — OFFICE VISIT (OUTPATIENT)
Dept: FAMILY MEDICINE | Facility: CLINIC | Age: 81
End: 2021-08-17
Payer: COMMERCIAL

## 2021-08-17 VITALS
DIASTOLIC BLOOD PRESSURE: 80 MMHG | HEIGHT: 62 IN | WEIGHT: 104.9 LBS | BODY MASS INDEX: 19.3 KG/M2 | SYSTOLIC BLOOD PRESSURE: 138 MMHG | HEART RATE: 84 BPM | OXYGEN SATURATION: 98 % | TEMPERATURE: 98.8 F | RESPIRATION RATE: 16 BRPM

## 2021-08-17 DIAGNOSIS — R68.83 CHILLS: Primary | ICD-10-CM

## 2021-08-17 DIAGNOSIS — E55.9 VITAMIN D DEFICIENCY: ICD-10-CM

## 2021-08-17 DIAGNOSIS — E78.00 HYPERCHOLESTEROLEMIA: ICD-10-CM

## 2021-08-17 DIAGNOSIS — F41.9 ANXIETY DISORDER, UNSPECIFIED TYPE: ICD-10-CM

## 2021-08-17 DIAGNOSIS — Z00.00 ENCOUNTER FOR MEDICARE ANNUAL WELLNESS EXAM: ICD-10-CM

## 2021-08-17 DIAGNOSIS — I63.9 CEREBROVASCULAR ACCIDENT (CVA), UNSPECIFIED MECHANISM (H): ICD-10-CM

## 2021-08-17 PROCEDURE — 99397 PER PM REEVAL EST PAT 65+ YR: CPT | Performed by: FAMILY MEDICINE

## 2021-08-17 ASSESSMENT — ACTIVITIES OF DAILY LIVING (ADL)
CURRENT_FUNCTION: HOUSEWORK REQUIRES ASSISTANCE
CURRENT_FUNCTION: PREPARING MEALS REQUIRES ASSISTANCE
CURRENT_FUNCTION: TRANSPORTATION REQUIRES ASSISTANCE
CURRENT_FUNCTION: LAUNDRY REQUIRES ASSISTANCE
CURRENT_FUNCTION: BATHING REQUIRES ASSISTANCE
CURRENT_FUNCTION: SHOPPING REQUIRES ASSISTANCE

## 2021-08-17 ASSESSMENT — MIFFLIN-ST. JEOR: SCORE: 886.13

## 2021-08-17 NOTE — PROGRESS NOTES
"SUBJECTIVE:   July Huang is a 81 year old female who presents for Preventive Visit.      Patient has been advised of split billing requirements and indicates understanding: Yes   Are you in the first 12 months of your Medicare coverage?  No    Healthy Habits:     In general, how would you rate your overall health?  Poor    Frequency of exercise:  4-5 days/week    Duration of exercise:  N/A    Do you usually eat at least 4 servings of fruit and vegetables a day, include whole grains    & fiber and avoid regularly eating high fat or \"junk\" foods?  Yes    Taking medications regularly:  Yes    Medication side effects:  Not applicable    Ability to successfully perform activities of daily living:  Transportation requires assistance, shopping requires assistance, preparing meals requires assistance, housework requires assistance, bathing requires assistance and laundry requires assistance    Home Safety:  Lack of handrails on stairs    Hearing Impairment:  No hearing concerns    In the past 6 months, have you been bothered by leaking of urine?  No    In general, how would you rate your overall mental or emotional health?  Fair      PHQ-2 Total Score: 1    Additional concerns today:  Yes    Do you feel safe in your environment? Yes    1)  She has intermittent symptoms with chills/ body aches/ fatigue.  Tested negative for COVID and blood work was normal and last urine was normal.      2) Recurrent UTI- She saw urology and had normal cystoscopy.  Has past h/o bladder tumor.    Have you ever done Advance Care Planning? (For example, a Health Directive, POLST, or a discussion with a medical provider or your loved ones about your wishes): Yes, patient states has an Advance Care Planning document and will bring a copy to the clinic.       Fall risk  Fallen 2 or more times in the past year?: Yes  Any fall with injury in the past year?: No    Cognitive Screening   1) Repeat 3 items (Leader, Season, Table)  Normal  2) Clock " "draw: NORMAL  3) 3 item recall: Recalls 1 object   Results: NORMAL clock, 1-2 items recalled: COGNITIVE IMPAIRMENT LESS LIKELY    Mini-CogTM Copyright GAYATRI Escamilla. Licensed by the author for use in [facility]; reprinted with permission (rudi@[facility]). All rights reserved.      Do you have sleep apnea, excessive snoring or daytime drowsiness?: no    Reviewed and updated as needed this visit by clinical staff  Tobacco                Reviewed and updated as needed this visit by Provider                Social History     Tobacco Use     Smoking status: Former Smoker     Quit date: 1994     Years since quittin.7     Smokeless tobacco: Never Used   Substance Use Topics     Alcohol use: Yes     Comment: ocas     If you drink alcohol do you typically have >3 drinks per day or >7 drinks per week? No    Alcohol Use 2021   Prescreen: >3 drinks/day or >7 drinks/week? No   Prescreen: >3 drinks/day or >7 drinks/week? -       Current providers sharing in care for this patient include:     Patient Care Team:  Denton Barahona MD as PCP - General (Family Medicine)  Florence Koo MD as Assigned PCP    The following health maintenance items are reviewed in Epic and correct as of today:  Health Maintenance Due   Topic Date Due     ANNUAL REVIEW OF HM ORDERS  Never done     ZOSTER IMMUNIZATION (1 of 2) Never done           Mammogram normal 10/20  Pertinent mammograms are reviewed under the imaging tab.    Review of Systems      OBJECTIVE:   /80 (BP Location: Left arm, Patient Position: Sitting, Cuff Size: Adult Regular)   Pulse 84   Temp 98.8  F (37.1  C) (Oral)   Resp 16   Ht 1.562 m (5' 1.5\")   Wt 47.6 kg (104 lb 14.4 oz)   SpO2 98%   BMI 19.50 kg/m   Estimated body mass index is 19.5 kg/m  as calculated from the following:    Height as of this encounter: 1.562 m (5' 1.5\").    Weight as of this encounter: 47.6 kg (104 lb 14.4 oz).  Physical Exam  GENERAL APPEARANCE: healthy, " alert and no distress  EYES: Eyes grossly normal to inspection, PERRL and conjunctivae and sclerae normal  HENT: ear canals and TM's normal, nose and mouth without ulcers or lesions, oropharynx clear and oral mucous membranes moist  NECK: no adenopathy, no asymmetry, masses, or scars and thyroid normal to palpation  RESP: lungs clear to auscultation - no rales, rhonchi or wheezes  BREAST: normal without masses, tenderness or nipple discharge and no palpable axillary masses or adenopathy  CV: regular rate and rhythm, normal S1 S2, no S3 or S4, no murmur, click or rub, no peripheral edema and peripheral pulses strong  ABDOMEN: soft, nontender, no hepatosplenomegaly, no masses and bowel sounds normal  MS: no musculoskeletal defects are noted and gait is age appropriate without ataxia  SKIN: no suspicious lesions or rashes  NEURO: Normal strength and tone, sensory exam grossly normal, mentation intact and speech normal  PSYCH: mentation appears normal and affect normal/bright    Diagnostic Test Results:  Labs reviewed in Epic  none     ASSESSMENT / PLAN:   [place] was seen today for wellness visit and musculoskeletal problem.    Diagnoses and all orders for this visit:    Chills- Etiology unclear.  No further UTI.  WBC/ CMP / ESR/ CRP are normal.  Will check for other infections.  -     EBV Capsid Antibody IgM; Future  -     EBV Capsid Antibody IgG; Future  -     CMV Antibody IgG; Future  -     CMV antibody IgM; Future  -     Lyme Disease Valery with reflex to WB Serum; Future    Vitamin D deficiency  -     Vitamin D Deficiency; Future    Hypercholesterolemia  -     Lipid panel reflex to direct LDL Fasting; Future    Encounter for Medicare annual wellness exam  Up to date with mammogram/ colon cancer screening.  She will consider Zoster vaccine at pharmacy.    HTN- Stable on amlodipine    Anxiety- Stable on clonazepam. Controlled substance contract signed.  F/U 6 months.    CVA- Continue physical therapy.          Patient  "has been advised of split billing requirements and indicates understanding: Yes  COUNSELING:  Reviewed preventive health counseling, as reflected in patient instructions       Healthy diet/nutrition       Fall risk prevention    Estimated body mass index is 19.5 kg/m  as calculated from the following:    Height as of this encounter: 1.562 m (5' 1.5\").    Weight as of this encounter: 47.6 kg (104 lb 14.4 oz).        She reports that she quit smoking about 26 years ago. She has never used smokeless tobacco.      Appropriate preventive services were discussed with this patient, including applicable screening as appropriate for cardiovascular disease, diabetes, osteopenia/osteoporosis, and glaucoma.  As appropriate for age/gender, discussed screening for colorectal cancer, prostate cancer, breast cancer, and cervical cancer. Checklist reviewing preventive services available has been given to the patient.    Reviewed patients plan of care and provided an AVS. The Basic Care Plan (routine screening as documented in Health Maintenance) for [place] meets the Care Plan requirement. This Care Plan has been established and reviewed with the Patient and spouse.    Counseling Resources:  ATP IV Guidelines  Pooled Cohorts Equation Calculator  Breast Cancer Risk Calculator  Breast Cancer: Medication to Reduce Risk  FRAX Risk Assessment  ICSI Preventive Guidelines  Dietary Guidelines for Americans, 2010  [employer]'s MyPlate  ASA Prophylaxis  Lung CA Screening    Florence Koo MD  [facility]    Identified Health Risks:    The patient was provided with suggestions to help her develop a healthy physical lifestyle.  The patient reports that she has difficulty with activities of daily living. The patient was provided with suggestions to help her develop a healthy emotional lifestyle.  She is at risk for falling and has been provided with information to reduce the risk of falling at home.  The patient was " provided with suggestions to help her develop a healthy physical lifestyle.  The patient reports that she has difficulty with activities of daily living. The patient was provided with suggestions to help her develop a healthy emotional lifestyle.  She is at risk for falling and has been provided with information to reduce the risk of falling at home.  The patient was provided with suggestions to help her develop a healthy physical lifestyle.  The patient reports that she has difficulty with activities of daily living.  The patient was provided with suggestions to help her develop a healthy emotional lifestyle.  She is at risk for falling and has been provided with information to reduce the risk of falling at home.

## 2021-08-17 NOTE — LETTER
[facility]  -- Controlled Medication Agreement    8/17/2021   July Huang   1940   [id number]       I understand that my provider is prescribing controlled medication (i.e., opioids, tranquilizers, barbiturates) to assist me in managing my chronic pain that has not responded to other treatments.  These medications are intended to decrease pain in order to improve function and/or ability to work.  The risks, benefits, and side effects or these medications have been explained to me and I agree to the following conditions for this type of treatment.    1. I will participate in other treatments (i.e., physical therapy, behavioral therapy, groups,) that my provider recommends.  I will be ready to taper or discontinue medications as other reasonable and effective treatments become available.  I understand I may be expected to see a health psychologist and a physical therapist at the discretion of my physician for ongoing functional assessment.  I will follow through with any recommendations made at this visit.    2. I will take my medications exactly as prescribed and will not change the medication dosage or schedule without my provider s approval.  Refills will not be given if I  run out early .  3. I will keep all regular appointments at the clinic (this includes nurse appointments and appointments with PT or behavioral medicine).  If I have three or more missed or cancelled appointments my medications may be discontinued.  4. I will not request or accept prescriptions for controlled substances from other physicians or individuals for my chronic pain condition.  If I develop another condition that requires the prescription of a controlled medication or if I am hospitalized for any reason, I will inform the clinic within one business day of receiving any treatment or medications.  5. I will designate one pharmacy where all of my prescriptions will be filled.  6. I will bring in the containers of  all medications prescribed each time I see my provider or a nurse even if there is no medication remaining.  This must be the original container from the pharmacy.  7. Refills of controlled medications will be made only during regular office hours, during a scheduled appointment with my provider or nurse.   8. I am responsible for my prescriptions.  If the medication is lost or stolen, I understand it will not be replaced.  9. I agree to abstain from all illegal and recreational drugs and will provide urine or blood specimens to monitor my compliance.  10. I will notify my nurse or provider immediately if I become pregnant.  11. I understand that controlled medications can affect my thinking and judgment and may interfere with my ability to drive.  I will not drive if I have this concern and will not drive if any dosage adjustments are made until my body has adjusted.        I understand that if I violate any of the above conditions my prescription medications and/or treatment may be terminated.  If the violation includes obtaining any controlled substances from other healthcare providers or individuals a report may be made to my physician, pharmacy, and other authorities including the police.    I have read this agreement and it has been explained to me.  I fully understand the consequences of violating this agreement.        _________________                             ___________                _________________       Patient Signature                                Date                              Witness      _________________  Florence Koo MD

## 2021-08-17 NOTE — LETTER
[facility]  08/19/21  Patient: July Huang  YOB: 1940  Medical Record Number: [id number]                                                                                  Non-Opioid Controlled Substance Agreement    This is an agreement between you and your provider regarding safe and appropriate use of controlled substances prescribed by your care team. Controlled substances are?medicines that can cause physical and mental dependence (abuse).     There are strict laws about having and using these medicines. We here at [facility] are  committed to working with you in your efforts to get better. To support you in this work, we'll help you schedule regular office appointments for medicine refills. If we must cancel or change your appointment for any reason, we'll make sure you have enough medicine to last until your next appointment.     As a Provider, I will:     Listen carefully to your concerns while treating you with respect.     Recommend a treatment plan that I believe is in your best interest and may involve therapies other than medicine.      Talk with you often about the possible benefits and the risk of harm of any medicine that we prescribe for you.    Assess the safety of this medicine and check how well it works.      Provide a plan on how to taper (discontinue or go off) using this medicine if the decision is made to stop its use.      ::  As a Patient, I understand controlled substances:       Are prescribed by my care provider to help me function or work and manage my condition(s).?    Are strong medicines and can cause serious side effects.       Need to be taken exactly as prescribed.?Combining controlled substances with certain medicines or chemicals (such as illegal drugs, alcohol, sedatives, sleeping pills, and benzodiazepines) can be dangerous or even fatal.? If I stop taking my medicines suddenly, I may have severe withdrawal symptoms.     The  risks, benefits, and side effects of these medicine(s) were explained to me. I agree that:    1. I will take part in other treatments as advised by my care team. This may be psychiatry or counseling, physical therapy, behavioral therapy, group treatment or a referral to specialist.    2. I will keep all my appointments and understand this is part of the monitoring of controlled substances.?My care team may require an office visit for EVERY controlled substance refill. If I miss appointments or don t follow instructions, my care team may stop my medicine    3. I will take my medicines as prescribed. I will not change the dose or schedule unless my care team tells me to. There will be no refills if I run out early.      4. I may be asked to come to the clinic and complete a urine drug test or complete a pill count. If I don t give a urine sample or participate in a pill count, the care team may stop my medicine.    5. I will only receive controlled substance prescriptions from this clinic. If I am treated by another provider, I will tell them that I am taking controlled substances and that I have a treatment agreement with this provider. I will inform my [facility] care team within one business day if I am given a prescription for any controlled substance by another healthcare provider. My [facility] care team can contact other providers and pharmacists about my use of any medicines.    6. It is up to me to make sure that I don't run out of my medicines on weekends or holidays.?If my care team is willing to refill my prescription without a visit, I must request refills only during office hours. Refills may take up to 3 business days to process. I will use one pharmacy to fill all my controlled substance prescriptions. I will notify the clinic about any changes to my insurance or medicine availability.    7. I am responsible for my prescriptions. If the medicine/prescription is lost, stolen or destroyed,  it will not be replaced.?I also agree not to share controlled substance medicines with anyone.     8. I am aware I should not use any illegal or recreational drugs. I agree not to drink alcohol unless my care team says I can.     9. If I enroll in the [place] Medical Cannabis program, I will tell my care team before my next refill.    10. I will tell my care team right away if I become pregnant, have a new medical problem treated outside of my regular clinic, or have a change in my medicines.     11. I understand that this medicine can affect my thinking, judgment and reaction time.? Alcohol and drugs affect the brain and body, which can affect the safety of my driving. Being under the influence of alcohol or drugs can affect my decision-making, behaviors, personal safety and the safety of others. Driving while impaired (DWI) can occur if a person is driving, operating or in physical control of a car, motorcycle, boat, snowmobile, ATV, motorbike, off-road vehicle or any other motor vehicle ([place] Statute 169A.20). I understand the risk if I choose to drive or operate any vehicle or machinery.    I understand that if I do not follow any of the conditions above, my prescriptions or treatment may be stopped or changed.   I agree that my provider, clinic care team and pharmacy may work with any city, state or federal law enforcement agency that investigates the misuse, sale or other diversion of my controlled medicine. I will allow my provider to discuss my care with, or share a copy of, this agreement with any other treating provider, pharmacy or emergency room where I receive care.     I have read this agreement and have asked questions about anything I did not understand.    ________________________________________________________  Patient Signature - July Huang     ___________________                   Date     ________________________________________________________  Provider Signature - Florence MIX  Hayes, MD       ___________________                   Date     ________________________________________________________  Witness Signature (required if provider not present while patient signing)          ___________________                   Date

## 2021-08-19 PROBLEM — M54.32 SCIATICA OF LEFT SIDE: Status: ACTIVE | Noted: 2019-03-18

## 2021-08-19 PROBLEM — R13.12 OROPHARYNGEAL DYSPHAGIA: Status: ACTIVE | Noted: 2020-12-04

## 2021-08-19 PROBLEM — G81.94 HEMIPARESIS, LEFT (H): Status: ACTIVE | Noted: 2020-12-01

## 2021-08-19 NOTE — PATIENT INSTRUCTIONS
Patient Education   Personalized Prevention Plan  You are due for the preventive services outlined below.  Your care team is available to assist you in scheduling these services.  If you have already completed any of these items, please share that information with your care team to update in your medical record.  Health Maintenance Due   Topic Date Due     ANNUAL REVIEW OF  ORDERS  Never done     Zoster (Shingles) Vaccine (1 of 2) Never done     Your Health Risk Assessment indicates you feel you are not in good health    A healthy lifestyle helps keep the body fit and the mind alert. It helps protect you from disease, helps you fight disease, and helps prevent chronic disease (disease that doesn't go away) from getting worse. This is important as you get older and begin to notice twinges in muscles and joints and a decline in the strength and stamina you once took for granted. A healthy lifestyle includes good healthcare, good nutrition, weight control, recreation, and regular exercise. Avoid harmful substances and do what you can to keep safe. Another part of a healthy lifestyle is stay mentally active and socially involved.    Good healthcare     Have a wellness visit every year.     If you have new symptoms, let us know right away. Don't wait until the next checkup.     Take medicines exactly as prescribed and keep your medicines in a safe place. Tell us if your medicine causes problems.   Healthy diet and weight control     Eat 3 or 4 small, nutritious, low-fat, high-fiber meals a day. Include a variety of fruits, vegetables, and whole-grain foods.     Make sure you get enough calcium in your diet. Calcium, vitamin D, and exercise help prevent osteoporosis (bone thinning).     If you live alone, try eating with others when you can. That way you get a good meal and have company while you eat it.     Try to keep a healthy weight. If you eat more calories than your body uses for energy, it will be stored as fat  and you will gain weight.     Recreation   Recreation is not limited to sports and team events. It includes any activity that provides relaxation, interest, enjoyment, and exercise. Recreation provides an outlet for physical, mental, and social energy. It can give a sense of worth and achievement. It can help you stay healthy.    Mental Exercise and Social Involvement  Mental and emotional health is as important as physical health. Keep in touch with friends and family. Stay as active as possible. Continue to learn and challenge yourself.   Things you can do to stay mentally active are:    Learn something new, like a foreign language or musical instrument.     Play SCRABBLE or do crossword puzzles. If you cannot find people to play these games with you at home, you can play them with others on your computer through the Internet.     Join a games club--anything from card games to chess or checkers or lawn bowling.     Start a new hobby.     Go back to school.     Volunteer.     Read.   Keep up with world events.  Activities of Daily Living    Your Health Risk Assessment indicates you have difficulties with activities of daily living such as housework, bathing, preparing meals, taking medication, etc. Please make a follow up appointment for us to address this issue in more detail.  Your Health Risk Assessment indicates you feel you are not in good emotional health.    Recreation   Recreation is not limited to sports and team events. It includes any activity that provides relaxation, interest, enjoyment, and exercise. Recreation provides an outlet for physical, mental, and social energy. It can give a sense of worth and achievement. It can help you stay healthy.    Mental Exercise and Social Involvement  Mental and emotional health is as important as physical health. Keep in touch with friends and family. Stay as active as possible. Continue to learn and challenge yourself.   Things you can do to stay mentally active  are:    Learn something new, like a foreign language or musical instrument.     Play SCRABBLE or do crossword puzzles. If you cannot find people to play these games with you at home, you can play them with others on your computer through the Internet.     Join a games club--anything from card games to chess or checkers or lawn bowling.     Start a new hobby.     Go back to school.     Volunteer.     Read.   Keep up with world events.    Preventing Falls at Home  A person can fall for many reasons. Older adults may fall because reaction time slows down as we age. Your muscles and joints may get stiff, weak, or less flexible because of illness, medicines, or a physical condition.   Other health problems that make falls more likely include:     Arthritis    Dizziness or lightheadedness when you stand up (orthostatic hypotension)    History of a stroke    Dizziness    Anemia    Certain medicines taken for mental illness or to control blood pressure.    Problems with balance or gait    Bladder or urinary problems    History of falling    Changes in vision (vision impairment)    Changes in thinking skills and memory (cognitive impairment)  Injuries from a fall can include serious injuries such as broken bones, dislocated joints, internal bleeding and cuts. Injuries like these can limit your independence.   Prevention tips  To help prevent falls and fall-related injuries, follow the tips below.    Floors  To make floors safer:     Put nonskid pads under area rugs.    Remove small rugs.    Replace worn floor coverings.    Tack carpets firmly to each step on carpeted stairs. Put nonskid strips on the edges of uncarpeted stairs.    Keep floors and stairs free of clutter and cords.    Arrange furniture so there are clear pathways.    Clean up any spills right away.  Bathrooms    To make bathrooms safer:     Install grab bars in the tub or shower.    Apply nonskid strips or put a nonskid rubber mat in the tub or shower.    Sit  on a bath chair to bathe.    Use bathmats with nonskid backing.  Lighting  To improve visibility in your home:      Keep a flashlight in each room. Or put a lamp next to the bed within easy reach.    Put nightlights in the bedrooms, hallways, kitchen, and bathrooms.    Make sure all stairways have good lighting.    Take your time when going up and down stairs.    Put handrails on both sides of stairs and in walkways for more support. To prevent injury to your wrist or arm, don t use handrails to pull yourself up.    Install grab bars to pull yourself up.    Move or rearrange items that you use often. This will make them easier to find or reach.    Look at your home to find any safety hazards. Especially look at doorways, walkways, and the driveway. Remove or repair any safety problems that you find.  Other changes to make    Look around to find any safety hazards. Look closely at doorways, walkways, and the driveway. Remove or repair any safety problems that you find.    Wear shoes that fit well.    Take your time when going up and down stairs.    Put handrails on both sides of stairs and in walkways for more support. To prevent injury to your wrist or arm, don t use handrails to pull yourself up.    Install grab bars wherever needed to pull yourself up.    Arrange items that you use often. This will make them easier to find or reach.    [employer] last reviewed this educational content on 3/1/2020    3093-3619 [employer]. All rights reserved. This information is not intended as a substitute for professional medical care. Always follow your healthcare professional's instructions.           Patient Education   Personalized Prevention Plan  You are due for the preventive services outlined below.  Your care team is available to assist you in scheduling these services.  If you have already completed any of these items, please share that information with your care team to update in your medical  record.  Health Maintenance Due   Topic Date Due     ANNUAL REVIEW OF  ORDERS  Never done     Zoster (Shingles) Vaccine (1 of 2) Never done     Your Health Risk Assessment indicates you feel you are not in good health    A healthy lifestyle helps keep the body fit and the mind alert. It helps protect you from disease, helps you fight disease, and helps prevent chronic disease (disease that doesn't go away) from getting worse. This is important as you get older and begin to notice twinges in muscles and joints and a decline in the strength and stamina you once took for granted. A healthy lifestyle includes good healthcare, good nutrition, weight control, recreation, and regular exercise. Avoid harmful substances and do what you can to keep safe. Another part of a healthy lifestyle is stay mentally active and socially involved.    Good healthcare     Have a wellness visit every year.     If you have new symptoms, let us know right away. Don't wait until the next checkup.     Take medicines exactly as prescribed and keep your medicines in a safe place. Tell us if your medicine causes problems.   Healthy diet and weight control     Eat 3 or 4 small, nutritious, low-fat, high-fiber meals a day. Include a variety of fruits, vegetables, and whole-grain foods.     Make sure you get enough calcium in your diet. Calcium, vitamin D, and exercise help prevent osteoporosis (bone thinning).     If you live alone, try eating with others when you can. That way you get a good meal and have company while you eat it.     Try to keep a healthy weight. If you eat more calories than your body uses for energy, it will be stored as fat and you will gain weight.     Recreation   Recreation is not limited to sports and team events. It includes any activity that provides relaxation, interest, enjoyment, and exercise. Recreation provides an outlet for physical, mental, and social energy. It can give a sense of worth and achievement. It can  help you stay healthy.    Mental Exercise and Social Involvement  Mental and emotional health is as important as physical health. Keep in touch with friends and family. Stay as active as possible. Continue to learn and challenge yourself.   Things you can do to stay mentally active are:    Learn something new, like a foreign language or musical instrument.     Play SCRABBLE or do crossword puzzles. If you cannot find people to play these games with you at home, you can play them with others on your computer through the Internet.     Join a games club--anything from card games to chess or checkers or lawn bowling.     Start a new hobby.     Go back to school.     Volunteer.     Read.   Keep up with world events.  Activities of Daily Living    Your Health Risk Assessment indicates you have difficulties with activities of daily living such as housework, bathing, preparing meals, taking medication, etc. Please make a follow up appointment for us to address this issue in more detail.  Your Health Risk Assessment indicates you feel you are not in good emotional health.    Recreation   Recreation is not limited to sports and team events. It includes any activity that provides relaxation, interest, enjoyment, and exercise. Recreation provides an outlet for physical, mental, and social energy. It can give a sense of worth and achievement. It can help you stay healthy.    Mental Exercise and Social Involvement  Mental and emotional health is as important as physical health. Keep in touch with friends and family. Stay as active as possible. Continue to learn and challenge yourself.   Things you can do to stay mentally active are:    Learn something new, like a foreign language or musical instrument.     Play SCRABBLE or do crossword puzzles. If you cannot find people to play these games with you at home, you can play them with others on your computer through the Internet.     Join a games club--anything from card games to  chess or checkers or lawn bowling.     Start a new hobby.     Go back to school.     Volunteer.     Read.   Keep up with world events.    Preventing Falls at Home  A person can fall for many reasons. Older adults may fall because reaction time slows down as we age. Your muscles and joints may get stiff, weak, or less flexible because of illness, medicines, or a physical condition.   Other health problems that make falls more likely include:     Arthritis    Dizziness or lightheadedness when you stand up (orthostatic hypotension)    History of a stroke    Dizziness    Anemia    Certain medicines taken for mental illness or to control blood pressure.    Problems with balance or gait    Bladder or urinary problems    History of falling    Changes in vision (vision impairment)    Changes in thinking skills and memory (cognitive impairment)  Injuries from a fall can include serious injuries such as broken bones, dislocated joints, internal bleeding and cuts. Injuries like these can limit your independence.   Prevention tips  To help prevent falls and fall-related injuries, follow the tips below.    Floors  To make floors safer:     Put nonskid pads under area rugs.    Remove small rugs.    Replace worn floor coverings.    Tack carpets firmly to each step on carpeted stairs. Put nonskid strips on the edges of uncarpeted stairs.    Keep floors and stairs free of clutter and cords.    Arrange furniture so there are clear pathways.    Clean up any spills right away.  Bathrooms    To make bathrooms safer:     Install grab bars in the tub or shower.    Apply nonskid strips or put a nonskid rubber mat in the tub or shower.    Sit on a bath chair to bathe.    Use bathmats with nonskid backing.  Lighting  To improve visibility in your home:      Keep a flashlight in each room. Or put a lamp next to the bed within easy reach.    Put nightlights in the bedrooms, hallways, kitchen, and bathrooms.    Make sure all stairways have good  lighting.    Take your time when going up and down stairs.    Put handrails on both sides of stairs and in walkways for more support. To prevent injury to your wrist or arm, don t use handrails to pull yourself up.    Install grab bars to pull yourself up.    Move or rearrange items that you use often. This will make them easier to find or reach.    Look at your home to find any safety hazards. Especially look at doorways, walkways, and the driveway. Remove or repair any safety problems that you find.  Other changes to make    Look around to find any safety hazards. Look closely at doorways, walkways, and the driveway. Remove or repair any safety problems that you find.    Wear shoes that fit well.    Take your time when going up and down stairs.    Put handrails on both sides of stairs and in walkways for more support. To prevent injury to your wrist or arm, don t use handrails to pull yourself up.    Install grab bars wherever needed to pull yourself up.    Arrange items that you use often. This will make them easier to find or reach.    [employer] last reviewed this educational content on 3/1/2020    7427-1648 [employer]. All rights reserved. This information is not intended as a substitute for professional medical care. Always follow your healthcare professional's instructions.           Patient Education   Personalized Prevention Plan  You are due for the preventive services outlined below.  Your care team is available to assist you in scheduling these services.  If you have already completed any of these items, please share that information with your care team to update in your medical record.  Health Maintenance Due   Topic Date Due     ANNUAL REVIEW OF HM ORDERS  Never done     Zoster (Shingles) Vaccine (1 of 2) Never done     Your Health Risk Assessment indicates you feel you are not in good health    A healthy lifestyle helps keep the body fit and the mind alert. It helps protect you from  disease, helps you fight disease, and helps prevent chronic disease (disease that doesn't go away) from getting worse. This is important as you get older and begin to notice twinges in muscles and joints and a decline in the strength and stamina you once took for granted. A healthy lifestyle includes good healthcare, good nutrition, weight control, recreation, and regular exercise. Avoid harmful substances and do what you can to keep safe. Another part of a healthy lifestyle is stay mentally active and socially involved.    Good healthcare     Have a wellness visit every year.     If you have new symptoms, let us know right away. Don't wait until the next checkup.     Take medicines exactly as prescribed and keep your medicines in a safe place. Tell us if your medicine causes problems.   Healthy diet and weight control     Eat 3 or 4 small, nutritious, low-fat, high-fiber meals a day. Include a variety of fruits, vegetables, and whole-grain foods.     Make sure you get enough calcium in your diet. Calcium, vitamin D, and exercise help prevent osteoporosis (bone thinning).     If you live alone, try eating with others when you can. That way you get a good meal and have company while you eat it.     Try to keep a healthy weight. If you eat more calories than your body uses for energy, it will be stored as fat and you will gain weight.     Recreation   Recreation is not limited to sports and team events. It includes any activity that provides relaxation, interest, enjoyment, and exercise. Recreation provides an outlet for physical, mental, and social energy. It can give a sense of worth and achievement. It can help you stay healthy.    Mental Exercise and Social Involvement  Mental and emotional health is as important as physical health. Keep in touch with friends and family. Stay as active as possible. Continue to learn and challenge yourself.   Things you can do to stay mentally active are:    Learn something new,  like a foreign language or musical instrument.     Play SCRABBLE or do crossword puzzles. If you cannot find people to play these games with you at home, you can play them with others on your computer through the Internet.     Join a games club--anything from card games to chess or checkers or lawn bowling.     Start a new hobby.     Go back to school.     Volunteer.     Read.   Keep up with world events.  Activities of Daily Living    Your Health Risk Assessment indicates you have difficulties with activities of daily living such as housework, bathing, preparing meals, taking medication, etc. Please make a follow up appointment for us to address this issue in more detail.  Your Health Risk Assessment indicates you feel you are not in good emotional health.    Recreation   Recreation is not limited to sports and team events. It includes any activity that provides relaxation, interest, enjoyment, and exercise. Recreation provides an outlet for physical, mental, and social energy. It can give a sense of worth and achievement. It can help you stay healthy.    Mental Exercise and Social Involvement  Mental and emotional health is as important as physical health. Keep in touch with friends and family. Stay as active as possible. Continue to learn and challenge yourself.   Things you can do to stay mentally active are:    Learn something new, like a foreign language or musical instrument.     Play SCRABBLE or do crossword puzzles. If you cannot find people to play these games with you at home, you can play them with others on your computer through the Internet.     Join a games club--anything from card games to chess or checkers or lawn bowling.     Start a new hobby.     Go back to school.     Volunteer.     Read.   Keep up with world events.    Preventing Falls at Home  A person can fall for many reasons. Older adults may fall because reaction time slows down as we age. Your muscles and joints may get stiff, weak, or  less flexible because of illness, medicines, or a physical condition.   Other health problems that make falls more likely include:     Arthritis    Dizziness or lightheadedness when you stand up (orthostatic hypotension)    History of a stroke    Dizziness    Anemia    Certain medicines taken for mental illness or to control blood pressure.    Problems with balance or gait    Bladder or urinary problems    History of falling    Changes in vision (vision impairment)    Changes in thinking skills and memory (cognitive impairment)  Injuries from a fall can include serious injuries such as broken bones, dislocated joints, internal bleeding and cuts. Injuries like these can limit your independence.   Prevention tips  To help prevent falls and fall-related injuries, follow the tips below.    Floors  To make floors safer:     Put nonskid pads under area rugs.    Remove small rugs.    Replace worn floor coverings.    Tack carpets firmly to each step on carpeted stairs. Put nonskid strips on the edges of uncarpeted stairs.    Keep floors and stairs free of clutter and cords.    Arrange furniture so there are clear pathways.    Clean up any spills right away.  Bathrooms    To make bathrooms safer:     Install grab bars in the tub or shower.    Apply nonskid strips or put a nonskid rubber mat in the tub or shower.    Sit on a bath chair to bathe.    Use bathmats with nonskid backing.  Lighting  To improve visibility in your home:      Keep a flashlight in each room. Or put a lamp next to the bed within easy reach.    Put nightlights in the bedrooms, hallways, kitchen, and bathrooms.    Make sure all stairways have good lighting.    Take your time when going up and down stairs.    Put handrails on both sides of stairs and in walkways for more support. To prevent injury to your wrist or arm, don t use handrails to pull yourself up.    Install grab bars to pull yourself up.    Move or rearrange items that you use often. This will  make them easier to find or reach.    Look at your home to find any safety hazards. Especially look at doorways, walkways, and the driveway. Remove or repair any safety problems that you find.  Other changes to make    Look around to find any safety hazards. Look closely at doorways, walkways, and the driveway. Remove or repair any safety problems that you find.    Wear shoes that fit well.    Take your time when going up and down stairs.    Put handrails on both sides of stairs and in walkways for more support. To prevent injury to your wrist or arm, don t use handrails to pull yourself up.    Install grab bars wherever needed to pull yourself up.    Arrange items that you use often. This will make them easier to find or reach.    [employer] last reviewed this educational content on 3/1/2020    5238-3388 [employer]. All rights reserved. This information is not intended as a substitute for professional medical care. Always follow your healthcare professional's instructions.

## 2021-08-24 ENCOUNTER — OFFICE VISIT (OUTPATIENT)
Dept: FAMILY MEDICINE | Facility: CLINIC | Age: 81
End: 2021-08-24

## 2021-08-24 VITALS
HEIGHT: 62 IN | DIASTOLIC BLOOD PRESSURE: 83 MMHG | BODY MASS INDEX: 19.14 KG/M2 | SYSTOLIC BLOOD PRESSURE: 146 MMHG | OXYGEN SATURATION: 96 % | HEART RATE: 93 BPM | WEIGHT: 104 LBS

## 2021-08-24 DIAGNOSIS — I10 HYPERTENSION GOAL BP (BLOOD PRESSURE) < 140/90: Primary | ICD-10-CM

## 2021-08-24 DIAGNOSIS — R39.9 LOWER URINARY TRACT SYMPTOMS (LUTS): ICD-10-CM

## 2021-08-24 DIAGNOSIS — Z85.43 HISTORY OF OVARIAN CANCER: ICD-10-CM

## 2021-08-24 DIAGNOSIS — Z85.51 PERSONAL HISTORY OF MALIGNANT NEOPLASM OF BLADDER: ICD-10-CM

## 2021-08-24 DIAGNOSIS — I63.9 CEREBROVASCULAR ACCIDENT (CVA), UNSPECIFIED MECHANISM (H): ICD-10-CM

## 2021-08-24 LAB
AMORPHOUS PHOSPHATES: ABNORMAL
BACTERIA URINE: ABNORMAL
BILIRUB UR QL STRIP: ABNORMAL
BLOOD URINE DIP: ABNORMAL
CASTS/LPF: 0
COLOR UR: YELLOW
CRYSTAL URINE: ABNORMAL
EPITHELIAL CELLS - QUEST: ABNORMAL
GLUCOSE UR STRIP-MCNC: ABNORMAL MG/DL
KETONES UR QL STRIP: ABNORMAL
LEUKOCYTE ESTERASE URINE DIP: ABNORMAL
MUCOUS URINE: 0
NITRITE UR QL STRIP: ABNORMAL
PH UR STRIP: 8.5 PH (ref 5–9)
PROT UR QL: ABNORMAL MG/DL (ref ?–0.01)
RBC URINE: 0 (ref 0–3)
SP GR UR STRIP: 1.01 (ref 1–1.02)
UROBILINOGEN UR QL STRIP: 0.2 EU/DL (ref 0.2–1)
WBC URINE: ABNORMAL (ref 0–3)

## 2021-08-24 PROCEDURE — 93050 ART PRESSURE WAVEFORM ANALYS: CPT | Performed by: FAMILY MEDICINE

## 2021-08-24 PROCEDURE — 81003 URINALYSIS AUTO W/O SCOPE: CPT | Performed by: FAMILY MEDICINE

## 2021-08-24 PROCEDURE — 99204 OFFICE O/P NEW MOD 45 MIN: CPT | Performed by: FAMILY MEDICINE

## 2021-08-24 ASSESSMENT — ANXIETY QUESTIONNAIRES
3. WORRYING TOO MUCH ABOUT DIFFERENT THINGS: NOT AT ALL
6. BECOMING EASILY ANNOYED OR IRRITABLE: SEVERAL DAYS
7. FEELING AFRAID AS IF SOMETHING AWFUL MIGHT HAPPEN: NOT AT ALL
5. BEING SO RESTLESS THAT IT IS HARD TO SIT STILL: NOT AT ALL
IF YOU CHECKED OFF ANY PROBLEMS ON THIS QUESTIONNAIRE, HOW DIFFICULT HAVE THESE PROBLEMS MADE IT FOR YOU TO DO YOUR WORK, TAKE CARE OF THINGS AT HOME, OR GET ALONG WITH OTHER PEOPLE: SOMEWHAT DIFFICULT
1. FEELING NERVOUS, ANXIOUS, OR ON EDGE: SEVERAL DAYS
GAD7 TOTAL SCORE: 2
2. NOT BEING ABLE TO STOP OR CONTROL WORRYING: NOT AT ALL

## 2021-08-24 ASSESSMENT — MIFFLIN-ST. JEOR: SCORE: 882.05

## 2021-08-24 ASSESSMENT — PATIENT HEALTH QUESTIONNAIRE - PHQ9
5. POOR APPETITE OR OVEREATING: NOT AT ALL
SUM OF ALL RESPONSES TO PHQ QUESTIONS 1-9: 4

## 2021-08-25 ENCOUNTER — TELEPHONE (OUTPATIENT)
Dept: FAMILY MEDICINE | Facility: CLINIC | Age: 81
End: 2021-08-25

## 2021-08-25 ASSESSMENT — ANXIETY QUESTIONNAIRES: GAD7 TOTAL SCORE: 2

## 2021-08-25 NOTE — PROGRESS NOTES
"  Roxana Mcdowell is a 81 year old patient who presents to clinic for evaluation.  She is a new patient.  She has been dealing with ongoing urinary symptoms for about 2 months.  She was diagnosed with a UTI with positive culture 7/3 and treated.  She is unsure if symptoms improved.  She was seen again 7/15 but unable to leave urine and was treated with additional abx.  Symptoms did not resolve.  She has since had additional urine testing that was clear.  Feels ongoing sense of burning and dysuria.  She was seen by Dr Alexander (has bladder prolapse) as well as Dr Roldan who did cystoscopy and felt bladder appeared normal.  No fever, chills, flank pain.      Of note, she had a stroke last fall, 11/20..  Is recovering but has affected her.    Review of Systems   Constitutional, HEENT, cardiovascular, pulmonary, GI, , musculoskeletal, neuro, skin, endocrine and psych systems are negative, except as otherwise noted.      Objective    BP (!) 146/83   Pulse 93   Ht 1.562 m (5' 1.5\")   Wt 47.2 kg (104 lb)   SpO2 96%   BMI 19.33 kg/m      General: Well appearing, NAD  Psych: normal mood and affect        No results found for this or any previous visit (from the past 24 hour(s)).    Assessment & Plan     Hypertension goal BP (blood pressure) < 140/90  Slightly above goal  - MS ART PRESS WAVEFORM ANALYS CENTRAL ART PRESSURE    Lower urinary tract symptoms (LUTS)  Uncertain cause.  No evidence of current UTI.  Cysto normal.  She just started [place].  If not improving consider alternative treatments such as bladder instillation and further urology follow up  - Urinalysis ([facility])    History of ovarian cancer    Personal history of malignant neoplasm of bladder    Cerebrovascular accident (CVA), unspecified mechanism (H)  Stable, improving.  Cont secondary prevention with ASA.  Uncertain why not on statin.  Will investigate further.    45 minutes total time including chart review, exam and face to face time, and " documentation.     Denton Barahona MD  [facility]

## 2021-09-10 NOTE — CONFIDENTIAL NOTE
8/25/21 per Dr. Barahona, reviewed chart and spoke with patient regarding why is not on statin.     Patient recalls trying a statin after December hospitalization and stroke but stopped due to reaction. Can't recall the reaction. Discussed how she would be prime candidate for this and is she open to trying a different one? She said yes but would like to discuss it with Dr. Barahona at her next visit that she plans to schedule for 1 month from now.   Chart review:   8/2019 visit note with PCP discussing statin. provider noted patient does not want to take statin at this time.   11/30/20 Atorvastatin 40 rx'd in hospital .   12/31/20 visit with PCP (Hayes) noted was on 40mg but now down to 10mg   1/15/21 phone call from patient reporting stopped atorvastatin due to caused tired and achy   1/25/21 visit with PCP- patient will retry atorvastatin at 10mg with COQ10.   3/11/21 visit with PCP (Hayes) noted patient does not want to take statin at this time.   Also noted in neuro consult that moderate intensity statin was recommended and to discuss with PCP.     Routed above info to Dr. Barahona. He will address with patient at next appt.   Trinh Salvador RN

## 2021-09-11 ENCOUNTER — HEALTH MAINTENANCE LETTER (OUTPATIENT)
Age: 81
End: 2021-09-11

## 2021-09-13 ENCOUNTER — TRANSFERRED RECORDS (OUTPATIENT)
Dept: HEALTH INFORMATION MANAGEMENT | Facility: CLINIC | Age: 81
End: 2021-09-13

## 2021-09-24 ENCOUNTER — OFFICE VISIT (OUTPATIENT)
Dept: FAMILY MEDICINE | Facility: CLINIC | Age: 81
End: 2021-09-24

## 2021-09-24 VITALS
RESPIRATION RATE: 16 BRPM | DIASTOLIC BLOOD PRESSURE: 74 MMHG | BODY MASS INDEX: 19.32 KG/M2 | SYSTOLIC BLOOD PRESSURE: 122 MMHG | WEIGHT: 105 LBS | OXYGEN SATURATION: 99 % | HEIGHT: 62 IN | HEART RATE: 97 BPM

## 2021-09-24 DIAGNOSIS — D32.9 MENINGIOMA (H): ICD-10-CM

## 2021-09-24 DIAGNOSIS — E87.1 HYPONATREMIA: ICD-10-CM

## 2021-09-24 DIAGNOSIS — E78.00 HYPERCHOLESTEROLEMIA: Primary | ICD-10-CM

## 2021-09-24 PROCEDURE — 99214 OFFICE O/P EST MOD 30 MIN: CPT | Performed by: FAMILY MEDICINE

## 2021-09-24 RX ORDER — PROGESTERONE 100 MG/1
CAPSULE ORAL
COMMUNITY
End: 2023-03-29

## 2021-09-24 ASSESSMENT — MIFFLIN-ST. JEOR: SCORE: 886.59

## 2021-09-24 NOTE — PROGRESS NOTES
"  Roxana Mcdowell is a 81 year old patient who presents to clinic for follow up.  She was on statin in past and would like to discuss follow up for her HLD.  She stopped as she was having body aches.  In addition, lyme, CMV and EBV tests were ordered but not completed.  Her symptoms resolved off statin.  Would like to try red yeast rice instead.      Mild hyponatremia in past.    Meningioma: felt to be stable on imaging    No other new concerns.    Review of Systems   Constitutional, HEENT, cardiovascular, pulmonary, gi and gu systems are negative, except as otherwise noted.      Objective    /74   Pulse 97   Resp 16   Ht 1.562 m (5' 1.5\")   Wt 47.6 kg (105 lb)   SpO2 99%   BMI 19.52 kg/m      General: Well appearing, NAD  Psych: normal mood and affect        Assessment & Plan     Hypercholesterolemia  Recheck on red yeast rice.  Will consider retrial of statin in future  - Lipid Panel ([facility]); Future    Hyponatremia  recheck  - Basic Metabolic Panel (8) ([facility]); Future    Meningioma (H)  Felt to be benign, should continue neuro follow up    [id number]}     See Patient Instructions    Return in about 3 months (around 12/24/2021) for Follow Up.    Denton Barahona MD  [facility]          "

## 2021-11-10 ENCOUNTER — TELEPHONE (OUTPATIENT)
Dept: FAMILY MEDICINE | Facility: CLINIC | Age: 81
End: 2021-11-10

## 2021-11-10 NOTE — TELEPHONE ENCOUNTER
Reason for Call:  Other call back    Detailed comments: Saw a n.p. at [facility]  Saw on Monday - was having sx. That she did not like- wanted to be tested for covid  Went to [facility] for covid vaccine.  Because of her age and stroke- she should be have the antibodies- where to go for this?    Wont get her test results for covid til Friday  Missed her b/p pill last night- [facility] now [facility]    Phone Number Patient can be reached at: Cell number on file:    Telephone Information:   Mobile [phone]       Best Time:     Can we leave a detailed message on this number? YES    Call taken on 11/10/2021 at 11:50 AM by Ximena Mendez

## 2021-11-11 NOTE — TELEPHONE ENCOUNTER
I called pt but NA so left message to address her questions:    1) She needs to have a positive COVID test before being consider for antibody treatment so needs to wait til results are back.    2) I would take her BP medicine now ( this evening) and then be back on track.    I advised she could call back if further questions.    Florence Koo MD

## 2021-11-12 ENCOUNTER — TRANSFERRED RECORDS (OUTPATIENT)
Dept: FAMILY MEDICINE | Facility: CLINIC | Age: 81
End: 2021-11-12

## 2021-11-15 RX ORDER — AMLODIPINE BESYLATE 10 MG/1
TABLET ORAL
Qty: 30 TABLET | OUTPATIENT
Start: 2021-11-15

## 2021-11-16 ENCOUNTER — OFFICE VISIT (OUTPATIENT)
Dept: FAMILY MEDICINE | Facility: CLINIC | Age: 81
End: 2021-11-16

## 2021-11-16 VITALS
HEART RATE: 80 BPM | WEIGHT: 107 LBS | HEIGHT: 62 IN | SYSTOLIC BLOOD PRESSURE: 124 MMHG | OXYGEN SATURATION: 99 % | DIASTOLIC BLOOD PRESSURE: 80 MMHG | BODY MASS INDEX: 19.69 KG/M2

## 2021-11-16 DIAGNOSIS — M79.10 MYALGIA: ICD-10-CM

## 2021-11-16 DIAGNOSIS — R51.9 ACUTE NONINTRACTABLE HEADACHE, UNSPECIFIED HEADACHE TYPE: Primary | ICD-10-CM

## 2021-11-16 DIAGNOSIS — I10 BENIGN ESSENTIAL HYPERTENSION: ICD-10-CM

## 2021-11-16 LAB
% GRANULOCYTES: 64 % (ref 42.2–75.2)
FLUAV AG UPPER RESP QL IA.RAPID: NEGATIVE
FLUBV AG UPPER RESP QL IA.RAPID: NEGATIVE
HCT VFR BLD AUTO: 35.8 % (ref 35–46)
HEMOGLOBIN: 12.5 G/DL (ref 11.8–15.5)
LYMPHOCYTES NFR BLD AUTO: 27.1 % (ref 20.5–51.1)
MCH RBC QN AUTO: 31.1 PG (ref 27–31)
MCHC RBC AUTO-ENTMCNC: 35.1 G/DL (ref 33–37)
MCV RBC AUTO: 88.7 FL (ref 80–100)
MONOCYTES NFR BLD AUTO: 8.9 % (ref 1.7–9.3)
PLATELET # BLD AUTO: 383 K/UL (ref 140–450)
RBC # BLD AUTO: 4.03 X10/CMM (ref 3.7–5.2)
WBC # BLD AUTO: 6.4 X10/CMM (ref 3.8–11)

## 2021-11-16 PROCEDURE — 85025 COMPLETE CBC W/AUTO DIFF WBC: CPT | Performed by: FAMILY MEDICINE

## 2021-11-16 PROCEDURE — 87254 VIRUS INOCULATION SHELL VIA: CPT | Performed by: FAMILY MEDICINE

## 2021-11-16 PROCEDURE — 99214 OFFICE O/P EST MOD 30 MIN: CPT | Performed by: FAMILY MEDICINE

## 2021-11-16 PROCEDURE — 36415 COLL VENOUS BLD VENIPUNCTURE: CPT | Performed by: FAMILY MEDICINE

## 2021-11-16 RX ORDER — AMLODIPINE BESYLATE 10 MG/1
10 TABLET ORAL DAILY
Qty: 90 TABLET | Refills: 1 | Status: SHIPPED | OUTPATIENT
Start: 2021-11-16 | End: 2022-06-10

## 2021-11-16 ASSESSMENT — MIFFLIN-ST. JEOR: SCORE: 895.66

## 2021-11-17 ENCOUNTER — MYC MEDICAL ADVICE (OUTPATIENT)
Dept: FAMILY MEDICINE | Facility: CLINIC | Age: 81
End: 2021-11-17

## 2021-11-17 NOTE — PROGRESS NOTES
"Roxana Mcdowell is a 81 year old patient who presents to clinic for evaluation.  She reports about 1-2 weeks of body aches, headache and mild fatigue.  Minimal URI symptoms.  She had a covid test that was negative.  She denies known tick bite.  She feels she is improving.  No fever or chills.  No abd pain, urinary symptoms, or other concerns.    Review of Systems   Constitutional, HEENT, cardiovascular, pulmonary, GI, , musculoskeletal, neuro, skin, endocrine and psych systems are negative, except as otherwise noted.      Objective    /80   Pulse 80   Ht 1.562 m (5' 1.5\")   Wt 48.5 kg (107 lb)   SpO2 99%   BMI 19.89 kg/m      General: Well appearing, NAD  HEENT: Clear  Heart: RRR, no murmur  Chest: Lungs CTAB  Skin: Clear  MSK: Normal  Psych: normal mood and affect        Results for orders placed or performed in visit on 11/16/21 (from the past 24 hour(s))   CBC with Diff/Plt ([facility])   Result Value Ref Range    WBC x10/cmm 6.4 3.8 - 11.0 x10/cmm    % Lymphocytes 27.1 20.5 - 51.1 %    % Monocytes 8.9 1.7 - 9.3 %    % Granulocytes 64.0 42.2 - 75.2 %    RBC x10/cmm 4.03 3.7 - 5.2 x10/cmm    Hemoglobin 12.5 11.8 - 15.5 g/dl    Hematocrit 35.8 35 - 46 %    MCV 88.7 80 - 100 fL    MCH 31.1 (A) 27.0 - 31.0 pg    MCHC 35.1 33.0 - 37.0 g/dL    Platelet Count 383 140 - 450 K/uL       Assessment & Plan     Acute nonintractable headache, unspecified headache type  She wondered about influenza but rapid test is negative.  Will check lyme as precaution.  Overall she is clinically improving and suspect likely nonspecific viral syndrome.  Will follow up if not improved or worsens  - Lyme  Total Ab Test/Reflex ([facility])  - Influenza Testing ([facility])  - CBC with Diff/Plt ([facility])  - VENOUS COLLECTION    Myalgia  - Lyme  Total Ab Test/Reflex ([facility])  - Influenza Testing ([facility])  - CBC with Diff/Plt ([facility])  - VENOUS COLLECTION    Benign essential hypertension  At goal  - amLODIPine (NORVASC) 10 MG tablet; Take 1 tablet " (10 mg) by mouth daily  - VENOUS COLLECTION      Denton Barahona MD  [facility]

## 2021-11-17 NOTE — TELEPHONE ENCOUNTER
Called patient and discussed. Patient reporting urinary frequency and urgency onset yesterday. Temp running in low 99 range. Feels worse than did at visit with Dr. Barahona 11/15. Would like urine tested for UTI.   Denies n&v/flank or abd pain/hematuria.   Plan: reviewed with Dr. Barahona. Patient should RTC tomorrow for UA and visit with provider. Appt scheduled with Mari Terry CNP at 11am.  rTinh Salvador RN

## 2021-11-18 ENCOUNTER — OFFICE VISIT (OUTPATIENT)
Dept: FAMILY MEDICINE | Facility: CLINIC | Age: 81
End: 2021-11-18

## 2021-11-18 VITALS
OXYGEN SATURATION: 99 % | WEIGHT: 104.6 LBS | TEMPERATURE: 97.9 F | BODY MASS INDEX: 19.44 KG/M2 | HEART RATE: 90 BPM | RESPIRATION RATE: 20 BRPM | DIASTOLIC BLOOD PRESSURE: 64 MMHG | SYSTOLIC BLOOD PRESSURE: 128 MMHG

## 2021-11-18 DIAGNOSIS — R39.9 LOWER URINARY TRACT SYMPTOMS (LUTS): Primary | ICD-10-CM

## 2021-11-18 LAB
BACTERIA URINE: ABNORMAL
BILIRUB UR QL STRIP: ABNORMAL
BLOOD URINE DIP: ABNORMAL
CASTS/LPF: ABNORMAL
COLOR UR: YELLOW
CRYSTAL URINE: ABNORMAL
EPITHELIAL CELLS - QUEST: ABNORMAL
GLUCOSE UR STRIP-MCNC: ABNORMAL MG/DL
KETONES UR QL STRIP: ABNORMAL
LEUKOCYTE ESTERASE URINE DIP: ABNORMAL
LYME IGG/IGM AB: <0.91 ISR (ref 0–0.9)
MUCOUS URINE: ABNORMAL
NITRITE UR QL STRIP: ABNORMAL
PH UR STRIP: 7 PH (ref 5–9)
PROT UR QL: ABNORMAL MG/DL (ref ?–0.01)
RBC URINE: 0 (ref 0–3)
SP GR UR STRIP: 1.01 (ref 1–1.02)
UROBILINOGEN UR QL STRIP: 0.2 EU/DL (ref 0.2–1)
WBC URINE: ABNORMAL (ref 0–3)

## 2021-11-18 PROCEDURE — 99213 OFFICE O/P EST LOW 20 MIN: CPT | Performed by: NURSE PRACTITIONER

## 2021-11-18 PROCEDURE — 81003 URINALYSIS AUTO W/O SCOPE: CPT | Performed by: NURSE PRACTITIONER

## 2021-11-18 NOTE — PROGRESS NOTES
Problem(s) Oriented visit        SUBJECTIVE:                                                    July Huang is a 81 year old female who presents to clinic today for the following health issues :    Urinary symptoms started 5 days ago. Other symptoms started a week before that. Was similar to Covid symptoms but tested negative. Had rapid influenza test done 3 days ago in clinic which was negative also. Has had 2-3 UTI's this year. Taking D-mannose supplement, which has helped   Continues to have body aches, headaches.  Not drinking much water or getting exercise.  Temperature at home around 99 deg F  Denies nausea, vomiting, hematuria.    Problem list, Medication list, Allergies, and Medical/Social/Surgical histories reviewed in [facility] and updated as appropriate.   Additional history: as documented    ROS:  5 point ROS completed and negative except noted above, including Gen, CV, Resp, GI,     OBJECTIVE:                                                    /64   Pulse 90   Temp 97.9  F (36.6  C) (Temporal)   Resp 20   Wt 47.4 kg (104 lb 9.6 oz)   SpO2 99%   BMI 19.44 kg/m    Body mass index is 19.44 kg/m .   GENERAL: Elderly female in no acute distress  RESP: Lung sounds clear throughout  CV: normal rate & rhythm, no murmur  ABDOMEN: no CVA tenderness or suprapubic tenderness  PSYCH: normal affect & mood    UA RESULTS:  Recent Labs   Lab Test 11/18/21  0000 08/24/21  0000 08/09/21  1132   COLOR Yellow   < > Yellow   APPEARANCE  --   --  Clear   URINEGLC  --   --  Negative   URINEBILI  --   --  Negative   URINEKETONE  --   --  Negative   SG 1.015   < > 1.010   UBLD  --   --  Negative   URINEPH 7.0   < > 7.0   PROTEIN  --   --  Negative   UROBILINOGEN 0.2   < > 0.2   NITRITE Neg   < > Negative   LEUKEST  --   --  Negative   RBCU 0   < >  --    WBCU 0-1   < >  --     < > = values in this interval not displayed.        ASSESSMENT/PLAN:                                                      Virginia was seen  today for urinary problem.    Diagnoses and all orders for this visit:    Lower urinary tract symptoms (LUTS)  -     Urinalysis ([facility])  -     Urine Culture  Routine ([facility])    Ongoing nonspecific viral symptoms similar to when seen in clinic 3 days ago. No evidence of infection on UA today. Culture sent and will treat if positive. Patient instructed to return to clinic in 1 week if still not improving, sooner if worsening.    See Patient Instructions  Patient Instructions   Try to drink 60 oz + of water daily    Tylenol as needed    We will notify you with urine culture results    Return for flu shot when feeling better    Thank you for coming in today!     We are working to improve our digital reputation.  Would you please help us by reviewing our clinic on Google and/or Huddle?  These are links for filling out a review for the clinic:    [email or web address]                 [email or web address]    We truly appreciate you taking the time to do this.     General Information:    Today you had your appointment with Mari Terry CNP  My hours are:    Monday 8 AM - 5 PM  Wednesday: 8 AM - 5 PM  Thursday: 8 AM - 5 PM  Fridays: 8 AM - 5 PM    I am not in the office Tuesdays. Therefore non urgent calls received on Tuesday will be addressed when I am back in the office on Wednesday.     If lab work was done today as part of your evaluation you will generally be contacted via My Chart, mail, or phone with the results within 1-5 days. If there is an alarming result we will contact you by phone. Lab results come back at varying times, I generally wait until all labs are resulted before making comments on results. Please note labs are automatically released to My Chart once available.     If you need refills please contact your pharmacy They will send a refill request to me to review. Please allow 3 business days for us to process all refill requests.     Please call  or send a medical message with any questions or concerns        SERGIO Kahn CNP  [facility]  [phone]    For any issues my office # is [phone]

## 2021-11-18 NOTE — PATIENT INSTRUCTIONS
Try to drink 60 oz + of water daily    Tylenol as needed    We will notify you with urine culture results    Return for flu shot when feeling better    Thank you for coming in today!     We are working to improve our digital reputation.  Would you please help us by reviewing our clinic on Google and/or Facebook?  These are links for filling out a review for the clinic:    [email or web address]                 [email or web address]    We truly appreciate you taking the time to do this.     General Information:    Today you had your appointment with Mari Terry CNP  My hours are:    Monday 8 AM - 5 PM  Wednesday: 8 AM - 5 PM  Thursday: 8 AM - 5 PM  Fridays: 8 AM - 5 PM    I am not in the office Tuesdays. Therefore non urgent calls received on Tuesday will be addressed when I am back in the office on Wednesday.     If lab work was done today as part of your evaluation you will generally be contacted via My Chart, mail, or phone with the results within 1-5 days. If there is an alarming result we will contact you by phone. Lab results come back at varying times, I generally wait until all labs are resulted before making comments on results. Please note labs are automatically released to My Chart once available.     If you need refills please contact your pharmacy They will send a refill request to me to review. Please allow 3 business days for us to process all refill requests.     Please call or send a medical message with any questions or concerns

## 2021-11-19 NOTE — TELEPHONE ENCOUNTER
11/19/21 10:42 AM called and answered her questions regarding UA result. Urine culture pending. No further questions.   Trinh Salvador RN

## 2021-11-21 ENCOUNTER — MYC MEDICAL ADVICE (OUTPATIENT)
Dept: FAMILY MEDICINE | Facility: CLINIC | Age: 81
End: 2021-11-21

## 2021-11-21 LAB
ANTIMICROBIAL SUSCEPTIBILITY: ABNORMAL
Lab: ABNORMAL
URINE CULTURE: ABNORMAL

## 2021-11-22 DIAGNOSIS — N30.00 ACUTE CYSTITIS WITHOUT HEMATURIA: Primary | ICD-10-CM

## 2021-11-22 RX ORDER — NITROFURANTOIN 25; 75 MG/1; MG/1
100 CAPSULE ORAL 2 TIMES DAILY
Qty: 10 CAPSULE | Refills: 0 | Status: SHIPPED | OUTPATIENT
Start: 2021-11-22 | End: 2021-11-27

## 2021-11-22 NOTE — TELEPHONE ENCOUNTER
Called and spoke with patient and advised patient to call clinic Wednesday with update. If symptoms are not improved will address at that time. Josephine Hager

## 2021-11-22 NOTE — TELEPHONE ENCOUNTER
Message sent to patient via [employer].   Antibiotic sent to pharmacy.    SERGIO Kahn CNP on 11/22/2021 at 8:02 AM

## 2021-11-23 ENCOUNTER — MYC MEDICAL ADVICE (OUTPATIENT)
Dept: FAMILY MEDICINE | Facility: CLINIC | Age: 81
End: 2021-11-23

## 2021-11-23 DIAGNOSIS — N30.00 ACUTE CYSTITIS WITHOUT HEMATURIA: Primary | ICD-10-CM

## 2021-11-30 NOTE — TELEPHONE ENCOUNTER
Patient sent [employer] update on UTI symptoms still present after nearly 7 days of Augmentin BID. Symptoms are improved but still having dysuria with some chills. Patient also left message on triage line today with this info and states hx of UTI's that take a second round of antibiotics to clear. States since symptoms have improved some and tolerating the med, would like refill of this same med if provider thinks reasonable.   Plan: per Dr. Barahona, sent refill on Augmentin 875 BID x 7 days and advised patient to stop med after symptoms resolved for 24 hours. Call/RTC if symptoms fail to resolve completely. Patient agrees.   Trinh Salvador RN

## 2021-12-03 ENCOUNTER — OFFICE VISIT (OUTPATIENT)
Dept: FAMILY MEDICINE | Facility: CLINIC | Age: 81
End: 2021-12-03

## 2021-12-03 VITALS
DIASTOLIC BLOOD PRESSURE: 82 MMHG | OXYGEN SATURATION: 96 % | SYSTOLIC BLOOD PRESSURE: 144 MMHG | TEMPERATURE: 97.8 F | HEART RATE: 91 BPM

## 2021-12-03 DIAGNOSIS — R39.9 LOWER URINARY TRACT SYMPTOMS (LUTS): Primary | ICD-10-CM

## 2021-12-03 DIAGNOSIS — R68.83 CHILLS: ICD-10-CM

## 2021-12-03 DIAGNOSIS — R52 BODY ACHES: ICD-10-CM

## 2021-12-03 LAB
% GRANULOCYTES: 75.1 % (ref 42.2–75.2)
BACTERIA URINE: NORMAL
BILIRUB UR QL STRIP: NORMAL
BLOOD URINE DIP: NORMAL
CASTS/LPF: NORMAL
COLOR UR: YELLOW
CRYSTAL URINE: NORMAL
EPITHELIAL CELLS - QUEST: NORMAL
GLUCOSE UR STRIP-MCNC: NORMAL MG/DL
HCT VFR BLD AUTO: 39.3 % (ref 35–46)
HEMOGLOBIN: 13.2 G/DL (ref 11.8–15.5)
KETONES UR QL STRIP: NORMAL
LEUKOCYTE ESTERASE URINE DIP: NORMAL
LYMPHOCYTES NFR BLD AUTO: 19 % (ref 20.5–51.1)
MCH RBC QN AUTO: 31.3 PG (ref 27–31)
MCHC RBC AUTO-ENTMCNC: 33.7 G/DL (ref 33–37)
MCV RBC AUTO: 92.8 FL (ref 80–100)
MONOCYTES NFR BLD AUTO: 5.9 % (ref 1.7–9.3)
MUCOUS URINE: NORMAL
NITRITE UR QL STRIP: NORMAL
PH UR STRIP: 7.5 PH (ref 5–9)
PLATELET # BLD AUTO: 439 K/UL (ref 140–450)
PROT UR QL: NORMAL MG/DL (ref ?–0.01)
RBC # BLD AUTO: 4.23 X10/CMM (ref 3.7–5.2)
RBC URINE: NORMAL
SP GR UR STRIP: 1.01 (ref 1–1.02)
UROBILINOGEN UR QL STRIP: 0.2 EU/DL (ref 0.2–1)
WBC # BLD AUTO: 9.2 X10/CMM (ref 3.8–11)
WBC URINE: NORMAL

## 2021-12-03 PROCEDURE — 36415 COLL VENOUS BLD VENIPUNCTURE: CPT | Performed by: FAMILY MEDICINE

## 2021-12-03 PROCEDURE — 81003 URINALYSIS AUTO W/O SCOPE: CPT | Performed by: FAMILY MEDICINE

## 2021-12-03 PROCEDURE — 99215 OFFICE O/P EST HI 40 MIN: CPT | Mod: 25 | Performed by: FAMILY MEDICINE

## 2021-12-03 PROCEDURE — 96372 THER/PROPH/DIAG INJ SC/IM: CPT | Performed by: FAMILY MEDICINE

## 2021-12-03 PROCEDURE — 85025 COMPLETE CBC W/AUTO DIFF WBC: CPT | Performed by: FAMILY MEDICINE

## 2021-12-03 RX ORDER — CEFTRIAXONE SODIUM 1 G
1 VIAL (EA) INJECTION ONCE
Status: COMPLETED | OUTPATIENT
Start: 2021-12-03 | End: 2021-12-03

## 2021-12-03 RX ORDER — CIPROFLOXACIN 500 MG/1
500 TABLET, FILM COATED ORAL 2 TIMES DAILY
Qty: 14 TABLET | Refills: 0 | Status: SHIPPED | OUTPATIENT
Start: 2021-12-03 | End: 2021-12-10

## 2021-12-03 RX ADMIN — Medication 1 G: at 14:45

## 2021-12-03 NOTE — NURSING NOTE
The following medication was given:     MEDICATION: Rocephin 1g and Lidocaine 1% 2.1mL  ROUTE: IM  SITE: Gluteal - Right  DOSE: 1 gram  LOT #: [id number]  :  [employer].  EXPIRATION DATE:  10/2022  NDC#: [address]-1     Francoise Pack MA 12/03/2021 @1445

## 2021-12-03 NOTE — PROGRESS NOTES
Subjective     Virginia is a 81 year old patient who presents to clinic for evaluation.      She was seen 11/16 with body aches and headache, CBC and lyme were negative.  She followed up a couple days later with urinary symptoms.  UA was bland but UCx grew Klebsiella.  Initially was given Macrobid but was of intermediate sensitivity and was switched to augmentin.  She improved but symptoms not entirely resolved and given additional augmentin.  She then began feeling worse with chills, body aches and ongoing urinary burning.  No discharge    Review of Systems         Objective    BP (!) 144/82   Pulse 91   Temp 97.8  F (36.6  C) (Skin)   SpO2 96%     General: Well appearing, NAD  HEENT: Clear  Abd: soft, nontender  Back: no CVA tenderness  Psych: normal mood and affect        Results for orders placed or performed in visit on 12/03/21 (from the past 24 hour(s))   Urinalysis ([facility])   Result Value Ref Range    Color Urine Yellow     pH Urine 7.5 5 - 9 pH    Specific Gravity Urine 1.015 1.005 - 1.025    Protein Urine neg 0.01 mg/dL    Glucose Urine neg     Ketones Urine neg     Leukocyte Esterase Urine neg     Blood Urine neg     Nitrite Urine Neg NEG    Bilirubin Urine Dip neg     Urobilinogen Urine 0.2 0.2 - 1.0 EU/dL    WBC Urine      RBC Urine      Epithelial Cells      Crystal Urine      Bacteria Urine      Mucous Urine      Casts/LPF     CBC with Diff/Plt [place]   Result Value Ref Range    WBC x10/cmm 9.2 3.8 - 11.0 x10/cmm    % Lymphocytes 19.0 (A) 20.5 - 51.1 %    % Monocytes 5.9 1.7 - 9.3 %    % Granulocytes 75.1 42.2 - 75.2 %    RBC x10/cmm 4.23 3.7 - 5.2 x10/cmm    Hemoglobin 13.2 11.8 - 15.5 g/dl    Hematocrit 39.3 35 - 46 %    MCV 92.8 80 - 100 fL    MCH 31.3 (A) 27.0 - 31.0 pg    MCHC 33.7 33.0 - 37.0 g/dL    Platelet Count 439 140 - 450 K/uL       Assessment & Plan     Lower urinary tract symptoms (LUTS)  Jamila is nontoxic appearing with normal VS and bland UA, but she had normal UA previously with  infection and am concerned with the level of urinary discomfort and systemic symptoms.  Treated with CTX 1 gram and oral cipro with very close follow up.  Await labs.  Recheck Covid. If worsens over weekend advise going to ED immediately.  Patient in agreement.   - Urinalysis ([facility])  - Urine Culture  Routine ([facility])  - CBC with Diff/Plt ([facility])  - C-Reactive Protein  Quant ([facility])  - Comp. Metabolic Panel (14) ([facility])  - cefTRIAXone (ROCEPHIN) injection 1 g  - Symptomatic COVID-19 Virus (Coronavirus) by PCR; Future  - ciprofloxacin (CIPRO) 500 MG tablet; Take 1 tablet (500 mg) by mouth 2 times daily for 7 days    Chills  See above  - Urine Culture  Routine ([facility])  - CBC with Diff/Plt ([facility])  - C-Reactive Protein  Quant ([facility])  - Comp. Metabolic Panel (14) ([facility])  - cefTRIAXone (ROCEPHIN) injection 1 g  - Symptomatic COVID-19 Virus (Coronavirus) by PCR; Future  - VENOUS COLLECTION  - ciprofloxacin (CIPRO) 500 MG tablet; Take 1 tablet (500 mg) by mouth 2 times daily for 7 days    Body aches  See above  - Urine Culture  Routine ([facility])  - CBC with Diff/Plt ([facility])  - C-Reactive Protein  Quant ([facility])  - Comp. Metabolic Panel (14) ([facility])  - cefTRIAXone (ROCEPHIN) injection 1 g  - Symptomatic COVID-19 Virus (Coronavirus) by PCR; Future  - VENOUS COLLECTION  - ciprofloxacin (CIPRO) 500 MG tablet; Take 1 tablet (500 mg) by mouth 2 times daily for 7 days      Denton Barahona MD  [facility]

## 2021-12-04 LAB
ALBUMIN SERPL-MCNC: 4.7 G/DL (ref 3.6–4.6)
ALBUMIN/GLOB SERPL: 1.4 {RATIO} (ref 1.2–2.2)
ALP SERPL-CCNC: 62 IU/L (ref 44–121)
ALT SERPL-CCNC: 15 IU/L (ref 0–32)
AST SERPL-CCNC: 18 IU/L (ref 0–40)
BILIRUB SERPL-MCNC: 0.3 MG/DL (ref 0–1.2)
BUN SERPL-MCNC: 12 MG/DL (ref 8–27)
BUN/CREATININE RATIO: 17 (ref 12–28)
CALCIUM SERPL-MCNC: 9.7 MG/DL (ref 8.7–10.3)
CHLORIDE SERPLBLD-SCNC: 96 MMOL/L (ref 96–106)
CREAT SERPL-MCNC: 0.7 MG/DL (ref 0.57–1)
CRP SERPL-MCNC: <1 MG/L (ref 0–10)
EGFR IF AFRICN AM: 94 ML/MIN/1.73
EGFR IF NONAFRICN AM: 82 ML/MIN/1.73
GLOBULIN, TOTAL: 3.4 G/DL (ref 1.5–4.5)
GLUCOSE SERPL-MCNC: 100 MG/DL (ref 65–99)
POTASSIUM SERPL-SCNC: 4.1 MMOL/L (ref 3.5–5.2)
PROT SERPL-MCNC: 8.1 G/DL (ref 6–8.5)
SODIUM SERPL-SCNC: 135 MMOL/L (ref 134–144)
TOTAL CO2: 22 MMOL/L (ref 20–29)

## 2021-12-05 LAB
Lab: NO GROWTH
URINE CULTURE: NORMAL

## 2021-12-07 ENCOUNTER — LAB (OUTPATIENT)
Dept: URGENT CARE | Facility: URGENT CARE | Age: 81
End: 2021-12-07
Attending: FAMILY MEDICINE
Payer: COMMERCIAL

## 2021-12-07 DIAGNOSIS — R68.83 CHILLS: ICD-10-CM

## 2021-12-07 DIAGNOSIS — R39.9 LOWER URINARY TRACT SYMPTOMS (LUTS): ICD-10-CM

## 2021-12-07 DIAGNOSIS — R52 BODY ACHES: ICD-10-CM

## 2021-12-07 LAB — SARS-COV-2 RNA RESP QL NAA+PROBE: NEGATIVE

## 2021-12-07 PROCEDURE — U0005 INFEC AGEN DETEC AMPLI PROBE: HCPCS

## 2021-12-07 PROCEDURE — U0003 INFECTIOUS AGENT DETECTION BY NUCLEIC ACID (DNA OR RNA); SEVERE ACUTE RESPIRATORY SYNDROME CORONAVIRUS 2 (SARS-COV-2) (CORONAVIRUS DISEASE [COVID-19]), AMPLIFIED PROBE TECHNIQUE, MAKING USE OF HIGH THROUGHPUT TECHNOLOGIES AS DESCRIBED BY CMS-2020-01-R: HCPCS

## 2021-12-10 ENCOUNTER — OFFICE VISIT (OUTPATIENT)
Dept: FAMILY MEDICINE | Facility: CLINIC | Age: 81
End: 2021-12-10

## 2021-12-10 ENCOUNTER — MYC MEDICAL ADVICE (OUTPATIENT)
Dept: FAMILY MEDICINE | Facility: CLINIC | Age: 81
End: 2021-12-10

## 2021-12-10 VITALS
DIASTOLIC BLOOD PRESSURE: 77 MMHG | BODY MASS INDEX: 19.63 KG/M2 | HEART RATE: 92 BPM | SYSTOLIC BLOOD PRESSURE: 148 MMHG | OXYGEN SATURATION: 98 % | RESPIRATION RATE: 18 BRPM | HEIGHT: 61 IN | WEIGHT: 104 LBS

## 2021-12-10 DIAGNOSIS — R39.9 LOWER URINARY TRACT SYMPTOMS (LUTS): Primary | ICD-10-CM

## 2021-12-10 LAB
BACTERIA URINE: 0
BILIRUB UR QL STRIP: ABNORMAL
BLOOD URINE DIP: ABNORMAL
CASTS/LPF: 0
COLOR UR: YELLOW
CRYSTAL URINE: ABNORMAL
EPITHELIAL CELLS - QUEST: ABNORMAL
GLUCOSE UR STRIP-MCNC: ABNORMAL MG/DL
KETONES UR QL STRIP: ABNORMAL
LEUKOCYTE ESTERASE URINE DIP: ABNORMAL
MUCOUS URINE: 0
NITRITE UR QL STRIP: ABNORMAL
PH UR STRIP: 7 PH (ref 5–9)
PROT UR QL: ABNORMAL MG/DL (ref ?–0.01)
RBC URINE: ABNORMAL (ref 0–3)
SP GR UR STRIP: 1.01 (ref 1–1.02)
UROBILINOGEN UR QL STRIP: 0.2 EU/DL (ref 0.2–1)
WBC URINE: ABNORMAL (ref 0–3)

## 2021-12-10 PROCEDURE — 81003 URINALYSIS AUTO W/O SCOPE: CPT | Performed by: FAMILY MEDICINE

## 2021-12-10 PROCEDURE — 99213 OFFICE O/P EST LOW 20 MIN: CPT | Performed by: FAMILY MEDICINE

## 2021-12-10 ASSESSMENT — MIFFLIN-ST. JEOR: SCORE: 874.12

## 2021-12-10 NOTE — PROGRESS NOTES
"  Roxana Mcdowell is a 81 year old patient who presents to clinic for recheck.  Urinary symptoms have improved.  She thinks could have been due to HSV as restarted valtrex.  UCx unremarkable.  Still with occasional chills but otherwise feeling improved and better.  Labs are overall reassuring.  No fever.  No other new symptoms.        Review of Systems   Constitutional, HEENT, cardiovascular, pulmonary, GI, , musculoskeletal, neuro, skin, endocrine and psych systems are negative, except as otherwise noted.      Objective    BP (!) 148/77   Pulse 92   Resp 18   Ht 1.549 m (5' 1\")   Wt 47.2 kg (104 lb)   SpO2 98%   BMI 19.65 kg/m      General: Well appearing, NAD  Psych: normal mood and affect        Results for orders placed or performed in visit on 12/10/21 (from the past 24 hour(s))   Urinalysis ([facility])   Result Value Ref Range    Color Urine Yellow     pH Urine 7.0 5 - 9 pH    Specific Gravity Urine 1.015 1.005 - 1.025    Protein Urine neg 0.01 mg/dL    Glucose Urine neg     Ketones Urine neg     Leukocyte Esterase Urine trace (A)     Blood Urine neg     Nitrite Urine Neg NEG    Bilirubin Urine Dip neg     Urobilinogen Urine 0.2 0.2 - 1.0 EU/dL    WBC Urine 3-5 (A) 0 - 3    RBC Urine rare 0 - 3    Epithelial Cells few     Crystal Urine few     Bacteria Urine 0     Mucous Urine 0     Casts/LPF 0        Lower urinary tract symptoms (LUTS)  Improved, follow up prn  - Urinalysis ([facility])    Appearance overall reassuring.  She is not in distress.  Workup unremarkable will follow up if symptoms recur.        "

## 2021-12-13 NOTE — TELEPHONE ENCOUNTER
Called patient and informed her that WBC in urine is white blood count  Informed her this is not elevated enough for any worry. .

## 2021-12-15 ENCOUNTER — TRANSFERRED RECORDS (OUTPATIENT)
Dept: FAMILY MEDICINE | Facility: CLINIC | Age: 81
End: 2021-12-15

## 2021-12-27 ENCOUNTER — TRANSFERRED RECORDS (OUTPATIENT)
Dept: HEALTH INFORMATION MANAGEMENT | Facility: CLINIC | Age: 81
End: 2021-12-27
Payer: COMMERCIAL

## 2022-01-11 ENCOUNTER — TRANSFERRED RECORDS (OUTPATIENT)
Dept: FAMILY MEDICINE | Facility: CLINIC | Age: 82
End: 2022-01-11

## 2022-01-18 ENCOUNTER — OFFICE VISIT (OUTPATIENT)
Dept: FAMILY MEDICINE | Facility: CLINIC | Age: 82
End: 2022-01-18

## 2022-01-18 VITALS
RESPIRATION RATE: 18 BRPM | BODY MASS INDEX: 19.67 KG/M2 | OXYGEN SATURATION: 98 % | DIASTOLIC BLOOD PRESSURE: 86 MMHG | HEART RATE: 91 BPM | SYSTOLIC BLOOD PRESSURE: 136 MMHG | HEIGHT: 61 IN | WEIGHT: 104.2 LBS

## 2022-01-18 DIAGNOSIS — M43.17 SPONDYLOLISTHESIS OF LUMBOSACRAL REGION: ICD-10-CM

## 2022-01-18 DIAGNOSIS — R39.9 LOWER URINARY TRACT SYMPTOMS (LUTS): ICD-10-CM

## 2022-01-18 DIAGNOSIS — G81.94 HEMIPARESIS, LEFT (H): ICD-10-CM

## 2022-01-18 DIAGNOSIS — D32.0 BENIGN NEOPLASM OF CEREBRAL MENINGES (H): ICD-10-CM

## 2022-01-18 DIAGNOSIS — G89.29 CHRONIC BILATERAL LOW BACK PAIN WITHOUT SCIATICA: ICD-10-CM

## 2022-01-18 DIAGNOSIS — I10 HYPERTENSION GOAL BP (BLOOD PRESSURE) < 140/90: Primary | ICD-10-CM

## 2022-01-18 DIAGNOSIS — M54.50 CHRONIC BILATERAL LOW BACK PAIN WITHOUT SCIATICA: ICD-10-CM

## 2022-01-18 DIAGNOSIS — F41.1 GENERALIZED ANXIETY DISORDER: ICD-10-CM

## 2022-01-18 DIAGNOSIS — Z23 NEEDS FLU SHOT: ICD-10-CM

## 2022-01-18 LAB
AMORPHOUS PHOSPHATES: ABNORMAL
BACTERIA URINE: ABNORMAL
BILIRUB UR QL STRIP: ABNORMAL
BLOOD URINE DIP: ABNORMAL
CASTS/LPF: 0
COLOR UR: YELLOW
CRYSTAL URINE: ABNORMAL
EPITHELIAL CELLS - QUEST: ABNORMAL
GLUCOSE UR STRIP-MCNC: ABNORMAL MG/DL
KETONES UR QL STRIP: ABNORMAL
LEUKOCYTE ESTERASE URINE DIP: ABNORMAL
MUCOUS URINE: 0
NITRITE UR QL STRIP: ABNORMAL
PH UR STRIP: 7.5 PH (ref 5–9)
PROT UR QL: ABNORMAL MG/DL (ref ?–0.01)
RBC URINE: 0 (ref 0–3)
SP GR UR STRIP: 1.01 (ref 1–1.02)
UROBILINOGEN UR QL STRIP: 0.2 EU/DL (ref 0.2–1)
WBC URINE: ABNORMAL (ref 0–3)

## 2022-01-18 PROCEDURE — G0008 ADMIN INFLUENZA VIRUS VAC: HCPCS | Mod: 59 | Performed by: FAMILY MEDICINE

## 2022-01-18 PROCEDURE — 90686 IIV4 VACC NO PRSV 0.5 ML IM: CPT | Performed by: FAMILY MEDICINE

## 2022-01-18 PROCEDURE — 93050 ART PRESSURE WAVEFORM ANALYS: CPT | Performed by: FAMILY MEDICINE

## 2022-01-18 PROCEDURE — 81003 URINALYSIS AUTO W/O SCOPE: CPT | Performed by: FAMILY MEDICINE

## 2022-01-18 PROCEDURE — 99214 OFFICE O/P EST MOD 30 MIN: CPT | Mod: 25 | Performed by: FAMILY MEDICINE

## 2022-01-18 RX ORDER — CLONAZEPAM 0.5 MG/1
TABLET ORAL
Qty: 30 TABLET | Refills: 2 | Status: SHIPPED | OUTPATIENT
Start: 2022-01-18 | End: 2022-06-28

## 2022-01-18 RX ORDER — FLUTICASONE PROPIONATE 50 MCG
SPRAY, SUSPENSION (ML) NASAL
COMMUNITY
Start: 2021-12-15 | End: 2023-03-29

## 2022-01-18 ASSESSMENT — MIFFLIN-ST. JEOR: SCORE: 875.03

## 2022-01-18 NOTE — NURSING NOTE
per patient, stated her other provider stated she should not get the high dose due to reactions, [facility] even gives her the regular dose, pt stated she has never recieved the high dose, dicussed with Dr Barahona and he okayed pt to get regular dose   Gaviota Johnson, CMA

## 2022-01-18 NOTE — PROGRESS NOTES
"  Roxana Mcdowell is a 81 year old patient who presents to clinic for evaluation.  She sees an osteopath for her chronic back pain and also has been seen at [facility].  Due to increased LLE weakness since her stroke an MRI was ordered by Dr Mccoy.  I do not have the visit notes.  She is uncertain what MRI showed.  She is having low back pain but not radiating into the legs.  She does endorse that LLE has been weaker.  No saddle anesthesia, urinary incontinence or retention.  No fever.        Review of Systems   Constitutional, HEENT, cardiovascular, pulmonary, GI, , musculoskeletal, neuro, skin, endocrine and psych systems are negative, except as otherwise noted.      Objective    BP (!) 140/77   Pulse 91   Resp 18   Ht 1.549 m (5' 1\")   Wt 47.3 kg (104 lb 3.2 oz)   SpO2 98%   BMI 19.69 kg/m      General: Well appearing, NAD  Psych: normal mood and affect    A/P:    Spondylolisthesis of lumbosacral region  Reviewed details of MRI.  Given lytic spondylolisthesis I encouraged follow up with [facility] or another spine specialist.     Chronic bilateral low back pain without sciatica  See above.      Hypertension goal BP (blood pressure) < 140/90  Borderline, recheck at follow up  - [place] ART PRESS WAVEFORM ANALYS CENTRAL ART PRESSURE    Needs flu shot  - VACCINE ADMINISTRATION, INITIAL    Generalized anxiety disorder  - clonazePAM (KLONOPIN) 0.5 MG tablet; TAKE 1/2 TO 1 TABLET(0.25 TO 0.5 MG) BY MOUTH AT BEDTIME    Hemiparesis, left (H)  Cont rehab    Benign neoplasm of cerebral meninges          "

## 2022-01-21 ENCOUNTER — MYC MEDICAL ADVICE (OUTPATIENT)
Dept: FAMILY MEDICINE | Facility: CLINIC | Age: 82
End: 2022-01-21

## 2022-01-21 NOTE — TELEPHONE ENCOUNTER
Faxed Dr. Barahona's 1/18/22 physiatry referral, imaging reports and visit notes to [facility] (Dr. Campos's office) at fax:[phone]  Their phone number is: [phone].     Messaged patient this was done.   Trinh Salvador RN

## 2022-01-27 ENCOUNTER — VIRTUAL VISIT (OUTPATIENT)
Dept: FAMILY MEDICINE | Facility: CLINIC | Age: 82
End: 2022-01-27

## 2022-01-27 DIAGNOSIS — R09.89 UPPER RESPIRATORY SYMPTOM: Primary | ICD-10-CM

## 2022-01-27 PROBLEM — Z85.51 PERSONAL HISTORY OF MALIGNANT NEOPLASM OF BLADDER: Status: RESOLVED | Noted: 2021-08-24 | Resolved: 2022-01-27

## 2022-01-27 PROBLEM — R00.2 PALPITATIONS: Status: RESOLVED | Noted: 2018-10-22 | Resolved: 2022-01-27

## 2022-01-27 PROBLEM — N99.3 VAGINAL VAULT PROLAPSE, POSTHYSTERECTOMY: Status: RESOLVED | Noted: 2017-12-29 | Resolved: 2022-01-27

## 2022-01-27 PROBLEM — M26.4 MALOCCLUSION OF TEETH: Status: RESOLVED | Noted: 2021-04-07 | Resolved: 2022-01-27

## 2022-01-27 PROCEDURE — 99213 OFFICE O/P EST LOW 20 MIN: CPT | Mod: GT | Performed by: FAMILY MEDICINE

## 2022-01-27 NOTE — PATIENT INSTRUCTIONS
Call [phone] to schedule Covid-19 testing    If You Test Positive for COVID-19 (Isolation guidelines)    Everyone, regardless of vaccination status.            Stay home for 5 days.            If you have no symptoms or your symptoms are resolving after 5 days, you can leave your house.            Continue to wear a mask around others for 5 additional days.    If you have a fever, continue to stay home until your fever resolves.

## 2022-01-27 NOTE — PROGRESS NOTES
"Telephone Start Time : 12:56     Telephone End Time : 13:15    Patient location:  Home    Provider location:  [facility]     Mode of Communication:  Video Conference via [employer]    This was a virtual video-visit conducted during COVID-19 outbreak in regulation with social distancing and quarantine recommendations of the CDC and [place] department of health and human services. A two way audio/video connection was used in real time with patient's consent.    SUBJECTIVE:                                                 Jluy Huang is a 81 year old female evaluated via telephone visit for the following health issues :    Has been experiencing some lower back pain (above baseline for her / Hx of lytic spondylolisthesis: sees osteopath for her chronic back pain) 3-4 days associated with feeling \"generally achy all over\" and some chills. Denies fevers / normal temp on home thermometer.   Denies urinary symptoms. Mild coryzal symptoms. All others in household are well.     Seen by ObGyn 2 weeks ago : dx with atrophic vaginitis. Awaiting a 'cream' for management of this.    Feeling a little better today. Denies any loss of taste or smell. Denies shortness of breath, cough, wheeze.     Completed negative COVID screening 1 month ago. Her symptoms are making her concerned she may have COVID 19. Fully vaccinated and has received booster. Interested in screening again.     OBJECTIVE:                                                    No vitals taken due to telehealth visit        ASSESSMENT/PLAN:                                                        Upper respiratory symptom  Conservative home management discussed. Isolation / quarantine guidelines issued on AVS. COVID 19 PCR testing ordered.     If You Test Positive for COVID-19 (Isolation guidelines)  Everyone, regardless of vaccination status.            Stay home for 5 days.            If you have no symptoms or your symptoms are resolving after 5 days, you " can leave your house.            Continue to wear a mask around others for 5 additional days.    If you have a fever, continue to stay home until your fever resolves.  -     Coronavirus CoVid19 ([facility])

## 2022-01-28 ENCOUNTER — LAB (OUTPATIENT)
Dept: URGENT CARE | Facility: URGENT CARE | Age: 82
End: 2022-01-28
Attending: FAMILY MEDICINE
Payer: COMMERCIAL

## 2022-01-28 DIAGNOSIS — Z20.822 SUSPECTED COVID-19 VIRUS INFECTION: ICD-10-CM

## 2022-01-28 DIAGNOSIS — R09.89 UPPER RESPIRATORY SYMPTOM: ICD-10-CM

## 2022-01-28 PROCEDURE — U0005 INFEC AGEN DETEC AMPLI PROBE: HCPCS

## 2022-01-28 PROCEDURE — U0003 INFECTIOUS AGENT DETECTION BY NUCLEIC ACID (DNA OR RNA); SEVERE ACUTE RESPIRATORY SYNDROME CORONAVIRUS 2 (SARS-COV-2) (CORONAVIRUS DISEASE [COVID-19]), AMPLIFIED PROBE TECHNIQUE, MAKING USE OF HIGH THROUGHPUT TECHNOLOGIES AS DESCRIBED BY CMS-2020-01-R: HCPCS

## 2022-01-29 LAB — SARS-COV-2 RNA RESP QL NAA+PROBE: NEGATIVE

## 2022-02-01 ENCOUNTER — OFFICE VISIT (OUTPATIENT)
Dept: FAMILY MEDICINE | Facility: CLINIC | Age: 82
End: 2022-02-01

## 2022-02-01 VITALS
OXYGEN SATURATION: 98 % | TEMPERATURE: 98 F | WEIGHT: 105 LBS | SYSTOLIC BLOOD PRESSURE: 122 MMHG | HEART RATE: 96 BPM | DIASTOLIC BLOOD PRESSURE: 76 MMHG | BODY MASS INDEX: 19.84 KG/M2

## 2022-02-01 DIAGNOSIS — R05.9 COUGH: Primary | ICD-10-CM

## 2022-02-01 DIAGNOSIS — R39.9 LOWER URINARY TRACT SYMPTOMS (LUTS): ICD-10-CM

## 2022-02-01 LAB
BACTERIA URINE: 0
BILIRUB UR QL STRIP: ABNORMAL
BLOOD URINE DIP: ABNORMAL
CASTS/LPF: 0
COLOR UR: YELLOW
CRYSTAL URINE: 0
EPITHELIAL CELLS - QUEST: ABNORMAL
GLUCOSE UR STRIP-MCNC: ABNORMAL MG/DL
KETONES UR QL STRIP: ABNORMAL
LEUKOCYTE ESTERASE URINE DIP: ABNORMAL
MUCOUS URINE: 0
NITRITE UR QL STRIP: ABNORMAL
PH UR STRIP: 7.5 PH (ref 5–9)
PROT UR QL: ABNORMAL MG/DL (ref ?–0.01)
RBC URINE: 0 (ref 0–3)
SP GR UR STRIP: 1.01 (ref 1–1.02)
UROBILINOGEN UR QL STRIP: 0.2 EU/DL (ref 0.2–1)
WBC URINE: 3.5 (ref 0–3)

## 2022-02-01 PROCEDURE — 71046 X-RAY EXAM CHEST 2 VIEWS: CPT | Performed by: FAMILY MEDICINE

## 2022-02-01 PROCEDURE — 81003 URINALYSIS AUTO W/O SCOPE: CPT | Mod: 90 | Performed by: FAMILY MEDICINE

## 2022-02-01 PROCEDURE — 99214 OFFICE O/P EST MOD 30 MIN: CPT | Performed by: FAMILY MEDICINE

## 2022-02-01 NOTE — LETTER
[facility]  02/01/22  Patient: July Huang  YOB: 1940  Medical Record Number: [id number]                                                                                  Non-Opioid Controlled Substance Agreement    This is an agreement between you and your provider regarding safe and appropriate use of controlled substances prescribed by your care team. Controlled substances are?medicines that can cause physical and mental dependence (abuse).     There are strict laws about having and using these medicines. We here at [facility] are  committed to working with you in your efforts to get better. To support you in this work, we'll help you schedule regular office appointments for medicine refills. If we must cancel or change your appointment for any reason, we'll make sure you have enough medicine to last until your next appointment.     As a Provider, I will:     Listen carefully to your concerns while treating you with respect.     Recommend a treatment plan that I believe is in your best interest and may involve therapies other than medicine.      Talk with you often about the possible benefits and the risk of harm of any medicine that we prescribe for you.    Assess the safety of this medicine and check how well it works.      Provide a plan on how to taper (discontinue or go off) using this medicine if the decision is made to stop its use.      ::  As a Patient, I understand controlled substances:       Are prescribed by my care provider to help me function or work and manage my condition(s).?    Are strong medicines and can cause serious side effects.       Need to be taken exactly as prescribed.?Combining controlled substances with certain medicines or chemicals (such as illegal drugs, alcohol, sedatives, sleeping pills, and benzodiazepines) can be dangerous or even fatal.? If I stop taking my medicines suddenly, I may have severe withdrawal symptoms.     The risks,  benefits, and side effects of these medicine(s) were explained to me. I agree that:    1. I will take part in other treatments as advised by my care team. This may be psychiatry or counseling, physical therapy, behavioral therapy, group treatment or a referral to specialist.    2. I will keep all my appointments and understand this is part of the monitoring of controlled substances.?My care team may require an office visit for EVERY controlled substance refill. If I miss appointments or don t follow instructions, my care team may stop my medicine    3. I will take my medicines as prescribed. I will not change the dose or schedule unless my care team tells me to. There will be no refills if I run out early.      4. I may be asked to come to the clinic and complete a urine drug test or complete a pill count. If I don t give a urine sample or participate in a pill count, the care team may stop my medicine.    5. I will only receive controlled substance prescriptions from this clinic. If I am treated by another provider, I will tell them that I am taking controlled substances and that I have a treatment agreement with this provider. I will inform my [facility] care team within one business day if I am given a prescription for any controlled substance by another healthcare provider. My [facility] care team can contact other providers and pharmacists about my use of any medicines.    6. It is up to me to make sure that I don't run out of my medicines on weekends or holidays.?If my care team is willing to refill my prescription without a visit, I must request refills only during office hours. Refills may take up to 3 business days to process. I will use one pharmacy to fill all my controlled substance prescriptions. I will notify the clinic about any changes to my insurance or medicine availability.    7. I am responsible for my prescriptions. If the medicine/prescription is lost, stolen or destroyed, it will  not be replaced.?I also agree not to share controlled substance medicines with anyone.     8. I am aware I should not use any illegal or recreational drugs. I agree not to drink alcohol unless my care team says I can.     9. If I enroll in the [place] Medical Cannabis program, I will tell my care team before my next refill.    10. I will tell my care team right away if I become pregnant, have a new medical problem treated outside of my regular clinic, or have a change in my medicines.     11. I understand that this medicine can affect my thinking, judgment and reaction time.? Alcohol and drugs affect the brain and body, which can affect the safety of my driving. Being under the influence of alcohol or drugs can affect my decision-making, behaviors, personal safety and the safety of others. Driving while impaired (DWI) can occur if a person is driving, operating or in physical control of a car, motorcycle, boat, snowmobile, ATV, motorbike, off-road vehicle or any other motor vehicle ([place] Statute 169A.20). I understand the risk if I choose to drive or operate any vehicle or machinery.    I understand that if I do not follow any of the conditions above, my prescriptions or treatment may be stopped or changed.   I agree that my provider, clinic care team and pharmacy may work with any city, state or federal law enforcement agency that investigates the misuse, sale or other diversion of my controlled medicine. I will allow my provider to discuss my care with, or share a copy of, this agreement with any other treating provider, pharmacy or emergency room where I receive care.     I have read this agreement and have asked questions about anything I did not understand.    ________________________________________________________  Patient Signature - July Huang     ___________________                   Date     ________________________________________________________  Provider Signature - Denton Barahona MD        ___________________                   Date     ________________________________________________________  Witness Signature (required if provider not present while patient signing)          ___________________                   Date

## 2022-02-01 NOTE — PROGRESS NOTES
Subjective     Virginia is a 81 year old patient who presents to clinic for evaluation.  About 1 week of cough and congestion.  Negative covid testing.  No fever, chills, dyspnea.  Feels chest congestion.  No chest pain.    Recurrence of very mild urinary symptoms.  Unsure if it is from the atrophic vaginitis.  No fever, chills, flank pain.        Review of Systems         Objective    /76   Pulse 96   Temp 98  F (36.7  C) (Skin)   Wt 47.6 kg (105 lb)   SpO2 98%   BMI 19.84 kg/m      General: Well appearing, NAD  HEENT: clear  Heart: RRR, no murmur  Chest: Lungs CTAB  Skin: Clear  Psych: normal mood and affect        CXR - Reviewed and interpreted by me Normal- no infiltrates, effusions, pneumothoraces, cardiomegaly or masses    Cough  Likely viral uri.  We discussed that nearly all upper respiratory infections are viral and that antibiotics generally do not improve outcomes.  Supportive care including fluids, cool mist humidifier, OTC analgesics, nasal irrigation, zinc acetate lozenges and honey discussed.  Reasons to follow up discussed and understood.  - X-ray Chest 2 vws*    Lower urinary tract symptoms (LUTS)  Await culture as UA not overwhelming.  Follow up if worsens  - Urinalysis ([facility])  - Urine Culture  Routine ([facility])    Follow up prn

## 2022-02-03 LAB
Lab: NORMAL
URINE CULTURE: NORMAL

## 2022-02-04 ENCOUNTER — MYC MEDICAL ADVICE (OUTPATIENT)
Dept: FAMILY MEDICINE | Facility: CLINIC | Age: 82
End: 2022-02-04

## 2022-02-04 DIAGNOSIS — M43.17 SPONDYLOLISTHESIS OF LUMBOSACRAL REGION: Primary | ICD-10-CM

## 2022-02-04 DIAGNOSIS — G89.29 CHRONIC BILATERAL LOW BACK PAIN WITHOUT SCIATICA: ICD-10-CM

## 2022-02-04 DIAGNOSIS — M54.50 CHRONIC BILATERAL LOW BACK PAIN WITHOUT SCIATICA: ICD-10-CM

## 2022-02-11 ENCOUNTER — OFFICE VISIT (OUTPATIENT)
Dept: FAMILY MEDICINE | Facility: CLINIC | Age: 82
End: 2022-02-11

## 2022-02-11 VITALS
BODY MASS INDEX: 19.84 KG/M2 | TEMPERATURE: 97.8 F | HEART RATE: 91 BPM | DIASTOLIC BLOOD PRESSURE: 63 MMHG | SYSTOLIC BLOOD PRESSURE: 118 MMHG | OXYGEN SATURATION: 99 % | WEIGHT: 105 LBS

## 2022-02-11 DIAGNOSIS — M54.42 BILATERAL LOW BACK PAIN WITH LEFT-SIDED SCIATICA, UNSPECIFIED CHRONICITY: ICD-10-CM

## 2022-02-11 DIAGNOSIS — M43.17 SPONDYLOLISTHESIS OF LUMBOSACRAL REGION: ICD-10-CM

## 2022-02-11 DIAGNOSIS — R39.9 LOWER URINARY TRACT SYMPTOMS (LUTS): Primary | ICD-10-CM

## 2022-02-11 LAB
BACTERIA URINE: ABNORMAL
BILIRUB UR QL STRIP: 0
BLOOD URINE DIP: 0
CASTS/LPF: ABNORMAL
COLOR UR: YELLOW
CRYSTAL URINE: ABNORMAL
EPITHELIAL CELLS - QUEST: ABNORMAL
GLUCOSE UR STRIP-MCNC: 0 MG/DL
KETONES UR QL STRIP: 0
LEUKOCYTE ESTERASE URINE DIP: ABNORMAL
MUCOUS URINE: ABNORMAL
NITRITE UR QL STRIP: ABNORMAL
PH UR STRIP: 7 PH (ref 5–9)
PROT UR QL: 0 MG/DL (ref ?–0.01)
RBC URINE: ABNORMAL (ref 0–3)
SP GR UR STRIP: 1.01 (ref 1–1.02)
UROBILINOGEN UR QL STRIP: 0.2 EU/DL (ref 0.2–1)
WBC URINE: ABNORMAL (ref 0–3)

## 2022-02-11 PROCEDURE — 81003 URINALYSIS AUTO W/O SCOPE: CPT | Performed by: FAMILY MEDICINE

## 2022-02-11 PROCEDURE — 99214 OFFICE O/P EST MOD 30 MIN: CPT | Performed by: FAMILY MEDICINE

## 2022-02-11 RX ORDER — PREDNISONE 10 MG/1
TABLET ORAL
Qty: 30 TABLET | Refills: 0 | Status: SHIPPED | OUTPATIENT
Start: 2022-02-11 | End: 2022-02-23

## 2022-02-11 NOTE — PROGRESS NOTES
Roxana Mcdowell is a 81 year old patient who presents to clinic for follow up.  She sent a message recently that she felt her UTI was improved but was still not feeling well with headache and body aches.  However, she now feels these are better but is still having aching in her legs.  Per previous note and MRI, she has spondylolisthesis, DDD and other findings in the lumbar spine.  She feels this might be what is causing her symptoms but more difficult to discern since her stroke.  She does think this is likely her sciatica which she has experienced in the past.  Her therapist recommended a physiatrist but is not able to see him until next month.  Would like to try if medication may help.  No new weakness, saddle anesthesia or urinary incontinence or retention.        Review of Systems   Constitutional, HEENT, cardiovascular, pulmonary, gi and gu systems are negative, except as otherwise noted.      Objective    /63   Pulse 91   Temp 97.8  F (36.6  C)   Wt 47.6 kg (105 lb)   SpO2 99%   BMI 19.84 kg/m      General: Well appearing, NAD  Psych: normal mood and affect        Results for orders placed or performed in visit on 02/11/22 (from the past 24 hour(s))   Urinalysis ([facility])   Result Value Ref Range    Color Urine Yellow     pH Urine 7.0 5 - 9 pH    Specific Gravity Urine 1.015 1.005 - 1.025    Protein Urine 0 0.01 mg/dL    Glucose Urine 0     Ketones Urine 0     Leukocyte Esterase Urine trace (A)     Blood Urine 0     Nitrite Urine Neg NEG    Bilirubin Urine Dip 0     Urobilinogen Urine 0.2 0.2 - 1.0 EU/dL    WBC Urine 0-3 0 - 3    RBC Urine rare 0 - 3    Epithelial Cells rare     Crystal Urine neg     Bacteria Urine neg     Mucous Urine neg     Casts/LPF neg        Lower urinary tract symptoms (LUTS)  reassurance  - Urinalysis ([facility])    Bilateral low back pain with left-sided sciatica, unspecified chronicity  Discussed options.  After a thorough discussion of potential harms and benefits she  elects to proceed with a trial of prednisone taper.  If not improved would recommend referral to Dr Hogan if he is able to see her sooner than her current appointment.  She is in agreement.    - predniSONE (DELTASONE) 10 MG tablet; Take 4 tablets (40 mg) by mouth daily for 3 days, THEN 3 tablets (30 mg) daily for 3 days, THEN 2 tablets (20 mg) daily for 3 days, THEN 1 tablet (10 mg) daily for 3 days.    Spondylolisthesis of lumbosacral region  See above

## 2022-02-18 NOTE — PROGRESS NOTES
2/16/22 faxed this office note with physician order to Dr. Hogan @ [phone]    Kofi Cook,   [facility]  [phone]

## 2022-02-23 ENCOUNTER — TRANSFERRED RECORDS (OUTPATIENT)
Dept: FAMILY MEDICINE | Facility: CLINIC | Age: 82
End: 2022-02-23

## 2022-02-28 ENCOUNTER — OFFICE VISIT (OUTPATIENT)
Dept: FAMILY MEDICINE | Facility: CLINIC | Age: 82
End: 2022-02-28

## 2022-02-28 VITALS
SYSTOLIC BLOOD PRESSURE: 116 MMHG | OXYGEN SATURATION: 96 % | DIASTOLIC BLOOD PRESSURE: 84 MMHG | TEMPERATURE: 97.8 F | HEART RATE: 88 BPM

## 2022-02-28 DIAGNOSIS — M54.32 SCIATICA OF LEFT SIDE: Primary | ICD-10-CM

## 2022-02-28 DIAGNOSIS — R39.9 LOWER URINARY TRACT SYMPTOMS (LUTS): ICD-10-CM

## 2022-02-28 DIAGNOSIS — M43.17 SPONDYLOLISTHESIS OF LUMBOSACRAL REGION: ICD-10-CM

## 2022-02-28 DIAGNOSIS — D32.0 BENIGN MENINGIOMA OF BRAIN (H): ICD-10-CM

## 2022-02-28 LAB
BACTERIA URINE: 0
BILIRUB UR QL STRIP: ABNORMAL
BLOOD URINE DIP: ABNORMAL
CASTS/LPF: 0
COLOR UR: YELLOW
CRYSTAL URINE: 0
EPITHELIAL CELLS - QUEST: ABNORMAL
GLUCOSE UR STRIP-MCNC: ABNORMAL MG/DL
KETONES UR QL STRIP: ABNORMAL
LEUKOCYTE ESTERASE URINE DIP: ABNORMAL
MUCOUS URINE: 0
NITRITE UR QL STRIP: ABNORMAL
PH UR STRIP: 7 PH (ref 5–9)
PROT UR QL: ABNORMAL MG/DL (ref ?–0.01)
RBC URINE: ABNORMAL (ref 0–3)
SP GR UR STRIP: 1.01 (ref 1–1.02)
UROBILINOGEN UR QL STRIP: 0.2 EU/DL (ref 0.2–1)
WBC URINE: ABNORMAL (ref 0–3)

## 2022-02-28 PROCEDURE — 99214 OFFICE O/P EST MOD 30 MIN: CPT | Performed by: FAMILY MEDICINE

## 2022-02-28 PROCEDURE — 81003 URINALYSIS AUTO W/O SCOPE: CPT | Mod: 90 | Performed by: FAMILY MEDICINE

## 2022-02-28 NOTE — PROGRESS NOTES
Roxana Mcdowell is a 81 year old patient who presents to clinic for evaluation.  Has left sided radicular symptoms and saw Dr Hogan.  Prescribed higher dose prednisone taper but developed dizziness and headache.  Improving since stopped prednisone.     Has history of meningioma and stroke and due for imaging follow up.  Would prefer to do this through [facility] and have images sent to neurology at [facility] due to convenience.    Mild dysuria.  No other symptoms.        Review of Systems   Constitutional, HEENT, cardiovascular, pulmonary, GI, , musculoskeletal, neuro, skin, endocrine and psych systems are negative, except as otherwise noted.      Objective    /84   Pulse 88   Temp 97.8  F (36.6  C) (Skin)   SpO2 96%     General: Well appearing, NAD  Psych: normal mood and affect        Results for orders placed or performed in visit on 02/28/22 (from the past 24 hour(s))   Urinalysis ([facility])   Result Value Ref Range    Color Urine Yellow     pH Urine 7.0 5 - 9 pH    Specific Gravity Urine 1.015 1.005 - 1.025    Protein Urine neg 0.01 mg/dL    Glucose Urine neg     Ketones Urine neg     Leukocyte Esterase Urine trace (A)     Blood Urine neg     Nitrite Urine Neg NEG    Bilirubin Urine Dip neg     Urobilinogen Urine 0.2 0.2 - 1.0 EU/dL    WBC Urine 0-3 0 - 3    RBC Urine rare 0 - 3    Epithelial Cells rare     Crystal Urine 0     Bacteria Urine 0     Mucous Urine 0     Casts/LPF 0        Sciatica of left side  Advise scheduling NATIVIDAD with Dr Hogan.  Does not tolerate oral steroids    Lower urinary tract symptoms (LUTS)  reassurance  - Urinalysis ([facility])    Benign meningioma of brain (H)  Schedule repeat imaging and follow up with neurology    Spondylolisthesis of lumbosacral region  See above

## 2022-03-14 ENCOUNTER — OFFICE VISIT (OUTPATIENT)
Dept: FAMILY MEDICINE | Facility: CLINIC | Age: 82
End: 2022-03-14

## 2022-03-14 VITALS
SYSTOLIC BLOOD PRESSURE: 110 MMHG | HEIGHT: 61 IN | RESPIRATION RATE: 18 BRPM | DIASTOLIC BLOOD PRESSURE: 69 MMHG | OXYGEN SATURATION: 98 % | HEART RATE: 85 BPM | BODY MASS INDEX: 19.83 KG/M2 | WEIGHT: 105 LBS

## 2022-03-14 DIAGNOSIS — R53.83 FATIGUE, UNSPECIFIED TYPE: ICD-10-CM

## 2022-03-14 DIAGNOSIS — R39.9 LOWER URINARY TRACT SYMPTOMS (LUTS): Primary | ICD-10-CM

## 2022-03-14 DIAGNOSIS — R53.1 GENERAL WEAKNESS: ICD-10-CM

## 2022-03-14 LAB
BACTERIA URINE: ABNORMAL
BILIRUB UR QL STRIP: 0
BLOOD URINE DIP: ABNORMAL
CASTS/LPF: 0
COLOR UR: YELLOW
CRYSTAL URINE: ABNORMAL
EPITHELIAL CELLS - QUEST: ABNORMAL
ERYTHROCYTE [SEDIMENTATION RATE] IN BLOOD: 17 MM/HR (ref 0–20)
GLUCOSE UR STRIP-MCNC: 0 MG/DL
KETONES UR QL STRIP: 0
LEUKOCYTE ESTERASE URINE DIP: ABNORMAL
MUCOUS URINE: 0
NITRITE UR QL STRIP: ABNORMAL
PH UR STRIP: 7.5 PH (ref 5–9)
PROT UR QL: 0 MG/DL (ref ?–0.01)
RBC URINE: ABNORMAL (ref 0–3)
SP GR UR STRIP: 1.01 (ref 1–1.02)
UROBILINOGEN UR QL STRIP: 0.2 EU/DL (ref 0.2–1)
WBC URINE: ABNORMAL (ref 0–3)

## 2022-03-14 PROCEDURE — 85651 RBC SED RATE NONAUTOMATED: CPT | Performed by: FAMILY MEDICINE

## 2022-03-14 PROCEDURE — 99214 OFFICE O/P EST MOD 30 MIN: CPT | Performed by: FAMILY MEDICINE

## 2022-03-14 PROCEDURE — 81003 URINALYSIS AUTO W/O SCOPE: CPT | Performed by: FAMILY MEDICINE

## 2022-03-14 PROCEDURE — 36415 COLL VENOUS BLD VENIPUNCTURE: CPT | Performed by: FAMILY MEDICINE

## 2022-03-14 NOTE — PROGRESS NOTES
"  Roxana Mcdowell is a 81 year old patient who presents to clinic for evaluation.  She reports feeling ongoing weakness, fatigue, dysuria and thinks she could have a UTI.  She frequently presents with urinary symptoms but most recent testing has been reassuring.  She was seen by physiatry for spondylolisthesis and sciatica. She has been advised to do aquatic therapy.  Tramadol is causing too many side effects.  If not improving an NATIVIDAD is being considered.  She does endorse some upper extremity stiffness as well.  She has a history of stroke and feels she is regressing.  This is understandably distressing her.  No fever, chills, nausea, chest pain, SOB, abd pain.  No joint swelling or redness.        Review of Systems   Constitutional, HEENT, cardiovascular, pulmonary, GI, , musculoskeletal, neuro, skin, endocrine and psych systems are negative, except as otherwise noted.      Objective    /69   Pulse 85   Resp 18   Ht 1.549 m (5' 1\")   Wt 47.6 kg (105 lb)   SpO2 98%   BMI 19.84 kg/m      General: Well appearing, NAD  Psych: mood and affect slightly depressed        Results for orders placed or performed in visit on 03/14/22 (from the past 24 hour(s))   Urinalysis ([facility])   Result Value Ref Range    Color Urine Yellow     pH Urine 7.5 5 - 9 pH    Specific Gravity Urine 1.015 1.005 - 1.025    Protein Urine 0 0.01 mg/dL    Glucose Urine 0     Ketones Urine 0     Leukocyte Esterase Urine Trace (A)     Blood Urine Trace-Intact (A)     Nitrite Urine Neg NEG    Bilirubin Urine Dip 0     Urobilinogen Urine 0.2 0.2 - 1.0 EU/dL    WBC Urine 0-3 0 - 3    RBC Urine 0-3 0 - 3    Epithelial Cells Rare     Crystal Urine Moderate     Bacteria Urine Rare     Mucous Urine 0     Casts/LPF 0        Lower urinary tract symptoms (LUTS)  Unlikely UTI.  Advised could consider returning to Dr Velasco for further evaluation  - Urinalysis ([facility])  - Urine Culture  Routine ([facility])  - VENOUS COLLECTION    Fatigue, " unspecified type  Uncertain cause.  Chronic.  Post-stroke vs other.  Recheck sed rate to help rule out PMR.  If normal consider neurology eval  - Urine Culture  Routine ([facility])  - Sed Rate Westergren ([facility])  - VENOUS COLLECTION    General weakness  See above  - Urine Culture  Routine ([facility])  - Sed Rate Westergren ([facility])  - VENOUS COLLECTION

## 2022-03-18 LAB
ANTIMICROBIAL SUSCEPTIBILITY: ABNORMAL
Lab: ABNORMAL
Lab: ABNORMAL
URINE CULTURE: ABNORMAL

## 2022-03-20 ENCOUNTER — OFFICE VISIT (OUTPATIENT)
Dept: URGENT CARE | Facility: URGENT CARE | Age: 82
End: 2022-03-20
Payer: COMMERCIAL

## 2022-03-20 VITALS
SYSTOLIC BLOOD PRESSURE: 155 MMHG | TEMPERATURE: 98.1 F | HEART RATE: 102 BPM | BODY MASS INDEX: 19.32 KG/M2 | WEIGHT: 105 LBS | OXYGEN SATURATION: 98 % | HEIGHT: 62 IN | DIASTOLIC BLOOD PRESSURE: 77 MMHG

## 2022-03-20 DIAGNOSIS — J30.9 ALLERGIC RHINITIS, UNSPECIFIED SEASONALITY, UNSPECIFIED TRIGGER: ICD-10-CM

## 2022-03-20 DIAGNOSIS — H61.21 IMPACTED CERUMEN OF RIGHT EAR: ICD-10-CM

## 2022-03-20 DIAGNOSIS — H65.02 ACUTE SEROUS OTITIS MEDIA OF LEFT EAR, RECURRENCE NOT SPECIFIED: Primary | ICD-10-CM

## 2022-03-20 PROCEDURE — 69209 REMOVE IMPACTED EAR WAX UNI: CPT | Mod: RT | Performed by: FAMILY MEDICINE

## 2022-03-20 PROCEDURE — 99213 OFFICE O/P EST LOW 20 MIN: CPT | Mod: 25 | Performed by: FAMILY MEDICINE

## 2022-03-20 NOTE — PROGRESS NOTES
Patient presents with:  Urgent Care: Pt in clinic to have eval for plugged ears.  Ear Problem       Subjective     July Huang is a 81 year old female who presents to clinic today for the following health issues:    HPI     Ear pain, Ears feels plugged     Onset of symptoms was in the last week symptom have worsened  Course of illness is worsening.    Severity moderate  Current and Associated symptoms: stuffy nose and ear pain bilateral   Denies fever, chills, cough - productive, wheezing, shortness of breath, headache, body aches, fatigue, nausea, vomiting and diarrhea  Treatment measures tried include Tylenol/ Fluids and Rest.  Predisposing factors include None, seasonal allergies, HX of chronic sinusitis and HX of recurrent ear feeling plugged         Past Medical History:   Diagnosis Date     Cancer of ovary (H)      Eye muscle weakness, left 2016     GERD (gastroesophageal reflux disease) 2009     H/O total hysterectomy with bilateral salpingo-oophorectomy (BSO)     ovarian cancer     Irregular heart beat     nl CT angiogram     Non-cardiac chest pain 2009     S/P coronary angiogram     negative     Stress 2010     Upper back pain 2016     Social History     Tobacco Use     Smoking status: Former Smoker     Quit date: 1994     Years since quittin.3     Smokeless tobacco: Never Used   Substance Use Topics     Alcohol use: Yes     Comment: ocas       Current Outpatient Medications   Medication Sig Dispense Refill     amLODIPine (NORVASC) 10 MG tablet Take 1 tablet (10 mg) by mouth daily 90 tablet 1     Ascorbic Acid (VITAMIN C PO) Take 1,000 mg by mouth daily        aspirin (ASA) 81 MG EC tablet Take 81 mg by mouth daily       calcium carbonate 500 mg, elemental, (OSCAL 500) 1250 (500 Ca) MG TABS tablet Take 500 mg by mouth       Cholecalciferol (VITAMIN D) 1000 UNIT capsule Take 1 capsule by mouth daily. 5000 unit 2-3 times per week       clonazePAM  "(KLONOPIN) 0.5 MG tablet TAKE 1/2 TO 1 TABLET(0.25 TO 0.5 MG) BY MOUTH AT BEDTIME 30 tablet 2     CRANBERRY EXTRACT PO Take 1 capsule by mouth       fluticasone (FLONASE) 50 MCG/ACT nasal spray SHAKE LIQUID AND USE 2 SPRAYS IN EACH NOSTRIL EVERY DAY       Lactobacillus Rhamnosus, GG, ( PROBIOTIC DIGESTIVE CARE) CAPS Take 1 capsule by mouth        Magnesium Bisglycinate (MAG GLYCINATE) 100 MG TABS Take 100 mg by mouth       melatonin 5 MG tablet Take 5 mg by mouth       progesterone (PROMETRIUM) 100 MG capsule progesterone micronized 100 mg capsule   TAKE ONE CAPSULE BY MOUTH EVERY NIGHT AT BEDTIME       valACYclovir (VALTREX) 1000 mg tablet valacyclovir 1 gram tablet       Allergies   Allergen Reactions     Amiloride/Hydrochlorothiazide Palpitations     Cozaar Visual Disturbance     Diltiazem Palpitations     Lisinopril Palpitations     Meclizine Palpitations     Metoprolol Palpitations     Tetracyclines Nausea and Vomiting             ROS are negative, except as otherwise noted HPI      Objective    BP (!) 155/77   Pulse 102   Temp 98.1  F (36.7  C) (Temporal)   Ht 1.562 m (5' 1.5\")   Wt 47.6 kg (105 lb)   SpO2 98%   BMI 19.52 kg/m    Body mass index is 19.52 kg/m .  Physical Exam     GENERAL:  interactive. No acute distress.  HEENT: Normocephalic, atraumatic. PEERRLA, EOMI.  Scleras, lids and conjunctivae normal. Pinnas clear, right ear-canal blocked with cerumen, after irrigation, canal clear, nontender, TM normal .  Left ear-TM dull, air fluid levels, canal clear, nontender  Nose with thick discharge. Oropharynx moist and clear.    NECK: supple and free of adenopathy   CHEST:  clear, no wheezing or rales. Normal symmetric air entry throughout both lung fields.   HEART:  S1 and S2 normal, no murmurs, clicks, gallops or rubs. Regular rate and rhythm.  SKIN:  No rashes    Diagnostic Test Results:  Labs reviewed in Epic  No results found for any visits on 03/20/22.        Cerumen removal-[facility] staff irrigated " right ear in usual fashion  and cerumen cleared from right ear canal    ASSESSMENT/PLAN:      ICD-10-CM    1. Acute serous otitis media of left ear, recurrence not specified  H65.02    2. Impacted cerumen of right ear  H61.21 MD REMOVAL IMPACTED CERUMEN IRRIGATION/LVG UNILAT   3. Allergic rhinitis, unspecified seasonality, unspecified trigger  J30.9              Reviewed medication instructions and side effects. Follow up if experiences side effects.     I reviewed supportive care, otc meds to use if needed, expected course, and signs of concern.  Follow up as needed or if she does not improve within  1-2 days or if worsens in any way.  Reviewed red flag symptoms and is to go to the ER if experiences any of these.     The use of Dragon/PowerMic dictation services may have been used to construct the content in this note; any grammatical or spelling errors are non-intentional. Please contact the author of this note directly if you are in need of any clarification.      On the day of the encounter, time spend on chart review, patient visit, review of testing, documentation was 20  minutes          Patient Instructions       Start over the counter ear wax in both ears 3 times a week at bedtime     For fluid behind the ear drum, change your  Flonase to 1 spray in each nostril in am and bedtime    To help with congestion, start claritin 10 mg a day      Follow up as needed or if your symptoms worsen in any way.     Follow up with your primary care provider or clinic in about 1 week if your symptoms do not improve     Sheba Swenson MD      Serous otitis media, fluid behind the ear drum    Earaches can happen without an infection. They can occur when air and fluid build up behind the eardrum. They may cause a feeling of fullness and discomfort. They may also impair hearing. This is called otitis media with effusion (OME) or serous otitis media. It means there is fluid in the middle ear. It is not the same as acute otitis  media, which is often from an infection.  OME can happen when you have a cold if congestion blocks the passage that drains the middle ear. This passage is called the eustachian tube. OME may also occur with nasal allergies or after a bacterial infection in the middle ear. Other causes are:    Trauma    Improper cleaning of wax from the ear    Bacterial infection of the mastoid bone (mastoiditis)    Tumor    Jaw pain    Changes in pressure, such as from flying or scuba diving    The pain or discomfort may come and go. You may hear clicking or popping sounds when you chew or swallow. You may feel that your balance is off. Or you may hear ringing in the ear.  It often takes from several weeks up to 3 months for the fluid to clear on its own. Oral pain relievers and ear drops help if there is pain. Decongestants and antihistamines sometimes help. Antibiotics don't help since there is no infection. Your healthcare provider may give you a nasal spray to help reduce swelling in the nose and eustachian tube. This can allow the ear to drain.  If your OME doesn't get better after 3 months, surgery may be used to drain the fluid. A small tube may also be put in the eardrum to help with drainage.  Because the middle ear fluid can become infected, watch for signs of an infection. These may develop later. They may include increased ear pain, fever, or drainage from the ear.  Home care  These home-care tips will help you take care of yourself:    You may use over-the-counter medicine as directed by your healthcare provider to control pain, unless medicine was prescribed. If you have chronic liver or kidney disease or ever had a stomach ulcer or GI bleeding, talk with your healthcare provider before using any medicines.    Aspirin should never be used in anyone younger than age 18 who has a fever. It may cause severe liver damage.    Ask your healthcare provider if you may use over-the-counter decongestants such as phenylephrine or  pseudoephedrine. Keep in mind they are not always helpful.    Talk with your healthcare provider about using nasal spray decongestants. Don't use them for more than 3 days, or as directed by your healthcare provider. Longer use can make congestion worse. Prescription nasal sprays from your healthcare provider don't often have such restrictions.    Antihistamines may help if you are also having allergy symptoms.    You may use medicines such as guaifenesin to thin mucus and help with drainage.  Follow-up care  Follow up with your healthcare provider or as advised if you are not feeling better after 3 days.  When to seek medical advice  Call your healthcare provider right away if any of these occur:    Ear pain that gets worse or that does not start to get better     Fever of 100.4 F (38 C) or higher, or as directed by your healthcare provider    Fluid or blood draining from the ear    Headache or sinus pain    Stiff neck    Unusual drowsiness or confusion  Kathe last reviewed this educational content on 12/1/2019 2000-2021 [employer]. All rights reserved. This information is not intended as a substitute for professional medical care. Always follow your healthcare professional's instructions.                Patient Education     Impacted Earwax     Inner ear structures including ear canal and eardrum.   Impacted earwax is a buildup of the natural wax in the ear. Impacted earwax is very common. It can cause symptoms such as hearing loss. It can also make it hard for a healthcare provider to check your ear.   Understanding earwax  Tiny glands in your ear make substances that combine with dead skin cells to form earwax. Earwax helps protect your ear canal from water, dirt, infection, and injury. Over time, earwax travels from the inner part of your ear canal to the entrance of the canal. Then it falls away naturally. But in some cases, it can t travel to the entrance of the canal. This may be because  of a health condition or objects put in the ear. With age, earwax tends to become harder and less fluid. Older adults are more likely to have problems with earwax buildup.   What causes impacted earwax?  Earwax can build up because of many health conditions. Some cause a physical blockage. Others cause too much earwax to be made. Health conditions that can cause earwax buildup include:     Bony blockage in the ear (osteoma or exostoses)    Infections, such as an outer ear infection (external otitis)    Skin disease, such as eczema    Autoimmune diseases, such as lupus    A narrowed ear canal from birth, chronic inflammation, or injury    Too much earwax because of injury    Too much earwax because of  water in the ear canal  Putting objects in the ear again and again can also cause impacted earwax. For example, putting cotton swabs in the ear may push the wax deeper into the ear. Over time, this may cause blockage. Hearing aids, swimming plugs, and swim molds can also cause this problem when used again and again.   In some cases, the cause of impacted earwax is not known.  Symptoms of impacted earwax  Excess earwax often does not cause any symptoms, unless there is a large amount of buildup. Then it may cause symptoms such as:     Hearing loss    Earache    Sense of ear fullness    Itching in the ear    Odor from the ear    Ear drainage    Dizziness    Ringing in the ears    Cough  Treatment for impacted earwax  If you don t have symptoms, you may not need treatment. Often the earwax goes away on its own with time. If you have symptoms, you may have 1 or more treatments such as:     Ear drops to soften the earwax. This helps it leave the ear over time.    Rinsing the ear canal with water. This is done in a healthcare provider s office.    Removing the earwax with small tools. This is also done in a provider s office.  In rare cases, some treatments for earwax removal may cause complications such as:    Outer ear  infection    Earache    Short-term hearing loss    Dizziness    Water trapped in the ear canal    Hole in the eardrum    Ringing in the ears    Bleeding from the ear  Talk with your healthcare provider about which risks apply most to you.  Healthcare providers don't advise using ear candles or ear vacuum kits. These methods are not shown to work and may cause problems.   Preventing impacted earwax  You may not be able to prevent impacted earwax if you have a health condition that causes it, such as eczema. In other cases, you may be able to prevent earwax buildup by:     Using ear drops once a week    Having a regular ear cleaning about every 6 months    Not using cotton swabs in the ear  When to call the healthcare provider  Call your healthcare provider right away if you have:     Symptoms of impacted earwax    Severe symptoms after earwax removal, such as bleeding or severe ear pain    Kathe last reviewed this educational content on 9/1/2019 2000-2021 [employer]. All rights reserved. This information is not intended as a substitute for professional medical care. Always follow your healthcare professional's instructions.           Patient Education     Earwax Removal    The ear canal makes earwax from the canal s lining. The ears make wax to lubricate and protect the ear canal. The ear canal is the tube that connects the middle ear to the outside of the ear. The wax protects the ear from bacteria, infection, and damage from water or trauma.   The wax that forms in the canal naturally moves toward the outside of the ear and falls out. In some cases, the ear may make too much wax. If the wax causes problems or keeps the healthcare provider from seeing into the ear, the extra wax may be removed.   Too much wax can affect your hearing. It can cause itching. In rare cases, it can be painful. Earwax should not be removed unless it is causing a problem. You should not stick objects such as cotton  swabs into your ear to remove wax unless told to do so by your healthcare provider.   Healthcare providers can remove earwax safely. Often flushing the earwax out with water (irrigation) and syringing will help. Sometimes devices or suction are used to remove the wax. It is important to stay still during the procedure to prevent damage to the ear canal. But removing earwax generally doesn t hurt. You won't need anesthesia or pain medicine when the provider removes the earwax.   A number of conditions lead to earwax buildup. These include some skin problems, a narrow ear canal, or ears that make too much earwax. Using cotton swabs in the canal pushes earwax deeper into the ear and helps lead to the buildup of earwax.   Home care    The healthcare provider may advise mineral oil or an over-the-counter (OTC) eardrop to use at home to soften the earwax. He or she may also advise a home irrigation or syringing kit. Use these products only if the provider advises them. Carefully follow the instructions given.    Don t use mineral oil or OTC eardrops if you might have an ear infection or a burst (ruptured) eardrum. Tell your provider right away if you have diabetes or an immune disorder.    Don t use cotton swabs in your ears. Cotton swabs may push wax deeper into the ear canal or damage the eardrum. Use cotton gauze or a wet washcloth to gently remove wax on the outside of the ear and around the opening to the ear canal.    Don't use any probing device or object such as cotton-tipped swabs or adrianna pins to clean the inside of your ears.    Don t use ear candles to clean your ears. Candling can be dangerous. It can burn the ear canal. It can also make the condition worse instead of better.    Don t use cold water to rinse the ear. This will make you dizzy. If your provider tells you to rinse your ear, use only warm water or follow his or her instructions.    Check the ear for signs of infection or irritation (listed below  "under \"When to get medical advice\").  Steps for using eardrops  1. Warm the medicine bottle by rubbing it between your hands for a few minutes.  2. Lie down on your side, with the affected ear up.  3. Place the advised number of drops in the ear. Wet a cotton ball with the medicine. Gently put the cotton ball into the ear opening.    Follow-up care  Follow up with your healthcare provider, or as advised.   When to get medical advice   Call your healthcare provider right away if you have:     Ear pain that gets worse    Fever of 100.4F F (38 C) or higher, or as directed by your healthcare provider    Worsening wax buildup    Severe pain, dizziness, or nausea    Bleeding from the ear    Hearing problems    Signs of irritation from the eardrops, such as burning, stinging, or swelling and soreness    Foul-smelling fluid draining from the ear    Signs of infection such as outer ear swelling, redness, or soreness    Headache, neck pain, or stiff neck  Kathe last reviewed this educational content on 6/1/2020 2000-2021 [employer]. All rights reserved. This information is not intended as a substitute for professional medical care. Always follow your healthcare professional's instructions.                         "

## 2022-03-20 NOTE — PATIENT INSTRUCTIONS
Start over the counter ear wax in both ears 3 times a week at bedtime     For fluid behind the ear drum, change your  Flonase to 1 spray in each nostril in am and bedtime    To help with congestion, start claritin 10 mg a day      Follow up as needed or if your symptoms worsen in any way.     Follow up with your primary care provider or clinic in about 1 week if your symptoms do not improve     Sheba Swenson MD      Serous otitis media, fluid behind the ear drum    Earaches can happen without an infection. They can occur when air and fluid build up behind the eardrum. They may cause a feeling of fullness and discomfort. They may also impair hearing. This is called otitis media with effusion (OME) or serous otitis media. It means there is fluid in the middle ear. It is not the same as acute otitis media, which is often from an infection.  OME can happen when you have a cold if congestion blocks the passage that drains the middle ear. This passage is called the eustachian tube. OME may also occur with nasal allergies or after a bacterial infection in the middle ear. Other causes are:    Trauma    Improper cleaning of wax from the ear    Bacterial infection of the mastoid bone (mastoiditis)    Tumor    Jaw pain    Changes in pressure, such as from flying or scuba diving    The pain or discomfort may come and go. You may hear clicking or popping sounds when you chew or swallow. You may feel that your balance is off. Or you may hear ringing in the ear.  It often takes from several weeks up to 3 months for the fluid to clear on its own. Oral pain relievers and ear drops help if there is pain. Decongestants and antihistamines sometimes help. Antibiotics don't help since there is no infection. Your healthcare provider may give you a nasal spray to help reduce swelling in the nose and eustachian tube. This can allow the ear to drain.  If your OME doesn't get better after 3 months, surgery may be used to drain the fluid. A  small tube may also be put in the eardrum to help with drainage.  Because the middle ear fluid can become infected, watch for signs of an infection. These may develop later. They may include increased ear pain, fever, or drainage from the ear.  Home care  These home-care tips will help you take care of yourself:    You may use over-the-counter medicine as directed by your healthcare provider to control pain, unless medicine was prescribed. If you have chronic liver or kidney disease or ever had a stomach ulcer or GI bleeding, talk with your healthcare provider before using any medicines.    Aspirin should never be used in anyone younger than age 18 who has a fever. It may cause severe liver damage.    Ask your healthcare provider if you may use over-the-counter decongestants such as phenylephrine or pseudoephedrine. Keep in mind they are not always helpful.    Talk with your healthcare provider about using nasal spray decongestants. Don't use them for more than 3 days, or as directed by your healthcare provider. Longer use can make congestion worse. Prescription nasal sprays from your healthcare provider don't often have such restrictions.    Antihistamines may help if you are also having allergy symptoms.    You may use medicines such as guaifenesin to thin mucus and help with drainage.  Follow-up care  Follow up with your healthcare provider or as advised if you are not feeling better after 3 days.  When to seek medical advice  Call your healthcare provider right away if any of these occur:    Ear pain that gets worse or that does not start to get better     Fever of 100.4 F (38 C) or higher, or as directed by your healthcare provider    Fluid or blood draining from the ear    Headache or sinus pain    Stiff neck    Unusual drowsiness or confusion  [employer] last reviewed this educational content on 12/1/2019 2000-2021 [employer]. All rights reserved. This information is not intended as a  substitute for professional medical care. Always follow your healthcare professional's instructions.                Patient Education     Impacted Earwax     Inner ear structures including ear canal and eardrum.   Impacted earwax is a buildup of the natural wax in the ear. Impacted earwax is very common. It can cause symptoms such as hearing loss. It can also make it hard for a healthcare provider to check your ear.   Understanding earwax  Tiny glands in your ear make substances that combine with dead skin cells to form earwax. Earwax helps protect your ear canal from water, dirt, infection, and injury. Over time, earwax travels from the inner part of your ear canal to the entrance of the canal. Then it falls away naturally. But in some cases, it can t travel to the entrance of the canal. This may be because of a health condition or objects put in the ear. With age, earwax tends to become harder and less fluid. Older adults are more likely to have problems with earwax buildup.   What causes impacted earwax?  Earwax can build up because of many health conditions. Some cause a physical blockage. Others cause too much earwax to be made. Health conditions that can cause earwax buildup include:     Bony blockage in the ear (osteoma or exostoses)    Infections, such as an outer ear infection (external otitis)    Skin disease, such as eczema    Autoimmune diseases, such as lupus    A narrowed ear canal from birth, chronic inflammation, or injury    Too much earwax because of injury    Too much earwax because of  water in the ear canal  Putting objects in the ear again and again can also cause impacted earwax. For example, putting cotton swabs in the ear may push the wax deeper into the ear. Over time, this may cause blockage. Hearing aids, swimming plugs, and swim molds can also cause this problem when used again and again.   In some cases, the cause of impacted earwax is not known.  Symptoms of impacted earwax  Excess  earwax often does not cause any symptoms, unless there is a large amount of buildup. Then it may cause symptoms such as:     Hearing loss    Earache    Sense of ear fullness    Itching in the ear    Odor from the ear    Ear drainage    Dizziness    Ringing in the ears    Cough  Treatment for impacted earwax  If you don t have symptoms, you may not need treatment. Often the earwax goes away on its own with time. If you have symptoms, you may have 1 or more treatments such as:     Ear drops to soften the earwax. This helps it leave the ear over time.    Rinsing the ear canal with water. This is done in a healthcare provider s office.    Removing the earwax with small tools. This is also done in a provider s office.  In rare cases, some treatments for earwax removal may cause complications such as:    Outer ear infection    Earache    Short-term hearing loss    Dizziness    Water trapped in the ear canal    Hole in the eardrum    Ringing in the ears    Bleeding from the ear  Talk with your healthcare provider about which risks apply most to you.  Healthcare providers don't advise using ear candles or ear vacuum kits. These methods are not shown to work and may cause problems.   Preventing impacted earwax  You may not be able to prevent impacted earwax if you have a health condition that causes it, such as eczema. In other cases, you may be able to prevent earwax buildup by:     Using ear drops once a week    Having a regular ear cleaning about every 6 months    Not using cotton swabs in the ear  When to call the healthcare provider  Call your healthcare provider right away if you have:     Symptoms of impacted earwax    Severe symptoms after earwax removal, such as bleeding or severe ear pain    [employer] last reviewed this educational content on 9/1/2019 2000-2021 [employer]. All rights reserved. This information is not intended as a substitute for professional medical care. Always follow your  healthcare professional's instructions.           Patient Education     Earwax Removal    The ear canal makes earwax from the canal s lining. The ears make wax to lubricate and protect the ear canal. The ear canal is the tube that connects the middle ear to the outside of the ear. The wax protects the ear from bacteria, infection, and damage from water or trauma.   The wax that forms in the canal naturally moves toward the outside of the ear and falls out. In some cases, the ear may make too much wax. If the wax causes problems or keeps the healthcare provider from seeing into the ear, the extra wax may be removed.   Too much wax can affect your hearing. It can cause itching. In rare cases, it can be painful. Earwax should not be removed unless it is causing a problem. You should not stick objects such as cotton swabs into your ear to remove wax unless told to do so by your healthcare provider.   Healthcare providers can remove earwax safely. Often flushing the earwax out with water (irrigation) and syringing will help. Sometimes devices or suction are used to remove the wax. It is important to stay still during the procedure to prevent damage to the ear canal. But removing earwax generally doesn t hurt. You won't need anesthesia or pain medicine when the provider removes the earwax.   A number of conditions lead to earwax buildup. These include some skin problems, a narrow ear canal, or ears that make too much earwax. Using cotton swabs in the canal pushes earwax deeper into the ear and helps lead to the buildup of earwax.   Home care    The healthcare provider may advise mineral oil or an over-the-counter (OTC) eardrop to use at home to soften the earwax. He or she may also advise a home irrigation or syringing kit. Use these products only if the provider advises them. Carefully follow the instructions given.    Don t use mineral oil or OTC eardrops if you might have an ear infection or a burst (ruptured) eardrum.  "Tell your provider right away if you have diabetes or an immune disorder.    Don t use cotton swabs in your ears. Cotton swabs may push wax deeper into the ear canal or damage the eardrum. Use cotton gauze or a wet washcloth to gently remove wax on the outside of the ear and around the opening to the ear canal.    Don't use any probing device or object such as cotton-tipped swabs or adrianna pins to clean the inside of your ears.    Don t use ear candles to clean your ears. Candling can be dangerous. It can burn the ear canal. It can also make the condition worse instead of better.    Don t use cold water to rinse the ear. This will make you dizzy. If your provider tells you to rinse your ear, use only warm water or follow his or her instructions.    Check the ear for signs of infection or irritation (listed below under \"When to get medical advice\").  Steps for using eardrops  1. Warm the medicine bottle by rubbing it between your hands for a few minutes.  2. Lie down on your side, with the affected ear up.  3. Place the advised number of drops in the ear. Wet a cotton ball with the medicine. Gently put the cotton ball into the ear opening.    Follow-up care  Follow up with your healthcare provider, or as advised.   When to get medical advice   Call your healthcare provider right away if you have:     Ear pain that gets worse    Fever of 100.4F F (38 C) or higher, or as directed by your healthcare provider    Worsening wax buildup    Severe pain, dizziness, or nausea    Bleeding from the ear    Hearing problems    Signs of irritation from the eardrops, such as burning, stinging, or swelling and soreness    Foul-smelling fluid draining from the ear    Signs of infection such as outer ear swelling, redness, or soreness    Headache, neck pain, or stiff neck  [employer] last reviewed this educational content on 6/1/2020 2000-2021 [employer]. All rights reserved. This information is not intended as a " substitute for professional medical care. Always follow your healthcare professional's instructions.

## 2022-04-13 ENCOUNTER — OFFICE VISIT (OUTPATIENT)
Dept: FAMILY MEDICINE | Facility: CLINIC | Age: 82
End: 2022-04-13

## 2022-04-13 VITALS — DIASTOLIC BLOOD PRESSURE: 78 MMHG | SYSTOLIC BLOOD PRESSURE: 146 MMHG | OXYGEN SATURATION: 98 %

## 2022-04-13 DIAGNOSIS — M54.32 SCIATICA OF LEFT SIDE: ICD-10-CM

## 2022-04-13 DIAGNOSIS — R39.9 LOWER URINARY TRACT SYMPTOMS (LUTS): ICD-10-CM

## 2022-04-13 DIAGNOSIS — M43.17 SPONDYLOLISTHESIS OF LUMBOSACRAL REGION: Primary | ICD-10-CM

## 2022-04-13 LAB
AMORPHOUS PHOSPHATES: ABNORMAL
BACTERIA URINE: ABNORMAL
BILIRUB UR QL STRIP: 0
BLOOD URINE DIP: 0
CASTS/LPF: ABNORMAL
COLOR UR: YELLOW
CRYSTAL URINE: ABNORMAL
EPITHELIAL CELLS - QUEST: ABNORMAL
GLUCOSE UR STRIP-MCNC: 0 MG/DL
KETONES UR QL STRIP: 0
LEUKOCYTE ESTERASE URINE DIP: ABNORMAL
MUCOUS URINE: ABNORMAL
NITRITE UR QL STRIP: ABNORMAL
PH UR STRIP: 7 PH (ref 5–9)
PROT UR QL: 0 MG/DL (ref ?–0.01)
RBC URINE: 0 (ref 0–3)
SP GR UR STRIP: 1.01 (ref 1–1.02)
UROBILINOGEN UR QL STRIP: 0.2 EU/DL (ref 0.2–1)
WBC URINE: 0 (ref 0–3)

## 2022-04-13 PROCEDURE — 99214 OFFICE O/P EST MOD 30 MIN: CPT | Performed by: FAMILY MEDICINE

## 2022-04-13 PROCEDURE — 81003 URINALYSIS AUTO W/O SCOPE: CPT | Performed by: FAMILY MEDICINE

## 2022-04-13 RX ORDER — HYDROCODONE BITARTRATE AND ACETAMINOPHEN 5; 325 MG/1; MG/1
TABLET ORAL
Qty: 30 TABLET | Refills: 0 | Status: SHIPPED | OUTPATIENT
Start: 2022-04-13 | End: 2022-05-10

## 2022-04-13 NOTE — LETTER
Opioid / Opioid Plus Controlled Substance Agreement    This is an agreement between you and your provider about the safe and appropriate use of controlled substance/opioids prescribed by your care team. Controlled substances are medicines that can cause physical and mental dependence (abuse).    There are strict laws about having and using these medicines. We here at [facility] are committing to working with you in your efforts to get better. To support you in this work, we ll help you schedule regular office appointments for medicine refills. If we must cancel or change your appointment for any reason, we ll make sure you have enough medicine to last until your next appointment.     As a Provider, I will:    Listen carefully to your concerns and treat you with respect.     Recommend a treatment plan that I believe is in your best interest. This plan may involve therapies other than opioid pain medication.     Talk with you often about the possible benefits, and the risk of harm of any medicine that we prescribe for you.     Provide a plan on how to taper (discontinue or go off) using this medicine if the decision is made to stop its use.    As a Patient, I understand that opioid(s):     Are a controlled substance prescribed by my care team to help me function or work and manage my condition(s).     Are strong medicines and can cause serious side effects such as:    Drowsiness, which can seriously affect my driving ability    A lower breathing rate, enough to cause death    Harm to my thinking ability     Depression     Abuse of and addiction to this medicine    Need to be taken exactly as prescribed. Combining opioids with certain medicines or chemicals (such as illegal drugs, sedatives, sleeping pills, and benzodiazepines) can be dangerous or even fatal. If I stop opioids suddenly, I may have severe withdrawal symptoms.    Do not work for all types of pain nor for all patients. If they re not helpful, I may  be asked to stop them.        The risks, benefits and side effects of these medicine(s) were explained to me. I agree that:  1. I will take part in other treatments as advised by my care team. This may be psychiatry or counseling, physical therapy, behavioral therapy, group treatment or a referral to a specialist.     2. I will keep all my appointments. I understand that this is part of the monitoring of opioids. My care team may require an office visit for EVERY opioid/controlled substance refill. If I miss appointments or don t follow instructions, my care team may stop my medicine.    3. I will take my medicines as prescribed. I will not change the dose or schedule unless my care team tells me to. There will be no refills if I run out early.     4. I may be asked to come to the clinic and complete a urine drug test or complete a pill count at any time. If I don t give a urine sample or participate in a pill count, the care team may stop my medicine.    5. I will only receive prescriptions from this clinic for chronic pain. If I am treated by another provider for acute pain issues, I will tell them that I am taking opioid pain medication for chronic pain and that I have a treatment agreement with this provider. I will inform my [facility] care team within one business day if I am given a prescription for any pain medication by another healthcare provider. My [facility] care team can contact other providers and pharmacists about my use of any medicines.    6. It is up to me to make sure that I don t run out of my medicines on weekends or holidays. If my care team is willing to refill my opioid prescription without a visit, I must request refills only during office hours. Refills may take up to 3 business days to process. I will use one pharmacy to fill all my opioid and other controlled substance prescriptions. I will notify the clinic about any changes to my insurance or medication  availability.    7. I am responsible for my prescriptions. If the medicine/prescription is lost, stolen or destroyed, it will not be replaced. I also agree not to share controlled substance medicines with anyone.    8. I am aware I should not use any illegal or recreational drugs. I agree not to drink alcohol unless my care team says I can.       9. If I enroll in the [place] [facility] program, I will tell my care team prior to my next refill.     10. I will tell my care team right away if I become pregnant, have a new medical problem treated outside of my regular clinic, or have a change in my medications.    11. I understand that this medicine can affect my thinking, judgment and reaction time. Alcohol and drugs affect the brain and body, which can affect the safety of my driving. Being under the influence of alcohol or drugs can affect my decision-making, behaviors, personal safety, and the safety of others. Driving while impaired (DWI) can occur if a person is driving, operating, or in physical control of a car, motorcycle, boat, snowmobile, ATV, motorbike, off-road vehicle, or any other motor vehicle ([place] Statute 169A.20). I understand the risk if I choose to drive or operate any vehicle or machinery.    I understand that if I do not follow any of the conditions above, my prescriptions or treatment may be stopped or changed.          Opioids  What You Need to Know    What are opioids?   Opioids are pain medicines that must be prescribed by a doctor. They are also known as narcotics.     Examples are:   1. morphine (MS Contin, Mimi)  2. oxycodone (Oxycontin)  3. oxycodone and acetaminophen (Percocet)  4. hydrocodone and acetaminophen (Vicodin, Norco)   5. fentanyl patch (Duragesic)   6. hydromorphone (Dilaudid)   7. methadone  8. codeine (Tylenol #3)     What do opioids do well?   Opioids are best for severe short-term pain such as after a surgery or injury. They may work well for cancer pain. They may  help some people with long-lasting (chronic) pain.     What do opioids NOT do well?   Opioids never get rid of pain entirely, and they don t work well for most patients with chronic pain. Opioids don t reduce swelling, one of the causes of pain.                                    Other ways to manage chronic pain and improve function include:       Treat the health problem that may be causing pain    Anti-inflammation medicines, which reduce swelling and tenderness, such as ibuprofen (Advil, Motrin) or naproxen (Aleve)    Acetaminophen (Tylenol)    Antidepressants and anti-seizure medicines, especially for nerve pain    Topical treatments such as patches or creams    Injections or nerve blocks    Chiropractic or osteopathic treatment    Acupuncture, massage, deep breathing, meditation, visual imagery, aromatherapy    Use heat or ice at the pain site    Physical therapy     Exercise    Stop smoking    Take part in therapy       Risks and side effects     Talk to your doctor before you start or decide to keep taking opioids. Possible side effects include:      Lowering your breathing rate enough to cause death    Overdose, including death, especially if taking higher than prescribed doses    Worse depression symptoms; less pleasure in things you usually enjoy    Feeling tired or sluggish    Slower thoughts or cloudy thinking    Being more sensitive to pain over time; pain is harder to control    Trouble sleeping or restless sleep    Changes in hormone levels (for example, less testosterone)    Changes in sex drive or ability to have sex    Constipation    Unsafe driving    Itching and sweating    Dizziness    Nausea, throwing up and dry mouth    What else should I know about opioids?    Opioids may lead to dependence, tolerance, or addiction.      Dependence means that if you stop or reduce the medicine too quickly, you will have withdrawal symptoms. These include loose poop (diarrhea), jitters, flu-like symptoms,  nervousness and tremors. Dependence is not the same as addiction.                       Tolerance means needing higher doses over time to get the same effect. This may increase the chance of serious side effects.      Addiction is when people improperly use a substance that harms their body, their mind or their relations with others. Use of opiates can cause a relapse of addiction if you have a history of drug or alcohol abuse.      People who have used opioids for a long time may have a lower quality of life, worse depression, higher levels of pain and more visits to doctors.    You can overdose on opioids. Take these steps to lower your risk of overdose:    1. Recognize the signs:  Signs of overdose include decrease or loss of consciousness (blackout), slowed breathing, trouble waking up and blue lips. If someone is worried about overdose, they should call 911.    2. Talk to your doctor about Narcan (naloxone).   If you are at risk for overdose, you may be given a prescription for Narcan. This medicine very quickly reverses the effects of opioids.   If you overdose, a friend or family member can give you Narcan while waiting for the ambulance. They need to know the signs of overdose and how to give Narcan.     3. Don't use alcohol or street drugs.   Taking them with opioids can cause death.    4. Do not take any of these medicines unless your doctor says it s OK. Taking these with opioids can cause death:    Benzodiazepines, such as lorazepam (Ativan), alprazolam (Xanax) or diazepam (Valium)    Muscle relaxers, such as cyclobenzaprine (Flexeril)    Sleeping pills like zolpidem (Ambien)     Other opioids      How to keep you and other people safe while taking opioids:    1. Never share your opioids with others.  Opioid medicines are regulated by the Drug Enforcement Agency ([employer]). Selling or sharing medications is a criminal act.    2. Be sure to store opioids in a secure place, locked up if possible. Young children  can easily swallow them and overdose.    3. When you are traveling with your medicines, keep them in the original bottles. If you use a pill box, be sure you also carry a copy of your medicine list from your clinic or pharmacy.    4. Safe disposal of opioids    Most pharmacies have places to get rid of medicine, called disposal kiosks. Medicine disposal options are also available in every [facility]. Search your county and  medication disposal  to find more options. You can find more details at:  [email or web address]-[email or web address]-[email or web address]     I agree that my provider, clinic care team, and pharmacy may work with any city, state or federal law enforcement agency that investigates the misuse, sale, or other diversion of my controlled medicine. I will allow my provider to discuss my care with, or share a copy of, this agreement with any other treating provider, pharmacy or emergency room where I receive care.    I have read this agreement and have asked questions about anything I did not understand.    _______________________________________________________  Patient Signature - July Huang _____________________                   Date     _______________________________________________________  Provider Signature - Denton Barahona MD   _____________________                   Date     _______________________________________________________  Witness Signature (required if provider not present while patient signing)   _____________________                   Date

## 2022-04-13 NOTE — PROGRESS NOTES
Subjective     Virginia is a 81 year old patient who presents to clinic for evaluation.  She has persistent low back pain and left radicular symptoms.  She had lumbar MRI, has been seen by Dr Samuel and Dr Hogan.  Had NATIVIDAD that did not help.  Has been on prednisone and gabapentin and tramadol.  Some of these may have helped some but caused dizziness and other side effects.  She is requesting low dose Vicodin trial.  Feels this will help her and feels less concerned about side effects.  Reports was on opiate many years ago and did not cause any harm.      Was concerned about UTI but UA unremarkable.          Review of Systems   Constitutional, HEENT, cardiovascular, pulmonary, GI, , musculoskeletal, neuro, skin, endocrine and psych systems are negative, except as otherwise noted.      Objective    BP (!) 146/78   SpO2 98%     General: Well appearing, NAD  Psych: normal mood and affect        Results for orders placed or performed in visit on 04/13/22 (from the past 24 hour(s))   Urinalysis ([facility])   Result Value Ref Range    Color Urine Yellow (A)     pH Urine 7.0 5 - 9 pH    Specific Gravity Urine 1.015 1.005 - 1.025    Protein Urine 0 0.01 mg/dL    Glucose Urine 0     Ketones Urine 0     Leukocyte Esterase Urine      Blood Urine 0     Nitrite Urine Neg NEG    Bilirubin Urine Dip 0     Urobilinogen Urine 0.2 0.2 - 1.0 EU/dL    WBC Urine 0 0 - 3    RBC Urine 0 0 - 3    Epithelial Cells rare     Crystal Urine neg     Bacteria Urine neg     Mucous Urine 0-1 (A)     Casts/LPF neg     Amorphous Phosphates few        Spondylolisthesis of lumbosacral region  Discussed in detail.  Has failed multiple interventions.  Trial of low dose vicodin per request.  Potential harms and benefits discussed.  Contract signed.  Follow up in 2-4 weeks.  If side effects could consider nortriptyline or pregabalin trial.  Close follow up  - HYDROcodone-acetaminophen (NORCO) 5-325 MG tablet; Take 1/2 to 1 tablet daily as needed for severe back  and leg pain    Sciatica of left side  See above  - HYDROcodone-acetaminophen (NORCO) 5-325 MG tablet; Take 1/2 to 1 tablet daily as needed for severe back and leg pain    Lower urinary tract symptoms (LUTS)  Advise restarting topicals recommended by Dr Alexander.  Topical estrogen also can be considered.  - Urinalysis ([facility])    Follow up in 2-4 weeks

## 2022-04-20 ENCOUNTER — TRANSFERRED RECORDS (OUTPATIENT)
Dept: HEALTH INFORMATION MANAGEMENT | Facility: CLINIC | Age: 82
End: 2022-04-20
Payer: COMMERCIAL

## 2022-04-27 ENCOUNTER — OFFICE VISIT (OUTPATIENT)
Dept: FAMILY MEDICINE | Facility: CLINIC | Age: 82
End: 2022-04-27

## 2022-04-27 VITALS — HEART RATE: 67 BPM | SYSTOLIC BLOOD PRESSURE: 141 MMHG | OXYGEN SATURATION: 98 % | DIASTOLIC BLOOD PRESSURE: 71 MMHG

## 2022-04-27 DIAGNOSIS — I10 BENIGN ESSENTIAL HYPERTENSION: ICD-10-CM

## 2022-04-27 DIAGNOSIS — M43.17 SPONDYLOLISTHESIS OF LUMBOSACRAL REGION: ICD-10-CM

## 2022-04-27 DIAGNOSIS — G89.29 CHRONIC BILATERAL LOW BACK PAIN WITHOUT SCIATICA: Primary | ICD-10-CM

## 2022-04-27 DIAGNOSIS — M54.50 CHRONIC BILATERAL LOW BACK PAIN WITHOUT SCIATICA: Primary | ICD-10-CM

## 2022-04-27 PROBLEM — K11.7 XEROSTOMIA: Status: ACTIVE | Noted: 2022-03-24

## 2022-04-27 PROBLEM — M54.17 LUMBOSACRAL RADICULITIS: Status: ACTIVE | Noted: 2022-04-27

## 2022-04-27 PROCEDURE — 99214 OFFICE O/P EST MOD 30 MIN: CPT | Performed by: FAMILY MEDICINE

## 2022-04-27 PROCEDURE — 93050 ART PRESSURE WAVEFORM ANALYS: CPT | Performed by: FAMILY MEDICINE

## 2022-04-27 RX ORDER — GABAPENTIN 300 MG/1
CAPSULE ORAL
COMMUNITY
Start: 2022-04-04 | End: 2022-06-13

## 2022-04-27 RX ORDER — CEVIMELINE HYDROCHLORIDE 30 MG/1
CAPSULE ORAL
COMMUNITY
Start: 2022-04-20 | End: 2022-06-13

## 2022-04-27 NOTE — PROGRESS NOTES
Roxana Mcdowell is a 81 year old patient who presents to clinic for follow up of her chronic low back pain.    She has persistent low back pain and left radicular symptoms.  She had lumbar MRI, has been seen by Dr Samuel and Dr Hogan.  Had NATIVIDAD that did not help.  Has been on prednisone and gabapentin and tramadol.  Some of these may have helped some but caused dizziness and other side effects.  Last visit she requested a trial of low dose Vicodin as this had worked for her in the past.  She has been using about one tablet per day and is working well.  She is trying to figure out the ideal time to take it.  She has not noticed any side effects.          Review of Systems   Constitutional, HEENT, cardiovascular, pulmonary, gi and gu systems are negative, except as otherwise noted.      Objective    BP (!) 141/71   Pulse 67   SpO2 98%     General: Well appearing, NAD  Psych: normal mood and affect      Chronic bilateral low back pain without sciatica  Improved control with Hydrocodone.  She is working on finding optimal time to take it.  She has not demonstrated any red flag behaviors or concerns.  We are both still hopeful she may be able to taper off in the future.    Spondylolisthesis of lumbosacral region  See above    Benign essential hypertension  Slightly elevated, recheck at follow up  - CA ART PRESS WAVEFORM ANALYS CENTRAL ART PRESSURE    Follow up in 3 months

## 2022-05-01 NOTE — TELEPHONE ENCOUNTER
Patient calls reporting feeling poorly for past week. Got COVID testing done today at [facility]. Results not expected until at least Friday 11/12. Also plans to get tested tomorrow at [facility].   Is interested in looking into monoclonal antibodies and asks how to do this.   Also mentions forgot to take her BP med last night at 9pm and asks if should take tonights dose sooner?     Plan: gave info to access [facility]'s MNRAP site for monoclonal antibodies.   Advised may take BP med earlier tonight or wait for usual time.   Discussed signs and symptoms of concern and to ED if symptoms worsen.   Call clinic with any questions and to inform us if COVID test comes back positive..   Per verbalizes understanding and no further questions at this time.     [facility] does not have patient's COVID vaccines in their database. Patient reports has had 3 Pfizer COVID vaccines done at [facility] on the following dates:   1/9/21  3/2/21  10/19/21   EPIC immunization records were updated.   Has not had flu shot and plans to get this when feeling well again.  Trinh Salvador RN         Patient is a 45 year old female that was admitted for JADA. Incidentally found to have a left ventricular thrombus with contrast 2D echo. Patient is currently on apixaban for atrial fibrillation anticoagulation. Differentials include medication intolerance versus medication absorption issue, especially because patient presented with significant amount of vomiting. Elevated BMI (41 kg/m2) is no longer considered a limiting factor in DOAC use for AC per recent evidence online. After discussing with patient, she states that she had tried Xarelto and had a come x 3 days with it. She reports she absolutely does not want to use Coumadin due to the headaches and frequent monitoring it requires. With shared decision making, we have come to the conclusion of Lovenox for treatment of the LV thrombus.   - Recommend considering transition to weight based Lovenox tonight (12 hrs from the last dose of Eliquis) 1 mg /kg SC q 12 hrs  - Recommend repeat echocardiogram with contrast in 3 months with outpatient Cardiologist  - Follow up with Dr Yanez (outpatient Cardiologist) in 3-4 weeks

## 2022-05-06 ENCOUNTER — OFFICE VISIT (OUTPATIENT)
Dept: FAMILY MEDICINE | Facility: CLINIC | Age: 82
End: 2022-05-06

## 2022-05-06 VITALS
OXYGEN SATURATION: 98 % | TEMPERATURE: 97.9 F | DIASTOLIC BLOOD PRESSURE: 68 MMHG | SYSTOLIC BLOOD PRESSURE: 120 MMHG | HEART RATE: 82 BPM | RESPIRATION RATE: 16 BRPM | BODY MASS INDEX: 19.56 KG/M2 | WEIGHT: 105.2 LBS

## 2022-05-06 DIAGNOSIS — I10 BENIGN ESSENTIAL HYPERTENSION: ICD-10-CM

## 2022-05-06 DIAGNOSIS — R68.83 CHILLS (WITHOUT FEVER): Primary | ICD-10-CM

## 2022-05-06 PROCEDURE — 99214 OFFICE O/P EST MOD 30 MIN: CPT | Performed by: FAMILY MEDICINE

## 2022-05-06 NOTE — PROGRESS NOTES
Roxana Mcdowell is a 81 year old patient who presents to clinic for evaluation.  She reports intermittent chills ever since her stroke.  Has not figured out why they occur.  Bothersome but not debilitating.  No fever, pain.  No focal infectious symptoms.        Review of Systems   Constitutional, HEENT, cardiovascular, pulmonary, GI, , musculoskeletal, neuro, skin, endocrine and psych systems are negative, except as otherwise noted.      Objective    /68   Pulse 82   Temp 97.9  F (36.6  C)   Resp 16   Wt 47.7 kg (105 lb 3.2 oz)   SpO2 98%   BMI 19.56 kg/m      General: Well appearing, NAD  Psych: normal mood and affect        No results found for this or any previous visit (from the past 24 hour(s)).    Chills (without fever)  May be related to autonomic nervous dysfunction since stroke.  Will check BMP and TSH.  If normal, will likely not pursue further work up  - Basic Metabolic Panel (8) ([facility])  - TSH reflex to T4 ([facility])    Benign essential hypertension  At goal

## 2022-05-07 LAB
BUN SERPL-MCNC: 12 MG/DL (ref 8–27)
BUN/CREATININE RATIO: 18 (ref 12–28)
CALCIUM SERPL-MCNC: 9.4 MG/DL (ref 8.7–10.3)
CHLORIDE SERPLBLD-SCNC: 101 MMOL/L (ref 96–106)
CREAT SERPL-MCNC: 0.67 MG/DL (ref 0.57–1)
EGFR: 88 ML/MIN/1.73
GLUCOSE SERPL-MCNC: 92 MG/DL (ref 65–99)
POTASSIUM SERPL-SCNC: 3.8 MMOL/L (ref 3.5–5.2)
SODIUM SERPL-SCNC: 137 MMOL/L (ref 134–144)
TOTAL CO2: 21 MMOL/L (ref 20–29)
TSH BLD-ACNC: 3 UIU/ML (ref 0.45–4.5)

## 2022-05-09 ENCOUNTER — MYC MEDICAL ADVICE (OUTPATIENT)
Dept: FAMILY MEDICINE | Facility: CLINIC | Age: 82
End: 2022-05-09

## 2022-05-09 DIAGNOSIS — M43.17 SPONDYLOLISTHESIS OF LUMBOSACRAL REGION: ICD-10-CM

## 2022-05-09 DIAGNOSIS — M54.32 SCIATICA OF LEFT SIDE: ICD-10-CM

## 2022-05-09 NOTE — TELEPHONE ENCOUNTER
Patient is requesting refill on Norco  si/2-1 tab daily prn for severe back and leg pain. #30 tabs were ordered by Dr. Barahona 22.   Per mychart note, patient finds this helpful with her back pain and sleep. Tolerating well.   Last related visits: 22 and 22 with Dr. Barahona     Per MN :       Plan: routed to Dr. Barahona for review.  Trinh Salvador RN

## 2022-05-10 RX ORDER — HYDROCODONE BITARTRATE AND ACETAMINOPHEN 5; 325 MG/1; MG/1
TABLET ORAL
Qty: 30 TABLET | Refills: 0 | Status: SHIPPED | OUTPATIENT
Start: 2022-05-10 | End: 2022-06-07

## 2022-06-07 ENCOUNTER — MYC REFILL (OUTPATIENT)
Dept: FAMILY MEDICINE | Facility: CLINIC | Age: 82
End: 2022-06-07

## 2022-06-07 DIAGNOSIS — M54.32 SCIATICA OF LEFT SIDE: ICD-10-CM

## 2022-06-07 DIAGNOSIS — M43.17 SPONDYLOLISTHESIS OF LUMBOSACRAL REGION: ICD-10-CM

## 2022-06-07 RX ORDER — HYDROCODONE BITARTRATE AND ACETAMINOPHEN 5; 325 MG/1; MG/1
TABLET ORAL
Qty: 30 TABLET | Refills: 0 | Status: SHIPPED | OUTPATIENT
Start: 2022-06-07 | End: 2022-07-06

## 2022-06-07 NOTE — TELEPHONE ENCOUNTER
6/7/22 Patient sent the following refill request today attached to an old 5/9/22 mychart encounter. Moved message to this encounter to maintain chart order.      To: [facility] TRIAGE      From: Jamila Huang      Created: 6/7/2022 11:15 AM            Dr. Barahona I need a renewal of hydrocodone last one was dated 5/10/22. I m not sure how much notice I should send you. I believe you said 3 or 4 days b4 date. Is that how you would like me to keep notifying you? It has been most helpful with back pain. Thank you. Jamila Huang        Last related visit: 4/27/22 with Dr. Barahona for chronic low back pain   Controlled Substance Agreement:4/13/22   Last controlled substance fills per :         Plan: routed request to Dr. Barahona for review.  Trinh Salvador RN

## 2022-06-09 ENCOUNTER — LAB (OUTPATIENT)
Dept: URGENT CARE | Facility: URGENT CARE | Age: 82
End: 2022-06-09
Payer: COMMERCIAL

## 2022-06-09 DIAGNOSIS — Z20.822 CLOSE EXPOSURE TO 2019 NOVEL CORONAVIRUS: ICD-10-CM

## 2022-06-09 LAB — SARS-COV-2 RNA RESP QL NAA+PROBE: NEGATIVE

## 2022-06-09 PROCEDURE — U0003 INFECTIOUS AGENT DETECTION BY NUCLEIC ACID (DNA OR RNA); SEVERE ACUTE RESPIRATORY SYNDROME CORONAVIRUS 2 (SARS-COV-2) (CORONAVIRUS DISEASE [COVID-19]), AMPLIFIED PROBE TECHNIQUE, MAKING USE OF HIGH THROUGHPUT TECHNOLOGIES AS DESCRIBED BY CMS-2020-01-R: HCPCS

## 2022-06-09 PROCEDURE — U0005 INFEC AGEN DETEC AMPLI PROBE: HCPCS

## 2022-06-10 DIAGNOSIS — I10 BENIGN ESSENTIAL HYPERTENSION: ICD-10-CM

## 2022-06-10 RX ORDER — AMLODIPINE BESYLATE 10 MG/1
TABLET ORAL
Qty: 90 TABLET | Refills: 1 | Status: SHIPPED | OUTPATIENT
Start: 2022-06-10 | End: 2023-01-09 | Stop reason: DRUGHIGH

## 2022-06-10 NOTE — TELEPHONE ENCOUNTER
Amlodipine  LOV 5/6/22  Benign essential hypertension  At goal     BP Readings from Last 3 Encounters:   05/06/22 120/68   04/27/22 (!) 141/71   04/13/22 (!) 146/78     Component      Latest Ref Rng & Units 5/6/2022   Glucose      65 - 99 mg/dL 92   Urea Nitrogen      8 - 27 mg/dL 12   Creatinine      0.57 - 1.00 mg/dL 0.67   eGFR       >59 mL/min/1.73 88   BUN/Creatinine Ratio      12 - 28 18   Sodium      134 - 144 mmol/L 137   Potassium      3.5 - 5.2 mmol/L 3.8   Chloride      96 - 106 mmol/L 101   Total CO2      20 - 29 mmol/L 21   Calcium      8.7 - 10.3 mg/dL 9.4

## 2022-06-13 ENCOUNTER — OFFICE VISIT (OUTPATIENT)
Dept: FAMILY MEDICINE | Facility: CLINIC | Age: 82
End: 2022-06-13

## 2022-06-13 ENCOUNTER — MYC MEDICAL ADVICE (OUTPATIENT)
Dept: FAMILY MEDICINE | Facility: CLINIC | Age: 82
End: 2022-06-13

## 2022-06-13 VITALS
WEIGHT: 103 LBS | OXYGEN SATURATION: 98 % | DIASTOLIC BLOOD PRESSURE: 72 MMHG | HEART RATE: 88 BPM | SYSTOLIC BLOOD PRESSURE: 130 MMHG | BODY MASS INDEX: 19.15 KG/M2

## 2022-06-13 DIAGNOSIS — M54.32 SCIATICA OF LEFT SIDE: ICD-10-CM

## 2022-06-13 DIAGNOSIS — R39.9 LOWER URINARY TRACT SYMPTOMS (LUTS): Primary | ICD-10-CM

## 2022-06-13 DIAGNOSIS — R53.83 FATIGUE, UNSPECIFIED TYPE: ICD-10-CM

## 2022-06-13 DIAGNOSIS — M43.17 SPONDYLOLISTHESIS OF LUMBOSACRAL REGION: ICD-10-CM

## 2022-06-13 LAB
AMPHETAMINES (AMP) UR: NEGATIVE
BACTERIA URINE: ABNORMAL
BARBITURATES (BAR) UR: NEGATIVE
BENZODIAZEPINES (BZO) UR: NEGATIVE
BILIRUB UR QL STRIP: 0
BLOOD URINE DIP: 0
BUPRENORPHINE (BUP) UR: NEGATIVE
CANNABINOIDS (THC) UR: ABNORMAL
CASTS/LPF: ABNORMAL
COCAINE (COC) UR: NEGATIVE
COLOR UR: ABNORMAL
CRYSTAL URINE: ABNORMAL
EPITHELIAL CELLS - QUEST: ABNORMAL
GLUCOSE UR STRIP-MCNC: 0 MG/DL
KETONES UR QL STRIP: 0
LEUKOCYTE ESTERASE URINE DIP: ABNORMAL
MDMA UR: NEGATIVE
METHADONE (MTD) UR: NEGATIVE
METHAMPHETAMINE (METHA) UR: NEGATIVE
MORPH/OPIATES (MOP) UR: NEGATIVE
MUCOUS URINE: ABNORMAL
NITRITE UR QL STRIP: ABNORMAL
OXYCODONE (OXYCO) UR: NEGATIVE
PCP UR: NEGATIVE
PH UR STRIP: 7.5 PH (ref 5–9)
PROT UR QL: 0 MG/DL (ref ?–0.01)
RBC URINE: ABNORMAL (ref 0–3)
SP GR UR STRIP: 1.01 (ref 1–1.02)
SPECIMEN VALIDITY INTERNAL QC UR: ABNORMAL
SPECIMEN VALIDITY TEMPERATURE UR: ABNORMAL
SPECIMEN VALIDITY UR CREATININE: ABNORMAL MG/DL (ref 20–200)
SPECIMEN VALIDITY UR PH: 5 (ref 4–9)
SPECIMEN VALIDITY UR SPECIFIC GRAVITY: 1.01 (ref 1–1.02)
UROBILINOGEN UR QL STRIP: 0.2 EU/DL (ref 0.2–1)
WBC URINE: ABNORMAL (ref 0–3)

## 2022-06-13 PROCEDURE — 99214 OFFICE O/P EST MOD 30 MIN: CPT | Performed by: FAMILY MEDICINE

## 2022-06-13 PROCEDURE — 81003 URINALYSIS AUTO W/O SCOPE: CPT | Performed by: FAMILY MEDICINE

## 2022-06-13 PROCEDURE — 36415 COLL VENOUS BLD VENIPUNCTURE: CPT | Performed by: FAMILY MEDICINE

## 2022-06-13 PROCEDURE — 80306 DRUG TEST PRSMV INSTRMNT: CPT | Performed by: FAMILY MEDICINE

## 2022-06-13 RX ORDER — GABAPENTIN 100 MG/1
100 CAPSULE ORAL AT BEDTIME
Qty: 90 CAPSULE | Refills: 0 | Status: SHIPPED | OUTPATIENT
Start: 2022-06-13 | End: 2022-09-26

## 2022-06-13 ASSESSMENT — ANXIETY QUESTIONNAIRES
GAD7 TOTAL SCORE: 2
3. WORRYING TOO MUCH ABOUT DIFFERENT THINGS: SEVERAL DAYS
IF YOU CHECKED OFF ANY PROBLEMS ON THIS QUESTIONNAIRE, HOW DIFFICULT HAVE THESE PROBLEMS MADE IT FOR YOU TO DO YOUR WORK, TAKE CARE OF THINGS AT HOME, OR GET ALONG WITH OTHER PEOPLE: NOT DIFFICULT AT ALL
2. NOT BEING ABLE TO STOP OR CONTROL WORRYING: NOT AT ALL
1. FEELING NERVOUS, ANXIOUS, OR ON EDGE: SEVERAL DAYS
GAD7 TOTAL SCORE: 2
7. FEELING AFRAID AS IF SOMETHING AWFUL MIGHT HAPPEN: NOT AT ALL
6. BECOMING EASILY ANNOYED OR IRRITABLE: NOT AT ALL
5. BEING SO RESTLESS THAT IT IS HARD TO SIT STILL: NOT AT ALL

## 2022-06-13 ASSESSMENT — PATIENT HEALTH QUESTIONNAIRE - PHQ9
5. POOR APPETITE OR OVEREATING: NOT AT ALL
SUM OF ALL RESPONSES TO PHQ QUESTIONS 1-9: 3

## 2022-06-13 NOTE — PROGRESS NOTES
Roxana Mcdowell is a 81 year old patient who presents to clinic for evaluation.    Continues to get chills intermittently since her stroke.  Always wonders if she has a UTI.  Workup has been unrevealing.    She has persistent low back pain and left radicular symptoms.  She had lumbar MRI, has been seen by Dr Samuel and Dr Hogan.  Had NATIVIDAD that did not help.  Has been on prednisone and gabapentin and tramadol.  Some of these may have helped some but caused dizziness and other side effects.  Was started on Norco with good improvement.  Now wondering if adding tramadol or gabapentin could augment pain relief.    Feels she has lost weight.  Only down about 1 pound in 5-6 months but is down 15-20 from a few years ago.  Appetite fairly stable.      Review of Systems   Constitutional, HEENT, cardiovascular, pulmonary, GI, , musculoskeletal, neuro, skin, endocrine and psych systems are negative, except as otherwise noted.      Objective    /72   Pulse 88   Wt 46.7 kg (103 lb)   SpO2 98%   BMI 19.15 kg/m      General: Well appearing, NAD  Psych: normal mood and affect        Results for orders placed or performed in visit on 06/13/22 (from the past 24 hour(s))   Urinalysis (RMG)   Result Value Ref Range    Color Urine Straw     pH Urine 7.5 5 - 9 pH    Specific Gravity Urine 1.015 1.005 - 1.025    Protein Urine 0 0.01 mg/dL    Glucose Urine 0     Ketones Urine 0     Leukocyte Esterase Urine 1+ (A)     Blood Urine 0     Nitrite Urine Neg NEG    Bilirubin Urine Dip 0     Urobilinogen Urine 0.2 0.2 - 1.0 EU/dL    WBC Urine 0-3 0 - 3    RBC Urine rare 0 - 3    Epithelial Cells rare     Crystal Urine neg     Bacteria Urine few (A)     Mucous Urine neg     Casts/LPF neg    UScreen Toxisure (RMG)   Result Value Ref Range    Cannabinoids (THC) UR Presumptive Positive (A) NEG    Cocaine (JULIANO) UR Negative NEG    Morphine (MOP) UR Negative NEG    PCP UR Negative NEG    MDMA UR Negative NEG    Amphetamines (AMP) UR  Negative NEG    Methamphetamine (METHA) UR Negative NEG    Barbiturates (BAR) UR Negative NEG    Benzodiazepines (BZO) UR Negative NEG    Methadone (MTD) UR Negative NEG    Oxycodone (OXYCO) UR Negative NEG    Buprenorphine (BUP) UR Negative NEG    Specimen Validity UR Creatinine 20 mg/dL 20 - 200 mg/dL    Specimen Validity UR pH 5.0 4 - 9    Specimen Validity UR Specific Gravity 1.015 1.005 - 1.025    Specimen Validity Temperature UR PASS     Specimen Validity Internal QC UR PASS        Lower urinary tract symptoms (LUTS)  Not consistent with UTI.   - Urinalysis ([facility]MG)    Spondylolisthesis of lumbosacral region  Stable but remains bothersome.  She is doing well with norco.  No escalating use or s/e.  She would like to try to add low dose gabapentin to see if it helps augment relief.  - UScreen Toxisure ([facility])    Fatigue, unspecified type  Uncertain cause.  Possibly related to stroke.  Discussed depression.  Check labs.  Previous work up unremarkable.  Cont to follow up  - Phosphorus  Serum ([facility])  - Vitamin B12 ([facility])    Sciatica of left side  - gabapentin (NEURONTIN) 100 MG capsule; Take 1 capsule (100 mg) by mouth At Bedtime

## 2022-06-14 LAB
PHOSPHATE SERPL-MCNC: 3 MG/DL (ref 3–4.3)
VIT B12 SERPL-MCNC: 346 PG/ML (ref 232–1245)

## 2022-06-20 NOTE — NURSING NOTE
Methylmalonic acid was added on per JG but specimen was QNS.   I informed patient to return at her convenience to have it drawn. Future order was placed.

## 2022-07-04 LAB
METHYLMALONATE SERPL-SCNC: NORMAL NMOL/L
SPECIMEN STATUS REPORT: NORMAL

## 2022-07-06 ENCOUNTER — OFFICE VISIT (OUTPATIENT)
Dept: FAMILY MEDICINE | Facility: CLINIC | Age: 82
End: 2022-07-06

## 2022-07-06 VITALS
DIASTOLIC BLOOD PRESSURE: 78 MMHG | TEMPERATURE: 98.4 F | OXYGEN SATURATION: 98 % | WEIGHT: 104.4 LBS | BODY MASS INDEX: 19.41 KG/M2 | RESPIRATION RATE: 16 BRPM | HEART RATE: 83 BPM | SYSTOLIC BLOOD PRESSURE: 132 MMHG

## 2022-07-06 DIAGNOSIS — M54.32 SCIATICA OF LEFT SIDE: ICD-10-CM

## 2022-07-06 DIAGNOSIS — M43.17 SPONDYLOLISTHESIS OF LUMBOSACRAL REGION: ICD-10-CM

## 2022-07-06 DIAGNOSIS — E44.1 MILD PROTEIN-CALORIE MALNUTRITION (H): ICD-10-CM

## 2022-07-06 DIAGNOSIS — I69.359 HEMIPLEGIA AS LATE EFFECT OF STROKE (H): ICD-10-CM

## 2022-07-06 DIAGNOSIS — K11.7 XEROSTOMIA: ICD-10-CM

## 2022-07-06 DIAGNOSIS — Z86.73 HISTORY OF STROKE: ICD-10-CM

## 2022-07-06 DIAGNOSIS — R53.83 FATIGUE, UNSPECIFIED TYPE: Primary | ICD-10-CM

## 2022-07-06 PROBLEM — I63.9 CEREBROVASCULAR ACCIDENT (CVA) (H): Status: RESOLVED | Noted: 2020-11-30 | Resolved: 2022-07-06

## 2022-07-06 PROBLEM — E46 PROTEIN-CALORIE MALNUTRITION, UNSPECIFIED SEVERITY (H): Status: ACTIVE | Noted: 2022-07-06

## 2022-07-06 PROCEDURE — 99214 OFFICE O/P EST MOD 30 MIN: CPT | Performed by: FAMILY MEDICINE

## 2022-07-06 RX ORDER — HYDROCODONE BITARTRATE AND ACETAMINOPHEN 5; 325 MG/1; MG/1
TABLET ORAL
Qty: 30 TABLET | Refills: 0 | Status: SHIPPED | OUTPATIENT
Start: 2022-07-06 | End: 2022-08-04

## 2022-07-06 NOTE — PROGRESS NOTES
Subjective     Virginia is a 82 year old patient who presents to clinic for evaluation.    Fatigue: persists.  Vit B12 borderline low.  Checking MMA today but did start B12 replacement about 1 week ago    Feels she has dry mouth and dry eyes.  Was reading about sjogrens and would like testing    Chronic back and leg pain has improved with Norco.  No escalating use.  Very helpful.  No falls.  Also using gabapentin.          Review of Systems   Constitutional, HEENT, cardiovascular, pulmonary, GI, , musculoskeletal, neuro, skin, endocrine and psych systems are negative, except as otherwise noted.      Objective    /78   Pulse 83   Temp 98.4  F (36.9  C)   Resp 16   Wt 47.4 kg (104 lb 6.4 oz)   SpO2 98%   BMI 19.41 kg/m      General: Well appearing, NAD  Psych: normal mood and affect        No results found for this or any previous visit (from the past 24 hour(s)).    Fatigue, unspecified type  Await results, cont B12 supplementation  - Methylmalonic Acid  Serum ([facility])    Spondylolisthesis of lumbosacral region  Cont current medication plan.    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

## 2022-07-10 ENCOUNTER — HOSPITAL ENCOUNTER (EMERGENCY)
Facility: CLINIC | Age: 82
Discharge: HOME | End: 2022-07-10
Attending: EMERGENCY MEDICINE
Payer: COMMERCIAL

## 2022-07-10 ENCOUNTER — NURSE TRIAGE (OUTPATIENT)
Dept: NURSING | Facility: CLINIC | Age: 82
End: 2022-07-10

## 2022-07-10 VITALS
RESPIRATION RATE: 20 BRPM | DIASTOLIC BLOOD PRESSURE: 73 MMHG | TEMPERATURE: 99.1 F | BODY MASS INDEX: 19.83 KG/M2 | WEIGHT: 105 LBS | HEART RATE: 92 BPM | OXYGEN SATURATION: 98 % | SYSTOLIC BLOOD PRESSURE: 146 MMHG | HEIGHT: 61 IN

## 2022-07-11 NOTE — TELEPHONE ENCOUNTER
Pt requesting COVID test    Pt states that she started having COVID symptoms on Friday.   Pt recently had an MRI on Friday, thinks she might be having a contrast dye.     Chills, fever-evens, body aches, headache, congestion    Last temperature was 97.8, states feeling fever-evens  Vaccinated and boosted x1    This writer able to help patient make an appt at the [facility] for Test and Treat.     Janett Oropeza RN  [place] Nurse Advisor  07/10/22  7:35 PM      Reason for Disposition    HIGH RISK for severe COVID complications (e.g., weak immune system, age > 64 years, obesity with BMI > 25, pregnant, chronic lung disease or other chronic medical condition)  (Exception: Already seen by PCP and no new or worsening symptoms.)    Additional Information    Negative: SEVERE difficulty breathing (e.g., struggling for each breath, speaks in single words)    Negative: Difficult to awaken or acting confused (e.g., disoriented, slurred speech)    Negative: Bluish (or gray) lips or face now    Negative: Shock suspected (e.g., cold/pale/clammy skin, too weak to stand, low BP, rapid pulse)    Negative: Sounds like a life-threatening emergency to the triager    Negative: SEVERE or constant chest pain or pressure  (Exception: Mild central chest pain, present only when coughing.)    Negative: MODERATE difficulty breathing (e.g., speaks in phrases, SOB even at rest, pulse 100-120)    Negative: [1] Headache AND [2] stiff neck (can't touch chin to chest)    Negative: Oxygen level (e.g., pulse oximetry) 90 percent or lower    Negative: Chest pain or pressure    Negative: Patient sounds very sick or weak to the triager    Negative: MILD difficulty breathing (e.g., minimal/no SOB at rest, SOB with walking, pulse <100)    Negative: Fever > 103 F (39.4 C)    Negative: [1] Fever > 101 F (38.3 C) AND [2] age > 60 years    Negative: [1] Fever > 100.0 F (37.8 C) AND [2] bedridden (e.g., nursing home patient, CVA, chronic illness, recovering  from surgery)    Protocols used: CORONAVIRUS (COVID-19) DIAGNOSED OR MAJJERIAN-A-LT 1.18.2022

## 2022-07-14 LAB
ANA DIRECT: NEGATIVE
METHYLMALONATE SERPL-SCNC: 155 NMOL/L (ref 0–378)
SJOGREN'S ANTI-SS-A: <0.2 AI (ref 0–0.9)
SJOGREN'S ANTI-SS-B: <0.2 AI (ref 0–0.9)

## 2022-07-15 ENCOUNTER — OFFICE VISIT (OUTPATIENT)
Dept: FAMILY MEDICINE | Facility: CLINIC | Age: 82
End: 2022-07-15

## 2022-07-15 VITALS
DIASTOLIC BLOOD PRESSURE: 70 MMHG | OXYGEN SATURATION: 97 % | WEIGHT: 102.13 LBS | BODY MASS INDEX: 19.3 KG/M2 | HEART RATE: 95 BPM | SYSTOLIC BLOOD PRESSURE: 124 MMHG

## 2022-07-15 DIAGNOSIS — D17.30 LIPOMA OF SKIN AND SUBCUTANEOUS TISSUE: Primary | ICD-10-CM

## 2022-07-15 DIAGNOSIS — D32.0 BENIGN MENINGIOMA OF BRAIN (H): ICD-10-CM

## 2022-07-15 DIAGNOSIS — R42 DIZZINESS: ICD-10-CM

## 2022-07-15 PROCEDURE — 99214 OFFICE O/P EST MOD 30 MIN: CPT | Performed by: NURSE PRACTITIONER

## 2022-07-15 NOTE — PATIENT INSTRUCTIONS
I will talk to Dr. Barahona on Monday to see if lipoma removal can be done here in clinic. Not something you have to do.

## 2022-07-15 NOTE — PROGRESS NOTES
Problem(s) Oriented visit        SUBJECTIVE:                                                    July Huang is a 82 year old female who presents to clinic today for the following health issues :    Benign meningioma of brain - sees provider @ [facility]. Her provider is leaving but she has one more appointment with him on 7/27.   Ongoing dizziness but more intense for past week since she had MRI brain. Worse when she takes Norco or Gabapentin for her pain. Went to ER but was not seen. Eventually left since waited so long.   Lipoma left inner thigh since 1995. Thinks it has gotten bigger but hard to tell since she has lost weight and muscle mass. Now having some pain when pressing on area or when moving around.   Has botox shot set up for August to help with left leg stiffness. Set up through [employer].     Problem list, Medication list, Allergies, and Medical/Social/Surgical histories reviewed in [facility] and updated as appropriate.   Additional history: as documented    ROS:  10 point ROS completed and negative except noted above, including Gen, HEENT, CV, Resp, GI, , MS, Neurologic, Psych    OBJECTIVE:                                                    /70   Pulse 95   Wt 46.3 kg (102 lb 2 oz)   SpO2 97%   BMI 19.30 kg/m    Body mass index is 19.3 kg/m .   GENERAL: Frail elderly female, alert, no acute distress  RESP: calm regular breathing, no cough  CV: normal rate, no edema  MS: very weak, using 4 foot walker and wheelchair for ambulation. Needs assist x 1 as well  SKIN: Lipoma 2.5 cm x 2 cm left inner thigh - soft, mobile, slight tenderness with palpation  NEURO: mentation intact  PSYCH: pleasant affect & mood     ASSESSMENT/PLAN:                                                      Virginia was seen today for mass.    Diagnoses and all orders for this visit:    Lipoma of skin and subcutaneous tissue  Patient feels like this is getting bigger and now  occasionally tender. Will discuss with Dr. Barahona to see if he will remove if patient desires.    Benign meningioma of brain (H)  Has follow-up with provider on 7/27    Dizziness  ER over weekend if worsening      See Patient Instructions  Patient Instructions   I will talk to Dr. Barahona on Monday to see if lipoma removal can be done here in clinic. Not something you have to do.          SERGIO Kahn CNP  [facility]  Family Practice  [facility]  [phone]    For any issues my office # is [phone]

## 2022-08-19 ENCOUNTER — OFFICE VISIT (OUTPATIENT)
Dept: FAMILY MEDICINE | Facility: CLINIC | Age: 82
End: 2022-08-19

## 2022-08-19 VITALS — OXYGEN SATURATION: 96 % | HEART RATE: 82 BPM | DIASTOLIC BLOOD PRESSURE: 64 MMHG | SYSTOLIC BLOOD PRESSURE: 117 MMHG

## 2022-08-19 DIAGNOSIS — D42.0 NEOPLASM OF UNCERTAIN BEHAVIOR OF CEREBRAL MENINGES (H): Primary | ICD-10-CM

## 2022-08-19 DIAGNOSIS — M54.17 LUMBOSACRAL RADICULITIS: ICD-10-CM

## 2022-08-19 DIAGNOSIS — G81.94 HEMIPARESIS, LEFT (H): ICD-10-CM

## 2022-08-19 PROCEDURE — 99214 OFFICE O/P EST MOD 30 MIN: CPT | Performed by: FAMILY MEDICINE

## 2022-08-19 RX ORDER — METHYLPREDNISOLONE 4 MG
TABLET, DOSE PACK ORAL
Qty: 21 TABLET | Refills: 0 | Status: SHIPPED | OUTPATIENT
Start: 2022-08-19 | End: 2022-09-01

## 2022-08-19 NOTE — PROGRESS NOTES
Roxana Mcdowell is a 82 year old patient who presents to clinic for follow up with her .     Known meningioma that has apparently increased and has been recommended to initiate radiation treatment.  She is scheduled to start this soon.    She has persistent low back pain and left radicular symptoms.  She had lumbar MRI, has been seen by Dr Samuel and Dr Hogan.  Had NATIVIDAD that did not help.  Has been on prednisone and gabapentin and tramadol.  Some of these may have helped some but caused dizziness and other side effects.  Was started on Norco with good improvement. Requesting trial of medrol dose pack and requesting referral to a physiatrist at [facility] for additional evaluation.        Review of Systems   Constitutional, HEENT, cardiovascular, pulmonary, GI, , musculoskeletal, neuro, skin, endocrine and psych systems are negative, except as otherwise noted.      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  Potential side effects discussed in detail  - Physiatry Referral; Future  - methylPREDNISolone (MEDROL DOSEPAK) 4 MG tablet therapy pack; Follow Package Directions

## 2022-08-31 ENCOUNTER — MYC MEDICAL ADVICE (OUTPATIENT)
Dept: FAMILY MEDICINE | Facility: CLINIC | Age: 82
End: 2022-08-31

## 2022-08-31 DIAGNOSIS — M54.17 LUMBOSACRAL RADICULITIS: ICD-10-CM

## 2022-09-01 RX ORDER — METHYLPREDNISOLONE 4 MG
TABLET, DOSE PACK ORAL
Qty: 21 TABLET | Refills: 0 | Status: SHIPPED | OUTPATIENT
Start: 2022-09-01 | End: 2023-01-10

## 2022-09-03 ENCOUNTER — MYC MEDICAL ADVICE (OUTPATIENT)
Dept: FAMILY MEDICINE | Facility: CLINIC | Age: 82
End: 2022-09-03

## 2022-09-03 DIAGNOSIS — M54.32 SCIATICA OF LEFT SIDE: ICD-10-CM

## 2022-09-03 DIAGNOSIS — M43.17 SPONDYLOLISTHESIS OF LUMBOSACRAL REGION: ICD-10-CM

## 2022-09-05 NOTE — TELEPHONE ENCOUNTER
Patient is requesting refill on Norco.   Last ordered: 22 by Dr. Barahona for Norco 5/325 #30 si.5-1 tablet daily as needed for severe back and leg pain.   Past fills for this rx: 22, 22, 22, 5/10/22, 22.     Last related visit: 22 with Dr. Barahona - patient requested and was prescribed trial of Medrol D[place]ak and physiatry referral.   Plan: routed request to Dr. Barahona for review.  Trinh Salvador RN    Per MN MN :      Josephine Hager

## 2022-09-06 RX ORDER — HYDROCODONE BITARTRATE AND ACETAMINOPHEN 5; 325 MG/1; MG/1
TABLET ORAL
Qty: 30 TABLET | Refills: 0 | Status: SHIPPED | OUTPATIENT
Start: 2022-09-06 | End: 2022-10-04

## 2022-09-23 ENCOUNTER — TRANSFERRED RECORDS (OUTPATIENT)
Dept: FAMILY MEDICINE | Facility: CLINIC | Age: 82
End: 2022-09-23

## 2022-09-26 ENCOUNTER — MYC REFILL (OUTPATIENT)
Dept: FAMILY MEDICINE | Facility: CLINIC | Age: 82
End: 2022-09-26

## 2022-09-26 DIAGNOSIS — M54.32 SCIATICA OF LEFT SIDE: ICD-10-CM

## 2022-09-27 ENCOUNTER — OFFICE VISIT (OUTPATIENT)
Dept: FAMILY MEDICINE | Facility: CLINIC | Age: 82
End: 2022-09-27

## 2022-09-27 VITALS
BODY MASS INDEX: 19.27 KG/M2 | DIASTOLIC BLOOD PRESSURE: 70 MMHG | SYSTOLIC BLOOD PRESSURE: 123 MMHG | WEIGHT: 102 LBS | HEART RATE: 79 BPM | OXYGEN SATURATION: 97 %

## 2022-09-27 DIAGNOSIS — I69.354 HEMIPARESIS AFFECTING LEFT SIDE AS LATE EFFECT OF STROKE (H): ICD-10-CM

## 2022-09-27 DIAGNOSIS — Z23 ENCOUNTER FOR IMMUNIZATION: ICD-10-CM

## 2022-09-27 DIAGNOSIS — D32.0 BENIGN MENINGIOMA OF BRAIN (H): Primary | ICD-10-CM

## 2022-09-27 DIAGNOSIS — M54.17 LUMBOSACRAL RADICULITIS: ICD-10-CM

## 2022-09-27 PROCEDURE — 90694 VACC AIIV4 NO PRSRV 0.5ML IM: CPT | Performed by: FAMILY MEDICINE

## 2022-09-27 PROCEDURE — 99214 OFFICE O/P EST MOD 30 MIN: CPT | Mod: 25 | Performed by: FAMILY MEDICINE

## 2022-09-27 PROCEDURE — G0008 ADMIN INFLUENZA VIRUS VAC: HCPCS | Mod: 59 | Performed by: FAMILY MEDICINE

## 2022-09-27 RX ORDER — GABAPENTIN 100 MG/1
100 CAPSULE ORAL AT BEDTIME
Qty: 90 CAPSULE | Refills: 0 | Status: SHIPPED | OUTPATIENT
Start: 2022-09-27 | End: 2023-05-22

## 2022-09-27 NOTE — PROGRESS NOTES
Roxana Mcdowell is a 82 year old patient who presents to clinic for follow up.    She underwent multiple rounds of radiation for her meningioma.  She had been having dizziness and chronic back pain.  Although not definitely from the meningioma, given its growth radiation was recommended.  She was unable to tolerate it due to pain.  She thinks she underwent about half of the treatments.     She has a history of stroke with residual left sided hemiparesis as well.    She went to [facility] neurology recently and is awaiting follow up from the visit.  She is taking gabapentin which she feels helps mitigate her radicular symptoms.  She is potentially interested in seeing a new neurologist for another opinion.         Review of Systems   Constitutional, HEENT, cardiovascular, pulmonary, GI, , musculoskeletal, neuro, skin, endocrine and psych systems are negative, except as otherwise noted.      Objective    /70   Pulse 79   Wt 46.3 kg (102 lb)   SpO2 97%   BMI 19.27 kg/m      General: Well appearing, NAD  Psych: normal mood and affect        Benign meningioma of brain (H)  S/p partial radiation treatment.  Recommend follow up with neurosurgery and radiation team per recommendations    Lumbosacral radiculitis  Stable.  Reports did not improve with injection.  Cont gabapentin.  She reports if she does not hear back from [facility] by the end of the week would be interested in new referral    Hemiparesis affecting left side as late effect of stroke (H)  stable    Encounter for immunization  - FLUAD QUADRIVALENT 65+ (G CLINIC ONLY)    Follow up in 2 months for AWMARISOL

## 2022-09-27 NOTE — TELEPHONE ENCOUNTER
MyChart refill request for gabapentin 100mg. Last office visit 8/19/22. CSA signed 4/13/22 and tox screen done 6/13/22. MN  checked and fill history below. Refill routed to Dr Barahona for review. Josephine Hager

## 2022-10-04 ENCOUNTER — OFFICE VISIT (OUTPATIENT)
Dept: FAMILY MEDICINE | Facility: CLINIC | Age: 82
End: 2022-10-04

## 2022-10-04 VITALS
BODY MASS INDEX: 19.27 KG/M2 | DIASTOLIC BLOOD PRESSURE: 68 MMHG | WEIGHT: 102 LBS | SYSTOLIC BLOOD PRESSURE: 121 MMHG | HEART RATE: 81 BPM | OXYGEN SATURATION: 97 %

## 2022-10-04 DIAGNOSIS — M54.32 SCIATICA OF LEFT SIDE: ICD-10-CM

## 2022-10-04 DIAGNOSIS — R42 DIZZINESS: Primary | ICD-10-CM

## 2022-10-04 DIAGNOSIS — M43.17 SPONDYLOLISTHESIS OF LUMBOSACRAL REGION: ICD-10-CM

## 2022-10-04 PROCEDURE — 99214 OFFICE O/P EST MOD 30 MIN: CPT | Performed by: FAMILY MEDICINE

## 2022-10-04 RX ORDER — HYDROCODONE BITARTRATE AND ACETAMINOPHEN 5; 325 MG/1; MG/1
TABLET ORAL
Qty: 30 TABLET | Refills: 0 | Status: SHIPPED | OUTPATIENT
Start: 2022-10-04 | End: 2022-11-11

## 2022-10-04 NOTE — PROGRESS NOTES
Roxana Mcdowell is a 82 year old patient who presents to clinic for follow up.  She continues to have back pain and dizziness.  Has new neurology appointment later this month.  Also was referred to [facility] by Dr Hogan and would like to revisit this option.        Review of Systems   Constitutional, HEENT, cardiovascular, pulmonary, GI, , musculoskeletal, neuro, skin, endocrine and psych systems are negative, except as otherwise noted.      Objective    /68   Pulse 81   Wt 46.3 kg (102 lb)   SpO2 97%   BMI 19.27 kg/m      General: Well appearing, NAD  Psych: normal mood and affect      Dizziness  Uncertain cause.  She feels pain is triggering dizziness.  Other workup has been unrevealing.  Await new neurology eval.  PT referral also placed    Spondylolisthesis of lumbosacral region  Cont current meds and new PT referral placed  - HYDROcodone-acetaminophen (NORCO) 5-325 MG tablet; Take 1/2 to 1 tablet daily as needed for severe back and leg pain    Sciatica of left side  See above  - HYDROcodone-acetaminophen (NORCO) 5-325 MG tablet; Take 1/2 to 1 tablet daily as needed for severe back and leg pain

## 2022-10-17 DIAGNOSIS — R30.0 DYSURIA: ICD-10-CM

## 2022-10-17 DIAGNOSIS — R35.0 URINARY FREQUENCY: ICD-10-CM

## 2022-10-17 LAB
AMORPHOUS PHOSPHATES: ABNORMAL
BACTERIA (RMG): ABNORMAL
BILIRUBIN (RMG): NEGATIVE
BLOOD (RMG): NEGATIVE
CASTS (RMG): ABNORMAL
COLOR UR: YELLOW
CRYSTAL (RMG): ABNORMAL
EPITHELIAL (RMG): ABNORMAL
GLUCOSE (RMG): NEGATIVE
KETONE (RMG): NEGATIVE
LEUKOCYTE (RMG): NEGATIVE
MUCOUS (RMG): ABNORMAL
NITRITE (RMG): NEGATIVE
PH UR STRIP: 7 PH (ref 5–9)
PROTEIN (RMG): NEGATIVE
RBC (RMG): ABNORMAL
SP GR UR STRIP: 1.01 (ref 1–1.02)
UROBILINOGEN (RMG): 0.2
WBC (RMG): ABNORMAL

## 2022-10-17 PROCEDURE — 81003 URINALYSIS AUTO W/O SCOPE: CPT | Performed by: FAMILY MEDICINE

## 2022-10-20 ENCOUNTER — TRANSFERRED RECORDS (OUTPATIENT)
Dept: FAMILY MEDICINE | Facility: CLINIC | Age: 82
End: 2022-10-20

## 2022-10-30 ENCOUNTER — HEALTH MAINTENANCE LETTER (OUTPATIENT)
Age: 82
End: 2022-10-30

## 2022-11-10 ENCOUNTER — MYC MEDICAL ADVICE (OUTPATIENT)
Dept: FAMILY MEDICINE | Facility: CLINIC | Age: 82
End: 2022-11-10

## 2022-11-10 DIAGNOSIS — M54.32 SCIATICA OF LEFT SIDE: ICD-10-CM

## 2022-11-10 DIAGNOSIS — M43.17 SPONDYLOLISTHESIS OF LUMBOSACRAL REGION: ICD-10-CM

## 2022-11-11 RX ORDER — HYDROCODONE BITARTRATE AND ACETAMINOPHEN 5; 325 MG/1; MG/1
TABLET ORAL
Qty: 30 TABLET | Refills: 0 | Status: SHIPPED | OUTPATIENT
Start: 2022-11-11 | End: 2022-12-06

## 2022-11-11 NOTE — TELEPHONE ENCOUNTER
Patient requesting refill on hydrocodone 5/325 #30 si/2-1 tab daily as needed for severe back pain.     Last related visit: 10/4/22 with Dr. Barahona   Controlled Substance Agreement: 22  Urine drug screen: 22    [place] per [place] :       .    Plan: routed request to Dr. Barahona for review.  Trinh Salvador RN

## 2022-11-16 ENCOUNTER — TRANSFERRED RECORDS (OUTPATIENT)
Dept: FAMILY MEDICINE | Facility: CLINIC | Age: 82
End: 2022-11-16

## 2022-11-30 ENCOUNTER — TRANSFERRED RECORDS (OUTPATIENT)
Dept: FAMILY MEDICINE | Facility: CLINIC | Age: 82
End: 2022-11-30

## 2022-12-02 ENCOUNTER — MYC MEDICAL ADVICE (OUTPATIENT)
Dept: FAMILY MEDICINE | Facility: CLINIC | Age: 82
End: 2022-12-02

## 2022-12-02 DIAGNOSIS — M43.17 SPONDYLOLISTHESIS OF LUMBOSACRAL REGION: ICD-10-CM

## 2022-12-02 DIAGNOSIS — M54.32 SCIATICA OF LEFT SIDE: ICD-10-CM

## 2022-12-06 RX ORDER — HYDROCODONE BITARTRATE AND ACETAMINOPHEN 5; 325 MG/1; MG/1
TABLET ORAL
Qty: 30 TABLET | Refills: 0 | Status: SHIPPED | OUTPATIENT
Start: 2022-12-11 | End: 2023-01-10

## 2022-12-06 NOTE — TELEPHONE ENCOUNTER
Patient messages today requesting refill on Norco 5/325 #30 tabs.  Last filled 30 tabs 11/11/22.   Has follow up appt with Dr. Barahona scheduled for 12/14/22.     Per chart review, patient has recently established care with [facility] for her chronic back pain. Per their last note, they are awaiting outside records to be obtained and reviewed with treatment plan to follow.     [place]  fills of [place] substances:       Plan: routed refill request to Dr. Barahona for Norco with start date of 12/11/22.  Trinh Salvador RN

## 2022-12-16 ENCOUNTER — TRANSFERRED RECORDS (OUTPATIENT)
Dept: FAMILY MEDICINE | Facility: CLINIC | Age: 82
End: 2022-12-16

## 2023-01-06 DIAGNOSIS — F41.1 GENERALIZED ANXIETY DISORDER: ICD-10-CM

## 2023-01-06 RX ORDER — CLONAZEPAM 0.5 MG/1
TABLET ORAL
Qty: 30 TABLET | Refills: 0 | Status: SHIPPED | OUTPATIENT
Start: 2023-01-06 | End: 2023-07-11

## 2023-01-06 NOTE — TELEPHONE ENCOUNTER
Clonazepam  LOV 10/4/22  Next appointment 1/9/23  Last addessed 1/18/22  LEANDRO-7 SCORE 8/24/2021 6/13/2022   Total Score 2 2

## 2023-01-06 NOTE — PROGRESS NOTES
[facility]  [address] [place] [address]  Phone: [phone]  Fax: [phone]  Primary Provider: Denton Barahona  Pre-op Performing Provider: DENTON BARAHONA      PREOPERATIVE EVALUATION:  Today's date: 1/9/2023  Preoperative Questionnaire:   Yes - Have you ever had a heart attack or stroke? Stroke 2020  No - Have you ever had surgery on your heart or blood vessels, such as a stent, coronary (heart) bypass, or surgery on an artery in the head, neck, heart, or legs?  No - Do you have chest pain when you are physically active?  No - Do you have a history of heart failure?  No - Do you currently have a cold, bronchitis, or symptoms of other respiratory (head and chest) infections?  No - Do you have a cough, shortness of breath, or wheezing?  No - Do you or anyone in your family have a history of blood clots?  No - Do you or anyone in your family have a serious bleeding problem, such as long-lasting bleeding after surgeries or cuts?  No - Have you ever had anemia or been told to take iron pills?  No - Have you had any abnormal blood loss such as black, tarry or bloody stools, or abnormal vaginal bleeding?  No - Have you ever had a blood transfusion?  Yes - Are you willing to have a blood transfusion if it is medically needed before, during, or after your surgery?  No - Have you or anyone in your family ever had problems with anesthesia (sedation for surgery)?  No - Do you have sleep apnea, excessive snoring, or daytime drowsiness?   No - Do you have any artifical heart valves or other implanted medical devices, such as a pacemaker, defibrillator, or continuous glucose monitor?  No - Do you have any artifical joints?  No - Are you allergic to latex?  No - Is there any chance that you may be pregnant?        July Huang is a 82 year old female who presents for a preoperative evaluation.    Surgical Information:  Surgery/Procedure: Ablation  Surgery Location: [facility]  surgery center [facility]   Surgeon: Dr Rosenbaum  Surgery Date: 1/12/23  Time of Surgery: 1030  Where patient plans to recover: At home with family  Fax number for surgical facility: [phone]    Type of Anesthesia Anticipated: to be determined    Assessment & Plan     The proposed surgical procedure is considered INTERMEDIATE risk.    Preop general physical exam  No apparent contraindications  - EKG 12-lead complete w/read - Clinics  - CBC with Diff/Plt ([facility])    Chronic neck and back pain  Planned ablation    Depression, major, single episode, severe (H)  stable/controlled. Cont current medication(s) and treatment    Mild protein-calorie malnutrition (H)  Weight stable, history of stroke with residual deficits, Encourage increased caloric intake    Hemiparesis affecting left side as late effect of stroke (H)  Stable, cont secondary prevention    Benign meningioma of brain (H)  Did not tolerate radiation, stable, cont neurology follow up    History of stroke  On secondary prevention    Benign essential hypertension  stable/controlled. Cont current medication(s) and treatment  - EKG 12-lead complete w/read - Clinics  - CBC with Diff/Plt ([facility])  - amLODIPine (NORVASC) 5 MG tablet; Take 1 tablet (5 mg) by mouth daily    Oropharyngeal dysphagia  - famotidine (PEPCID) 20 MG tablet; Take 1 tablet (20 mg) by mouth 2 times daily        Risks and Recommendations:  The patient has the following additional risks and recommendations for perioperative complications:   - No identified additional risk factors other than previously addressed    Medication Instructions:  Patient is to take all scheduled medications on the day of surgery EXCEPT for modifications listed below:   - Patient is undergoing an interventional pain or spine procedure. Will consult with Pain Specialist about holding antiplatelet and anticoagulant medications.   - aspirin: Discontinue aspirin 7-10 days prior to procedure to reduce bleeding risk. It should be resumed  postoperatively.     RECOMMENDATION:  APPROVAL GIVEN to proceed with proposed procedure, without further diagnostic evaluation.    [id number]}    Subjective     HPI related to upcoming procedure: Here for preoperative risk assessment.    Chronic neck pain: planned ablation    Chronic low back pain: followed by pain clinic.  Has undergone multiple procedures.  On gabapentin and [place]    MDD: reports controlled without medication.      History of intracerebral hemorrhage: resultant left hemiparesis.  Not on statin.  On ASA.    Benign meningiomas: followed by neurology.  Did not tolerate radiation      Health Care Directive:  Patient does not have a Health Care Directive or Living Will: Discussed advance care planning with patient; information given to patient to review.    Preoperative Review of :   reviewed - controlled substances reflected in medication list.      Status of Chronic Conditions:  See problem list for active medical problems.  Problems all longstanding and stable, except as noted/documented.  See ROS for pertinent symptoms related to these conditions.      Review of Systems  CONSTITUTIONAL: NEGATIVE for fever, chills, change in weight  INTEGUMENTARY/SKIN: NEGATIVE for worrisome rashes, moles or lesions  EYES: NEGATIVE for vision changes or irritation  ENT/MOUTH: NEGATIVE for ear, mouth and throat problems  RESP: NEGATIVE for significant cough or SOB  CV: NEGATIVE for chest pain, palpitations or peripheral edema  GI: NEGATIVE for nausea, abdominal pain, heartburn, or change in bowel habits  : NEGATIVE for frequency, dysuria, or hematuria  MUSCULOSKELETAL: NEGATIVE for significant arthralgias or myalgia  NEURO: NEGATIVE for weakness, dizziness or paresthesias  ENDOCRINE: NEGATIVE for temperature intolerance, skin/hair changes  HEME: NEGATIVE for bleeding problems  PSYCHIATRIC: NEGATIVE for changes in mood or affect    Patient Active Problem List    Diagnosis Date Noted     Hemiparesis affecting left  side as late effect of stroke (H) 09/27/2022     Priority: Medium     Protein-calorie malnutrition, unspecified severity (H) 07/06/2022     Priority: Medium     History of stroke 07/06/2022     Priority: Medium     Lumbosacral radiculitis 04/27/2022     Priority: Medium     Xerostomia 03/24/2022     Priority: Medium     Spondylolisthesis of lumbosacral region 01/18/2022     Priority: Medium     Oropharyngeal dysphagia 12/04/2020     Priority: Medium     Sciatica of left side 03/18/2019     Priority: Medium     Osteoporosis 10/22/2018     Priority: Medium     Overview:   Created by Conversion    Replacement Utility updated for latest IMO load       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  Created by Conversion       History of ovarian cancer 12/29/1994     Priority: Medium     Formatting of this note might be different from the original.  S/p ELZBIETA/BSO in 1994    Formatting of this note might be different from the original.  Formatting of this note might be different from the original.  Formatting of this note might be different from the original.  S/p ELZBIETA/BSO in 1994  Formatting of this note might be different from the original.  S/p ELZBIETA/BSO in 1994        Past Medical History:   Diagnosis Date     Cancer of ovary (H)      Cerebrovascular accident (CVA) (H) 11/30/2020     Eye muscle weakness, left 2/17/2016     GERD (gastroesophageal reflux disease) 7/8/2009     H/O total hysterectomy with bilateral salpingo-oophorectomy (BSO) 1995    ovarian cancer     Irregular heart beat 2009    nl CT angiogram      Non-cardiac chest pain 7/8/2009     S/P coronary angiogram 2005    negative     Stress 12/22/2010     Upper back pain 2/17/2016     Past Surgical History:   Procedure Laterality Date     BIOPSY BREAST       HYSTERECTOMY TOTAL ABDOMINAL, BILATERAL SALPINGO-OOPHORECTOMY, COMBINED  1995     HYSTERECTOMY, PAP NO LONGER INDICATED      ELZBIETA BSO     OVARY SURGERY       Current Outpatient Medications   Medication Sig Dispense Refill     amLODIPine (NORVASC) 10 MG tablet TAKE 1 TABLET(10 MG) BY MOUTH DAILY 90 tablet 1     Ascorbic Acid (VITAMIN C PO) Take 1,000 mg by mouth daily        aspirin (ASA) 81 MG EC tablet Take 81 mg by mouth daily       calcium carbonate 500 mg, elemental, (OSCAL 500) 1250 (500 Ca) MG TABS tablet Take 500 mg by mouth       Cholecalciferol (VITAMIN D) 1000 UNIT capsule Take 1 capsule by mouth daily 5000 unit 2-3 times per week       clonazePAM (KLONOPIN) 0.5 MG tablet TAKE 1/2 TO 1 TABLET(0.25 TO 0.5 MG) BY MOUTH AT BEDTIME 30 tablet 2     CRANBERRY EXTRACT PO Take 1 capsule by mouth       cyanocobalamin (VITAMIN B-12) 1000 MCG tablet Take 1 tablet (1,000 mcg) by mouth daily 100 tablet 1     fluticasone (FLONASE) 50 MCG/ACT nasal spray SHAKE LIQUID AND USE 2 SPRAYS IN EACH NOSTRIL EVERY DAY       gabapentin (NEURONTIN) 100 MG capsule Take 1 capsule (100 mg) by mouth At Bedtime 90 capsule 0     HYDROcodone-acetaminophen (NORCO) 5-325 MG tablet Take 1/2 to 1 tablet daily as needed for severe back and leg pain 30 tablet 0     Lactobacillus Rhamnosus, GG, (RA PROBIOTIC DIGESTIVE CARE) CAPS Take 1 capsule by mouth        Magnesium Bisglycinate (MAG GLYCINATE) 100 MG TABS Take 100 mg by mouth       melatonin 5 MG tablet Take 5 mg by mouth       methylPREDNISolone (MEDROL DOSEPAK) 4 MG tablet therapy pack Follow Package Directions 21 tablet 0     progesterone (PROMETRIUM) 100 MG capsule progesterone micronized 100 mg capsule   TAKE ONE CAPSULE BY MOUTH EVERY NIGHT AT BEDTIME       valACYclovir (VALTREX)  1000 mg tablet valacyclovir 1 gram tablet         Allergies   Allergen Reactions     Amiloride/Hydrochlorothiazide Palpitations     Cozaar Visual Disturbance     Diltiazem Palpitations     Lisinopril Palpitations     Meclizine Palpitations     Metoprolol Palpitations     Tetracyclines Nausea and Vomiting        Social History     Tobacco Use     Smoking status: Former     Types: Cigarettes     Quit date: 1994     Years since quittin.1     Smokeless tobacco: Never   Substance Use Topics     Alcohol use: Yes     Comment: ocas     Family History   Problem Relation Age of Onset     Cerebrovascular Disease Mother      Cancer Sister         lung cancer     Asthma Sister      Neurologic Disorder Sister         parkinson's      Heart Disease Father      Diabetes Father      Lung Cancer Sister      Breast Cancer No family hx of      Colon Cancer No family hx of      Parkinsonism Sister      Asthma Sister      History   Drug Use No         Objective     There were no vitals taken for this visit.    Physical Exam    GENERAL APPEARANCE: healthy, alert and no distress     EYES: EOMI, PERRL     HENT: ear canals and TM's normal and nose and mouth without ulcers or lesions     NECK: no adenopathy, no asymmetry, masses, or scars and thyroid normal to palpation     RESP: lungs clear to auscultation - no rales, rhonchi or wheezes     CV: regular rates and rhythm, normal S1 S2, no S3 or S4 and no murmur, click or rub     ABDOMEN:  soft, nontender, no HSM or masses and bowel sounds normal     SKIN: no suspicious lesions or rashes     NEURO: Normal strength and tone, sensory exam grossly normal, mentation intact and speech normal     PSYCH: mentation appears normal. and affect normal/bright     LYMPHATICS: No cervical adenopathy    Recent Labs   Lab Test 22  1626 21  1459 21  0000 21  1409   HGB  --   --  13.2 12.5   PLT  --   --  439 383    135  --   --    POTASSIUM 3.8 4.1  --   --    CR 0.67  0.70  --   --         Diagnostics:  Recent Results (from the past 24 hour(s))   CBC with Diff/Plt ([facility])    Collection Time: 01/09/23 12:00 AM   Result Value Ref Range    WBC x10/cmm 6.6 3.8 - 11.0 x10/cmm    % Lymphocytes 19.8 (A) 20.5 - 51.1 %    % Monocytes 6.2 1.7 - 9.3 %    % Granulocytes 74.0 42.2 - 75.2 %    RBC x10/cmm 4.23 3.7 - 5.2 x10/cmm    Hemoglobin 13.1 11.8 - 15.5 g/dl    Hematocrit 37.7 35 - 46 %    MCV 89.1 80 - 100 fL    MCH 31.0 27.0 - 31.0 pg    MCHC 34.8 33.0 - 37.0 g/dL    Platelet Count 395 140 - 450 K/uL      EKG: appears normal, NSR, normal axis, normal intervals, no acute ST/T changes c/w ischemia, no LVH by voltage criteria    Revised Cardiac Risk Index (RCRI):  The patient has the following serious cardiovascular risks for perioperative complications:   - No serious cardiac risks = 0 points     RCRI Interpretation: 0 points: Class I (very low risk - 0.4% complication rate)           Signed Electronically by: Denton Barahona MD  Copy of this evaluation report is provided to requesting physician.

## 2023-01-06 NOTE — PATIENT INSTRUCTIONS
For informational purposes only. Not to replace the advice of your health care provider. Copyright   2003,  [employer] [facility]. All rights reserved. Clinically reviewed by Kerry Peng MD. [employer] [id number] - REV .  Preparing for Your Surgery  Getting started  A nurse will call you to review your health history and instructions. They will give you an arrival time based on your scheduled surgery time. Please be ready to share:    Your doctor's clinic name and phone number    Your medical, surgical, and anesthesia history    A list of allergies and sensitivities    A list of medicines, including herbal treatments and over-the-counter drugs    Whether the patient has a legal guardian (ask how to send us the papers in advance)  Please tell us if you're pregnant--or if there's any chance you might be pregnant. Some surgeries may injure a fetus (unborn baby), so they require a pregnancy test. Surgeries that are safe for a fetus don't always need a test, and you can choose whether to have one.   If you have a child who's having surgery, please ask for a copy of Preparing for Your Child's Surgery.    Preparing for surgery    Within 10 to 30 days of surgery: Have a pre-op exam (sometimes called an H&P, or History and Physical). This can be done at a clinic or pre-operative center.  ? If you're having a , you may not need this exam. Talk to your care team.    At your pre-op exam, talk to your care team about all medicines you take. If you need to stop any medicines before surgery, ask when to start taking them again.  ? We do this for your safety. Many medicines can make you bleed too much during surgery. Some change how well surgery (anesthesia) drugs work.    Call your insurance company to let them know you're having surgery. (If you don't have insurance, call [phone].)    Call your clinic if there's any change in your health. This includes signs of a cold or flu (sore throat, runny nose,  cough, rash, fever). It also includes a scrape or scratch near the surgery site.    If you have questions on the day of surgery, call your hospital or surgery center.  Eating and drinking guidelines  For your safety: Unless your surgeon tells you otherwise, follow the guidelines below.    Eat and drink as usual until 8 hours before you arrive for surgery. After that, no food or milk.    Drink clear liquids until 2 hours before you arrive. These are liquids you can see through, like water, Gatorade, and Propel Water. They also include plain black coffee and tea (no cream or milk), candy, and breath mints. You can spit out gum when you arrive.    If you drink alcohol: Stop drinking it the night before surgery.    If your care team tells you to take medicine on the morning of surgery, it's okay to take it with a sip of water.  Preventing infection    Shower or bathe the night before and morning of your surgery. Follow the instructions your clinic gave you. (If no instructions, use regular soap.)    Don't shave or clip hair near your surgery site. We'll remove the hair if needed.    Don't smoke or vape the morning of surgery. You may chew nicotine gum up to 2 hours before surgery. A nicotine patch is okay.  ? Note: Some surgeries require you to completely quit smoking and nicotine. Check with your surgeon.    Your care team will make every effort to keep you safe from infection. We will:  ? Clean our hands often with soap and water (or an alcohol-based hand rub).  ? Clean the skin at your surgery site with a special soap that kills germs.  ? Give you a special gown to keep you warm. (Cold raises the risk of infection.)  ? Wear special hair covers, masks, gowns and gloves during surgery.  ? Give antibiotic medicine, if prescribed. Not all surgeries need antibiotics.  What to bring on the day of surgery    Photo ID and insurance card    Copy of your health care directive, if you have one    Glasses and hearing aids (bring  cases)  ? You can't wear contacts during surgery    Inhaler and eye drops, if you use them (tell us about these when you arrive)    CPAP machine or breathing device, if you use them    A few personal items, if spending the night    If you have . . .  ? A pacemaker, ICD (cardiac defibrillator) or other implant: Bring the ID card.  ? An implanted stimulator: Bring the remote control.  ? A legal guardian: Bring a copy of the certified (court-stamped) guardianship papers.  Please remove any jewelry, including body piercings. Leave jewelry and other valuables at home.  If you're going home the day of surgery    You must have a responsible adult drive you home. They should stay with you overnight as well.    If you don't have someone to stay with you, and you aren't safe to go home alone, we may keep you overnight. Insurance often won't pay for this.  After surgery  If it's hard to control your pain or you need more pain medicine, please call your surgeon's office.  Questions?   If you have any questions for your care team, list them here: _________________________________________________________________________________________________________________________________________________________________________ ____________________________________ ____________________________________ ____________________________________

## 2023-01-09 ENCOUNTER — MYC MEDICAL ADVICE (OUTPATIENT)
Dept: FAMILY MEDICINE | Facility: CLINIC | Age: 83
End: 2023-01-09

## 2023-01-09 ENCOUNTER — OFFICE VISIT (OUTPATIENT)
Dept: FAMILY MEDICINE | Facility: CLINIC | Age: 83
End: 2023-01-09

## 2023-01-09 VITALS
DIASTOLIC BLOOD PRESSURE: 75 MMHG | HEIGHT: 61 IN | BODY MASS INDEX: 19.45 KG/M2 | SYSTOLIC BLOOD PRESSURE: 128 MMHG | HEART RATE: 82 BPM | WEIGHT: 103 LBS | RESPIRATION RATE: 16 BRPM | OXYGEN SATURATION: 98 % | TEMPERATURE: 97.8 F

## 2023-01-09 DIAGNOSIS — G89.29 CHRONIC NECK AND BACK PAIN: ICD-10-CM

## 2023-01-09 DIAGNOSIS — I10 BENIGN ESSENTIAL HYPERTENSION: ICD-10-CM

## 2023-01-09 DIAGNOSIS — Z01.818 PREOP GENERAL PHYSICAL EXAM: Primary | ICD-10-CM

## 2023-01-09 DIAGNOSIS — F32.2 DEPRESSION, MAJOR, SINGLE EPISODE, SEVERE (H): ICD-10-CM

## 2023-01-09 DIAGNOSIS — M54.9 CHRONIC NECK AND BACK PAIN: ICD-10-CM

## 2023-01-09 DIAGNOSIS — M54.32 SCIATICA OF LEFT SIDE: ICD-10-CM

## 2023-01-09 DIAGNOSIS — I69.354 HEMIPARESIS AFFECTING LEFT SIDE AS LATE EFFECT OF STROKE (H): ICD-10-CM

## 2023-01-09 DIAGNOSIS — M43.17 SPONDYLOLISTHESIS OF LUMBOSACRAL REGION: ICD-10-CM

## 2023-01-09 DIAGNOSIS — E44.1 MILD PROTEIN-CALORIE MALNUTRITION (H): ICD-10-CM

## 2023-01-09 DIAGNOSIS — D32.0 BENIGN MENINGIOMA OF BRAIN (H): ICD-10-CM

## 2023-01-09 DIAGNOSIS — Z86.73 HISTORY OF STROKE: ICD-10-CM

## 2023-01-09 DIAGNOSIS — R13.12 OROPHARYNGEAL DYSPHAGIA: ICD-10-CM

## 2023-01-09 DIAGNOSIS — M54.2 CHRONIC NECK AND BACK PAIN: ICD-10-CM

## 2023-01-09 LAB
% GRANULOCYTES: 74 % (ref 42.2–75.2)
HCT VFR BLD AUTO: 37.7 % (ref 35–46)
HEMOGLOBIN: 13.1 G/DL (ref 11.8–15.5)
LYMPHOCYTES NFR BLD AUTO: 19.8 % (ref 20.5–51.1)
MCH RBC QN AUTO: 31 PG (ref 27–31)
MCHC RBC AUTO-ENTMCNC: 34.8 G/DL (ref 33–37)
MCV RBC AUTO: 89.1 FL (ref 80–100)
MONOCYTES NFR BLD AUTO: 6.2 % (ref 1.7–9.3)
PLATELET # BLD AUTO: 395 K/UL (ref 140–450)
RBC # BLD AUTO: 4.23 X10/CMM (ref 3.7–5.2)
WBC # BLD AUTO: 6.6 X10/CMM (ref 3.8–11)

## 2023-01-09 PROCEDURE — 85025 COMPLETE CBC W/AUTO DIFF WBC: CPT | Performed by: FAMILY MEDICINE

## 2023-01-09 PROCEDURE — 99215 OFFICE O/P EST HI 40 MIN: CPT | Performed by: FAMILY MEDICINE

## 2023-01-09 PROCEDURE — 93000 ELECTROCARDIOGRAM COMPLETE: CPT | Performed by: FAMILY MEDICINE

## 2023-01-09 RX ORDER — FAMOTIDINE 20 MG/1
20 TABLET, FILM COATED ORAL 2 TIMES DAILY
COMMUNITY
End: 2023-01-09

## 2023-01-09 RX ORDER — FAMOTIDINE 20 MG/1
20 TABLET, FILM COATED ORAL 2 TIMES DAILY
Qty: 180 TABLET | Refills: 0 | Status: SHIPPED | OUTPATIENT
Start: 2023-01-09 | End: 2023-09-13

## 2023-01-09 RX ORDER — MONTELUKAST SODIUM 10 MG/1
10 TABLET ORAL DAILY
COMMUNITY
Start: 2023-01-04 | End: 2023-04-07

## 2023-01-09 RX ORDER — AMLODIPINE BESYLATE 5 MG/1
5 TABLET ORAL DAILY
Qty: 90 TABLET | Refills: 0
Start: 2023-01-09 | End: 2023-05-22

## 2023-01-09 NOTE — TELEPHONE ENCOUNTER
Patient saw Dr. Barahona in clinic today for preop visit. Following visit patient sent this [employer] message requesting a refill on Norco 5/325 #30 tabs.           Patient is being seen by [facility] - see scanned notes in chart.     Last visit there was 12/16/22.     Plan: routed to Dr. Barahona.

## 2023-01-10 RX ORDER — HYDROCODONE BITARTRATE AND ACETAMINOPHEN 5; 325 MG/1; MG/1
TABLET ORAL
Qty: 30 TABLET | Refills: 0 | Status: SHIPPED | OUTPATIENT
Start: 2023-01-10 | End: 2023-05-03

## 2023-01-11 NOTE — PROGRESS NOTES
1/10/23 faxed this preop, EKG and lab to [facility] @ [phone]    Kofi Cook,   [facility]  [phone]

## 2023-02-08 ENCOUNTER — TRANSFERRED RECORDS (OUTPATIENT)
Dept: FAMILY MEDICINE | Facility: CLINIC | Age: 83
End: 2023-02-08

## 2023-03-24 ENCOUNTER — TRANSFERRED RECORDS (OUTPATIENT)
Dept: FAMILY MEDICINE | Facility: CLINIC | Age: 83
End: 2023-03-24

## 2023-03-29 ENCOUNTER — OFFICE VISIT (OUTPATIENT)
Dept: FAMILY MEDICINE | Facility: CLINIC | Age: 83
End: 2023-03-29

## 2023-03-29 VITALS — DIASTOLIC BLOOD PRESSURE: 73 MMHG | OXYGEN SATURATION: 97 % | HEART RATE: 75 BPM | SYSTOLIC BLOOD PRESSURE: 128 MMHG

## 2023-03-29 DIAGNOSIS — E44.1 MILD PROTEIN-CALORIE MALNUTRITION (H): ICD-10-CM

## 2023-03-29 DIAGNOSIS — G89.29 CHRONIC NECK AND BACK PAIN: ICD-10-CM

## 2023-03-29 DIAGNOSIS — R09.89 HYPERINFLATION OF LUNGS: ICD-10-CM

## 2023-03-29 DIAGNOSIS — M54.2 CHRONIC NECK AND BACK PAIN: ICD-10-CM

## 2023-03-29 DIAGNOSIS — F11.20 CONTINUOUS OPIOID DEPENDENCE (H): ICD-10-CM

## 2023-03-29 DIAGNOSIS — J98.4 CALCIFIED GRANULOMA OF LUNG: ICD-10-CM

## 2023-03-29 DIAGNOSIS — I69.354 HEMIPARESIS AFFECTING LEFT SIDE AS LATE EFFECT OF STROKE (H): Primary | ICD-10-CM

## 2023-03-29 DIAGNOSIS — R42 DIZZINESS: ICD-10-CM

## 2023-03-29 DIAGNOSIS — R93.89 ABNORMAL CXR: ICD-10-CM

## 2023-03-29 DIAGNOSIS — M54.9 CHRONIC NECK AND BACK PAIN: ICD-10-CM

## 2023-03-29 PROCEDURE — 99215 OFFICE O/P EST HI 40 MIN: CPT | Performed by: FAMILY MEDICINE

## 2023-03-29 RX ORDER — LIDOCAINE 40 MG/G
CREAM TOPICAL
COMMUNITY
Start: 2023-03-25 | End: 2023-12-29

## 2023-03-29 RX ORDER — BACLOFEN 5 MG/1
TABLET ORAL
COMMUNITY
Start: 2023-02-24 | End: 2023-08-04 | Stop reason: ALTCHOICE

## 2023-03-29 NOTE — PROGRESS NOTES
Roxana Mcdowell is a 82 year old patient who presents to clinic for follow up.    She has been receiving comprehensive complex home care with physician, nurses and pharmacists.  Unfortunately, she does not feel she is improving and overall feels worse.    Chronic neck pain: s/p ablation.  Since procedure feels pain slightly improved but has exacerbated her chronic dizziness.     Chronic low back pain: followed by pain clinic.  Has undergone multiple procedures.  On gabapentin and [place]     MDD: reports controlled without medication.       History of intracerebral hemorrhage: resultant left hemiparesis.  Not on statin.  On ASA.     Benign meningiomas: followed by neurology.  ?whether this was causing dizziness.  Did not tolerate radiation    Abnormal CXR: calcified granuloma and hyperinflation.  Former smoker.    Malnutrition: low BMI in context of stroke and frailty.      Review of Systems   Constitutional, HEENT, cardiovascular, pulmonary, GI, , musculoskeletal, neuro, skin, endocrine and psych systems are negative, except as otherwise noted.      Objective    /73   Pulse 75   SpO2 97%     General: Well appearing, NAD  Psych: normal mood and affect        No results found for this or any previous visit (from the past 24 hour(s)).    Hemiparesis affecting left side as late effect of stroke (H)  Unchanged with ongoing weakness and stiffness  Cont current meds  Ongoing PT needs  Uncertain what else will help, needs to improve mobility, consider other inteventions    F11.2 - Continuous opioid dependence (H)  Stable chronic use related to chronic pain    Protein-calorie malnutrition, unspecified severity (H)  Improve caloric intake, discussed need for energy and strength    Calcified granuloma of lung (H)  Noted on xray, no symptoms    Dizziness  Uncertain etiology, potentially multifactorial  Referral and follow up  - Referral to [facility]; Future    Chronic neck and back  pain  Slight improvement after ablation    Abnormal CXR    Hyperinflation of lungs  Consider spirometry at follow up, asymptomatic    42 minutes total time including chart review, exam and face to face time, and documentation.         Follow up in 2 months for AWV

## 2023-04-05 ENCOUNTER — TRANSFERRED RECORDS (OUTPATIENT)
Dept: FAMILY MEDICINE | Facility: CLINIC | Age: 83
End: 2023-04-05

## 2023-04-07 ENCOUNTER — OFFICE VISIT (OUTPATIENT)
Dept: FAMILY MEDICINE | Facility: CLINIC | Age: 83
End: 2023-04-07

## 2023-04-07 VITALS
DIASTOLIC BLOOD PRESSURE: 80 MMHG | BODY MASS INDEX: 19.01 KG/M2 | OXYGEN SATURATION: 99 % | WEIGHT: 100.6 LBS | HEART RATE: 85 BPM | SYSTOLIC BLOOD PRESSURE: 127 MMHG | TEMPERATURE: 98.2 F

## 2023-04-07 DIAGNOSIS — M54.2 NECK PAIN: Primary | ICD-10-CM

## 2023-04-07 PROCEDURE — 99213 OFFICE O/P EST LOW 20 MIN: CPT | Performed by: FAMILY MEDICINE

## 2023-04-07 NOTE — PROGRESS NOTES
Roxana Mcdowell is a 82 year old patient who presents to clinic for evaluation.  Burning pain in left side of neck since her ablation.  Reports spoke to person who did the ablation and said could be related to procedure but should come in to make sure there is no infection.  She denies any redness.  No swelling.  No fever or chills.        Review of Systems   Constitutional, HEENT, cardiovascular, pulmonary, GI, , musculoskeletal, neuro, skin, endocrine and psych systems are negative, except as otherwise noted.      Objective    /80   Pulse 85   Temp 98.2  F (36.8  C)   Wt 45.6 kg (100 lb 9.6 oz)   SpO2 99%   BMI 19.01 kg/m      General: Well appearing, NAD  Neck: kyphosis.  No overlying skin changes.  No erythema or swelling  Psych: normal mood and affect        Neck pain  Reassuring exam.  Advise follow up with pain clinic.      Patient is agreement with the assessment and plan as outlined above.  All questions answered.  Red flag symptoms that should prompt emergent evaluation discussed and understood.

## 2023-04-12 ENCOUNTER — TRANSFERRED RECORDS (OUTPATIENT)
Dept: FAMILY MEDICINE | Facility: CLINIC | Age: 83
End: 2023-04-12

## 2023-04-28 ENCOUNTER — TRANSFERRED RECORDS (OUTPATIENT)
Dept: FAMILY MEDICINE | Facility: CLINIC | Age: 83
End: 2023-04-28

## 2023-05-01 DIAGNOSIS — M43.17 SPONDYLOLISTHESIS OF LUMBOSACRAL REGION: ICD-10-CM

## 2023-05-01 DIAGNOSIS — M54.32 SCIATICA OF LEFT SIDE: ICD-10-CM

## 2023-05-03 RX ORDER — HYDROCODONE BITARTRATE AND ACETAMINOPHEN 5; 325 MG/1; MG/1
TABLET ORAL
Qty: 30 TABLET | Refills: 0 | OUTPATIENT
Start: 2023-05-03

## 2023-05-03 NOTE — TELEPHONE ENCOUNTER
Per Dr. Barahona, denied [place] refill request due to patient's pain is being managed by [facility].   Trinh Salvador RN

## 2023-05-18 DIAGNOSIS — R39.9 LOWER URINARY TRACT SYMPTOMS (LUTS): ICD-10-CM

## 2023-05-18 LAB
BACTERIA (RMG): NORMAL
BILIRUBIN (RMG): NEGATIVE
BLOOD (RMG): NEGATIVE
CASTS (RMG): NORMAL
COLOR UR: YELLOW
CRYSTAL (RMG): NORMAL
EPITHELIAL (RMG): NORMAL
GLUCOSE (RMG): NEGATIVE
KETONE (RMG): NEGATIVE
LEUKOCYTE (RMG): NEGATIVE
MUCOUS (RMG): NORMAL
NITRITE (RMG): NEGATIVE
PH UR STRIP: 7 PH (ref 5–9)
PROTEIN (RMG): NEGATIVE
RBC (RMG): NORMAL
SP GR UR STRIP: 1.01 (ref 1–1.02)
UROBILINOGEN (RMG): 0.2
WBC (RMG): NORMAL

## 2023-05-18 PROCEDURE — 81003 URINALYSIS AUTO W/O SCOPE: CPT | Mod: QW | Performed by: FAMILY MEDICINE

## 2023-05-19 NOTE — PROGRESS NOTES
"SUBJECTIVE:   July Huang is a 82 year old female who presents for Preventive Visit.    Patient has been advised of split billing requirements and indicates understanding: Yes  Are you in the first 12 months of your Medicare Part B coverage?  No    Chronic neck pain: s/p ablation.  Since procedure feels pain slightly improved but has exacerbated her chronic dizziness.      Chronic low back pain: followed by pain clinic.  Has undergone multiple procedures.  On chronic norco once daily at night.  Tramadol caused side effects.  Gabapentin caused side effects.  Dr Amee Veliz prescribed lyrica but she has not started it yet.  Numerous other interventions, medications and therapies have failed.     MDD: not on medication, no longer active       History of intracerebral hemorrhage: resultant left hemiparesis.  Not on statin.  On ASA.     Benign meningiomas: followed by neurology.  ?whether this was causing dizziness.  Did not tolerate radiation     Abnormal CXR: calcified granuloma and hyperinflation.  Former smoker.     Malnutrition: low BMI in context of stroke and frailty.      History of ovarian cancer: s/p [id number], reports did not require chemo or radiation.    Physical Health:    In general, how would you rate your overall physical health? poor    Outside of work, how many days during the week do you exercise? 4-5 days/week    Outside of work, approximately how many minutes a day do you exercise?less than 15 minutes    If you drink alcohol do you typically have >3 drinks per day or >7 drinks per week? No    Do you usually eat at least 4 servings of fruit and vegetables a day, include whole grains & fiber and avoid regularly eating high fat or \"junk\" foods? NO    Do you have any problems taking medications regularly?  No    Do you have any side effects from medications? Yes    Needs assistance for the following daily activities: YES    Which of the following safety concerns are present in your home?  lack of " grab bars in the bathroom and lack of handrails on stairs     Hearing impairment: No    In the past 6 months, have you been bothered by leaking of urine? yes    Hearing Screening:  Right Ear  4000Hz: FAIL  2000Hz: FAIL  1000Hz: pass  500Hz: pass    Left Ear  4000Hz: FAIL  2000Hz: FAIL  1000Hz: pass  500Hz: pass    Mental Health:    In general, how would you rate your overall mental or emotional health? fair  PHQ-2 Score:      Do you feel safe in your environment? Yes    Have you ever done Advance Care Planning? (For example, a Health Directive, POLST, or a discussion with a medical provider or your loved ones about your wishes): Yes, patient states has an Advance Care Planning document and will bring a copy to the clinic.    Additional concerns to address?  YES    Fall risk:  Fallen 2 or more times in the past year?: Yes  Any fall with injury in the past year?: Yes    Cognitive Screenin) Repeat 3 items (Leader, Season, Table)    2) Clock draw: NORMAL  3) 3 item recall: Recalls 3 objects  Results: 3 items recalled: COGNITIVE IMPAIRMENT LESS LIKELY  Mini-CogTM Copyright GAYATRI Escamilla. Licensed by the author for use in [facility]; reprinted with permission (soob@[email or web address]). All rights reserved.      Do you have sleep apnea, excessive snoring or daytime drowsiness?: yes        -------------------------------------    Reviewed and updated as needed this visit by clinical staff   Tobacco  Allergies  Meds              Reviewed and updated as needed this visit by Provider                 Social History     Tobacco Use     Smoking status: Former     Types: Cigarettes     Quit date: 1994     Years since quittin.5     Smokeless tobacco: Never   Vaping Use     Vaping status: Not on file   Substance Use Topics     Alcohol use: Yes     Comment: ocas             2021     1:01 PM   Alcohol Use   Prescreen: >3 drinks/day or >7 drinks/week? No          View : No data to display.              Do you have  "a current opioid prescription? Yes   How severe is your pain on a scale from 1-10? 5/10            View : No data to display.              Low Risk (0-3)  Moderate Risk (4-7)  High Risk (>8)  Do you use any other controlled substances or medications that are not prescribed by a provider? None                      Current providers sharing in care for this patient include:   Patient Care Team:  Denton Barahona MD as PCP - General (Family Medicine)  Denton Barahona MD as Assigned PCP  Denton Barahona MD as Assigned Pain Medication Provider    The following health maintenance items are reviewed in Epic and correct as of today:  Health Maintenance   Topic Date Due     DEPRESSION ACTION PLAN  Never done     ZOSTER IMMUNIZATION (1 of 2) Never done     DEXA  02/23/2022     COVID-19 Vaccine (6 - Pfizer series) 02/18/2023     TREATMENT AGREEMENT FOR CHRONIC PAIN MANAGEMENT  04/19/2023     URINE DRUG SCREEN  06/13/2023     MEDICARE ANNUAL WELLNESS VISIT  05/22/2024     LEANDRO ASSESSMENT  05/22/2024     FALL RISK ASSESSMENT  05/22/2024     PHQ-9  05/22/2024     ADVANCE CARE PLANNING  05/22/2028     DTAP/TDAP/TD IMMUNIZATION (3 - Td or Tdap) 02/15/2029     INFLUENZA VACCINE  Completed     Pneumococcal Vaccine: 65+ Years  Completed     IPV IMMUNIZATION  Aged Out     MENINGITIS IMMUNIZATION  Aged Out     Lab work is in process      ROS:  Constitutional, HEENT, cardiovascular, pulmonary, GI, , musculoskeletal, neuro, skin, endocrine and psych systems are negative, except as otherwise noted.    OBJECTIVE:   /75   Pulse 83   Ht 1.549 m (5' 1\")   Wt 43.8 kg (96 lb 9.6 oz)   SpO2 96%   BMI 18.25 kg/m   Estimated body mass index is 18.25 kg/m  as calculated from the following:    Height as of this encounter: 1.549 m (5' 1\").    Weight as of this encounter: 43.8 kg (96 lb 9.6 oz).  EXAM:   GENERAL APPEARANCE: alert and no distress  EYES: Eyes grossly normal to inspection, PERRL and conjunctivae and sclerae " normal  HENT: ear canals and TM's normal, nose and mouth without ulcers or lesions, oropharynx clear and oral mucous membranes moist  NECK: no adenopathy, no asymmetry, masses, or scars and thyroid normal to palpation  RESP: lungs clear to auscultation - no rales, rhonchi or wheezes  CV: regular rate and rhythm, normal S1 S2, no S3 or S4, no murmur, click or rub, no peripheral edema and peripheral pulses strong  ABDOMEN: soft, nontender, no hepatosplenomegaly, no masses and bowel sounds normal  MS: no musculoskeletal defects are noted and gait is age appropriate without ataxia  SKIN: no suspicious lesions or rashes  PSYCH: mentation appears normal    Diagnostic Test Results:  Labs reviewed in Epic  No results found for this or any previous visit (from the past 24 hour(s)).    ASSESSMENT / PLAN:   Encounter for Medicare annual wellness exam  - discussed preventative guidelines, healthy diet, exercise and weight management    F11.2 - Continuous opioid dependence (H)  No other medication or intervention has made significant improvement.  Takes one hydrocodone daily.  No escalating use.  No other red flags.  Aware of potential harms.  - UScreen Toxisure ([facility])    Calcified granuloma of lung (H)  Likely benign.  asymptomatic    Chronic neck and back pain  Retrial of PT  Consider Lyrica per Dr davila-Jose Alfredo  Cont norco     Mild protein-calorie malnutrition (H)  Referral, increase caloric intake  - Nutrition Referral; Future    Hemiparesis affecting left side as late effect of stroke (H)  Stable, unchanged, fall precautions    Benign meningioma of brain (H)  Recommend neuro follow up for surveillance, did not tolerate radiation    Anxiety disorder, unspecified type  Stable, not on medications    Dyslipidemia  recheck  - Lipid Profile ([facility]); Future    Postmenopausal  - DEXA - Hip/Pelvis/Spine (FUTURE/[facility]); Future    Primary hypertension  Prefers to return to 10 mg dose of amlodipine  Cont meds  - Basic Metabolic  "Panel (8) ([facility]); Future    History of cerebrovascular accident  On asa and statin    Sarcopenia  - Nutrition Referral; Future    History of ovarian cancer  S/p resection without recurrence        Patient has been advised of split billing requirements and indicates understanding: Yes    COUNSELING:  Reviewed preventive health counseling, as reflected in patient instructions       Regular exercise       Healthy diet/nutrition    Estimated body mass index is 18.25 kg/m  as calculated from the following:    Height as of this encounter: 1.549 m (5' 1\").    Weight as of this encounter: 43.8 kg (96 lb 9.6 oz).    Weight management plan nutritionist consultation    She reports that she quit smoking about 28 years ago. Her smoking use included cigarettes. She has never used smokeless tobacco.      I have reviewed Opioid Use Disorder and Substance Use Disorder risk factors and made any needed referrals.     Appropriate preventive services were discussed with this patient, including applicable screening as appropriate for cardiovascular disease, diabetes, osteopenia/osteoporosis, and glaucoma.  As appropriate for age/gender, discussed screening for colorectal cancer, prostate cancer, breast cancer, and cervical cancer. Checklist reviewing preventive services available has been given to the patient.    Reviewed patients plan of care and provided an AVS. The Intermediate Care Plan ( asthma action plan, low back pain action plan, and migraine action plan) for [place] meets the Care Plan requirement. This Care Plan has been established and reviewed with the Patient and spouse.    Counseling Resources:  ATP IV Guidelines  Pooled Cohorts Equation Calculator  Breast Cancer Risk Calculator  BRCA-Related Cancer Risk Assessment: FHS-7 Tool  FRAX Risk Assessment  ICSI Preventive Guidelines  Dietary Guidelines for Americans, 2010  USDA's MyPlate  ASA Prophylaxis  Lung CA Screening    Denton Barahona MD  [facility]  "

## 2023-05-19 NOTE — PATIENT INSTRUCTIONS
Patient Education   Personalized Prevention Plan  You are due for the preventive services outlined below.  Your care team is available to assist you in scheduling these services.  If you have already completed any of these items, please share that information with your care team to update in your medical record.  Health Maintenance Due   Topic Date Due     Depression Action Plan  Never done     Zoster (Shingles) Vaccine (1 of 2) Never done     Osteoporosis Screening  02/23/2022     COVID-19 Vaccine (6 - Pfizer series) 02/18/2023     TREATMENT AGREEMENT FOR CHRONIC PAIN MANAGEMENT  04/19/2023     URINE DRUG SCREEN  06/13/2023     Depression Assessment  06/13/2023

## 2023-05-22 ENCOUNTER — OFFICE VISIT (OUTPATIENT)
Dept: FAMILY MEDICINE | Facility: CLINIC | Age: 83
End: 2023-05-22

## 2023-05-22 VITALS
WEIGHT: 96.6 LBS | BODY MASS INDEX: 18.24 KG/M2 | OXYGEN SATURATION: 96 % | HEIGHT: 61 IN | DIASTOLIC BLOOD PRESSURE: 75 MMHG | SYSTOLIC BLOOD PRESSURE: 135 MMHG | HEART RATE: 83 BPM

## 2023-05-22 DIAGNOSIS — E44.1 MILD PROTEIN-CALORIE MALNUTRITION (H): ICD-10-CM

## 2023-05-22 DIAGNOSIS — F41.9 ANXIETY DISORDER, UNSPECIFIED TYPE: ICD-10-CM

## 2023-05-22 DIAGNOSIS — Z00.00 ENCOUNTER FOR MEDICARE ANNUAL WELLNESS EXAM: Primary | ICD-10-CM

## 2023-05-22 DIAGNOSIS — Z85.43 HISTORY OF OVARIAN CANCER: ICD-10-CM

## 2023-05-22 DIAGNOSIS — M62.84 SARCOPENIA: ICD-10-CM

## 2023-05-22 DIAGNOSIS — Z78.0 POSTMENOPAUSAL: ICD-10-CM

## 2023-05-22 DIAGNOSIS — I69.354 HEMIPARESIS AFFECTING LEFT SIDE AS LATE EFFECT OF STROKE (H): ICD-10-CM

## 2023-05-22 DIAGNOSIS — G89.29 CHRONIC NECK AND BACK PAIN: ICD-10-CM

## 2023-05-22 DIAGNOSIS — M54.2 CHRONIC NECK AND BACK PAIN: ICD-10-CM

## 2023-05-22 DIAGNOSIS — E78.5 DYSLIPIDEMIA: ICD-10-CM

## 2023-05-22 DIAGNOSIS — Z86.73 HISTORY OF CEREBROVASCULAR ACCIDENT: ICD-10-CM

## 2023-05-22 DIAGNOSIS — M54.9 CHRONIC NECK AND BACK PAIN: ICD-10-CM

## 2023-05-22 DIAGNOSIS — D32.0 BENIGN MENINGIOMA OF BRAIN (H): ICD-10-CM

## 2023-05-22 DIAGNOSIS — J98.4 CALCIFIED GRANULOMA OF LUNG: ICD-10-CM

## 2023-05-22 DIAGNOSIS — F11.20 CONTINUOUS OPIOID DEPENDENCE (H): ICD-10-CM

## 2023-05-22 DIAGNOSIS — I10 PRIMARY HYPERTENSION: ICD-10-CM

## 2023-05-22 PROBLEM — M26.4 MALOCCLUSION OF TEETH: Status: ACTIVE | Noted: 2021-04-07

## 2023-05-22 PROBLEM — I63.9 CEREBROVASCULAR ACCIDENT (CVA) (H): Status: ACTIVE | Noted: 2020-11-30

## 2023-05-22 PROBLEM — F32.2 DEPRESSION, MAJOR, SINGLE EPISODE, SEVERE (H): Status: ACTIVE | Noted: 2022-12-23

## 2023-05-22 PROBLEM — F32.2 DEPRESSION, MAJOR, SINGLE EPISODE, SEVERE (H): Status: RESOLVED | Noted: 2022-12-23 | Resolved: 2023-05-22

## 2023-05-22 PROBLEM — N99.3 VAGINAL VAULT PROLAPSE, POSTHYSTERECTOMY: Status: ACTIVE | Noted: 2017-12-29

## 2023-05-22 PROBLEM — Z85.51 PERSONAL HISTORY OF MALIGNANT NEOPLASM OF BLADDER: Status: ACTIVE | Noted: 2021-08-24

## 2023-05-22 PROBLEM — I63.9 CEREBROVASCULAR ACCIDENT (CVA) (H): Status: RESOLVED | Noted: 2020-11-30 | Resolved: 2023-05-22

## 2023-05-22 PROBLEM — R00.2 PALPITATIONS: Status: ACTIVE | Noted: 2018-10-22

## 2023-05-22 PROCEDURE — G0439 PPPS, SUBSEQ VISIT: HCPCS | Performed by: FAMILY MEDICINE

## 2023-05-22 PROCEDURE — 99214 OFFICE O/P EST MOD 30 MIN: CPT | Mod: 25 | Performed by: FAMILY MEDICINE

## 2023-05-22 RX ORDER — TRAMADOL HYDROCHLORIDE 50 MG/1
TABLET ORAL
COMMUNITY
Start: 2023-04-12 | End: 2023-05-22

## 2023-05-22 RX ORDER — PREGABALIN 25 MG/1
CAPSULE ORAL
COMMUNITY
Start: 2023-05-16 | End: 2023-08-04

## 2023-05-22 RX ORDER — AMLODIPINE BESYLATE 10 MG/1
10 TABLET ORAL DAILY
Qty: 90 TABLET | Refills: 3
Start: 2023-05-22 | End: 2023-06-16

## 2023-05-22 ASSESSMENT — ANXIETY QUESTIONNAIRES
1. FEELING NERVOUS, ANXIOUS, OR ON EDGE: SEVERAL DAYS
GAD7 TOTAL SCORE: 5
4. TROUBLE RELAXING: NOT AT ALL
IF YOU CHECKED OFF ANY PROBLEMS ON THIS QUESTIONNAIRE, HOW DIFFICULT HAVE THESE PROBLEMS MADE IT FOR YOU TO DO YOUR WORK, TAKE CARE OF THINGS AT HOME, OR GET ALONG WITH OTHER PEOPLE: NOT DIFFICULT AT ALL
3. WORRYING TOO MUCH ABOUT DIFFERENT THINGS: SEVERAL DAYS
GAD7 TOTAL SCORE: 5
6. BECOMING EASILY ANNOYED OR IRRITABLE: SEVERAL DAYS
7. FEELING AFRAID AS IF SOMETHING AWFUL MIGHT HAPPEN: SEVERAL DAYS
5. BEING SO RESTLESS THAT IT IS HARD TO SIT STILL: NOT AT ALL
2. NOT BEING ABLE TO STOP OR CONTROL WORRYING: SEVERAL DAYS

## 2023-05-22 ASSESSMENT — PATIENT HEALTH QUESTIONNAIRE - PHQ9: SUM OF ALL RESPONSES TO PHQ QUESTIONS 1-9: 6

## 2023-05-22 NOTE — LETTER
Opioid / Opioid Plus Controlled Substance Agreement    This is an agreement between you and your provider about the safe and appropriate use of controlled substance/opioids prescribed by your care team. Controlled substances are medicines that can cause physical and mental dependence (abuse).    There are strict laws about having and using these medicines. We here at [facility] are committing to working with you in your efforts to get better. To support you in this work, we ll help you schedule regular office appointments for medicine refills. If we must cancel or change your appointment for any reason, we ll make sure you have enough medicine to last until your next appointment.     As a Provider, I will:  Listen carefully to your concerns and treat you with respect.   Recommend a treatment plan that I believe is in your best interest. This plan may involve therapies other than opioid pain medication.   Talk with you often about the possible benefits, and the risk of harm of any medicine that we prescribe for you.   Provide a plan on how to taper (discontinue or go off) using this medicine if the decision is made to stop its use.    As a Patient, I understand that opioid(s):   Are a controlled substance prescribed by my care team to help me function or work and manage my condition(s).   Are strong medicines and can cause serious side effects such as:  Drowsiness, which can seriously affect my driving ability  A lower breathing rate, enough to cause death  Harm to my thinking ability   Depression   Abuse of and addiction to this medicine  Need to be taken exactly as prescribed. Combining opioids with certain medicines or chemicals (such as illegal drugs, sedatives, sleeping pills, and benzodiazepines) can be dangerous or even fatal. If I stop opioids suddenly, I may have severe withdrawal symptoms.  Do not work for all types of pain nor for all patients. If they re not helpful, I may be asked to stop  them.        The risks, benefits and side effects of these medicine(s) were explained to me. I agree that:  I will take part in other treatments as advised by my care team. This may be psychiatry or counseling, physical therapy, behavioral therapy, group treatment or a referral to a specialist.     I will keep all my appointments. I understand that this is part of the monitoring of opioids. My care team may require an office visit for EVERY opioid/controlled substance refill. If I miss appointments or don t follow instructions, my care team may stop my medicine.    I will take my medicines as prescribed. I will not change the dose or schedule unless my care team tells me to. There will be no refills if I run out early.     I may be asked to come to the clinic and complete a urine drug test or complete a pill count at any time. If I don t give a urine sample or participate in a pill count, the care team may stop my medicine.    I will only receive prescriptions from this clinic for chronic pain. If I am treated by another provider for acute pain issues, I will tell them that I am taking opioid pain medication for chronic pain and that I have a treatment agreement with this provider. I will inform my [facility] care team within one business day if I am given a prescription for any pain medication by another healthcare provider. My [facility] care team can contact other providers and pharmacists about my use of any medicines.    It is up to me to make sure that I don t run out of my medicines on weekends or holidays. If my care team is willing to refill my opioid prescription without a visit, I must request refills only during office hours. Refills may take up to 3 business days to process. I will use one pharmacy to fill all my opioid and other controlled substance prescriptions. I will notify the clinic about any changes to my insurance or medication availability.    I am responsible for my  prescriptions. If the medicine/prescription is lost, stolen or destroyed, it will not be replaced. I also agree not to share controlled substance medicines with anyone.    I am aware I should not use any illegal or recreational drugs. I agree not to drink alcohol unless my care team says I can.       If I enroll in the [place] [facility] Cannabis program, I will tell my care team prior to my next refill.     I will tell my care team right away if I become pregnant, have a new medical problem treated outside of my regular clinic, or have a change in my medications.    I understand that this medicine can affect my thinking, judgment and reaction time. Alcohol and drugs affect the brain and body, which can affect the safety of my driving. Being under the influence of alcohol or drugs can affect my decision-making, behaviors, personal safety, and the safety of others. Driving while impaired (DWI) can occur if a person is driving, operating, or in physical control of a car, motorcycle, boat, snowmobile, ATV, motorbike, off-road vehicle, or any other motor vehicle ([place] Statute 169A.20). I understand the risk if I choose to drive or operate any vehicle or machinery.    I understand that if I do not follow any of the conditions above, my prescriptions or treatment may be stopped or changed.          Opioids  What You Need to Know    What are opioids?   Opioids are pain medicines that must be prescribed by a doctor. They are also known as narcotics.     Examples are:   morphine (MS Contin, Mimi)  oxycodone (Oxycontin)  oxycodone and acetaminophen (Percocet)  hydrocodone and acetaminophen (Vicodin, Norco)   fentanyl patch (Duragesic)   hydromorphone (Dilaudid)   methadone  codeine (Tylenol #3)     What do opioids do well?   Opioids are best for severe short-term pain such as after a surgery or injury. They may work well for cancer pain. They may help some people with long-lasting (chronic) pain.     What do opioids NOT do  well?   Opioids never get rid of pain entirely, and they don t work well for most patients with chronic pain. Opioids don t reduce swelling, one of the causes of pain.                                    Other ways to manage chronic pain and improve function include:     Treat the health problem that may be causing pain  Anti-inflammation medicines, which reduce swelling and tenderness, such as ibuprofen (Advil, Motrin) or naproxen (Aleve)  Acetaminophen (Tylenol)  Antidepressants and anti-seizure medicines, especially for nerve pain  Topical treatments such as patches or creams  Injections or nerve blocks  Chiropractic or osteopathic treatment  Acupuncture, massage, deep breathing, meditation, visual imagery, aromatherapy  Use heat or ice at the pain site  Physical therapy   Exercise  Stop smoking  Take part in therapy       Risks and side effects     Talk to your doctor before you start or decide to keep taking opioids. Possible side effects include:    Lowering your breathing rate enough to cause death  Overdose, including death, especially if taking higher than prescribed doses  Worse depression symptoms; less pleasure in things you usually enjoy  Feeling tired or sluggish  Slower thoughts or cloudy thinking  Being more sensitive to pain over time; pain is harder to control  Trouble sleeping or restless sleep  Changes in hormone levels (for example, less testosterone)  Changes in sex drive or ability to have sex  Constipation  Unsafe driving  Itching and sweating  Dizziness  Nausea, throwing up and dry mouth    What else should I know about opioids?    Opioids may lead to dependence, tolerance, or addiction.    Dependence means that if you stop or reduce the medicine too quickly, you will have withdrawal symptoms. These include loose poop (diarrhea), jitters, flu-like symptoms, nervousness and tremors. Dependence is not the same as addiction.                     Tolerance means needing higher doses over time to  get the same effect. This may increase the chance of serious side effects.    Addiction is when people improperly use a substance that harms their body, their mind or their relations with others. Use of opiates can cause a relapse of addiction if you have a history of drug or alcohol abuse.    People who have used opioids for a long time may have a lower quality of life, worse depression, higher levels of pain and more visits to doctors.    You can overdose on opioids. Take these steps to lower your risk of overdose:    Recognize the signs:  Signs of overdose include decrease or loss of consciousness (blackout), slowed breathing, trouble waking up and blue lips. If someone is worried about overdose, they should call 911.    Talk to your doctor about Narcan (naloxone).   If you are at risk for overdose, you may be given a prescription for Narcan. This medicine very quickly reverses the effects of opioids.   If you overdose, a friend or family member can give you Narcan while waiting for the ambulance. They need to know the signs of overdose and how to give Narcan.     Don't use alcohol or street drugs.   Taking them with opioids can cause death.    Do not take any of these medicines unless your doctor says it s OK. Taking these with opioids can cause death:  Benzodiazepines, such as lorazepam (Ativan), alprazolam (Xanax) or diazepam (Valium)  Muscle relaxers, such as cyclobenzaprine (Flexeril)  Sleeping pills like zolpidem (Ambien)   Other opioids      How to keep you and other people safe while taking opioids:    Never share your opioids with others.  Opioid medicines are regulated by the Drug Enforcement Agency ([place]РИН[place]). Selling or sharing medications is a criminal act.    2. Be sure to store opioids in a secure place, locked up if possible. Young children can easily swallow them and overdose.    3. When you are traveling with your medicines, keep them in the original bottles. If you use a pill box, be sure you also  carry a copy of your medicine list from your clinic or pharmacy.    4. Safe disposal of opioids    Most pharmacies have places to get rid of medicine, called disposal kiosks. Medicine disposal options are also available in every [facility]. Search your county and  medication disposal  to find more options. You can find more details at:  https://www.pca.[facility].[email or web address]./living-green/managing-unwanted-medications     I agree that my provider, clinic care team, and pharmacy may work with any city, state or federal law enforcement agency that investigates the misuse, sale, or other diversion of my controlled medicine. I will allow my provider to discuss my care with, or share a copy of, this agreement with any other treating provider, pharmacy or emergency room where I receive care.    I have read this agreement and have asked questions about anything I did not understand.    _______________________________________________________  Patient Signature - July Huang _____________________                   Date     _______________________________________________________  Provider Signature - Denton Barahona MD   _____________________                   Date     _______________________________________________________  Witness Signature (required if provider not present while patient signing)   _____________________                   Date

## 2023-06-03 ENCOUNTER — MYC MEDICAL ADVICE (OUTPATIENT)
Dept: FAMILY MEDICINE | Facility: CLINIC | Age: 83
End: 2023-06-03

## 2023-06-05 ENCOUNTER — MYC REFILL (OUTPATIENT)
Dept: FAMILY MEDICINE | Facility: CLINIC | Age: 83
End: 2023-06-05

## 2023-06-05 DIAGNOSIS — M54.32 SCIATICA OF LEFT SIDE: ICD-10-CM

## 2023-06-05 DIAGNOSIS — M43.17 SPONDYLOLISTHESIS OF LUMBOSACRAL REGION: ICD-10-CM

## 2023-06-05 RX ORDER — HYDROCODONE BITARTRATE AND ACETAMINOPHEN 5; 325 MG/1; MG/1
TABLET ORAL
Qty: 30 TABLET | Refills: 0 | Status: SHIPPED | OUTPATIENT
Start: 2023-06-05 | End: 2023-07-03

## 2023-06-05 NOTE — CONFIDENTIAL NOTE
CONTROLLED SUBSTANCE REFILL REQUEST FOR HYDROCODONE-ACETAMINOPHEN  DOSE: 5-325 MG - # 30  SIG: TAKE 1/2 TO 1 TAB PRN FOR SEVERE BACK AND LEG PAIN      LAST REFILL: 5/4/23    LAST APPT RELATED: 5/22/23    NEXT APPT: NONE      CONTROLLED SUBSTANCE AGREEMENT: 5/25/23    URINE DRUG SCREEN: 6/13/22        FILL HISTORY PER :            REFILL ROUTED TO DR MARTINO FOR REVIEW    Aminta Antoine, CMA

## 2023-06-12 ENCOUNTER — OFFICE VISIT (OUTPATIENT)
Dept: URGENT CARE | Facility: URGENT CARE | Age: 83
End: 2023-06-12
Payer: COMMERCIAL

## 2023-06-12 VITALS
DIASTOLIC BLOOD PRESSURE: 60 MMHG | SYSTOLIC BLOOD PRESSURE: 124 MMHG | HEART RATE: 109 BPM | OXYGEN SATURATION: 94 % | TEMPERATURE: 98.5 F | BODY MASS INDEX: 17.95 KG/M2 | RESPIRATION RATE: 20 BRPM | WEIGHT: 95 LBS

## 2023-06-12 DIAGNOSIS — J22 LOWER RESPIRATORY INFECTION: Primary | ICD-10-CM

## 2023-06-12 PROCEDURE — 99213 OFFICE O/P EST LOW 20 MIN: CPT | Performed by: PHYSICIAN ASSISTANT

## 2023-06-12 RX ORDER — CEFDINIR 300 MG/1
300 CAPSULE ORAL 2 TIMES DAILY
Qty: 14 CAPSULE | Refills: 0 | Status: SHIPPED | OUTPATIENT
Start: 2023-06-12 | End: 2023-06-19

## 2023-06-12 RX ORDER — BENZONATATE 100 MG/1
100 CAPSULE ORAL 3 TIMES DAILY PRN
Qty: 25 CAPSULE | Refills: 0 | Status: SHIPPED | OUTPATIENT
Start: 2023-06-12 | End: 2023-12-29

## 2023-06-12 ASSESSMENT — ENCOUNTER SYMPTOMS
SORE THROAT: 1
NAUSEA: 0
FEVER: 0
COUGH: 1
HEADACHES: 1
DIARRHEA: 0
APPETITE CHANGE: 1
RHINORRHEA: 1
SHORTNESS OF BREATH: 0
VOMITING: 0

## 2023-06-12 NOTE — PROGRESS NOTES
,SUBJECTIVE:   July Huang is a 82 year old female presenting with a chief complaint of   Chief Complaint   Patient presents with     Urgent Care     URI     Sick with URI symptoms x 1 week, coughing up white mucus. Low grade fever up to 100       She is an established patient of [facility].    Treatment:  Tylenol and elderberry    Review of Systems   Constitutional: Positive for appetite change. Negative for fever.   HENT: Positive for congestion, ear pain, rhinorrhea and sore throat.    Respiratory: Positive for cough. Negative for shortness of breath.    Gastrointestinal: Negative for diarrhea, nausea and vomiting.   Neurological: Positive for headaches.   All other systems reviewed and are negative.      Past Medical History:   Diagnosis Date     Cancer of ovary (H)      Cerebrovascular accident (CVA) (H) 11/30/2020     Depression, major, single episode, severe (H) 12/23/2022     Eye muscle weakness, left 2/17/2016     GERD (gastroesophageal reflux disease) 7/8/2009     H/O total hysterectomy with bilateral salpingo-oophorectomy (BSO) 1995    ovarian cancer     Irregular heart beat 2009    nl CT angiogram     Non-cardiac chest pain 7/8/2009     S/P coronary angiogram 2005    negative     Stress 12/22/2010     Upper back pain 2/17/2016     Family History   Problem Relation Age of Onset     Cerebrovascular Disease Mother      Cancer Sister         lung cancer     Asthma Sister      Neurologic Disorder Sister         parkinson's      Heart Disease Father      Diabetes Father      Lung Cancer Sister      Breast Cancer No family hx of      Colon Cancer No family hx of      Parkinsonism Sister      Asthma Sister      Current Outpatient Medications   Medication Sig Dispense Refill     amLODIPine (NORVASC) 10 MG tablet Take 1 tablet (10 mg) by mouth daily 90 tablet 3     Ascorbic Acid (VITAMIN C PO) Take 1,000 mg by mouth daily        aspirin (ASA) 81 MG EC tablet Take 81 mg by mouth daily       Baclofen (LIORESAL)  5 MG tablet TAKE 1/2 TO 1 TABLET BY MOUTH THREE TIMES DAILY       benzonatate (TESSALON) 100 MG capsule Take 1 capsule (100 mg) by mouth 3 times daily as needed for cough 25 capsule 0     calcium carbonate 500 mg, elemental, (OSCAL 500) 1250 (500 Ca) MG TABS tablet Take 500 mg by mouth       cefdinir (OMNICEF) 300 MG capsule Take 1 capsule (300 mg) by mouth 2 times daily for 7 days 14 capsule 0     Cholecalciferol (VITAMIN D) 1000 UNIT capsule Take 1 capsule by mouth daily 5000 unit 2-3 times per week       clonazePAM (KLONOPIN) 0.5 MG tablet TAKE 1/2 TO 1 TABLET(0.25 TO 0.5 MG) BY MOUTH AT BEDTIME 30 tablet 0     CRANBERRY EXTRACT PO Take 1 capsule by mouth       cyanocobalamin (VITAMIN B-12) 1000 MCG tablet Take 1 tablet (1,000 mcg) by mouth daily 100 tablet 1     famotidine (PEPCID) 20 MG tablet Take 1 tablet (20 mg) by mouth 2 times daily (Patient taking differently: Take 20 mg by mouth 2 times daily as needed) 180 tablet 0     HYDROcodone-acetaminophen (NORCO) 5-325 MG tablet Take 1/2 to 1 tablet daily as needed for severe back and leg pain 30 tablet 0     Lactobacillus Rhamnosus, GG, ( PROBIOTIC DIGESTIVE CARE) CAPS Take 1 capsule by mouth        Magnesium Bisglycinate (MAG GLYCINATE) 100 MG TABS Take 100 mg by mouth       melatonin 5 MG tablet Take 5 mg by mouth nightly as needed       lidocaine (LMX4) 4 % external cream APPLY DIME SIZE AMOUONT TO PAINFUL BODY REGION UP TO THREE TIMES DAILY       pregabalin (LYRICA) 25 MG capsule TAKE 1 CAPSULE BY MOUTH EVERY NIGHT AT BEDTIME FOR A WEEK THEN 1 CAPSULE TWICE DAILY FOR A WEEK THEN 1 CAPSULE THREE TIMES DAILY (Patient not taking: Reported on 2023)       valACYclovir (VALTREX) 1000 mg tablet valacyclovir 1 gram tablet       Social History     Tobacco Use     Smoking status: Former     Types: Cigarettes     Quit date: 1994     Years since quittin.5     Smokeless tobacco: Never   Vaping Use     Vaping status: Not on file   Substance Use Topics      Alcohol use: Yes     Comment: ocas       OBJECTIVE  /60   Pulse 109   Temp 98.5  F (36.9  C) (Temporal)   Resp 20   Wt 43.1 kg (95 lb)   SpO2 94%   BMI 17.95 kg/m      Physical Exam  Vitals and nursing note reviewed.   Constitutional:       Appearance: Normal appearance. She is normal weight.   HENT:      Head: Normocephalic and atraumatic.      Right Ear: Tympanic membrane, ear canal and external ear normal.      Left Ear: Tympanic membrane, ear canal and external ear normal.      Nose: Nose normal.      Mouth/Throat:      Mouth: Mucous membranes are moist.      Pharynx: Oropharynx is clear.   Eyes:      Extraocular Movements: Extraocular movements intact.      Conjunctiva/sclera: Conjunctivae normal.   Cardiovascular:      Rate and Rhythm: Normal rate and regular rhythm.      Pulses: Normal pulses.      Heart sounds: Normal heart sounds.   Pulmonary:      Breath sounds: Normal breath sounds.      Comments: Poor effort.  Musculoskeletal:      Cervical back: Normal range of motion.   Skin:     General: Skin is warm and dry.      Findings: No rash.   Neurological:      General: No focal deficit present.      Mental Status: She is alert.   Psychiatric:         Mood and Affect: Mood normal.         Behavior: Behavior normal.         Labs:  No results found for this or any previous visit (from the past 24 hour(s)).    X-Ray was not done.    ASSESSMENT:      ICD-10-CM    1. Lower respiratory infection  J22 cefdinir (OMNICEF) 300 MG capsule     benzonatate (TESSALON) 100 MG capsule           Medical Decision Making:    Differential Diagnosis:  URI Adult/Peds:  Bronchitis-viral and Pneumonia    Serious Comorbid Conditions:  Adult:  reviewed    PLAN:    Rx for omnicef and tessalon perles.  Discussed reasons to seek immediate medical attention.  Additionally if no improvement or worsening in one week, may follow up with PCP and/or UC.        Followup:    If not improving or if condition worsens, follow up with  your Primary Care Provider, If not improving or if conditions worsens over the next 12-24 hours, go to the Emergency Department    There are no Patient Instructions on file for this visit.  '

## 2023-06-16 ENCOUNTER — OFFICE VISIT (OUTPATIENT)
Dept: FAMILY MEDICINE | Facility: CLINIC | Age: 83
End: 2023-06-16

## 2023-06-16 ENCOUNTER — PATIENT OUTREACH (OUTPATIENT)
Dept: FAMILY MEDICINE | Facility: CLINIC | Age: 83
End: 2023-06-16

## 2023-06-16 VITALS — DIASTOLIC BLOOD PRESSURE: 60 MMHG | SYSTOLIC BLOOD PRESSURE: 110 MMHG | HEART RATE: 88 BPM | OXYGEN SATURATION: 96 %

## 2023-06-16 DIAGNOSIS — J22 ACUTE LOWER RESPIRATORY TRACT INFECTION: Primary | ICD-10-CM

## 2023-06-16 DIAGNOSIS — R26.81 UNSTABLE GAIT: ICD-10-CM

## 2023-06-16 DIAGNOSIS — I10 PRIMARY HYPERTENSION: ICD-10-CM

## 2023-06-16 DIAGNOSIS — I69.354 HEMIPARESIS AFFECTING LEFT SIDE AS LATE EFFECT OF STROKE (H): ICD-10-CM

## 2023-06-16 PROCEDURE — 99215 OFFICE O/P EST HI 40 MIN: CPT | Performed by: FAMILY MEDICINE

## 2023-06-16 RX ORDER — AMLODIPINE BESYLATE 5 MG/1
5 TABLET ORAL DAILY
Qty: 90 TABLET | Refills: 1 | Status: SHIPPED | OUTPATIENT
Start: 2023-06-16 | End: 2023-07-14

## 2023-06-16 NOTE — PROGRESS NOTES
Roxana Mcdowell is a 82 year old patient who presents to clinic for evaluation.  She was seen in  several days ago, note reviewed.  History and exam c/w LRI and was started empirically on cefdinir.  She feels her cough has improved.  She still feels ill with mild cough, congestion and malaise.      In addition, has felt more lightheaded and dizzy lately.  Not severe but is bothersome.  BP lower than usual today.  No CP or palpitations.    Finally, her  notes, unrelated to current illness, she has become weaker and ambulating and transfers have become increasingly difficult.  He is having trouble getting her to the bathroom.  She has not bathed in 1-2 weeks.        Review of Systems   Constitutional, HEENT, cardiovascular, pulmonary, GI, , musculoskeletal, neuro, skin, endocrine and psych systems are negative, except as otherwise noted.      Objective    /60   Pulse 88   SpO2 96%     General: Mildly appearing, NAD  HEENT: clear, mild nasal congestion  Heart: RRR, no murmur  Chest: mild crackles left base, lungs otherwise clear  Skin: Clear  Psych: mood and affect are flat        No results found for this or any previous visit (from the past 24 hour(s)).    Acute lower respiratory tract infection  Agree with completing cefdinir for possible pneumonia.  Did not obtain xray as she is improving.  Will reconsider if worsens.    Primary hypertension  Lower reading today.  Given lightheadedness will reduce dose and monitor closely  - amLODIPine (NORVASC) 5 MG tablet; Take 1 tablet (5 mg) by mouth daily    Hemiparesis affecting left side as late effect of stroke (H)  Increasing difficulty ambulating, transferring and with ADLs  - Home Care Referral    Unstable gait  See above  - Home Care Referral    45 minutes total time including chart review, exam and face to face time, and documentation.

## 2023-06-16 NOTE — TELEPHONE ENCOUNTER
PC to Jamila. She has been ill with an respiratory infection for the last couple of weeks and is in need of supports. In addition to this, she has had a stroke and has mobility issues. 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     Pt would like home care to help her build strength and work on eating again. PCP put in order for home care. [facility] will send to [facility], per pt request.    [facility] will outreach to pt on Monday to touch base.    ADI Saravia  , Care Coordination  [facility]  Cell: [phone]  Clinic: [phone]  [email or web address]

## 2023-06-19 ENCOUNTER — PATIENT OUTREACH (OUTPATIENT)
Dept: FAMILY MEDICINE | Facility: CLINIC | Age: 83
End: 2023-06-19

## 2023-06-19 NOTE — TELEPHONE ENCOUNTER
[facility] received message from pcp stating [facility] was at capacity for home care. [facility] called [facility] who said they have staffing there and to fax over paperwork. [facility] faxed information to them @ [phone]    ADI Saravia  , Care Coordination  [facility]  Cell: [phone]  Clinic: [phone]  [email or web address]

## 2023-06-19 NOTE — TELEPHONE ENCOUNTER
"Social Work Care Coordination follow up:      Previous plan: [facility] to send home care to [facility]    Current concern/ issue : PC from Jamila. She reports that she and her  are feeling worse than before. Worsening cough and dizziness. Pt reports there is no one checking on them and they have not heard from home care. CC let her know that I'd faxed order to [facility] and am waiting to hear back. Pt called [facility] and was told that they are waiting for a \"verbal\" from our clinic. [facility] spoke with  who had provided verbal order to proceed with intake process.    [facility] discussed severity of illlness with Jamila and she said she does not want to go to the hospital nor can her  bring her b/c he's sick as well. [facility] encouraged her to call 911 if both of their situations worsen and need attention.     Pt expressed understanding      Current Plan: [facility] will call pt and follow up re: home care    ADI Saravia  , Care Coordination  [facility]  [phone]  [email or web address]   "

## 2023-06-20 ENCOUNTER — PATIENT OUTREACH (OUTPATIENT)
Dept: FAMILY MEDICINE | Facility: CLINIC | Age: 83
End: 2023-06-20

## 2023-06-20 NOTE — TELEPHONE ENCOUNTER
Voicemail      [facility] called patient    Left a voicemail for a call back.      [facility] will outreach again in 4-7 days.    ADI Saravia  , Care Coordination  [facility]  [phone]  [email or web address]

## 2023-06-20 NOTE — TELEPHONE ENCOUNTER
PC to pt. She has spoken to [facility] and they are sending someone out to see her tomorrow. Pt hesitant to see them given her cough and condition. Pt reports she has the chills again today and has a hard time swallowing.     No additional concerns or needs at this time. Pt will follow up with home care as appropriate, CC will outreach in 7-10 days.    ADI Saravia  , Care Coordination  [facility]  Cell: [phone]  Clinic: [phone]  [email or web address]

## 2023-06-21 PROCEDURE — G0180 MD CERTIFICATION HHA PATIENT: HCPCS | Performed by: FAMILY MEDICINE

## 2023-06-26 ENCOUNTER — PATIENT OUTREACH (OUTPATIENT)
Dept: FAMILY MEDICINE | Facility: CLINIC | Age: 83
End: 2023-06-26

## 2023-06-26 NOTE — TELEPHONE ENCOUNTER
PC to pt . She reports to continue to cough a lot and feels quite tired. Pt reports she cannot drink much and in turn does not have much urine output.     Pt states that home care is coming out today for PT .     Pt will reach out to clinic if other needs arise.    Plan: CC to outreach in 2-3 weeks.

## 2023-06-30 ENCOUNTER — TELEPHONE (OUTPATIENT)
Dept: FAMILY MEDICINE | Facility: CLINIC | Age: 83
End: 2023-06-30
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
## 2023-07-03 DIAGNOSIS — M54.32 SCIATICA OF LEFT SIDE: ICD-10-CM

## 2023-07-03 DIAGNOSIS — M43.17 SPONDYLOLISTHESIS OF LUMBOSACRAL REGION: ICD-10-CM

## 2023-07-03 RX ORDER — HYDROCODONE BITARTRATE AND ACETAMINOPHEN 5; 325 MG/1; MG/1
TABLET ORAL
Qty: 30 TABLET | Refills: 0 | Status: SHIPPED | OUTPATIENT
Start: 2023-07-03 | End: 2023-08-02

## 2023-07-11 DIAGNOSIS — F41.1 GENERALIZED ANXIETY DISORDER: ICD-10-CM

## 2023-07-12 ENCOUNTER — PATIENT OUTREACH (OUTPATIENT)
Dept: FAMILY MEDICINE | Facility: CLINIC | Age: 83
End: 2023-07-12

## 2023-07-12 ENCOUNTER — TELEPHONE (OUTPATIENT)
Dept: FAMILY MEDICINE | Facility: CLINIC | Age: 83
End: 2023-07-12

## 2023-07-12 DIAGNOSIS — I10 PRIMARY HYPERTENSION: ICD-10-CM

## 2023-07-12 RX ORDER — CLONAZEPAM 0.5 MG/1
TABLET ORAL
Qty: 30 TABLET | Refills: 0 | Status: SHIPPED | OUTPATIENT
Start: 2023-07-12 | End: 2023-10-05

## 2023-07-12 NOTE — TELEPHONE ENCOUNTER
Call received from [facility] 7/11/23 @1640 regarding patient blood pressure. PT reports blood pressures 170/94 and 176/110 with no physical exertion. Patient reports she may have some increased lightheadedness but is not certain of this. She reports no other concerning symptoms, heart racing, SOB, headache, tightness chest. Patient declines ED or UC evaluation, she wishes to see Dr Barahona to discuss. Dr Barahona was out of office 7/11 but returns tomorrow. Patient is encouraged to take amlodipine 5mg, she takes this in evenings. She is advised that if concerning symptoms develop overnight she should have urgent evaluation. Discussed BP with Dr Barahona who advises that patient increase amlodipine to 10mg, this had been decreased @ 6/16 office visit as BP's were running low. Patient advised to continue to monitor blood pressure and update clinic with concerns. She is scheduled for BP check 7/26/23. Josephine Hager

## 2023-07-12 NOTE — TELEPHONE ENCOUNTER
PC to Drea . She reports she is feeling better overall. She continues to work with [facility] for physical therapy and nursing support. She states her  helps with transfers when needed.     Pt reports she would like to go from 5 to 10 mg of Aplodipine as she feels it was more helpful. Pt report she had left a message for nurse line. [facility] sent message to triage as well to ensure they got the message.     No other concerns at this time. No further outreaches needed.    ADI Saravia  , Care Coordination  [facility]  Cell: [phone]  Clinic: [phone]  [email or web address]

## 2023-07-14 RX ORDER — AMLODIPINE BESYLATE 10 MG/1
10 TABLET ORAL DAILY
Qty: 30 TABLET | Refills: 2 | Status: SHIPPED | OUTPATIENT
Start: 2023-07-14 | End: 2023-10-11 | Stop reason: DRUGHIGH

## 2023-07-18 ENCOUNTER — TELEPHONE (OUTPATIENT)
Dept: FAMILY MEDICINE | Facility: CLINIC | Age: 83
End: 2023-07-18

## 2023-07-18 DIAGNOSIS — R42 VERTIGO: Primary | ICD-10-CM

## 2023-07-18 RX ORDER — MECLIZINE HYDROCHLORIDE 25 MG/1
25 TABLET ORAL 3 TIMES DAILY PRN
Qty: 30 TABLET | Refills: 1 | Status: SHIPPED | OUTPATIENT
Start: 2023-07-18 | End: 2023-07-18

## 2023-07-18 NOTE — CONFIDENTIAL NOTE
Patient left several messages in the 2 hours reporting having a flare of vertigo and needs meclizine ordered asap.  Reports home care nurse visiting knew right away that she was having vertigo flare and told her to call to get meclizine ordered.   Has appt scheduled with [facility] for next week.   Also has appt with Dr. Barahona for other issues next week 7/26.   Called patient and informed meclizine is OTC. States will have her  pick this up today. If symptoms intolerable should go to ER. Patient agrees.

## 2023-08-02 DIAGNOSIS — M43.17 SPONDYLOLISTHESIS OF LUMBOSACRAL REGION: ICD-10-CM

## 2023-08-02 DIAGNOSIS — M54.32 SCIATICA OF LEFT SIDE: ICD-10-CM

## 2023-08-04 ENCOUNTER — OFFICE VISIT (OUTPATIENT)
Dept: FAMILY MEDICINE | Facility: CLINIC | Age: 83
End: 2023-08-04

## 2023-08-04 VITALS
WEIGHT: 90.4 LBS | HEART RATE: 81 BPM | SYSTOLIC BLOOD PRESSURE: 123 MMHG | BODY MASS INDEX: 17.08 KG/M2 | OXYGEN SATURATION: 97 % | DIASTOLIC BLOOD PRESSURE: 75 MMHG

## 2023-08-04 DIAGNOSIS — E44.1 MILD PROTEIN-CALORIE MALNUTRITION (H): ICD-10-CM

## 2023-08-04 DIAGNOSIS — G89.29 CHRONIC NECK AND BACK PAIN: Primary | ICD-10-CM

## 2023-08-04 DIAGNOSIS — M54.9 CHRONIC NECK AND BACK PAIN: Primary | ICD-10-CM

## 2023-08-04 DIAGNOSIS — I69.354 HEMIPARESIS AFFECTING LEFT SIDE AS LATE EFFECT OF STROKE (H): ICD-10-CM

## 2023-08-04 DIAGNOSIS — M54.2 CHRONIC NECK AND BACK PAIN: Primary | ICD-10-CM

## 2023-08-04 DIAGNOSIS — F41.9 ANXIETY DISORDER, UNSPECIFIED TYPE: ICD-10-CM

## 2023-08-04 DIAGNOSIS — I10 PRIMARY HYPERTENSION: ICD-10-CM

## 2023-08-04 PROCEDURE — 99214 OFFICE O/P EST MOD 30 MIN: CPT | Performed by: FAMILY MEDICINE

## 2023-08-04 RX ORDER — BACLOFEN 10 MG/1
10 TABLET ORAL
COMMUNITY
Start: 2023-07-14 | End: 2023-08-04 | Stop reason: ALTCHOICE

## 2023-08-04 RX ORDER — CYCLOBENZAPRINE HCL 5 MG
5 TABLET ORAL DAILY PRN
Qty: 90 TABLET | Refills: 1 | Status: SHIPPED | OUTPATIENT
Start: 2023-08-04 | End: 2023-11-02

## 2023-08-04 RX ORDER — HYDROCODONE BITARTRATE AND ACETAMINOPHEN 5; 325 MG/1; MG/1
TABLET ORAL
Qty: 30 TABLET | Refills: 0 | Status: SHIPPED | OUTPATIENT
Start: 2023-08-04 | End: 2023-09-04

## 2023-08-04 NOTE — PROGRESS NOTES
Roxana Mcdowell is a 83 year old patient who presents to clinic for follow up.    HTN: improved control on increased dose of amlodipine.    Chronic neck pain: s/p ablation.  Since procedure feels pain slightly improved but has exacerbated her chronic dizziness.      Chronic low back pain: followed by pain clinic.  Has undergone multiple procedures.  On chronic norco once daily at night.  Tramadol caused side effects.  Gabapentin caused side effects.  No longer on Lyrica.  Baclofen has become more expensive, would like to try flexeril again. Numerous other interventions, medications and therapies have failed.     History of MDD: not on medication, no longer active       History of intracerebral hemorrhage: resultant left hemiparesis.  Not on statin.  On ASA.     Benign meningiomas: followed by neurology.  ?whether this was causing dizziness.  Did not tolerate radiation     Abnormal CXR: calcified granuloma and hyperinflation.  Former smoker.     Malnutrition: low BMI in context of stroke and frailty.       History of ovarian cancer: s/p ELZBIETA-BSO, reports did not require chemo or radiation.    Review of Systems   Constitutional, HEENT, cardiovascular, pulmonary, GI, , musculoskeletal, neuro, skin, endocrine and psych systems are negative, except as otherwise noted.      Objective    /75   Pulse 81   Wt 41 kg (90 lb 6.4 oz)   SpO2 97%   BMI 17.08 kg/m      General: Well appearing, NAD  Psych: normal mood and affect        No results found for this or any previous visit (from the past 24 hour(s)).    Chronic neck and back pain  Suboptimal situation, on medication without dramatic improvement but not improved with other interventions.  Challenging situation.  Her quality of life is decreasing.  Increasing stressors on her and her .  Consider palliative care referral if not improving  - cyclobenzaprine (FLEXERIL) 5 MG tablet; Take 1 tablet (5 mg) by mouth daily as needed for muscle spasms  (spasm)    Hemiparesis affecting left side as late effect of stroke (H)  See above.  Weakness is becoming increasingly problematic    Mild protein-calorie malnutrition (H)  Weight down further, increase caloric intake    Primary hypertension  stable/controlled. Cont current medication(s) and treatment    Anxiety disorder, unspecified type  Very close follow up.  Not currently on medication

## 2023-08-04 NOTE — TELEPHONE ENCOUNTER
NORCO  LOV 6/16/23  Last refill 7/3/23  CSA: 5/22/23  [employer]isure: 6/13/22  BP Readings from Last 3 Encounters:   06/16/23 110/60   06/12/23 124/60   05/22/23 135/75

## 2023-08-15 ENCOUNTER — OFFICE VISIT (OUTPATIENT)
Dept: FAMILY MEDICINE | Facility: CLINIC | Age: 83
End: 2023-08-15

## 2023-08-15 VITALS
BODY MASS INDEX: 17.01 KG/M2 | HEART RATE: 94 BPM | SYSTOLIC BLOOD PRESSURE: 147 MMHG | WEIGHT: 90 LBS | DIASTOLIC BLOOD PRESSURE: 80 MMHG | OXYGEN SATURATION: 98 %

## 2023-08-15 DIAGNOSIS — R39.9 LOWER URINARY TRACT SYMPTOMS (LUTS): Primary | ICD-10-CM

## 2023-08-15 LAB
BACTERIA (RMG): ABNORMAL
BILIRUBIN (RMG): NEGATIVE
BLOOD (RMG): NEGATIVE
CASTS (RMG): ABNORMAL
COLOR UR: YELLOW
CRYSTAL (RMG): ABNORMAL
EPITHELIAL (RMG): ABNORMAL
GLUCOSE (RMG): NEGATIVE
KETONE (RMG): NEGATIVE
LEUKOCYTE (RMG): ABNORMAL
MUCOUS (RMG): ABNORMAL
NITRITE (RMG): NEGATIVE
PH UR STRIP: 7 PH (ref 5–7)
PROTEIN (RMG): NEGATIVE
RBC (RMG): ABNORMAL
SP GR UR STRIP: 1.01
UROBILINOGEN (RMG): 0.2
WBC (RMG): ABNORMAL

## 2023-08-15 PROCEDURE — 87086 URINE CULTURE/COLONY COUNT: CPT | Performed by: FAMILY MEDICINE

## 2023-08-15 PROCEDURE — 81003 URINALYSIS AUTO W/O SCOPE: CPT | Performed by: FAMILY MEDICINE

## 2023-08-15 PROCEDURE — 99213 OFFICE O/P EST LOW 20 MIN: CPT | Performed by: FAMILY MEDICINE

## 2023-08-15 NOTE — PROGRESS NOTES
Subjective     Virginia is a 83 year old patient who presents to clinic for evaluation. 1 week of urgency and dysuria.  Feels some aches and chills but no fever.  No flank pain.  Mild rhinorrhea.  No hematuria.        Review of Systems   Constitutional, HEENT, cardiovascular, pulmonary, gi and gu systems are negative, except as otherwise noted.      Objective    BP (!) 147/80   Pulse 94   Wt 40.8 kg (90 lb)   SpO2 98%   BMI 17.01 kg/m      General: Well appearing, NAD  HEENT: Mild nasal congestion.  Tms clear  Heart: RRR, no murmur  Chest: Lungs CTAB  Skin: Clear  Psych: normal mood and affect        Results for orders placed or performed in visit on 08/15/23 (from the past 24 hour(s))   Urinalysis (RMG)   Result Value Ref Range    Color Urine Yellow (A)     pH Urine 7.0 pH    Specific Gravity Urine 1.015     PROTEIN (RMG) Negative Negative    GLUCOSE (RMG) Negative Negative    KETONE (RMG) Negative Negative    LEUKOCYTE (RMG) 1+ (A) Negative    BLOOD (RMG) Negative Negative    Nitrite (RMG) Negative Negative, Positive    BILIRUBIN (RMG) Negative Negative    UROBILINOGEN (RMG) 0.2 0.2 - 1    WBC (RMG) 0-3     RBC (RMG) Rare     CRYSTAL (RMG) None     BACTERIA (RMG) Few (A)     MUCOUS (RMG) None     CASTS (RMG) None     EPITHELIAL (RMG) Few        Lower urinary tract symptoms (LUTS)  [place] UA.  Reassurance.  Recommend considering urology consult to see if may have CIC or other cause of symptoms.  She will consider.  - Urinalysis (RMG)  - Urine Culture; Future  - Urine Culture

## 2023-08-17 LAB — BACTERIA UR CULT: NORMAL

## 2023-09-02 ENCOUNTER — MYC MEDICAL ADVICE (OUTPATIENT)
Dept: FAMILY MEDICINE | Facility: CLINIC | Age: 83
End: 2023-09-02

## 2023-09-02 DIAGNOSIS — M54.32 SCIATICA OF LEFT SIDE: ICD-10-CM

## 2023-09-02 DIAGNOSIS — M43.17 SPONDYLOLISTHESIS OF LUMBOSACRAL REGION: ICD-10-CM

## 2023-09-05 RX ORDER — HYDROCODONE BITARTRATE AND ACETAMINOPHEN 5; 325 MG/1; MG/1
TABLET ORAL
Qty: 30 TABLET | Refills: 0 | Status: SHIPPED | OUTPATIENT
Start: 2023-09-05 | End: 2023-10-03

## 2023-09-13 ENCOUNTER — MYC MEDICAL ADVICE (OUTPATIENT)
Dept: FAMILY MEDICINE | Facility: CLINIC | Age: 83
End: 2023-09-13

## 2023-09-13 DIAGNOSIS — R13.12 OROPHARYNGEAL DYSPHAGIA: ICD-10-CM

## 2023-09-13 RX ORDER — FAMOTIDINE 20 MG/1
20 TABLET, FILM COATED ORAL 2 TIMES DAILY PRN
Qty: 180 TABLET | Refills: 3 | Status: SHIPPED | OUTPATIENT
Start: 2023-09-13

## 2023-09-13 NOTE — TELEPHONE ENCOUNTER
Prescription refill request for famotidine 20mg, taken for oropharyngeal dysphagia. Last prescription 1/9/23 #180. Refill routed to Dr Barahona for review. Josephine Hager

## 2023-10-03 ENCOUNTER — MYC REFILL (OUTPATIENT)
Dept: FAMILY MEDICINE | Facility: CLINIC | Age: 83
End: 2023-10-03

## 2023-10-03 ENCOUNTER — TRANSFERRED RECORDS (OUTPATIENT)
Dept: FAMILY MEDICINE | Facility: CLINIC | Age: 83
End: 2023-10-03

## 2023-10-03 DIAGNOSIS — M43.17 SPONDYLOLISTHESIS OF LUMBOSACRAL REGION: ICD-10-CM

## 2023-10-03 DIAGNOSIS — M54.32 SCIATICA OF LEFT SIDE: ICD-10-CM

## 2023-10-03 RX ORDER — HYDROCODONE BITARTRATE AND ACETAMINOPHEN 5; 325 MG/1; MG/1
TABLET ORAL
Qty: 30 TABLET | Refills: 0 | Status: SHIPPED | OUTPATIENT
Start: 2023-10-03 | End: 2023-10-31

## 2023-10-03 NOTE — TELEPHONE ENCOUNTER
CONTROLLED SUBSTANCE REFILL REQUEST FOR NORCO  DOSE: 5-325 Mg  - # 30  SIG: Take 1/2 to 1 tablet daily as needed for severe back and leg pain       LAST REFILL: 9/5/23    LAST APPT RELATED: 5/22/23    NEXT APPT: None         CONTROLLED SUBSTANCE AGREEMENT: 5/22/23    URINE DRUG SCREEN: 6/13/22        FILL HISTORY PER :          REFILL ROUTED TO J FOR REVIEW

## 2023-10-05 ENCOUNTER — MYC REFILL (OUTPATIENT)
Dept: FAMILY MEDICINE | Facility: CLINIC | Age: 83
End: 2023-10-05

## 2023-10-05 DIAGNOSIS — F41.1 GENERALIZED ANXIETY DISORDER: ICD-10-CM

## 2023-10-05 NOTE — TELEPHONE ENCOUNTER
CONTROLLED SUBSTANCE REFILL REQUEST FOR Clonazepam     DOSE: 0.5 mg - # 30  SIG: TAKE 1/2 TO 1 TABLET(0.25 TO 0.5 MG) BY MOUTH AT BEDTIME       LAST REFILL: 7/14/23    LAST APPT RELATED: 8/4/23    NEXT APPT: None         CONTROLLED SUBSTANCE AGREEMENT: 5/22/23    URINE DRUG SCREEN: 6/13/22        FILL HISTORY PER :          REFILL ROUTED TO JG FOR REVIEW

## 2023-10-06 DIAGNOSIS — F41.1 GENERALIZED ANXIETY DISORDER: ICD-10-CM

## 2023-10-06 RX ORDER — CLONAZEPAM 0.5 MG/1
TABLET ORAL
Qty: 30 TABLET | Refills: 0 | Status: SHIPPED | OUTPATIENT
Start: 2023-10-06 | End: 2023-12-08

## 2023-10-06 RX ORDER — CLONAZEPAM 0.5 MG/1
TABLET ORAL
Qty: 30 TABLET | Refills: 0 | Status: SHIPPED | OUTPATIENT
Start: 2023-10-06 | End: 2023-10-06

## 2023-10-06 NOTE — TELEPHONE ENCOUNTER
CURRENT PHARMACY IS OUT OF STOCK, ASKING TO HAVE SENT TO A NEW PHARMACY (THIS WAS JUST APPROVED AND SENT RECENTLY)    CLONAZEPAM    PHARMACY [facility]

## 2023-10-11 DIAGNOSIS — I10 PRIMARY HYPERTENSION: Primary | ICD-10-CM

## 2023-10-11 RX ORDER — AMLODIPINE BESYLATE 5 MG/1
5 TABLET ORAL DAILY
Qty: 90 TABLET | Refills: 1 | Status: SHIPPED | OUTPATIENT
Start: 2023-10-11 | End: 2023-12-29

## 2023-10-17 NOTE — TELEPHONE ENCOUNTER
Called and spoke with patient who reports productive cough, headache, body aches, decreased appetite and significant fatigue. Her symptoms have worsened since 6/5. Patient advised to seek evaluation at UC or ED. She declines at this time but states if symptoms continue she will be seen over this weekend. Josephine Hager    [facility] - ICU    RN Progress Note:            Pertinent Assessments:      Please refer to flowsheet rows for full assessment     - On Precedex 1.2 mcg & Fentanyl 125 mcg to maintain RASS -1 to -2. PRN Fentanyl    50 mcg for agitation during turn.    - SVT trail weaned for 65 minute.             Key Events - This Shift:

## 2023-10-31 ENCOUNTER — OFFICE VISIT (OUTPATIENT)
Dept: FAMILY MEDICINE | Facility: CLINIC | Age: 83
End: 2023-10-31

## 2023-10-31 VITALS
DIASTOLIC BLOOD PRESSURE: 84 MMHG | TEMPERATURE: 98.4 F | BODY MASS INDEX: 19.39 KG/M2 | HEART RATE: 95 BPM | SYSTOLIC BLOOD PRESSURE: 143 MMHG | WEIGHT: 102.6 LBS | OXYGEN SATURATION: 97 %

## 2023-10-31 DIAGNOSIS — M43.17 SPONDYLOLISTHESIS OF LUMBOSACRAL REGION: ICD-10-CM

## 2023-10-31 DIAGNOSIS — R39.9 LOWER URINARY TRACT SYMPTOMS (LUTS): Primary | ICD-10-CM

## 2023-10-31 DIAGNOSIS — R68.83 CHILLS: ICD-10-CM

## 2023-10-31 DIAGNOSIS — J06.9 UPPER RESPIRATORY TRACT INFECTION, UNSPECIFIED TYPE: ICD-10-CM

## 2023-10-31 DIAGNOSIS — M54.32 SCIATICA OF LEFT SIDE: ICD-10-CM

## 2023-10-31 LAB
BACTERIA (RMG): ABNORMAL
BILIRUBIN (RMG): NEGATIVE
BLOOD (RMG): NEGATIVE
CASTS (RMG): ABNORMAL
COLOR UR: ABNORMAL
CRYSTAL (RMG): ABNORMAL
EPITHELIAL (RMG): ABNORMAL
GLUCOSE (RMG): NEGATIVE
INFLUENZA A (RMG): NEGATIVE
INFLUENZA B (RMG): NEGATIVE
KETONE (RMG): NEGATIVE
LEUKOCYTE (RMG): ABNORMAL
MUCOUS (RMG): ABNORMAL
NITRITE (RMG): NEGATIVE
PH UR STRIP: 7 PH (ref 5–7)
PROTEIN (RMG): NEGATIVE
RBC (RMG): ABNORMAL
SARS ANTIGEN (RMG): NEGATIVE
SP GR UR STRIP: 1.01
UROBILINOGEN (RMG): 0.2
WBC (RMG): ABNORMAL

## 2023-10-31 PROCEDURE — 87186 SC STD MICRODIL/AGAR DIL: CPT | Performed by: FAMILY MEDICINE

## 2023-10-31 PROCEDURE — 87086 URINE CULTURE/COLONY COUNT: CPT | Performed by: FAMILY MEDICINE

## 2023-10-31 PROCEDURE — 99214 OFFICE O/P EST MOD 30 MIN: CPT | Performed by: FAMILY MEDICINE

## 2023-10-31 PROCEDURE — 87428 SARSCOV & INF VIR A&B AG IA: CPT | Mod: QW | Performed by: FAMILY MEDICINE

## 2023-10-31 PROCEDURE — 87088 URINE BACTERIA CULTURE: CPT | Performed by: FAMILY MEDICINE

## 2023-10-31 PROCEDURE — 81003 URINALYSIS AUTO W/O SCOPE: CPT | Performed by: FAMILY MEDICINE

## 2023-10-31 RX ORDER — HYDROCODONE BITARTRATE AND ACETAMINOPHEN 5; 325 MG/1; MG/1
TABLET ORAL
Qty: 30 TABLET | Refills: 0 | Status: SHIPPED | OUTPATIENT
Start: 2023-11-03 | End: 2023-11-28

## 2023-10-31 NOTE — PROGRESS NOTES
Subjective     Virginia is a 83 year old patient who presents to clinic for evaluation.  3 days of mild increased urinary frequency, congestion, rhinorrhea, dry mouth, subjective chills and aches and reports low grade fever.  No distress.  No hematuria, chest pain, SOB.          Review of Systems   Constitutional, HEENT, cardiovascular, pulmonary, GI, , musculoskeletal, neuro, skin, endocrine and psych systems are negative, except as otherwise noted.      Objective    BP (!) 143/84   Pulse 95   Temp 98.4  F (36.9  C) (Oral)   Wt 46.5 kg (102 lb 9.6 oz)   SpO2 97%   BMI 19.39 kg/m      General: mildly ill appearing, NAD  HEENT: nasal congestion.  Oropharynx clear.  TM clear bilaterally  Heart: RRR, no murmur  Chest: Lungs CTAB  Abd: soft, nontender  Skin: Clear  Psych: normal mood and affect        Results for orders placed or performed in visit on 10/31/23 (from the past 24 hour(s))   Urinalysis (RMG)   Result Value Ref Range    Color Urine Straw (A)     pH Urine 7.0 pH    Specific Gravity Urine 1.015     PROTEIN (RMG) Negative Negative    GLUCOSE (RMG) Negative Negative    KETONE (RMG) Negative Negative    LEUKOCYTE (RMG) Trace (A) Negative    BLOOD (RMG) Negative Negative    Nitrite (RMG) Negative Negative, Positive    BILIRUBIN (RMG) Negative Negative    UROBILINOGEN (RMG) 0.2 0.2 - 1    WBC (RMG) 0-3     RBC (RMG) None     CRYSTAL (RMG) None     BACTERIA (RMG) Few (A)     MUCOUS (RMG) None     CASTS (RMG) None     EPITHELIAL (RMG) Rare    Influenza + SARS Antigen (RMG)   Result Value Ref Range    INFLUENZA A (RMG) Negative Negative    INFLUENZA B (RMG) Negative Negative    SARS ANTIGEN (RMG) Negative Negative       Lower urinary tract symptoms (LUTS)  [place] UA.  Await culture.  Phone follow up tomorrow.  ER if systemic symptoms develop/increase  - Urinalysis (RMG)  - Urine Culture; Future  - Urine Culture    Chills  Reassuring appearance.  Possible viral URI.  Phone follow up tomorrow  - Influenza +  SARS Antigen (RMG)    Upper respiratory tract infection, unspecified type  We discussed that nearly all upper respiratory infections are viral and that antibiotics generally do not improve outcomes.  Supportive care including fluids, cool mist humidifier, OTC analgesics, nasal irrigation, zinc acetate lozenges and honey discussed.  Reasons to follow up discussed and understood.    Sciatica of left side  Stable, on chronic opiates.  Other interventions have failed  - HYDROcodone-acetaminophen (NORCO) 5-325 MG tablet; Take 1/2 to 1 tablet daily as needed for severe back and leg pain    Spondylolisthesis of lumbosacral region  - HYDROcodone-acetaminophen (NORCO) 5-325 MG tablet; Take 1/2 to 1 tablet daily as needed for severe back and leg pain    Patient is agreement with the assessment and plan as outlined above.  All questions answered.  Red flag symptoms that should prompt emergent evaluation discussed and understood.

## 2023-11-02 ENCOUNTER — MYC MEDICAL ADVICE (OUTPATIENT)
Dept: FAMILY MEDICINE | Facility: CLINIC | Age: 83
End: 2023-11-02

## 2023-11-02 DIAGNOSIS — N39.0 URINARY TRACT INFECTION: ICD-10-CM

## 2023-11-02 DIAGNOSIS — R39.9 LOWER URINARY TRACT SYMPTOMS (LUTS): Primary | ICD-10-CM

## 2023-11-02 LAB — BACTERIA UR CULT: ABNORMAL

## 2023-11-03 RX ORDER — CEFDINIR 300 MG/1
300 CAPSULE ORAL 2 TIMES DAILY
Qty: 14 CAPSULE | Refills: 0 | Status: SHIPPED | OUTPATIENT
Start: 2023-11-03 | End: 2023-11-10

## 2023-11-11 ENCOUNTER — MYC MEDICAL ADVICE (OUTPATIENT)
Dept: FAMILY MEDICINE | Facility: CLINIC | Age: 83
End: 2023-11-11

## 2023-11-11 DIAGNOSIS — R39.9 LOWER URINARY TRACT SYMPTOMS (LUTS): Primary | ICD-10-CM

## 2023-11-15 RX ORDER — CEFDINIR 300 MG/1
300 CAPSULE ORAL 2 TIMES DAILY
Qty: 10 CAPSULE | Refills: 0 | Status: SHIPPED | OUTPATIENT
Start: 2023-11-15 | End: 2023-11-20

## 2023-11-28 ENCOUNTER — MYC MEDICAL ADVICE (OUTPATIENT)
Dept: FAMILY MEDICINE | Facility: CLINIC | Age: 83
End: 2023-11-28

## 2023-11-28 DIAGNOSIS — M54.32 SCIATICA OF LEFT SIDE: ICD-10-CM

## 2023-11-28 DIAGNOSIS — M43.17 SPONDYLOLISTHESIS OF LUMBOSACRAL REGION: ICD-10-CM

## 2023-11-28 RX ORDER — HYDROCODONE BITARTRATE AND ACETAMINOPHEN 5; 325 MG/1; MG/1
TABLET ORAL
Qty: 30 TABLET | Refills: 0 | Status: SHIPPED | OUTPATIENT
Start: 2023-12-03 | End: 2023-12-29

## 2023-11-28 NOTE — TELEPHONE ENCOUNTER
Controlled Substance Refill Request for: Norco 5-325mg #30   Last refill: 10/3/23  Last clinic visit: 10/31/23-chronic pain addressed    Clinic visit frequency required: Q 3 months  Next appt: not scheduled at this time   Controlled substance agreement on file: Yes:  Date 5/22/23.  Tox screen done 6/13/22    [place]  checked, fill history below. Refill routed to Dr Barahona for review.

## 2023-12-03 ENCOUNTER — NURSE TRIAGE (OUTPATIENT)
Dept: NURSING | Facility: CLINIC | Age: 83
End: 2023-12-03
Payer: COMMERCIAL

## 2023-12-03 NOTE — TELEPHONE ENCOUNTER
Nurse Triage SBAR    Is this a 2nd Level Triage? No - patient is not established with [facility]    Situation/Background: Symptoms of headache, body aches, dizziness, started Friday evening.     Patient was on antibiotic for UTI. While on the antibiotic, the patient saw her dentist and was told that she had an infection in her tooth. Patient was prescribed amoxicillin for the tooth. Patient states she spoke with her Provider who prescribes 5 more days of antibiotics on 11/11/23.   Patient was treated with cefdinir and amoxicillin.    Assessment:   Headache, body aches, pains, dizziness, weakness  Highest temp around 99 today, has had chills and sweats today  Dry mouth, clogged nose  Patient describes urinary urgency, burning with urination, frequency with small amounts  Burning pain in upper back is moderate, is worse when sitting up  Muscles in legs and arms aches    Recommendation: Per disposition, See HCP (or PCP Triage). Patient declined disposition, stating she will go to [facility] if symptoms worsen or will go tomorrow. Recommended patient contact [facility] and ask for Provider on call. Offered to warm transfer with the patient to [facility]. The patient declined, stating she needed to rest first. Reviewed Care Advice with patient and verbalizes understanding. Patient declines additional questions. Advised patient to call back with any new or worsening symptoms. Patient  verbalized understanding and agrees with plan.     Protocol Recommended Disposition: Paged/Called Provider    Rosi Starr RN on 12/3/2023 at 5:38 PM  [facility]  Reason for Disposition   Urinating more frequently than usual (i.e., frequency)   [1] Pain or burning with passing urine (urination) AND [2] flank pain (i.e., in side, below ribs and above hip)    Additional Information   Negative: Shock suspected (e.g., cold/pale/clammy skin, too weak to stand, low BP, rapid pulse)   Negative: Sounds  like a life-threatening emergency to the triager   Negative: [1] Unable to urinate (or only a few drops) > 4 hours AND [2] bladder feels very full (e.g., palpable bladder or strong urge to urinate)   Negative: [1] Decreased urination and [2] drinking very little AND [2] dehydration suspected (e.g., dark urine, no urine > 12 hours, very dry mouth, very lightheaded)   Negative: Patient sounds very sick or weak to the triager   Negative: Fever > 100.4 F (38.0 C)   Negative: Side (flank) or lower back pain present   Negative: Followed a female genital area injury (e.g., labia, vagina, vulva)   Negative: Followed a male genital area injury (e.g., penis, scrotum)   Negative: Vaginal discharge   Negative: Pus (white, yellow) or bloody discharge from end of penis   Negative: [1] Taking antibiotic for urinary tract infection (UTI) AND [2] female   Negative: [1] Taking antibiotic for urinary tract infection (UTI) AND [2] male   Negative: [1] Pain or burning with passing urine (urination) AND [2] pregnant   Negative: [1] Pain or burning with passing urine (urination) AND [2] postpartum (from 0 to 6 weeks after delivery)   Negative: [1] Pain or burning with passing urine (urination) AND [2] female   Negative: [1] Pain or burning with passing urine (urination) AND [2] male   Negative: Pain or itching in the vulvar area   Negative: Pain in scrotum is main symptom   Negative: Blood in the urine is main symptom   Negative: Symptoms arising from use of a urinary catheter (e.g., Coude, [place])   Negative: Major injury to the back (e.g., MVA, fall > 10 feet or 3 meters, penetrating injury, etc.)   Negative: Followed a tailbone injury   Negative: [1] Pain in the upper back over the ribs (rib cage) AND [2] radiates (travels, goes) into chest   Negative: [1] Pain in the upper back over the ribs (rib cage) AND [2] worsened by coughing (or clearly increases with breathing)   Negative: Back pain during pregnancy   Negative: Pain mainly in  flank (i.e., in the side, over the lower ribs or just below the ribs)   Negative: Passed out (i.e., lost consciousness, collapsed and was not responding)   Negative: Shock suspected (e.g., cold/pale/clammy skin, too weak to stand, low BP, rapid pulse)   Negative: Sounds like a life-threatening emergency to the triager   Negative: [1] SEVERE back pain (e.g., excruciating) AND [2] sudden onset AND [3] age > 60 years   Negative: [1] Unable to urinate (or only a few drops) > 4 hours AND [2] bladder feels very full (e.g., palpable bladder or strong urge to urinate)   Negative: [1] Loss of bladder or bowel control (urine or bowel incontinence; wetting self, leaking stool) AND [2] new-onset   Negative: Numbness in groin or rectal area (i.e., loss of sensation)   Negative: [1] SEVERE abdominal pain AND [2] present > 1 hour   Negative: [1] Abdominal pain AND [2] age > 60 years   Negative: Weakness of a leg or foot (e.g., unable to bear weight, dragging foot)   Negative: Unable to walk   Negative: Patient sounds very sick or weak to the triager   Negative: [1] SEVERE back pain (e.g., excruciating, unable to do any normal activities) AND [2] not improved 2 hours after pain medicine   Negative: [1] Pain radiates into the thigh or further down the leg AND [2] both legs   Negative: [1] Fever > 100.0 F (37.8 C) AND [2] flank pain (i.e., in side, below ribs and above hip)    Protocols used: Urinary Symptoms-A-AH, Back Pain-A-AH

## 2023-12-07 ENCOUNTER — TELEPHONE (OUTPATIENT)
Dept: UROLOGY | Facility: CLINIC | Age: 83
End: 2023-12-07
Payer: COMMERCIAL

## 2023-12-07 NOTE — TELEPHONE ENCOUNTER
[facility] Call Center    Phone Message    May a detailed message be left on voicemail: no     Reason for Call: Other: pt calling and needs a new doctor, had a spot on her bladder that was cancer but gone now, wants a uro/gyno, not sure who to put      Action Taken: Other: urolgoy    Travel Screening: Not Applicable

## 2023-12-08 DIAGNOSIS — F41.1 GENERALIZED ANXIETY DISORDER: ICD-10-CM

## 2023-12-08 RX ORDER — CLONAZEPAM 0.5 MG/1
TABLET ORAL
Qty: 30 TABLET | Refills: 0 | Status: ON HOLD | OUTPATIENT
Start: 2023-12-08 | End: 2024-04-04

## 2023-12-08 NOTE — TELEPHONE ENCOUNTER
Patient scheduled at [facility]    Charlotte Kahn, RN, BSN  Care Coordinator Urology  [facility], [place]  [facility]  [phone]

## 2023-12-08 NOTE — TELEPHONE ENCOUNTER
Patient will be out  before Monday.  Last seen 10/31/23    Clonazepam    Pharmacy:  [facility]

## 2023-12-12 ENCOUNTER — TRANSFERRED RECORDS (OUTPATIENT)
Dept: FAMILY MEDICINE | Facility: CLINIC | Age: 83
End: 2023-12-12

## 2023-12-14 ENCOUNTER — TRANSCRIBE ORDERS (OUTPATIENT)
Dept: OTHER | Age: 83
End: 2023-12-14

## 2023-12-14 DIAGNOSIS — R39.14 FEELING OF INCOMPLETE BLADDER EMPTYING: Primary | ICD-10-CM

## 2023-12-20 ENCOUNTER — HOSPITAL REPORTS SCAN (OUTPATIENT)
Dept: FAMILY MEDICINE | Facility: CLINIC | Age: 83
End: 2023-12-20

## 2023-12-20 ENCOUNTER — NURSE TRIAGE (OUTPATIENT)
Dept: NURSING | Facility: CLINIC | Age: 83
End: 2023-12-20
Payer: COMMERCIAL

## 2023-12-20 NOTE — TELEPHONE ENCOUNTER
Nurse Triage SBAR    Is this a 2nd Level Triage? NO    Situation: Patient calling with left leg pain following a fall in the kitchen today.  Consent: not needed    Background: Patient has a history of stroke and has a few falls.  Fell again today in the kitchen floor on slate tile landing on left hip/leg   helped her up and patient is currently sitting in a chair.     Assessment:   Whole left leg hurts - rates pain 10/10. States pain is getting worse and she is now unable to move or bend her leg.  Unable to bear weight or stand    Protocol Recommended Disposition:   Call 911    Recommendation: Advised patient to call 911. Care advice given including when to call back. Patient verbalized understanding and agreed with plan.     Opal Cook RN [facility] 12/20/2023 12:55 PM    Reason for Disposition   Can't stand (bear weight) or walk    Additional Information   Negative: Major bleeding (actively dripping or spurting) that can't be stopped   Negative: Bullet, stabbed by knife, or other serious penetrating wound   Negative: Looks like a dislocated joint (crooked or deformed)    Protocols used: Leg Injury-A-OH

## 2023-12-26 ENCOUNTER — LAB REQUISITION (OUTPATIENT)
Dept: LAB | Facility: CLINIC | Age: 83
End: 2023-12-26
Payer: COMMERCIAL

## 2023-12-26 DIAGNOSIS — Z11.1 ENCOUNTER FOR SCREENING FOR RESPIRATORY TUBERCULOSIS: ICD-10-CM

## 2023-12-27 ENCOUNTER — MEDICAL CORRESPONDENCE (OUTPATIENT)
Dept: FAMILY MEDICINE | Facility: CLINIC | Age: 83
End: 2023-12-27

## 2023-12-27 ENCOUNTER — TRANSITIONAL CARE UNIT VISIT (OUTPATIENT)
Dept: GERIATRICS | Facility: CLINIC | Age: 83
End: 2023-12-27
Payer: COMMERCIAL

## 2023-12-27 VITALS
BODY MASS INDEX: 20.67 KG/M2 | DIASTOLIC BLOOD PRESSURE: 85 MMHG | TEMPERATURE: 97.5 F | SYSTOLIC BLOOD PRESSURE: 153 MMHG | WEIGHT: 109.4 LBS | OXYGEN SATURATION: 98 % | HEART RATE: 92 BPM | RESPIRATION RATE: 22 BRPM

## 2023-12-27 DIAGNOSIS — F51.01 PRIMARY INSOMNIA: ICD-10-CM

## 2023-12-27 DIAGNOSIS — S72.002D CLOSED LEFT HIP FRACTURE, WITH ROUTINE HEALING, SUBSEQUENT ENCOUNTER: Primary | ICD-10-CM

## 2023-12-27 DIAGNOSIS — R53.81 PHYSICAL DECONDITIONING: ICD-10-CM

## 2023-12-27 DIAGNOSIS — F11.90 CHRONIC, CONTINUOUS USE OF OPIOIDS: ICD-10-CM

## 2023-12-27 DIAGNOSIS — I69.354 HEMIPARESIS AFFECTING LEFT SIDE AS LATE EFFECT OF CEREBROVASCULAR ACCIDENT (CVA) (H): ICD-10-CM

## 2023-12-27 DIAGNOSIS — Z47.89 AFTERCARE FOLLOWING SURGERY OF THE MUSCULOSKELETAL SYSTEM: ICD-10-CM

## 2023-12-27 DIAGNOSIS — K59.03 DRUG-INDUCED CONSTIPATION: ICD-10-CM

## 2023-12-27 DIAGNOSIS — F41.1 GENERALIZED ANXIETY DISORDER: ICD-10-CM

## 2023-12-27 PROBLEM — F32.A DEPRESSIVE DISORDER: Status: ACTIVE | Noted: 2023-12-10

## 2023-12-27 PROBLEM — M26.639 ARTICULAR DISC DISORDER OF TEMPOROMANDIBULAR JOINT: Status: ACTIVE | Noted: 2023-11-05

## 2023-12-27 PROBLEM — K21.9 GASTROESOPHAGEAL REFLUX DISEASE: Status: ACTIVE | Noted: 2023-12-10

## 2023-12-27 PROBLEM — G89.29 CHRONIC PAIN: Status: ACTIVE | Noted: 2023-12-27

## 2023-12-27 PROBLEM — E87.1 HYPONATREMIA: Status: ACTIVE | Noted: 2023-12-20

## 2023-12-27 PROBLEM — G81.90 HEMIPARESIS (H): Status: ACTIVE | Noted: 2023-12-10

## 2023-12-27 PROBLEM — Z86.018 HISTORY OF MENINGIOMA: Status: ACTIVE | Noted: 2023-12-10

## 2023-12-27 PROBLEM — I63.9 CEREBROVASCULAR ACCIDENT (H): Status: ACTIVE | Noted: 2023-12-10

## 2023-12-27 PROBLEM — Z85.43 HISTORY OF MALIGNANT NEOPLASM OF OVARY: Status: ACTIVE | Noted: 2023-12-10

## 2023-12-27 PROBLEM — E78.5 HYPERLIPIDEMIA: Status: ACTIVE | Noted: 2023-12-10

## 2023-12-27 PROBLEM — M26.69 TEMPOROMANDIBULAR JOINT CREPITUS: Status: ACTIVE | Noted: 2023-11-05

## 2023-12-27 PROBLEM — S72.012A: Status: ACTIVE | Noted: 2023-12-20

## 2023-12-27 PROBLEM — K04.7 DENTAL ABSCESS: Status: ACTIVE | Noted: 2023-11-05

## 2023-12-27 PROBLEM — F41.9 ANXIETY: Status: ACTIVE | Noted: 2023-12-27

## 2023-12-27 PROCEDURE — 99310 SBSQ NF CARE HIGH MDM 45: CPT | Performed by: NURSE PRACTITIONER

## 2023-12-27 PROCEDURE — P9604 ONE-WAY ALLOW PRORATED TRIP: HCPCS | Performed by: INTERNAL MEDICINE

## 2023-12-27 PROCEDURE — 86481 TB AG RESPONSE T-CELL SUSP: CPT | Performed by: INTERNAL MEDICINE

## 2023-12-27 RX ORDER — OXYCODONE HYDROCHLORIDE 5 MG/1
5 TABLET ORAL EVERY 6 HOURS PRN
COMMUNITY
End: 2024-03-11

## 2023-12-27 RX ORDER — MULTIVIT-MIN/IRON/FOLIC ACID/K 18-600-40
1000 CAPSULE ORAL DAILY
COMMUNITY
End: 2024-04-17

## 2023-12-27 RX ORDER — MILK THISTLE 150 MG
500 CAPSULE ORAL DAILY
Status: ON HOLD | COMMUNITY
End: 2024-04-04

## 2023-12-27 RX ORDER — ZINC SULFATE 50(220)MG
220 CAPSULE ORAL DAILY
COMMUNITY
End: 2024-04-17

## 2023-12-27 RX ORDER — BACLOFEN 10 MG/1
10 TABLET ORAL 3 TIMES DAILY PRN
COMMUNITY
End: 2024-02-05

## 2023-12-27 RX ORDER — GABAPENTIN 100 MG/1
100 CAPSULE ORAL 2 TIMES DAILY
COMMUNITY
End: 2024-03-11

## 2023-12-27 RX ORDER — LANOLIN ALCOHOL/MO/W.PET/CERES
1 CREAM (GRAM) TOPICAL DAILY
COMMUNITY

## 2023-12-27 NOTE — LETTER
12/27/2023        RE: July Huang  [address]  [facility] [address]        [facility] GERIATRICS    PRIMARY CARE PROVIDER AND CLINIC:  Denton Barahona MD, [address] [facility] [address] [facility] [place] [address]  Chief Complaint   Patient presents with     Hospital F/U      [facility] Medical Record Number:  [id number]  Place of Service where encounter took place:  [facility] (U) [[id number]]    HPI:    July Huang is an 83 y.o. 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 Found to have a left hip fracture, underwent left hip hemiarthroplasty on 12/21/2023. Per Ortho, patient can weight-bear as tolerated left lower extremity with no restrictions. Aspirin 81 mg twice daily and SCDs prescribed for DVT prophylaxis. She was admitted to [facility] on 12/26/23 for further nursing care and therapies.     Khushi was visited today while in her room resting in bed.  She is very frustrated on how the night went last night and repeatedly shares stories that went wrong beginning from her hospital discharge yesterday afternoon.  She is very upset that she did not get her Klonopin last night as she has taken this for the past 20 to 30 years for anxiety and sleep.  She has not had pain in her left hip and did not want her pain medication overnight, just her Klonopin.  She shares that she feels very thirsty and has been wanting to drink water but was told she cannot drink water overnight.  She feels very dry in her mouth.    She slept with her crocs on last night states that this is comfortable for her as she has spasms in her feet in which she uses as needed baclofen for.  She denies abdominal pain or constipation but did have constipation while in the hospital.  Appetite has been fair.  She has not had chest pain, shortness of breath, cough, or palpitations.    CODE STATUS/ADVANCE DIRECTIVES  DISCUSSION:  No Order  DNR / DNI  ALLERGIES:   Allergies   Allergen Reactions     Diltiazem Palpitations     Hydrochlorothiazide W-Amiloride Palpitations     Lisinopril Palpitations     Losartan Potassium Visual Disturbance     Meclizine Palpitations     Metoprolol Palpitations     Tetracyclines & Related Nausea and Vomiting      PAST MEDICAL HISTORY:   Past Medical History:   Diagnosis Date     Cancer of ovary (H)      Cerebrovascular accident (CVA) (H) 11/30/2020     Depression, major, single episode, severe (H) 12/23/2022     Eye muscle weakness, left 2/17/2016     GERD (gastroesophageal reflux disease) 7/8/2009     H/O total hysterectomy with bilateral salpingo-oophorectomy (BSO) 1995    ovarian cancer     Irregular heart beat 2009    nl CT angiogram     Non-cardiac chest pain 7/8/2009     S/P coronary angiogram 2005    negative     Stress 12/22/2010     Upper back pain 2/17/2016      PAST SURGICAL HISTORY:   has a past surgical history that includes Ovary surgery; hysterectomy, pap no longer indicated; Hysterectomy total abdominal, bilateral salpingo-oophorectomy, combined (1995); and Biopsy breast.  FAMILY HISTORY: family history includes Asthma in her sister and sister; Cancer in her sister; Cerebrovascular Disease in her mother; Diabetes in her father; Heart Disease in her father; Lung Cancer in her sister; Neurologic Disorder in her sister; Parkinsonism in her sister.  SOCIAL HISTORY:   reports that she quit smoking about 29 years ago. Her smoking use included cigarettes. She has never used smokeless tobacco. She reports current alcohol use. She reports that she does not use drugs.  Patient's living condition: lives with spouse    Post Discharge Medication Reconciliation Status:  Post Medication Reconciliation Status:          Current Outpatient Medications   Medication Sig     Ascorbic Acid (VITAMIN C) 500 MG CAPS Take 1,000 mg by mouth daily     aspirin (ASA) 81 MG EC tablet Take 81 mg by mouth 2 times  daily     baclofen (LIORESAL) 10 MG tablet Take 10 mg by mouth 3 times daily as needed for muscle spasms     calcium citrate-vitamin D (CITRACAL) 315-5 MG-MCG TABS per tablet Take 1 tablet by mouth daily     Cholecalciferol (VITAMIN D) 1000 UNIT capsule Take 1 capsule by mouth daily 5000 unit 2-3 times per week     clonazePAM (KLONOPIN) 0.5 MG tablet TAKE 1/2 TO 1 TABLET(0.25 TO 0.5 MG) BY MOUTH AT BEDTIME     CRANBERRY EXTRACT PO Take 1 capsule by mouth     cyanocobalamin (VITAMIN B-12) 1000 MCG tablet Take 1 tablet (1,000 mcg) by mouth daily     diclofenac (VOLTAREN) 1 % topical gel Apply topically daily as needed for moderate pain     famotidine (PEPCID) 20 MG tablet Take 1 tablet (20 mg) by mouth 2 times daily as needed     gabapentin (NEURONTIN) 100 MG capsule Take 100 mg by mouth 2 times daily     Lactobacillus Rhamnosus, GG, ( PROBIOTIC DIGESTIVE CARE) CAPS Take 1 capsule by mouth      Lutein-Zeaxanthin 20-1 MG CAPS Take 1 capsule by mouth daily     melatonin 5 MG tablet Take 5 mg by mouth nightly as needed     oxyCODONE (ROXICODONE) 5 MG tablet Take 5 mg by mouth every 6 hours as needed for severe pain     Quercetin 500 MG CAPS Take 500 mg by mouth daily     TAURINE PO Take 500 mg by mouth daily     valACYclovir (VALTREX) 1000 mg tablet valacyclovir 1 gram tablet     zinc sulfate (ZINCATE) 220 (50 Zn) MG capsule Take 220 mg by mouth daily     No current facility-administered medications for this visit.       ROS:  10 point ROS of systems including Constitutional, Eyes, Respiratory, Cardiovascular, Gastroenterology, Genitourinary, Integumentary, Musculoskeletal, Psychiatric were all negative except for pertinent positives noted in my HPI.    Vitals:  BP (!) 153/85   Pulse 92   Temp 97.5  F (36.4  C)   Resp 22   Wt 49.6 kg (109 lb 6.4 oz)   SpO2 98%   BMI 20.67 kg/m    Exam:  GENERAL APPEARANCE:  Alert, anxious, oriented x 4  EYES: no discharge or mattering on lids or lashes noted, wears glasses  ENT:   moist mucous membranes, hearing acuity intact  NECK: supple, symmetrical  RESP: no respiratory distress, Lung sounds clear, patient is on room air  CV:  rate and rhythm regular, no murmur. Edema none in bilateral lower extremities. VASCULAR: warm extremities without open areas.  ABDOMEN: normal bowel sounds, soft, nontender.  M/S:   Gait and station: unsafe without assistance, able to move all extremities   SKIN:  Inspection and palpation of skin and subcutaneous tissue: skin warm, dry and intact without rashes. Left hip incision clean dry and intact.   NEURO: no facial asymmetry, no speech deficits and able to follow directions, moves all extremities symmetrically  PSYCH:  insight and judgement intact, memory intact, affect and mood anxious    Lab/Diagnostic data:  Recent labs in [facility] reviewed by me today.     ASSESSMENT/PLAN:  (S72.002D) Closed left hip fracture, with routine healing, subsequent encounter  (primary encounter diagnosis)  Comment: Secondary to fall now status post left hemiarthroplasty.  The incision itself looks great without signs or symptoms of infection.  She denies pain today.  It may be more difficult to manage her pain given her chronic opioid use but today she is comfortable from a pain perspective.  Hemoglobin postoperatively in the tens range.  -- Aspirin 81 mg twice daily for DVT prophylaxis  -- Scheduled Tylenol 1000 mg 3 times daily and oxycodone every 4 hours as needed  --Gabapentin 100 mg twice daily  -- PT OT for strengthening  -- Follow-up with Ortho in 2 to 3 weeks.  If it is difficult for her to get out we could have her onsite Ortho see her here if she wishes    (I60.354) Hemiparesis affecting left side as late effect of cerebrovascular accident (CVA) (H)  Comment: Contributing to her fall leading to the fracture.  She has muscle spasms that she uses baclofen for and that is effective in managing them.  -- Baclofen 3 times daily as needed  -- PT OT for strengthening    (F41.1)  Generalized anxiety disorder  Comment: She is very anxious today which in part is due to her discharge yesterday feeling rushed getting to the TCU later in the day and then not having her Klonopin so she has been up most of the night.  -- Will schedule her Klonopin 0.5 mg at bedtime which is how she takes it at home.    (K59.03) Drug-induced constipation  Comment: Due to surgery and chronic opioid use.  She reports that she had constipation while in the hospital and was given senna and did not like the way it resulted.  She would like to use MiraLAX as it is more gentle and has used this in the past therefore we will schedule MiraLAX daily in the morning    (F51.01) Primary insomnia  Comment: Reports that she is taking Klonopin for 20 to 30 years.  She was very frustrated last night that the Klonopin was not given.  It appears that the prescription was not sent from the hospital so pharmacy was unable to send the medication to the facility.  Wrote a prescription for the Klonopin today so she should be able to take that this evening and hopeful she will get a better night sleep tonight.    (Z47.89) Aftercare following surgery of the musculoskeletal system  (R53.81) Physical deconditioning  Comment: secondary to recent hospitalization and underlying medical conditions.   Plan: PT/OT for strengthening. SW for discharge planning.     (F11.90) Chronic, continuous use of opioids  Comment: Looks like she is followed by a pain specialist, Denton Barahona with in the [facility].  She does have a pain contract on file and gets 30 tablets and Norco on average on a monthly basis.  She follows with the specialist every 3 months.    Total time spent with patient visit at the skilled nursing facility was 52 minutes including patient visit (30 minutes) and review of past records.     Electronically signed by:  SERGIO Bojorquez CNP                   Sincerely,        SERGIO Bojorquez CNP

## 2023-12-27 NOTE — PROGRESS NOTES
[facility] GERIATRICS    PRIMARY CARE PROVIDER AND CLINIC:  Denton Barahona MD, [address] / [facility] [address]  Chief Complaint   Patient presents with    Hospital F/U      [facility] Medical Record Number:  [id number]  Place of Service where encounter took place:  [facility] ([facility]) [[id number]]    HPI:    July Huang is an 83 y.o. 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 Found to have a left hip fracture, underwent left hip hemiarthroplasty on 12/21/2023. Per Ortho, patient can weight-bear as tolerated left lower extremity with no restrictions. Aspirin 81 mg twice daily and SCDs prescribed for DVT prophylaxis. She was admitted to [facility] on 12/26/23 for further nursing care and therapies.     Khushi was visited today while in her room resting in bed.  She is very frustrated on how the night went last night and repeatedly shares stories that went wrong beginning from her hospital discharge yesterday afternoon.  She is very upset that she did not get her Klonopin last night as she has taken this for the past 20 to 30 years for anxiety and sleep.  She has not had pain in her left hip and did not want her pain medication overnight, just her Klonopin.  She shares that she feels very thirsty and has been wanting to drink water but was told she cannot drink water overnight.  She feels very dry in her mouth.    She slept with her crocs on last night states that this is comfortable for her as she has spasms in her feet in which she uses as needed baclofen for.  She denies abdominal pain or constipation but did have constipation while in the hospital.  Appetite has been fair.  She has not had chest pain, shortness of breath, cough, or palpitations.    CODE STATUS/ADVANCE DIRECTIVES DISCUSSION:  No Order  DNR / DNI  ALLERGIES:   Allergies   Allergen Reactions    Diltiazem  Palpitations    Hydrochlorothiazide W-Amiloride Palpitations    Lisinopril Palpitations    Losartan Potassium Visual Disturbance    Meclizine Palpitations    Metoprolol Palpitations    Tetracyclines & Related Nausea and Vomiting      PAST MEDICAL HISTORY:   Past Medical History:   Diagnosis Date    Cancer of ovary (H)     Cerebrovascular accident (CVA) (H) 11/30/2020    Depression, major, single episode, severe (H) 12/23/2022    Eye muscle weakness, left 2/17/2016    GERD (gastroesophageal reflux disease) 7/8/2009    H/O total hysterectomy with bilateral salpingo-oophorectomy (BSO) 1995    ovarian cancer    Irregular heart beat 2009    nl CT angiogram    Non-cardiac chest pain 7/8/2009    S/P coronary angiogram 2005    negative    Stress 12/22/2010    Upper back pain 2/17/2016      PAST SURGICAL HISTORY:   has a past surgical history that includes Ovary surgery; hysterectomy, pap no longer indicated; Hysterectomy total abdominal, bilateral salpingo-oophorectomy, combined (1995); and Biopsy breast.  FAMILY HISTORY: family history includes Asthma in her sister and sister; Cancer in her sister; Cerebrovascular Disease in her mother; Diabetes in her father; Heart Disease in her father; Lung Cancer in her sister; Neurologic Disorder in her sister; Parkinsonism in her sister.  SOCIAL HISTORY:   reports that she quit smoking about 29 years ago. Her smoking use included cigarettes. She has never used smokeless tobacco. She reports current alcohol use. She reports that she does not use drugs.  Patient's living condition: lives with spouse    Post Discharge Medication Reconciliation Status:  Post Medication Reconciliation Status:          Current Outpatient Medications   Medication Sig    Ascorbic Acid (VITAMIN C) 500 MG CAPS Take 1,000 mg by mouth daily    aspirin (ASA) 81 MG EC tablet Take 81 mg by mouth 2 times daily    baclofen (LIORESAL) 10 MG tablet Take 10 mg by mouth 3 times daily as needed for muscle spasms     calcium citrate-vitamin D (CITRACAL) 315-5 MG-MCG TABS per tablet Take 1 tablet by mouth daily    Cholecalciferol (VITAMIN D) 1000 UNIT capsule Take 1 capsule by mouth daily 5000 unit 2-3 times per week    clonazePAM (KLONOPIN) 0.5 MG tablet TAKE 1/2 TO 1 TABLET(0.25 TO 0.5 MG) BY MOUTH AT BEDTIME    CRANBERRY EXTRACT PO Take 1 capsule by mouth    cyanocobalamin (VITAMIN B-12) 1000 MCG tablet Take 1 tablet (1,000 mcg) by mouth daily    diclofenac (VOLTAREN) 1 % topical gel Apply topically daily as needed for moderate pain    famotidine (PEPCID) 20 MG tablet Take 1 tablet (20 mg) by mouth 2 times daily as needed    gabapentin (NEURONTIN) 100 MG capsule Take 100 mg by mouth 2 times daily    Lactobacillus Rhamnosus, GG, ( PROBIOTIC DIGESTIVE CARE) CAPS Take 1 capsule by mouth     Lutein-Zeaxanthin 20-1 MG CAPS Take 1 capsule by mouth daily    melatonin 5 MG tablet Take 5 mg by mouth nightly as needed    oxyCODONE (ROXICODONE) 5 MG tablet Take 5 mg by mouth every 6 hours as needed for severe pain    Quercetin 500 MG CAPS Take 500 mg by mouth daily    TAURINE PO Take 500 mg by mouth daily    valACYclovir (VALTREX) 1000 mg tablet valacyclovir 1 gram tablet    zinc sulfate (ZINCATE) 220 (50 Zn) MG capsule Take 220 mg by mouth daily     No current facility-administered medications for this visit.       ROS:  10 point ROS of systems including Constitutional, Eyes, Respiratory, Cardiovascular, Gastroenterology, Genitourinary, Integumentary, Musculoskeletal, Psychiatric were all negative except for pertinent positives noted in my HPI.    Vitals:  BP (!) 153/85   Pulse 92   Temp 97.5  F (36.4  C)   Resp 22   Wt 49.6 kg (109 lb 6.4 oz)   SpO2 98%   BMI 20.67 kg/m    Exam:  GENERAL APPEARANCE:  Alert, anxious, oriented x 4  EYES: no discharge or mattering on lids or lashes noted, wears glasses  ENT:  moist mucous membranes, hearing acuity intact  NECK: supple, symmetrical  RESP: no respiratory distress, Lung sounds clear,  patient is on room air  CV:  rate and rhythm regular, no murmur. Edema none in bilateral lower extremities. VASCULAR: warm extremities without open areas.  ABDOMEN: normal bowel sounds, soft, nontender.  M/S:   Gait and station: unsafe without assistance, able to move all extremities   SKIN:  Inspection and palpation of skin and subcutaneous tissue: skin warm, dry and intact without rashes. Left hip incision clean dry and intact.   NEURO: no facial asymmetry, no speech deficits and able to follow directions, moves all extremities symmetrically  PSYCH:  insight and judgement intact, memory intact, affect and mood anxious    Lab/Diagnostic data:  Recent labs in [facility] reviewed by me today.     ASSESSMENT/PLAN:  (S72.002D) Closed left hip fracture, with routine healing, subsequent encounter  (primary encounter diagnosis)  Comment: Secondary to fall now status post left hemiarthroplasty.  The incision itself looks great without signs or symptoms of infection.  She denies pain today.  It may be more difficult to manage her pain given her chronic opioid use but today she is comfortable from a pain perspective.  Hemoglobin postoperatively in the tens range.  -- Aspirin 81 mg twice daily for DVT prophylaxis  -- Scheduled Tylenol 1000 mg 3 times daily and oxycodone every 4 hours as needed  --Gabapentin 100 mg twice daily  -- PT OT for strengthening  -- Follow-up with Ortho in 2 to 3 weeks.  If it is difficult for her to get out we could have her onsite Ortho see her here if she wishes    (I69.354) Hemiparesis affecting left side as late effect of cerebrovascular accident (CVA) (H)  Comment: Contributing to her fall leading to the fracture.  She has muscle spasms that she uses baclofen for and that is effective in managing them.  -- Baclofen 3 times daily as needed  -- PT OT for strengthening    (F41.1) Generalized anxiety disorder  Comment: She is very anxious today which in part is due to her discharge yesterday feeling  rushed getting to the TCU later in the day and then not having her Klonopin so she has been up most of the night.  -- Will schedule her Klonopin 0.5 mg at bedtime which is how she takes it at home.    (K59.03) Drug-induced constipation  Comment: Due to surgery and chronic opioid use.  She reports that she had constipation while in the hospital and was given senna and did not like the way it resulted.  She would like to use MiraLAX as it is more gentle and has used this in the past therefore we will schedule MiraLAX daily in the morning    (F51.01) Primary insomnia  Comment: Reports that she is taking Klonopin for 20 to 30 years.  She was very frustrated last night that the Klonopin was not given.  It appears that the prescription was not sent from the hospital so pharmacy was unable to send the medication to the facility.  Wrote a prescription for the Klonopin today so she should be able to take that this evening and hopeful she will get a better night sleep tonight.    (Z47.89) Aftercare following surgery of the musculoskeletal system  (R53.81) Physical deconditioning  Comment: secondary to recent hospitalization and underlying medical conditions.   Plan: PT/OT for strengthening. SW for discharge planning.     (F11.90) Chronic, continuous use of opioids  Comment: Looks like she is followed by a pain specialist, Denton Barahona with in the [facility].  She does have a pain contract on file and gets 30 tablets and Norco on average on a monthly basis.  She follows with the specialist every 3 months.    Total time spent with patient visit at the skilled nursing facility was 52 minutes including patient visit (30 minutes) and review of past records.     Electronically signed by:  SERGIO Bojorquez CNP

## 2023-12-28 LAB
GAMMA INTERFERON BACKGROUND BLD IA-ACNC: 0.01 IU/ML
M TB IFN-G BLD-IMP: NEGATIVE
M TB IFN-G CD4+ BCKGRND COR BLD-ACNC: 1.2 IU/ML
MITOGEN IGNF BCKGRD COR BLD-ACNC: 0.01 IU/ML
MITOGEN IGNF BCKGRD COR BLD-ACNC: 0.03 IU/ML
QUANTIFERON MITOGEN: 1.21 IU/ML
QUANTIFERON NIL TUBE: 0.01 IU/ML
QUANTIFERON TB1 TUBE: 0.02 IU/ML
QUANTIFERON TB2 TUBE: 0.04

## 2024-01-02 ENCOUNTER — MEDICAL CORRESPONDENCE (OUTPATIENT)
Dept: FAMILY MEDICINE | Facility: CLINIC | Age: 84
End: 2024-01-02

## 2024-01-05 ENCOUNTER — LAB REQUISITION (OUTPATIENT)
Dept: LAB | Facility: CLINIC | Age: 84
End: 2024-01-05
Payer: COMMERCIAL

## 2024-01-05 DIAGNOSIS — E87.1 HYPO-OSMOLALITY AND HYPONATREMIA: ICD-10-CM

## 2024-01-06 ENCOUNTER — LAB REQUISITION (OUTPATIENT)
Dept: LAB | Facility: CLINIC | Age: 84
End: 2024-01-06
Payer: COMMERCIAL

## 2024-01-06 DIAGNOSIS — E87.1 HYPO-OSMOLALITY AND HYPONATREMIA: ICD-10-CM

## 2024-01-06 LAB
ANION GAP SERPL CALCULATED.3IONS-SCNC: 10 MMOL/L (ref 7–15)
BUN SERPL-MCNC: 11 MG/DL (ref 8–23)
CALCIUM SERPL-MCNC: 8.4 MG/DL (ref 8.8–10.2)
CHLORIDE SERPL-SCNC: 98 MMOL/L (ref 98–107)
CREAT SERPL-MCNC: 0.62 MG/DL (ref 0.51–0.95)
DEPRECATED HCO3 PLAS-SCNC: 24 MMOL/L (ref 22–29)
EGFRCR SERPLBLD CKD-EPI 2021: 88 ML/MIN/1.73M2
GLUCOSE SERPL-MCNC: 117 MG/DL (ref 70–99)
POTASSIUM SERPL-SCNC: 3.6 MMOL/L (ref 3.4–5.3)
SODIUM SERPL-SCNC: 132 MMOL/L (ref 135–145)

## 2024-01-06 PROCEDURE — 80048 BASIC METABOLIC PNL TOTAL CA: CPT | Mod: ORL

## 2024-01-07 LAB
ANION GAP SERPL CALCULATED.3IONS-SCNC: 10 MMOL/L (ref 7–15)
BUN SERPL-MCNC: 9.7 MG/DL (ref 8–23)
CALCIUM SERPL-MCNC: 8.6 MG/DL (ref 8.8–10.2)
CHLORIDE SERPL-SCNC: 98 MMOL/L (ref 98–107)
CREAT SERPL-MCNC: 0.58 MG/DL (ref 0.51–0.95)
DEPRECATED HCO3 PLAS-SCNC: 25 MMOL/L (ref 22–29)
EGFRCR SERPLBLD CKD-EPI 2021: 89 ML/MIN/1.73M2
GLUCOSE SERPL-MCNC: 104 MG/DL (ref 70–99)
POTASSIUM SERPL-SCNC: 3.7 MMOL/L (ref 3.4–5.3)
SODIUM SERPL-SCNC: 133 MMOL/L (ref 135–145)

## 2024-01-07 PROCEDURE — 36415 COLL VENOUS BLD VENIPUNCTURE: CPT | Mod: ORL

## 2024-01-07 PROCEDURE — P9603 ONE-WAY ALLOW PRORATED MILES: HCPCS | Mod: ORL

## 2024-01-07 PROCEDURE — 80048 BASIC METABOLIC PNL TOTAL CA: CPT | Mod: ORL

## 2024-01-08 ENCOUNTER — MYC MEDICAL ADVICE (OUTPATIENT)
Dept: FAMILY MEDICINE | Facility: CLINIC | Age: 84
End: 2024-01-08

## 2024-01-08 ENCOUNTER — LAB REQUISITION (OUTPATIENT)
Dept: LAB | Facility: CLINIC | Age: 84
End: 2024-01-08
Payer: COMMERCIAL

## 2024-01-08 DIAGNOSIS — E87.1 HYPO-OSMOLALITY AND HYPONATREMIA: ICD-10-CM

## 2024-01-08 LAB
ANION GAP SERPL CALCULATED.3IONS-SCNC: 13 MMOL/L (ref 7–15)
BUN SERPL-MCNC: 13.3 MG/DL (ref 8–23)
CALCIUM SERPL-MCNC: 9.1 MG/DL (ref 8.8–10.2)
CHLORIDE SERPL-SCNC: 96 MMOL/L (ref 98–107)
CREAT SERPL-MCNC: 0.66 MG/DL (ref 0.51–0.95)
DEPRECATED HCO3 PLAS-SCNC: 24 MMOL/L (ref 22–29)
EGFRCR SERPLBLD CKD-EPI 2021: 87 ML/MIN/1.73M2
GLUCOSE SERPL-MCNC: 105 MG/DL (ref 70–99)
POTASSIUM SERPL-SCNC: 3.7 MMOL/L (ref 3.4–5.3)
SODIUM SERPL-SCNC: 133 MMOL/L (ref 135–145)

## 2024-01-08 PROCEDURE — 36415 COLL VENOUS BLD VENIPUNCTURE: CPT | Mod: ORL

## 2024-01-08 PROCEDURE — 80048 BASIC METABOLIC PNL TOTAL CA: CPT | Mod: ORL

## 2024-01-08 PROCEDURE — P9603 ONE-WAY ALLOW PRORATED MILES: HCPCS | Mod: ORL

## 2024-01-09 LAB
ANION GAP SERPL CALCULATED.3IONS-SCNC: 12 MMOL/L (ref 7–15)
BUN SERPL-MCNC: 15.1 MG/DL (ref 8–23)
CALCIUM SERPL-MCNC: 9.4 MG/DL (ref 8.8–10.2)
CHLORIDE SERPL-SCNC: 98 MMOL/L (ref 98–107)
CREAT SERPL-MCNC: 0.64 MG/DL (ref 0.51–0.95)
DEPRECATED HCO3 PLAS-SCNC: 25 MMOL/L (ref 22–29)
EGFRCR SERPLBLD CKD-EPI 2021: 87 ML/MIN/1.73M2
GLUCOSE SERPL-MCNC: 149 MG/DL (ref 70–99)
POTASSIUM SERPL-SCNC: 4 MMOL/L (ref 3.4–5.3)
SODIUM SERPL-SCNC: 135 MMOL/L (ref 135–145)

## 2024-01-09 PROCEDURE — 80048 BASIC METABOLIC PNL TOTAL CA: CPT | Mod: ORL | Performed by: FAMILY MEDICINE

## 2024-01-09 PROCEDURE — P9604 ONE-WAY ALLOW PRORATED TRIP: HCPCS | Mod: ORL | Performed by: FAMILY MEDICINE

## 2024-01-09 PROCEDURE — 36415 COLL VENOUS BLD VENIPUNCTURE: CPT | Mod: ORL | Performed by: FAMILY MEDICINE

## 2024-01-12 ENCOUNTER — LAB REQUISITION (OUTPATIENT)
Dept: LAB | Facility: CLINIC | Age: 84
End: 2024-01-12
Payer: COMMERCIAL

## 2024-01-12 DIAGNOSIS — E87.1 HYPO-OSMOLALITY AND HYPONATREMIA: ICD-10-CM

## 2024-01-12 DIAGNOSIS — D64.9 ANEMIA, UNSPECIFIED: ICD-10-CM

## 2024-01-14 LAB
ANION GAP SERPL CALCULATED.3IONS-SCNC: 10 MMOL/L (ref 7–15)
BUN SERPL-MCNC: 15.6 MG/DL (ref 8–23)
CALCIUM SERPL-MCNC: 8.5 MG/DL (ref 8.8–10.2)
CHLORIDE SERPL-SCNC: 96 MMOL/L (ref 98–107)
CREAT SERPL-MCNC: 0.66 MG/DL (ref 0.51–0.95)
DEPRECATED HCO3 PLAS-SCNC: 23 MMOL/L (ref 22–29)
EGFRCR SERPLBLD CKD-EPI 2021: 87 ML/MIN/1.73M2
GLUCOSE SERPL-MCNC: 94 MG/DL (ref 70–99)
POTASSIUM SERPL-SCNC: 3.8 MMOL/L (ref 3.4–5.3)
SODIUM SERPL-SCNC: 129 MMOL/L (ref 135–145)

## 2024-01-14 PROCEDURE — 80048 BASIC METABOLIC PNL TOTAL CA: CPT | Mod: ORL

## 2024-01-14 PROCEDURE — P9603 ONE-WAY ALLOW PRORATED MILES: HCPCS | Mod: ORL

## 2024-01-14 PROCEDURE — 36415 COLL VENOUS BLD VENIPUNCTURE: CPT | Mod: ORL

## 2024-02-01 ENCOUNTER — TRANSFERRED RECORDS (OUTPATIENT)
Dept: FAMILY MEDICINE | Facility: CLINIC | Age: 84
End: 2024-02-01

## 2024-02-02 ENCOUNTER — MEDICAL CORRESPONDENCE (OUTPATIENT)
Dept: FAMILY MEDICINE | Facility: CLINIC | Age: 84
End: 2024-02-02

## 2024-02-05 ENCOUNTER — OFFICE VISIT (OUTPATIENT)
Dept: FAMILY MEDICINE | Facility: CLINIC | Age: 84
End: 2024-02-05

## 2024-02-05 VITALS
DIASTOLIC BLOOD PRESSURE: 80 MMHG | WEIGHT: 108 LBS | HEART RATE: 89 BPM | OXYGEN SATURATION: 97 % | BODY MASS INDEX: 20.41 KG/M2 | SYSTOLIC BLOOD PRESSURE: 129 MMHG

## 2024-02-05 DIAGNOSIS — F32.2 SEVERE MAJOR DEPRESSION WITHOUT PSYCHOTIC FEATURES (H): ICD-10-CM

## 2024-02-05 DIAGNOSIS — E44.1 MILD PROTEIN-CALORIE MALNUTRITION (H): Primary | ICD-10-CM

## 2024-02-05 DIAGNOSIS — E87.1 HYPONATREMIA: ICD-10-CM

## 2024-02-05 DIAGNOSIS — F11.20 CONTINUOUS OPIOID DEPENDENCE (H): ICD-10-CM

## 2024-02-05 DIAGNOSIS — Z99.3 WHEELCHAIR DEPENDENT: ICD-10-CM

## 2024-02-05 DIAGNOSIS — G89.4 CHRONIC PAIN SYNDROME: ICD-10-CM

## 2024-02-05 DIAGNOSIS — J98.4 CALCIFIED GRANULOMA OF LUNG: ICD-10-CM

## 2024-02-05 DIAGNOSIS — D32.9 BENIGN NEOPLASM OF MENINGES (H): ICD-10-CM

## 2024-02-05 DIAGNOSIS — I69.354 HEMIPARESIS AFFECTING LEFT SIDE AS LATE EFFECT OF STROKE (H): ICD-10-CM

## 2024-02-05 DIAGNOSIS — Z87.81 HISTORY OF HIP FRACTURE: ICD-10-CM

## 2024-02-05 DIAGNOSIS — R42 DIZZINESS: ICD-10-CM

## 2024-02-05 PROCEDURE — 99215 OFFICE O/P EST HI 40 MIN: CPT | Performed by: FAMILY MEDICINE

## 2024-02-05 RX ORDER — LORAZEPAM 0.5 MG/1
TABLET ORAL
COMMUNITY
End: 2024-03-29

## 2024-02-05 RX ORDER — ACETAMINOPHEN 500 MG
1000 TABLET ORAL EVERY 6 HOURS PRN
Status: ON HOLD | COMMUNITY
Start: 2023-12-25 | End: 2024-04-04

## 2024-02-05 RX ORDER — CYCLOBENZAPRINE HCL 5 MG
TABLET ORAL
COMMUNITY
End: 2024-03-29

## 2024-02-05 RX ORDER — AMLODIPINE BESYLATE 5 MG/1
1 TABLET ORAL
COMMUNITY
Start: 2023-12-28 | End: 2024-03-11

## 2024-02-05 RX ORDER — POLYETHYLENE GLYCOL 3350 17 G/17G
1 POWDER, FOR SOLUTION ORAL DAILY PRN
COMMUNITY
Start: 2023-12-28 | End: 2024-05-30

## 2024-02-05 RX ORDER — BACLOFEN 10 MG/1
10 TABLET ORAL 3 TIMES DAILY PRN
Qty: 90 TABLET | Refills: 0 | Status: SHIPPED | OUTPATIENT
Start: 2024-02-05 | End: 2024-03-11

## 2024-02-05 RX ORDER — BACLOFEN 10 MG/1
10 TABLET ORAL 3 TIMES DAILY PRN
Qty: 90 TABLET | Refills: 0 | Status: SHIPPED | OUTPATIENT
Start: 2024-02-05 | End: 2024-02-05

## 2024-02-05 ASSESSMENT — ANXIETY QUESTIONNAIRES
3. WORRYING TOO MUCH ABOUT DIFFERENT THINGS: NOT AT ALL
5. BEING SO RESTLESS THAT IT IS HARD TO SIT STILL: NOT AT ALL
GAD7 TOTAL SCORE: 0
GAD7 TOTAL SCORE: 0
2. NOT BEING ABLE TO STOP OR CONTROL WORRYING: NOT AT ALL
1. FEELING NERVOUS, ANXIOUS, OR ON EDGE: NOT AT ALL
6. BECOMING EASILY ANNOYED OR IRRITABLE: NOT AT ALL
7. FEELING AFRAID AS IF SOMETHING AWFUL MIGHT HAPPEN: NOT AT ALL

## 2024-02-05 ASSESSMENT — PATIENT HEALTH QUESTIONNAIRE - PHQ9
5. POOR APPETITE OR OVEREATING: NOT AT ALL
SUM OF ALL RESPONSES TO PHQ QUESTIONS 1-9: 0

## 2024-02-05 NOTE — PROGRESS NOTES
Roxana Mcdowell is a 83 year old patient who presents to clinic for follow up    Closed hip fracture on 12/20 and then underwent left hemiarthroplasty with Dr Streeter on 12/21.  She was discharged to a TCU but was not happy with the care and went home with her .  They realized she needed additional support and then went to [facility] 1/2/24 and discharged 1/30/24 per report.  Now set up for home care with [facility].  Have not met with them yet.  She reports ongoing muscle spasms and is out of baclofen.  Previously prescribed by pain clinic and would like refilled.  Her hip is reportedly healing well but her mobility is very limited.    She also was found to have hyponatremia of uncertain cause.  Other than ongoing weakness she denies symptoms.    She also continues to report intermittent dizziness.  She would like referral to new vestibular rehab.    Her other chronic issues are stable as below:    HTN: doing well on amlodipine     Chronic neck pain: s/p ablation.  Since procedure feels pain slightly improved but has exacerbated her chronic dizziness.      Chronic low back pain: followed by pain clinic.  Has undergone multiple procedures.  On chronic norco once daily at night.  Tramadol caused side effects.  Gabapentin caused side effects.  No longer on Lyrica.  would like to restart baclofen.  Numerous other interventions, medications and therapies have failed.     MDD: endorses very low mood.  Declines medication or therapy at this time.  Denies SI     History of intracerebral hemorrhage: resultant left hemiparesis.  Not on statin.  On ASA.     Benign meningiomas: followed by neurology.  ?whether this was causing dizziness.  Did not tolerate radiation     Abnormal CXR: calcified granuloma and hyperinflation.  Former smoker.     Malnutrition: low BMI in context of stroke and frailty.  weight has increased.     History of ovarian cancer: s/p ELZBIETA-BSO, reports did not require chemo or  radiation.        Review of Systems   Constitutional, HEENT, cardiovascular, pulmonary, GI, , musculoskeletal, neuro, skin, endocrine and psych systems are negative, except as otherwise noted.      Objective    /80   Pulse 89   Wt 49 kg (108 lb)   SpO2 97%   BMI 20.41 kg/m      General: Well appearing, NAD, sitting in wheelchair  Psych: depressed mood and affect.  No SI        No results found for this or any previous visit (from the past 24 hour(s)).    Mild protein-calorie malnutrition (H24)  Weight is increasing, encouraged increased caloric intake and very close monitoring    Continuous opioid dependence (H)  Unchanged, chronic.  Does not want to change    Hemiparesis affecting left side as late effect of stroke (H)  Stable but worsened mobility due to hip fracture  Restart PT/OT    Calcified granuloma of lung (H)  Asymptomatic, no intervention needed    Benign neoplasm of meninges (H)  Was followed by neurology and now declines further radiation treatment    History of hip fracture  Fall precautions  PT/OT    Hyponatremia  Labs ordered, further management pending results  - Basic metabolic panel; Future  - Osmolality; Future  - Sodium random urine; Future  - Osmolality urine; Future  - TSH with free T4 reflex; Future  - Cortisol; Future    Dizziness  Retrial of vestibular rehab  - Physical Therapy Referral; Future    Chronic pain syndrome  See above    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  Recommended medication and therapy.  She declines.  Will readdress at follow up    Wheelchair dependent      Follow up in 1-2 months, sooner if needed

## 2024-02-06 ENCOUNTER — MEDICAL CORRESPONDENCE (OUTPATIENT)
Dept: FAMILY MEDICINE | Facility: CLINIC | Age: 84
End: 2024-02-06

## 2024-02-07 ENCOUNTER — THERAPY VISIT (OUTPATIENT)
Dept: PHYSICAL THERAPY | Facility: CLINIC | Age: 84
End: 2024-02-07
Attending: FAMILY MEDICINE
Payer: COMMERCIAL

## 2024-02-07 DIAGNOSIS — Z79.899 POLYPHARMACY: ICD-10-CM

## 2024-02-07 DIAGNOSIS — R42 DIZZINESS: ICD-10-CM

## 2024-02-07 DIAGNOSIS — R42 DIZZINESS: Primary | ICD-10-CM

## 2024-02-07 PROCEDURE — 97162 PT EVAL MOD COMPLEX 30 MIN: CPT | Mod: GP | Performed by: PHYSICAL THERAPIST

## 2024-02-07 NOTE — PROGRESS NOTES
PHYSICAL THERAPY EVALUATION  Type of Visit: Evaluation  [facility] 44/100    See electronic medical record for Abuse and Falls Screening details.    Subjective   MEGAN here w/ husb tana.      Presenting condition or subjective complaint:    Date of onset: 02/05/24    Relevant medical history:   12/2023 cva affecting L side.  12/2023 L partial hip. After hip fx.      Dates & types of surgery:  see chart    Prior diagnostic imaging/testing results:     see chart  Prior therapy history for the same diagnosis, illness or injury:   na     Prior Level of Function  Transfers: Assistive equipment and person  Ambulation: Completely dependent,        Living Environment  Social support:   husb, home pt/ot   Type of home: house    Stairs to enter the home:         Ramp:     Stairs inside the home:         Help at home:    Equipment owned:   travel w/c / walker combo    Employment:      Hobbies/Interests:      Patient goals for therapy:  see why am dizzy    Pain assessment: Pain present yes legs cleopatra L.     Objective supine 152/96 bp;       Cognitive Status Examination  Orientation: Oriented to person, place and time   Level of Consciousness: Alert, Emotionally labile upset about pain  Follows Commands and Answers Questions: 100% of the time  Personal Safety and Judgement: Impaired  Memory:  unknown    OBSERVATION: thin female sitting in manual w/c w/ husb.   INTEGUMENTARY: Intact  POSTURE:  forw head, kyphosis in sitting      BED MOBILITY: Mod Assist, Max Assist    TRANSFERS: Mod Assist    WHEELCHAIR MOBILITY: dependent    GAIT: na    SEE daily note for goggle exam.  NOT BPPV.  Will cont home PT/OT for mobility.        Assessment & Plan   CLINICAL IMPRESSIONS  Medical Diagnosis: dizziness    Treatment Diagnosis:     Impression/Assessment: Patient is a 83 year old female with dizzy/pain complaints.  The following significant findings have been identified: Decreased strength, Impaired balance, Impaired gait, and Dizziness. These  impairments interfere with their ability to perform self care tasks, recreational activities, household chores, and household mobility as compared to previous level of function.     Clinical Decision Making (Complexity):  Clinical Presentation: Evolving/Changing  Clinical Presentation Rationale: based on medical and personal factors listed in PT evaluation  Clinical Decision Making (Complexity): Moderate complexity    PLAN OF CARE  Treatment Interventions:  NA - pt seeing home PT/OT/ RN    Rec they consider purewick at night - caregiver burnout - and MTM meet w/ pharmacist.     Long Term Goals     PT Goal 1  Goal Identifier: pt to damon bppv testing  Goal Description: above  Target Date: 02/21/24  Date Met: 02/07/24      Frequency of Treatment: eval only  Duration of Treatment: na    Recommended Referrals to Other Professionals:  MTM pharmacy  Education Assessment:   Learner/Method: Patient;Listening;Demonstration  Education Comments: ideas incl pro wick - to discuss w/ home RN. and MTM - ask PCP to meet w/ pharmacist to discuss meds.    Risks and benefits of evaluation/treatment have been explained.   Patient/Family/caregiver agrees with Plan of Care.     Evaluation Time:     PT Anton[place]eta, Moderate Complexity Minutes (72954): 40       Signing Clinician: Nilda Lange, PT      [facility]                                                                                   OUTPATIENT PHYSICAL THERAPY      PLAN OF TREATMENT FOR OUTPATIENT REHABILITATION   Patient's Last Name, First Name, July Williamson YOB: 1940   Provider's Name   [facility]   Medical Record No.  [id number]     Onset Date: 02/05/24  Start of Care Date: 02/07/24     Medical Diagnosis:  dizziness      PT Treatment Diagnosis:    Plan of Treatment  Frequency/Duration: eval only/ na    Certification date from 02/07/24 to 02/21/24         See note for plan of treatment details and  functional goals     Nilda Lange, PT                         I CERTIFY THE NEED FOR THESE SERVICES FURNISHED UNDER        THIS PLAN OF TREATMENT AND WHILE UNDER MY CARE     (Physician attestation of this document indicates review and certification of the therapy plan).              Referring Provider:  Denton Barahona    Initial Assessment  See Epic Evaluation- Start of Care Date: 02/07/24

## 2024-02-15 ENCOUNTER — TELEPHONE (OUTPATIENT)
Dept: FAMILY MEDICINE | Facility: CLINIC | Age: 84
End: 2024-02-15

## 2024-02-15 NOTE — TELEPHONE ENCOUNTER
MTM referral from: [facility] visit (referral by provider)    MTM referral outreach attempt #2 on February 15, 2024 at 11:46 AM      Outcome: Patient not reachable after several attempts, will route to MTM Pharmacist/Provider as an FYI.  MT scheduling number is .  Thank you for the referral.    Use [facility] part d map for the carrier/Plan on the flowsheet      [employer] Message Sent    Ana Wilkins CPhT  MTM

## 2024-02-17 ENCOUNTER — LAB (OUTPATIENT)
Dept: LAB | Facility: CLINIC | Age: 84
End: 2024-02-17
Payer: COMMERCIAL

## 2024-02-17 DIAGNOSIS — E87.1 HYPONATREMIA: ICD-10-CM

## 2024-02-17 LAB — OSMOLALITY UR: 436 MMOL/KG (ref 100–1200)

## 2024-02-17 PROCEDURE — 83935 ASSAY OF URINE OSMOLALITY: CPT

## 2024-02-17 PROCEDURE — 84300 ASSAY OF URINE SODIUM: CPT

## 2024-02-18 LAB — SODIUM UR-SCNC: 27 MMOL/L

## 2024-02-22 ENCOUNTER — HOSPITAL (OUTPATIENT)
Dept: FAMILY MEDICINE | Facility: CLINIC | Age: 84
End: 2024-02-22

## 2024-03-04 ENCOUNTER — TRANSFERRED RECORDS (OUTPATIENT)
Dept: FAMILY MEDICINE | Facility: CLINIC | Age: 84
End: 2024-03-04

## 2024-03-08 NOTE — PROGRESS NOTES
Answers submitted by the patient for this visit:  Hypertension Visit (Submitted on 3/3/2024)  Chief Complaint: Chronic problems general questions HPI Form  Do you check your blood pressure regularly outside of the clinic?: Yes  Are your blood pressures ever more than 140 on the top number (systolic) OR more than 90 on the bottom number (diastolic)? (For example, greater than 140/90): Yes  Are you following a low salt diet?: Yes  Back Pain Visit Questionnaire (Submitted on 3/3/2024)  Your back pain is: chronic  Chronic or Recurring Back Pain Visit Questionnaire (Submitted on 3/3/2024)  Where is your back pain located? : right lower back, left lower back, right side of neck, left side of neck, right shoulder, left shoulder  How would you describe your back pain? : dull ache  Where does your back pain spread? : nowhere  Since you noticed your back pain, how has it changed? : always present, but gets better and worse  Does your back pain interfere with your job?: Not applicable  General Questionnaire (Submitted on 3/3/2024)  Chief Complaint: Chronic problems general questions HPI Form  How many servings of fruits and vegetables do you eat daily?: 2-3  On average, how many sweetened beverages do you drink each day (Examples: soda, juice, sweet tea, etc.  Do NOT count diet or artificially sweetened beverages)?: 0  How many minutes a day do you exercise enough to make your heart beat faster?: 9 or less  How many days a week do you exercise enough to make your heart beat faster?: 3 or less  How many days per week do you miss taking your medication?: 0      Hospital Follow-up Visit:    Hospital/Nursing Home/ Rehab Facility: [facility]     Date of Admission: 3/1/24  Date of Discharge: 3/4/24  Reason(s) for Admission:   Weakness of both lower extremities   Meningioma (CMS/HHS)   Urinary retention   Weakness of both lower extremities   Altered mental status, unspecified altered mental status type     Was your  hospitalization related to COVID-19? No   Problems taking medications regularly:  None  Medication changes since discharge:   START taking these medications     polyethylene glycol 3350 17 g Packet    CHANGE how you take these medications     baclofen 5 mg tablet  melatonin 5 mg tablet    STOP taking these medications     GABApentin 100 mg Capsule  lactobacillus rhamnosus GG capsule  Lutein-Zeaxanthin 20-1 MG Capsule  Quercetin 500 MG Capsule     Problems adhering to non-medication therapy:  None        Hospital/Nursing Home/IP Rehab Facility: [facility]     Date of Admission: 2/22/24  Date of Discharge: 2/28/24  Reason(s) for Admission:   Delirium (Primary Dx);   Acute cystitis without hematuria;   Hx of ischemic right MCA stroke;   Encephalopathy acut     Was your hospitalization related to COVID-19? No   Problems taking medications regularly:  None  Medication changes since discharge:   START taking:  Rosuvastatin 10 mg     Problems adhering to non-medication therapy:  None    Summary of hospitalization:  CareEverywhere information obtained and reviewed  Diagnostic Tests/Treatments reviewed.  Follow up needed: none  Other Healthcare Providers Involved in Patient s Care:         Homecare  Update since discharge: improved.         Plan of care communicated with patient and family             Subjective     Virginia is a 83 year old patient who presents to clinic for hospital follow up.  [place] recent [place] [facility] [place]  [place] was felt to either have toxic-metabolic encephalopathy vs delirium.  Felt likely to be due to polypharmacy.  Her gabapentin was stopped.  Her baclofen was reduced to 5 mg TID.  She has declined since her hip fracture several months ago though has been in decline for years.  She has chronic dizziness and lightheadedness.      See chronic issues below:    HTN: doing well on amlodipine     Chronic neck pain: s/p ablation.  Since procedure feels pain slightly  improved but has exacerbated her chronic dizziness.      Chronic low back pain: followed by pain clinic.  Has undergone multiple procedures.  On chronic norco once daily at night.  Tramadol caused side effects.  Gabapentin caused side effects.  No longer on Lyrica.  would like to restart baclofen.  Numerous other interventions, medications and therapies have failed.     MDD: endorses very low mood.  Declines medication or therapy at this time.  Denies SI     History of intracerebral hemorrhage: resultant left hemiparesis.  Not on statin.  On ASA.     Benign meningiomas: followed by neurology.  ?whether this was causing dizziness.  Did not tolerate radiation     Abnormal CXR: calcified granuloma and hyperinflation.  Former smoker.     Malnutrition: low BMI in context of stroke and frailty.  weight has increased.     History of ovarian cancer: s/p [id number], reports did not require chemo or radiation.      Review of Systems   Constitutional, HEENT, cardiovascular, pulmonary, GI, , musculoskeletal, neuro, skin, endocrine and psych systems are negative, except as otherwise noted.      Objective    /62   Pulse 89   SpO2 96%     General: Well appearing, NAD    Psych: normal mood and affect        No results found for this or any previous visit (from the past 24 hour(s)).    Hemiparesis affecting left side as late effect of stroke (H)  Stable, cont OT/PT    Sciatica of left side  Discussed at length.  She feels very strongly she needs this medication.  She was strongly advised to try to gradually wean off.  She will try to do this  - HYDROcodone-acetaminophen (NORCO) 5-325 MG tablet; Take 1/2 to 1 tablet daily as needed for severe back and leg pain    Spondylolisthesis of lumbosacral region    - HYDROcodone-acetaminophen (NORCO) 5-325 MG tablet; Take 1/2 to 1 tablet daily as needed for severe back and leg pain    Benign meningioma of brain (H)  Stable on imaging.  Did not tolerate radiation    Chronic pain  syndrome    - baclofen (LIORESAL) 10 MG tablet; Take 0.5 tablets (5 mg) by mouth 3 times daily as needed for muscle spasms    Mild protein-calorie malnutrition (H24)  Cont efforts to increase caloric intake    We had a length discussion regarding goals of care including discussing palliative care and hospice.  At this time she does not have a life limiting diagnosis but she is quite frail and is declining.  She would like to put all efforts into PT, OT, and increasing her strength.  I hope her medication adjustments may help.  I am also going to consult MTM to see if additional adjustments may help.    Follow up in 4-6 weeks, sooner if needed

## 2024-03-11 ENCOUNTER — OFFICE VISIT (OUTPATIENT)
Dept: FAMILY MEDICINE | Facility: CLINIC | Age: 84
End: 2024-03-11

## 2024-03-11 VITALS — DIASTOLIC BLOOD PRESSURE: 62 MMHG | OXYGEN SATURATION: 96 % | SYSTOLIC BLOOD PRESSURE: 108 MMHG | HEART RATE: 89 BPM

## 2024-03-11 DIAGNOSIS — G89.4 CHRONIC PAIN SYNDROME: ICD-10-CM

## 2024-03-11 DIAGNOSIS — D32.0 BENIGN MENINGIOMA OF BRAIN (H): ICD-10-CM

## 2024-03-11 DIAGNOSIS — E44.1 MILD PROTEIN-CALORIE MALNUTRITION (H): ICD-10-CM

## 2024-03-11 DIAGNOSIS — Z79.899 POLYPHARMACY: ICD-10-CM

## 2024-03-11 DIAGNOSIS — M43.17 SPONDYLOLISTHESIS OF LUMBOSACRAL REGION: ICD-10-CM

## 2024-03-11 DIAGNOSIS — I69.354 HEMIPARESIS AFFECTING LEFT SIDE AS LATE EFFECT OF STROKE (H): Primary | ICD-10-CM

## 2024-03-11 DIAGNOSIS — M54.32 SCIATICA OF LEFT SIDE: ICD-10-CM

## 2024-03-11 PROCEDURE — 99496 TRANSJ CARE MGMT HIGH F2F 7D: CPT | Performed by: FAMILY MEDICINE

## 2024-03-11 RX ORDER — BACLOFEN 10 MG/1
5 TABLET ORAL 3 TIMES DAILY PRN
Qty: 90 TABLET | Refills: 0 | Status: ON HOLD | OUTPATIENT
Start: 2024-03-11 | End: 2024-04-04

## 2024-03-11 RX ORDER — ROSUVASTATIN CALCIUM 10 MG/1
10 TABLET, COATED ORAL DAILY
COMMUNITY
Start: 2024-02-28

## 2024-03-11 RX ORDER — HYDROCODONE BITARTRATE AND ACETAMINOPHEN 5; 325 MG/1; MG/1
TABLET ORAL
Qty: 30 TABLET | Refills: 0 | Status: ON HOLD | OUTPATIENT
Start: 2024-03-11 | End: 2024-04-04

## 2024-03-19 ENCOUNTER — MYC MEDICAL ADVICE (OUTPATIENT)
Dept: PHARMACY | Facility: OTHER | Age: 84
End: 2024-03-19
Payer: COMMERCIAL

## 2024-03-19 ENCOUNTER — TELEPHONE (OUTPATIENT)
Dept: FAMILY MEDICINE | Facility: CLINIC | Age: 84
End: 2024-03-19

## 2024-03-19 NOTE — TELEPHONE ENCOUNTER
MTM referral from: [facility] visit (referral by provider)    MTM referral outreach attempt #2 on March 19, 2024 at 10:18 AM      Outcome: Patient not reachable after several attempts, will route to MTM Pharmacist/Provider as an FYI.  [facility] scheduling number is .  Thank you for the referral.    Use [facility] part d map for the carrier/Plan on the flowsheet      [employer] Message Sent    Dotty Nichole  [facility]

## 2024-03-29 ENCOUNTER — HOSPITAL ENCOUNTER (INPATIENT)
Facility: CLINIC | Age: 84
LOS: 6 days | Discharge: NURSING, REHAB OR LONG-TERM CARE | DRG: 070 | End: 2024-04-04
Attending: EMERGENCY MEDICINE | Admitting: HOSPITALIST
Payer: COMMERCIAL

## 2024-03-29 ENCOUNTER — APPOINTMENT (OUTPATIENT)
Dept: CT IMAGING | Facility: CLINIC | Age: 84
DRG: 070 | End: 2024-03-29
Attending: EMERGENCY MEDICINE
Payer: COMMERCIAL

## 2024-03-29 DIAGNOSIS — E78.5 DYSLIPIDEMIA: ICD-10-CM

## 2024-03-29 DIAGNOSIS — F41.9 ANXIETY: ICD-10-CM

## 2024-03-29 DIAGNOSIS — K04.7 DENTAL ABSCESS: ICD-10-CM

## 2024-03-29 DIAGNOSIS — F32.A DEPRESSIVE DISORDER: ICD-10-CM

## 2024-03-29 DIAGNOSIS — G89.29 CHRONIC NECK AND BACK PAIN: Primary | ICD-10-CM

## 2024-03-29 DIAGNOSIS — B00.9 HSV (HERPES SIMPLEX VIRUS) INFECTION: ICD-10-CM

## 2024-03-29 DIAGNOSIS — E87.1 HYPONATREMIA: ICD-10-CM

## 2024-03-29 DIAGNOSIS — F11.20 CONTINUOUS OPIOID DEPENDENCE (H): ICD-10-CM

## 2024-03-29 DIAGNOSIS — D32.9 MENINGIOMA (H): ICD-10-CM

## 2024-03-29 DIAGNOSIS — R41.82 ALTERED MENTAL STATUS, UNSPECIFIED ALTERED MENTAL STATUS TYPE: ICD-10-CM

## 2024-03-29 DIAGNOSIS — R00.2 PALPITATIONS: ICD-10-CM

## 2024-03-29 DIAGNOSIS — I10 PRIMARY HYPERTENSION: ICD-10-CM

## 2024-03-29 DIAGNOSIS — E44.1 MILD PROTEIN-CALORIE MALNUTRITION (H): ICD-10-CM

## 2024-03-29 DIAGNOSIS — M54.2 CHRONIC NECK AND BACK PAIN: Primary | ICD-10-CM

## 2024-03-29 DIAGNOSIS — G47.00 INSOMNIA, UNSPECIFIED TYPE: ICD-10-CM

## 2024-03-29 DIAGNOSIS — M54.9 CHRONIC NECK AND BACK PAIN: Primary | ICD-10-CM

## 2024-03-29 LAB
ALBUMIN UR-MCNC: 30 MG/DL
ANION GAP SERPL CALCULATED.3IONS-SCNC: 12 MMOL/L (ref 7–15)
APPEARANCE UR: CLEAR
ATRIAL RATE - MUSE: 110 BPM
BASOPHILS # BLD AUTO: 0 10E3/UL (ref 0–0.2)
BASOPHILS NFR BLD AUTO: 0 %
BILIRUB UR QL STRIP: NEGATIVE
BUN SERPL-MCNC: 15.5 MG/DL (ref 8–23)
CALCIUM SERPL-MCNC: 9.3 MG/DL (ref 8.8–10.2)
CHLORIDE SERPL-SCNC: 104 MMOL/L (ref 98–107)
COLOR UR AUTO: ABNORMAL
CREAT SERPL-MCNC: 0.63 MG/DL (ref 0.51–0.95)
DEPRECATED HCO3 PLAS-SCNC: 24 MMOL/L (ref 22–29)
DIASTOLIC BLOOD PRESSURE - MUSE: NORMAL MMHG
EGFRCR SERPLBLD CKD-EPI 2021: 88 ML/MIN/1.73M2
EOSINOPHIL # BLD AUTO: 0 10E3/UL (ref 0–0.7)
EOSINOPHIL NFR BLD AUTO: 0 %
ERYTHROCYTE [DISTWIDTH] IN BLOOD BY AUTOMATED COUNT: 15.1 % (ref 10–15)
FERRITIN SERPL-MCNC: 21 NG/ML (ref 11–328)
GLUCOSE SERPL-MCNC: 109 MG/DL (ref 70–99)
GLUCOSE UR STRIP-MCNC: NEGATIVE MG/DL
HCT VFR BLD AUTO: 32.8 % (ref 35–47)
HGB BLD-MCNC: 10.4 G/DL (ref 11.7–15.7)
HGB UR QL STRIP: NEGATIVE
IMM GRANULOCYTES # BLD: 0 10E3/UL
IMM GRANULOCYTES NFR BLD: 0 %
INTERPRETATION ECG - MUSE: NORMAL
IRON BINDING CAPACITY (ROCHE): 363 UG/DL (ref 240–430)
IRON SATN MFR SERPL: 6 % (ref 15–46)
IRON SERPL-MCNC: 22 UG/DL (ref 37–145)
KETONES UR STRIP-MCNC: NEGATIVE MG/DL
LACTATE SERPL-SCNC: 1.3 MMOL/L (ref 0.7–2)
LEUKOCYTE ESTERASE UR QL STRIP: NEGATIVE
LYMPHOCYTES # BLD AUTO: 1.5 10E3/UL (ref 0.8–5.3)
LYMPHOCYTES NFR BLD AUTO: 15 %
MCH RBC QN AUTO: 25 PG (ref 26.5–33)
MCHC RBC AUTO-ENTMCNC: 31.7 G/DL (ref 31.5–36.5)
MCV RBC AUTO: 79 FL (ref 78–100)
MONOCYTES # BLD AUTO: 0.9 10E3/UL (ref 0–1.3)
MONOCYTES NFR BLD AUTO: 9 %
MUCOUS THREADS #/AREA URNS LPF: PRESENT /LPF
NEUTROPHILS # BLD AUTO: 7.3 10E3/UL (ref 1.6–8.3)
NEUTROPHILS NFR BLD AUTO: 76 %
NITRATE UR QL: NEGATIVE
NRBC # BLD AUTO: 0 10E3/UL
NRBC BLD AUTO-RTO: 0 /100
P AXIS - MUSE: 76 DEGREES
PH UR STRIP: 7 [PH] (ref 5–7)
PLATELET # BLD AUTO: 459 10E3/UL (ref 150–450)
POTASSIUM SERPL-SCNC: 3.8 MMOL/L (ref 3.4–5.3)
PR INTERVAL - MUSE: 114 MS
PROCALCITONIN SERPL IA-MCNC: 0.06 NG/ML
QRS DURATION - MUSE: 72 MS
QT - MUSE: 382 MS
QTC - MUSE: 516 MS
R AXIS - MUSE: 55 DEGREES
RBC # BLD AUTO: 4.16 10E6/UL (ref 3.8–5.2)
RBC URINE: <1 /HPF
RETICS # AUTO: 0.04 10E6/UL (ref 0.03–0.1)
RETICS/RBC NFR AUTO: 0.8 % (ref 0.5–2)
SODIUM SERPL-SCNC: 140 MMOL/L (ref 135–145)
SP GR UR STRIP: 1.01 (ref 1–1.03)
SYSTOLIC BLOOD PRESSURE - MUSE: NORMAL MMHG
T AXIS - MUSE: 40 DEGREES
UROBILINOGEN UR STRIP-MCNC: NORMAL MG/DL
VENTRICULAR RATE- MUSE: 110 BPM
VIT B12 SERPL-MCNC: 1190 PG/ML (ref 232–1245)
WBC # BLD AUTO: 9.8 10E3/UL (ref 4–11)
WBC URINE: 2 /HPF

## 2024-03-29 PROCEDURE — 250N000013 HC RX MED GY IP 250 OP 250 PS 637: Performed by: EMERGENCY MEDICINE

## 2024-03-29 PROCEDURE — 51798 US URINE CAPACITY MEASURE: CPT

## 2024-03-29 PROCEDURE — 120N000001 HC R&B MED SURG/OB

## 2024-03-29 PROCEDURE — 99223 1ST HOSP IP/OBS HIGH 75: CPT | Performed by: HOSPITALIST

## 2024-03-29 PROCEDURE — 96375 TX/PRO/DX INJ NEW DRUG ADDON: CPT

## 2024-03-29 PROCEDURE — 93005 ELECTROCARDIOGRAM TRACING: CPT

## 2024-03-29 PROCEDURE — 36415 COLL VENOUS BLD VENIPUNCTURE: CPT | Performed by: EMERGENCY MEDICINE

## 2024-03-29 PROCEDURE — 250N000011 HC RX IP 250 OP 636: Performed by: HOSPITALIST

## 2024-03-29 PROCEDURE — 250N000011 HC RX IP 250 OP 636: Performed by: EMERGENCY MEDICINE

## 2024-03-29 PROCEDURE — 70450 CT HEAD/BRAIN W/O DYE: CPT

## 2024-03-29 PROCEDURE — 83605 ASSAY OF LACTIC ACID: CPT | Performed by: EMERGENCY MEDICINE

## 2024-03-29 PROCEDURE — 87040 BLOOD CULTURE FOR BACTERIA: CPT | Performed by: EMERGENCY MEDICINE

## 2024-03-29 PROCEDURE — 84145 PROCALCITONIN (PCT): CPT | Performed by: EMERGENCY MEDICINE

## 2024-03-29 PROCEDURE — 82728 ASSAY OF FERRITIN: CPT | Performed by: HOSPITALIST

## 2024-03-29 PROCEDURE — 85004 AUTOMATED DIFF WBC COUNT: CPT | Performed by: EMERGENCY MEDICINE

## 2024-03-29 PROCEDURE — 96374 THER/PROPH/DIAG INJ IV PUSH: CPT | Mod: 59

## 2024-03-29 PROCEDURE — 81001 URINALYSIS AUTO W/SCOPE: CPT | Performed by: EMERGENCY MEDICINE

## 2024-03-29 PROCEDURE — 99285 EMERGENCY DEPT VISIT HI MDM: CPT | Mod: 25

## 2024-03-29 PROCEDURE — 80048 BASIC METABOLIC PNL TOTAL CA: CPT | Performed by: EMERGENCY MEDICINE

## 2024-03-29 PROCEDURE — 85045 AUTOMATED RETICULOCYTE COUNT: CPT | Performed by: HOSPITALIST

## 2024-03-29 PROCEDURE — 250N000013 HC RX MED GY IP 250 OP 250 PS 637: Performed by: HOSPITALIST

## 2024-03-29 PROCEDURE — 82607 VITAMIN B-12: CPT | Performed by: HOSPITALIST

## 2024-03-29 PROCEDURE — 258N000003 HC RX IP 258 OP 636: Performed by: HOSPITALIST

## 2024-03-29 PROCEDURE — 83550 IRON BINDING TEST: CPT | Performed by: HOSPITALIST

## 2024-03-29 RX ORDER — LORAZEPAM 2 MG/ML
1 INJECTION INTRAMUSCULAR ONCE
Status: COMPLETED | OUTPATIENT
Start: 2024-03-29 | End: 2024-03-29

## 2024-03-29 RX ORDER — AMOXICILLIN 250 MG
1 CAPSULE ORAL 2 TIMES DAILY PRN
Status: DISCONTINUED | OUTPATIENT
Start: 2024-03-29 | End: 2024-04-04 | Stop reason: HOSPADM

## 2024-03-29 RX ORDER — POLYETHYLENE GLYCOL 3350 17 G/17G
17 POWDER, FOR SOLUTION ORAL DAILY PRN
Status: DISCONTINUED | OUTPATIENT
Start: 2024-03-29 | End: 2024-04-04 | Stop reason: HOSPADM

## 2024-03-29 RX ORDER — ONDANSETRON 2 MG/ML
4 INJECTION INTRAMUSCULAR; INTRAVENOUS EVERY 6 HOURS PRN
Status: DISCONTINUED | OUTPATIENT
Start: 2024-03-29 | End: 2024-04-01

## 2024-03-29 RX ORDER — CEFTRIAXONE 1 G/1
1 INJECTION, POWDER, FOR SOLUTION INTRAMUSCULAR; INTRAVENOUS EVERY 24 HOURS
Status: DISCONTINUED | OUTPATIENT
Start: 2024-03-29 | End: 2024-03-29

## 2024-03-29 RX ORDER — ACETAMINOPHEN 325 MG/1
650 TABLET ORAL EVERY 4 HOURS PRN
Status: DISCONTINUED | OUTPATIENT
Start: 2024-03-29 | End: 2024-04-04 | Stop reason: HOSPADM

## 2024-03-29 RX ORDER — ACETAMINOPHEN 650 MG/1
650 SUPPOSITORY RECTAL EVERY 4 HOURS PRN
Status: DISCONTINUED | OUTPATIENT
Start: 2024-03-29 | End: 2024-04-04 | Stop reason: HOSPADM

## 2024-03-29 RX ORDER — LANOLIN ALCOHOL/MO/W.PET/CERES
1000 CREAM (GRAM) TOPICAL DAILY
Status: DISCONTINUED | OUTPATIENT
Start: 2024-03-30 | End: 2024-04-04 | Stop reason: HOSPADM

## 2024-03-29 RX ORDER — CEFEPIME HYDROCHLORIDE 2 G/1
2 INJECTION, POWDER, FOR SOLUTION INTRAVENOUS EVERY 24 HOURS
Status: DISCONTINUED | OUTPATIENT
Start: 2024-03-29 | End: 2024-03-30

## 2024-03-29 RX ORDER — LORAZEPAM 0.5 MG/1
0.5 TABLET ORAL ONCE
Status: COMPLETED | OUTPATIENT
Start: 2024-03-29 | End: 2024-03-29

## 2024-03-29 RX ORDER — ASPIRIN 81 MG/1
81 TABLET ORAL DAILY
Status: DISCONTINUED | OUTPATIENT
Start: 2024-03-30 | End: 2024-04-04 | Stop reason: HOSPADM

## 2024-03-29 RX ORDER — ZINC SULFATE 50(220)MG
220 CAPSULE ORAL DAILY
Status: DISCONTINUED | OUTPATIENT
Start: 2024-03-30 | End: 2024-04-04 | Stop reason: HOSPADM

## 2024-03-29 RX ORDER — AMOXICILLIN 250 MG
2 CAPSULE ORAL 2 TIMES DAILY PRN
Status: DISCONTINUED | OUTPATIENT
Start: 2024-03-29 | End: 2024-04-04 | Stop reason: HOSPADM

## 2024-03-29 RX ORDER — SODIUM CHLORIDE 9 MG/ML
INJECTION, SOLUTION INTRAVENOUS CONTINUOUS
Status: ACTIVE | OUTPATIENT
Start: 2024-03-29 | End: 2024-03-30

## 2024-03-29 RX ORDER — ACETAMINOPHEN 325 MG/1
650 TABLET ORAL
Status: DISCONTINUED | OUTPATIENT
Start: 2024-03-29 | End: 2024-04-04 | Stop reason: HOSPADM

## 2024-03-29 RX ORDER — KETOROLAC TROMETHAMINE 15 MG/ML
15 INJECTION, SOLUTION INTRAMUSCULAR; INTRAVENOUS ONCE
Status: COMPLETED | OUTPATIENT
Start: 2024-03-29 | End: 2024-03-29

## 2024-03-29 RX ORDER — ONDANSETRON 4 MG/1
4 TABLET, ORALLY DISINTEGRATING ORAL EVERY 6 HOURS PRN
Status: DISCONTINUED | OUTPATIENT
Start: 2024-03-29 | End: 2024-04-01

## 2024-03-29 RX ADMIN — ACETAMINOPHEN 650 MG: 325 TABLET, FILM COATED ORAL at 21:29

## 2024-03-29 RX ADMIN — KETOROLAC TROMETHAMINE 15 MG: 15 INJECTION, SOLUTION INTRAMUSCULAR; INTRAVENOUS at 13:13

## 2024-03-29 RX ADMIN — LORAZEPAM 0.5 MG: 0.5 TABLET ORAL at 17:01

## 2024-03-29 RX ADMIN — SODIUM CHLORIDE: 9 INJECTION, SOLUTION INTRAVENOUS at 17:37

## 2024-03-29 RX ADMIN — CEFEPIME HYDROCHLORIDE 2 G: 2 INJECTION, POWDER, FOR SOLUTION INTRAVENOUS at 17:00

## 2024-03-29 RX ADMIN — QUETIAPINE 12.5 MG: 25 TABLET, FILM COATED ORAL at 18:39

## 2024-03-29 RX ADMIN — ACETAMINOPHEN 650 MG: 325 TABLET, FILM COATED ORAL at 17:00

## 2024-03-29 RX ADMIN — LORAZEPAM 1 MG: 2 INJECTION INTRAMUSCULAR; INTRAVENOUS at 14:09

## 2024-03-29 ASSESSMENT — ACTIVITIES OF DAILY LIVING (ADL)
ADLS_ACUITY_SCORE: 35
ADLS_ACUITY_SCORE: 47
ADLS_ACUITY_SCORE: 35

## 2024-03-29 ASSESSMENT — COLUMBIA-SUICIDE SEVERITY RATING SCALE - C-SSRS
1. IN THE PAST MONTH, HAVE YOU WISHED YOU WERE DEAD OR WISHED YOU COULD GO TO SLEEP AND NOT WAKE UP?: NO
2. HAVE YOU ACTUALLY HAD ANY THOUGHTS OF KILLING YOURSELF IN THE PAST MONTH?: NO
6. HAVE YOU EVER DONE ANYTHING, STARTED TO DO ANYTHING, OR PREPARED TO DO ANYTHING TO END YOUR LIFE?: NO

## 2024-03-29 NOTE — ED NOTES
Patient yelling/moaning out in pain, PRN toradol not helping. MD notified and medication ordered.

## 2024-03-29 NOTE — PHARMACY-ADMISSION MEDICATION HISTORY
Pharmacist Admission Medication History    Admission medication history is complete. The information provided in this note is only as accurate as the sources available at the time of the update.    Information Source(s): Family member via phone (spouse, Deny)    Pertinent Information: Reports patient has not been taking supplements consistently. States she is taking prescriptions routinely with monitoring. Recently had cefuroxime 500 mg BID Rx for 10 days, but spouse reports patient only took about 6 days worth and decided it was causing her SE so she stopped.     Changes made to PTA medication list:  Added: None  Deleted: cyclobenzaprine, taurine, lactobacillus, lorazepam  Changed: None    Allergies reviewed with patient and updates made in EHR: no    Medication History Completed By: Joi Shultz RP 3/29/2024 6:41 PM    PTA Med List   Medication Sig Last Dose    acetaminophen (TYLENOL) 500 MG tablet Take 1,000 mg by mouth every 6 hours as needed for fever or pain  at prn    Ascorbic Acid (VITAMIN C) 500 MG CAPS Take 1,000 mg by mouth daily Past Week    aspirin (ASA) 81 MG EC tablet Take 81 mg by mouth daily Past Week    baclofen (LIORESAL) 10 MG tablet Take 0.5 tablets (5 mg) by mouth 3 times daily as needed for muscle spasms  at prn    calcium citrate-vitamin D (CITRACAL) 315-5 MG-MCG TABS per tablet Take 1 tablet by mouth daily 3/28/2024    Cholecalciferol (VITAMIN D) 1000 UNIT capsule Take 1 capsule by mouth daily 5000 unit 2-3 times per week Past Week    clonazePAM (KLONOPIN) 0.5 MG tablet TAKE 1/2 TO 1 TABLET(0.25 TO 0.5 MG) BY MOUTH AT BEDTIME Past Week    CRANBERRY EXTRACT PO Take 1 capsule by mouth daily Past Week    cyanocobalamin (VITAMIN B-12) 1000 MCG tablet Take 1 tablet (1,000 mcg) by mouth daily Past Week    diclofenac (VOLTAREN) 1 % topical gel Apply topically daily as needed for moderate pain  at prn    famotidine (PEPCID) 20 MG tablet Take 1 tablet (20 mg) by mouth 2 times daily as needed  at  prn    HYDROcodone-acetaminophen (NORCO) 5-325 MG tablet Take 1/2 to 1 tablet daily as needed for severe back and leg pain  at prn    Lactobacillus Rhamnosus, GG, (RA PROBIOTIC DIGESTIVE CARE) CAPS Take 1 capsule by mouth daily Past Week    Lutein-Zeaxanthin 20-1 MG CAPS Take 1 capsule by mouth daily Past Week    melatonin 5 MG tablet Take 5 mg by mouth nightly as needed  at prn    polyethylene glycol (GLYCOLAX) 17 GM/Dose powder Take 1 Capful by mouth daily as needed for constipation  at prn    Quercetin 500 MG CAPS Take 500 mg by mouth daily Past Week    rosuvastatin (CRESTOR) 10 MG tablet Take 10 mg by mouth daily Past Week    valACYclovir (VALTREX) 1000 mg tablet Take 1,000 mg by mouth 2 times daily as needed (HSV infxn PRN)  at prn    zinc sulfate (ZINCATE) 220 (50 Zn) MG capsule Take 220 mg by mouth daily Past Week

## 2024-03-29 NOTE — ED NOTES
Patient was placed on a purewick, yelling at staff, patient was informed she can go to the bathroom whenever since she has a purewick placed to help with that.

## 2024-03-29 NOTE — H&P
[facility]    History and Physical - Hospitalist Service       Date of Admission:  3/29/2024    Assessment & Plan      July Huang is a 83 year old female with past medical history of hypertension, hyperlipidemia, gastroesophageal reflux disease, cerebrovascular disease with prior stroke and left hemiparesis, depression, anxiety, ovarian cancer, bladder cancer, meningioma, osteoporosis, malnutrition, chronic neck and back pain, admitted 3/29/2024 with worsening confusion, recent urinary tract infection, and questionable changes in brain mass on CT imaging with past history of meningioma    Mental status changes, increased confusion  Acute encephalopathy, recurrent  4.8 x 5.7 x 2.4 extra-axial mass over the frontoparietal convexity on CT  Known meningioma diagnosed 2016, previous radiation by report  History of urinary tract infection, recurrent  -Several day history of increasing confusion with past history of recurrent encephalopathy, typically related to urinary tract infection; 2 prior hospitalizations in recent months, recently discharged from [facility] on 3/4/2024; on admission 3/29 CT head concerning for possible changes in established mass felt to be meningioma, neurosurgery consulted; past MRI brain on 3/3/2024 - see report  -Admission vitals, 117/61, temperature 99.2  F, pulse 92, respiratory rate 20, O2 saturation 97% room air  -Admission labs, WBC 9800, hemoglobin 10.4, platelets 459,000, MCV 79, sodium 140, potassium 3.8, creatinine 0.63, glucose 109, lactic acid 1.3, procalcitonin 0.06; urinalysis negative nitrite and leukocyte esterase; 2 WBC, less than 1 red blood cell, clear urine  -EKG sinus tachycardia, rate 110 bpm, right atrial enlargement, premature ventricular complexes, nonspecific ST changes lateral precordial leads  -Admission noncontrast head CT no acute intracranial pathology; 4.8 x 5.7 x 2.4 naso-/hyperdense, extra-axial mass over the  frontal parietal convexity, likely meningioma, see report  -Day prior to admission 3/28/2024: Chest x-ray no significant change in mild diffuse coarse interstitial opacity, no focal airspace opacities suspected, no pleural effusion or pneumothorax; CT lumbar spine no evidence of acute fracture or dislocation thoracic spine with large lateral hernia; no evidence for acute fracture/dislocation of the lumbar spine with multilevel degenerative disc disease and facet arthropathy; left renal stone; gallbladder stone  -Prior MRI brain 3/3/2024 showed 4 point centimeters extra-axial mass at the vertex in the midline, consistent with meningioma, see official report  -Of note, prior hospitalization at [facility] 3/1/2024 through 3/4/2024 for toxic metabolic encephalopathy and delirium thought secondary to polypharmacy with concerns of known meningioma and acute urinary retention; for details, see dismissal summary dated 3/4/2024  -Hospital admission this occasion for worsening confusion, recent urinary tract infection, and history of recurrent acute encephalopathy with 2 prior hospitalizations in the past couple of months; established meningioma reportedly present since 2016; follow-up CT on admission reviewed with plans for neurosurgery consultation, initiated by ER provider    Admit observation, see hospital orders  Neurosurgery consulted, notified by ER on admission  Defer to neurosurgery indication for steroids or acute intervention regarding brain mass and possible changes, felt to be meningioma in the past  Empiric IV antibiotics started in ER given recurrent pattern of acute encephalopathy associated with UTI; reviewing treatment with pharmacy, past urine culture 10/31/2023 grew Citrobacter koseri, ampicillin resistant; in the setting pharmacy recommends avoiding cephalosporins such as ceftriaxone and instead using cefepime; IV cefepime ordered per pharmacy dosing, awaiting culture results and  response to treatment  Neurochecks every 4 hours  Vital signs every 4 hours  Monitor oxygen saturations, oxygen therapy as required  Telemetry  Rule out infection; blood and urine cultures ordered and pending  Monitor electrolytes, glucose, serum creatinine  IV fluids with 0.9 normal saline, reassess daily  Physical therapy, occupational therapy consults  Blood cultures  Urine culture  Consider neurology consultation if worsening mental status and no clear infection established to explain symptoms  One-to-one sitter for safety and supervision if needed  I/O's, daily weights  Nutrition consult  Regular diet  Pharmacy consult for medication review and reconciliation  Delirium precautions, see order set  Quetiapine (Seroquel) as needed for increased agitation or worsening delirium not responding to nonpharmacologic measures  AM labs as ordered    Essential hypertension  Monitor blood pressure and heart rate  Review prior to admission medication(s) once reconciled by pharmacy    Hyperlipidemia  Continue prior to admission statin once reconciled by pharmacy     Cerebrovascular disease, history of stroke  History of left hemiparesis  Baseline left-sided weakness with prior stroke; CT head on admission as above  Therapy consults as above  Continue prior to admission aspirin once reconciled by pharmacy     Gastroesophageal reflux disease  Continue prior to admission famotidine (Pepcid) as needed once reconciled by pharmacy     Anemia, normocytic  -Admission Hb 10.4; historic baseline 9.6 from 3/3/2024 and 13.1 from 1/9/2023  Monitor hemoglobin  Check B12, folic acid  Check iron studies, peripheral blood smear, reticulocyte count  Check stool for occult blood  Follow-up with AM rounding provider and primary clinic provider  Recent Labs   Lab 03/29/24  1303   HGB 10.4*     History of depression and anxiety  HOLD prior to admission benzodiazepine(s), review once reconciled by pharmacy   Comfort and support offered    History  of ovarian cancer - noted  History of bladder cancer - noted  History of chronic neck and back pain - pain control, see hospital orders  History of osteoporosis - noted, follow-up with primary clinic provider  History of malnutrition - nutrition consult, assess nutritional status  DNR, DNI code status - confirmed with  on admission    Disposition  Estimated length of stay 3 to 4 days  Anticipated discharge to home vs transitional care unit  Social work consult  Care plan discussed with patient's  by phone on 3/29/2024    Change in hospitalist provider tomorrow with one of my [facility] hospitalist colleagues assuming care.        Diet: Regular Diet AdultRegular  DVT Prophylaxis: Pneumatic Compression Devices  Jordan Catheter: Not present  Lines: None     Cardiac Monitoring: None  Code Status: No CPR- Do NOT IntubateDNR, DNI code status, confirmed on admission with     Clinically Significant Risk Factors Present on Admission                # Drug Induced Platelet Defect: home medication list includes an antiplatelet medication   # Hypertension: Noted on problem list                 Disposition Plan      Expected Discharge Date: 03/31/2024                Varinder Hare MD  Hospitalist Service  [facility]  Securely message with [employer] (more info)  Text page via [employer] Paging/Directory     ______________________________________________________________________    Chief Complaint   Weakness, confusion, history of urinary tract infection, brain mass on CT imaging with history of meningioma    History is obtained from the patient, ER provider, chart review, patient's  by phone    History of Present Illness   July Huang is a 83 year old female with past medical history above presenting with confusion, weakness, and recent urinary tract infection.  Diagnosed with urinary tract infection approxi-1 week ago, recently on Macrobid.  Patient unable to provide  "detailed history due to confusion and acute encephalopathy.  History obtained by speaking with patient, ER provider, reviewing clinic notes, and speaking with  by phone.  Patient's  describes \"3 bouts\" of acute encephalopathy typically related to urinary tract infection.  She will become confused, agitated, with increased weakness involving her left arm.  History of prior stroke with baseline left hemiparesis.  Prior hospitalizations at [facility] and more recently at [facility] in early March 2024, see below.  On this occasion, several day history of increased confusion, fatigue, with increasing sleep.  No report of chest pain or shortness of breath.  No nausea or vomiting.  When patient asked current symptoms she states \"I want to go home\".  She goes on to say \"I do not know\".  She is able to recall the year as \"24\" though disoriented to place.  She is oriented to person.  Occasional chills.  No fevers reported.    -Admission vitals, 117/61, temperature 99.2  F, pulse 92, respiratory rate 20, O2 saturation 97% room air  -Admission labs, WBC 9800, hemoglobin 10.4, platelets 459,000, MCV 79, sodium 140, potassium 3.8, creatinine 0.63, glucose 109, lactic acid 1.3, procalcitonin 0.06; urinalysis negative nitrite and leukocyte esterase; 2 WBC, less than 1 red blood cell, clear urine  -EKG sinus tachycardia, rate 110 bpm, right atrial enlargement, premature ventricular complexes, nonspecific ST changes lateral precordial leads  -Admission noncontrast head CT no acute intracranial pathology; 4.8 x 5.7 x 2.4 naso-/hyperdense, extra-axial mass over the frontal parietal convexity, likely meningioma, see report  -Day prior to admission 3/28/2024: Chest x-ray no significant change in mild diffuse coarse interstitial opacity, no focal airspace opacities suspected, no pleural effusion or pneumothorax; CT lumbar spine no evidence of acute fracture or dislocation thoracic spine with " large lateral hernia; no evidence for acute fracture/dislocation of the lumbar spine with multilevel degenerative disc disease and facet arthropathy; left renal stone; gallbladder stone passed MRI  -Brain 3/3/2024 showed 4 point centimeters extra-axial mass at the vertex in the midline, consistent with meningioma, see official report  -Of note, prior hospitalization at [facility] 3/1/2024 through 3/4/2024 for toxic metabolic encephalopathy and delirium thought secondary to polypharmacy with concerns of known meningioma and acute urinary retention; for details, see dismissal summary dated 3/4/2024  -Hospital admission for worsening confusion, recent urinary tract infection, and history of recurrent acute encephalopathy with 2 prior hospitalizations in the past couple of months; established meningioma reportedly present since 2016; follow-up CT on admission reviewed with plans for neurosurgery consultation, initiated by ER provider    -Admit observation; new problem this admission, moderate to high risk medical condition, potentially severe; multiple medical problems with potential for worsening; moderate to high complexity patient with additional workup and treatment planned (see hospital orders).    Past Medical History    Past Medical History:   Diagnosis Date    Cancer of ovary (H)     Cerebrovascular accident (CVA) (H) 11/30/2020    Depression, major, single episode, severe (H) 12/23/2022    Eye muscle weakness, left 2/17/2016    GERD (gastroesophageal reflux disease) 7/8/2009    H/O total hysterectomy with bilateral salpingo-oophorectomy (BSO) 1995    ovarian cancer    Irregular heart beat 2009    nl CT angiogram    Non-cardiac chest pain 7/8/2009    S/P coronary angiogram 2005    negative    Stress 12/22/2010    Upper back pain 2/17/2016       Past Surgical History   Past Surgical History:   Procedure Laterality Date    BIOPSY BREAST      HYSTERECTOMY TOTAL ABDOMINAL, BILATERAL  SALPINGO-OOPHORECTOMY, COMBINED  1995    HYSTERECTOMY, PAP NO LONGER INDICATED      ELZBIETA BSO    OVARY SURGERY         Prior to Admission Medications   Prior to Admission Medications   Prescriptions Last Dose Informant Patient Reported? Taking?   Ascorbic Acid (VITAMIN C) 500 MG CAPS   Yes No   Sig: Take 1,000 mg by mouth daily   CRANBERRY EXTRACT PO   Yes No   Sig: Take 1 capsule by mouth   Cholecalciferol (VITAMIN D) 1000 UNIT capsule   Yes No   Sig: Take 1 capsule by mouth daily 5000 unit 2-3 times per week   HYDROcodone-acetaminophen (NORCO) 5-325 MG tablet   No No   Sig: Take 1/2 to 1 tablet daily as needed for severe back and leg pain   LORazepam (ATIVAN) 0.5 MG tablet   Yes No   Lactobacillus Rhamnosus, GG, (RA PROBIOTIC DIGESTIVE CARE) CAPS   Yes No   Sig: Take 1 capsule by mouth    Lutein-Zeaxanthin 20-1 MG CAPS   Yes No   Sig: Take 1 capsule by mouth daily   Quercetin 500 MG CAPS   Yes No   Sig: Take 500 mg by mouth daily   TAURINE PO   Yes No   Sig: Take 500 mg by mouth daily   Patient not taking: Reported on 3/11/2024   acetaminophen (TYLENOL) 500 MG tablet   Yes No   Sig: Take 1,000 mg by mouth   aspirin (ASA) 81 MG EC tablet   Yes No   Sig: Take 81 mg by mouth 2 times daily   baclofen (LIORESAL) 10 MG tablet   No No   Sig: Take 0.5 tablets (5 mg) by mouth 3 times daily as needed for muscle spasms   calcium citrate-vitamin D (CITRACAL) 315-5 MG-MCG TABS per tablet   Yes No   Sig: Take 1 tablet by mouth daily   clonazePAM (KLONOPIN) 0.5 MG tablet   No No   Sig: TAKE 1/2 TO 1 TABLET(0.25 TO 0.5 MG) BY MOUTH AT BEDTIME   cyanocobalamin (VITAMIN B-12) 1000 MCG tablet   Yes No   Sig: Take 1 tablet (1,000 mcg) by mouth daily   cyclobenzaprine (FLEXERIL) 5 MG tablet   Yes No   diclofenac (VOLTAREN) 1 % topical gel   Yes No   Sig: Apply topically daily as needed for moderate pain   famotidine (PEPCID) 20 MG tablet   No No   Sig: Take 1 tablet (20 mg) by mouth 2 times daily as needed   melatonin 5 MG tablet   Yes  No   Sig: Take 5 mg by mouth nightly as needed   polyethylene glycol (GLYCOLAX) 17 GM/Dose powder   Yes No   Sig: Give 17 gram by mouth in the morning for constipation (in Liquid)   rosuvastatin (CRESTOR) 10 MG tablet   Yes No   Sig: Take 10 mg by mouth daily   valACYclovir (VALTREX) 1000 mg tablet   Yes No   Sig: valacyclovir 1 gram tablet   zinc sulfate (ZINCATE) 220 (50 Zn) MG capsule   Yes No   Sig: Take 220 mg by mouth daily      Facility-Administered Medications: None        Review of Systems    Review of systems otherwise negative and per HPI above    Social History   I have reviewed this patient's social history and updated it with pertinent information if needed.  Social History     Tobacco Use    Smoking status: Former     Types: Cigarettes     Quit date: 1994     Years since quittin.3    Smokeless tobacco: Never   Substance Use Topics    Alcohol use: Yes     Comment: ocas    Drug use: No     Family History   I have reviewed this patient's family history and updated it with pertinent information if needed.  Family History   Problem Relation Age of Onset    Cerebrovascular Disease Mother     Cancer Sister         lung cancer    Asthma Sister     Neurologic Disorder Sister         parkinson's     Heart Disease Father     Diabetes Father     Lung Cancer Sister     Breast Cancer No family hx of     Colon Cancer No family hx of     Parkinsonism Sister     Asthma Sister          Allergies   Allergies   Allergen Reactions    Diltiazem Palpitations    Hydrochlorothiazide W-Amiloride Palpitations    Lisinopril Palpitations    Losartan Potassium Visual Disturbance    Meclizine Palpitations    Metoprolol Palpitations    Tetracyclines & Related Nausea and Vomiting        Physical Exam   Vital Signs: Temp: 99.2  F (37.3  C) Temp src: Temporal BP: (!) 178/109 Pulse: 104   Resp: 20 SpO2: 97 % O2 Device: None (Room air)    Weight: 108 lbs 0 oz    GENERAL awake and alert, lying in bed, tearful at times, missing  "her , mild distress, disoriented to place  EYES pupils equal, round; no conjunctival injection or jaundice  ENT mucous membranes mildly dryu without lesions or exudates  LYMPH no cervical, submandibular, supraclavicular, or axillary adenopathy  BACK normal  LUNGS no wheezes or crackles  HEART S1,S2 with RRR, no rubs or gallops, no murmurs  ABDOMEN soft, non-tender; no guarding, rebound, or rigidity  MUSCULOSKELETAL no gross joint deformities; extremities without edema  PULSES dorsalis pedis pulses present, symmetric  SKIN warm and dry  NEURO moves upper and lower extremities spontaneously and to command; no focal weakness appreciated; sensation intact to light touch upper and lower extremities  PSYCHIATRIC awake and alert; answers questions and follows simple commands; disoriented to place, able to state that time is \"24\"; able to recall middle name    Medical Decision Making             Data     I have personally reviewed the following data over the past 24 hrs:    9.8  \   10.4 (L)   / 459 (H)     140 104 15.5 /  109 (H)   3.8 24 0.63 \     Procal: 0.06 CRP: N/A Lactic Acid: 1.3         Imaging results reviewed over the past 24 hrs:   Recent Results (from the past 24 hour(s))   Head CT w/o contrast    Narrative    EXAM: CT HEAD W/O CONTRAST  3/29/2024 2:24 PM     HISTORY:  AMS       COMPARISON:  No prior similar studies    TECHNIQUE: Using multidetector thin collimation helical acquisition  technique, axial, coronal and sagittal CT images from the skull base  to the vertex were obtained without intravenous contrast.   (topogram) image(s) also obtained and reviewed. Dose reduction  techniques were performed.    FINDINGS:  There is a 4.8 x 5.7 x 2.4 cm, iso/hyperdense, extra-axial mass over  the frontoparietal convexity. This mass encases the superior sagittal  sinus and has mass effect on the left greater than right underlying  parenchyma. No intracranial hemorrhage or midline shift. No acute loss  of " gray-white matter differentiation in the cerebral hemispheres.  Ventricles are proportionate to the cerebral sulci. Clear basal  cisterns. Chronic lacunar infarct in the right corona radiata and  right cerebellar hemisphere. Mild diffuse cerebral volume loss. Mild  periventricular hypoattenuation, consistent with chronic small vessel  ischemic disease.    The bony calvaria and the bones of the skull base are normal. The  visualized portions of the paranasal sinuses and mastoid air cells are  clear. Grossly normal orbits.       Impression    IMPRESSION:   1. No acute intracranial pathology.   2. 4.8 x 5.7 x 2.4 cm, iso/hyperdense, extra-axial mass over the  frontoparietal convexity. This mass encases the superior sagittal  sinus and has mass effect on the left greater than right underlying  parenchyma. This likely represents a meningioma.    BENITEZ VELEZ MD         SYSTEM ID:  [id number]

## 2024-03-29 NOTE — ED TRIAGE NOTES
Pt has been dealing with UTI's for the past few months, has been on several antibiotics and treated at [facility] for this in this time frame. Pt here with , having increased weakness and confusion for the past few days     Triage Assessment (Adult)       Row Name 03/29/24 1204          Triage Assessment    Airway WDL WDL        Respiratory WDL    Respiratory WDL WDL        Cardiac WDL    Cardiac WDL WDL        Cognitive/Neuro/Behavioral WDL    Cognitive/Neuro/Behavioral WDL X

## 2024-03-29 NOTE — ED PROVIDER NOTES
"  History     Chief Complaint:  UTI       HPI   July Huang is a 83 year old female with a history of CVA with residual left-sided weakness and ovarian cancer s/p hysterectomy with bilateral salpingo-oophorectomy who presents with weakness, confusion, dysuria, and dark urine. She reports pain throughout her body today.     Independent Historian:   The patient's  reports she has been treated for UTIs in the emergency department 3 times in the past 2 months. He reports that each time, she was treated with antibiotics, symptoms started to improve, then she would worsen again, with contracture of her right arm and increasing confusion. He reports he first noted confusion 2 days ago. Yesterday and today she seemed very confused and was saying things that didn't make sense, including stating \"the [place] Police Department is shooting dogs, especially black dogs.\" He denies recent falls or injuries. No fever. She has been complaining of dysuria and he notes dark urine. She has been eating and drinking less than normal. She is on day 6 of Macrobid.   states that when patient was at [facility] she presented altered and was found to had a UTI which is why he suspects that today patient has worsening infection.    Review of External Notes:   Reviewed [facility] visit from 3/18/24, treated for UTI.    Medications:    Aspirin 81 mg   Lioresal  Klonopin  Crestor  Ceftin  Macrobid    Past Medical History:    Ovarian cancer   Bladder cancer   CVA with residual left hemiparesis  Depression   Anxiety   GERD  Hypertension   Pelvic prolapse  Dysphagia  Hyperlipidemia   Chronic pain  Meningioma    Past Surgical History:    Total abdominal hysterectomy, Bilateral salpingo-oophorectomy   Coronary angiogram   Biopsy breast  Hemiarthroplasty, left hip     Physical Exam   Patient Vitals for the past 24 hrs:   BP Temp Temp src Pulse Resp SpO2 Weight   03/29/24 1715 135/75 -- -- 94 -- -- --   03/29/24 1711 -- -- -- -- -- 96 % -- "   03/29/24 1630 (!) 146/81 -- -- 94 -- -- --   03/29/24 1615 (!) 170/133 -- -- 102 -- -- --   03/29/24 1549 -- -- -- -- -- 97 % --   03/29/24 1547 -- -- -- -- -- 97 % --   03/29/24 1530 (!) 178/109 -- -- 104 -- 97 % --   03/29/24 1529 -- -- -- -- -- 97 % --   03/29/24 1515 (!) 171/95 -- -- 102 -- -- --   03/29/24 1500 116/68 -- -- 87 -- 94 % --   03/29/24 1445 90/56 -- -- 61 -- 95 % --   03/29/24 1430 135/73 -- -- 85 -- 93 % --   03/29/24 1345 (!) 154/84 -- -- 95 -- 97 % --   03/29/24 1333 -- -- -- -- -- 97 % --   03/29/24 1330 (!) 161/102 -- -- 99 -- -- --   03/29/24 1205 117/61 99.2  F (37.3  C) Temporal 92 20 97 % 49 kg (108 lb)        Physical Exam  Vitals reviewed.   Constitutional:       General: She is not in acute distress.     Appearance: She is not ill-appearing.   HENT:      Head: Normocephalic and atraumatic.   Eyes:      Extraocular Movements: Extraocular movements intact.   Cardiovascular:      Rate and Rhythm: Normal rate and regular rhythm.   Pulmonary:      Effort: Pulmonary effort is normal. No respiratory distress.      Breath sounds: Normal breath sounds. No wheezing.   Abdominal:      Palpations: Abdomen is soft.      Tenderness: There is no abdominal tenderness. There is no guarding.   Musculoskeletal:      Cervical back: Normal range of motion.   Skin:     General: Skin is warm and dry.   Neurological:      Mental Status: She is alert.      GCS: GCS eye subscore is 4. GCS verbal subscore is 5. GCS motor subscore is 6.      Comments: Patient is alert oriented to person.  Does not know where she is.  Able to tell me the month.   Psychiatric:         Behavior: Behavior normal.           Emergency Department Course   ECG:  ECG results from 03/29/24   EKG 12-lead, tracing only     Value    Systolic Blood Pressure     Diastolic Blood Pressure     Ventricular Rate 110    Atrial Rate 110    FL Interval 114    QRS Duration 72        QTc 516    P Axis 76    R AXIS 55    T Axis 40    Interpretation  ECG      Sinus tachycardia with frequent Premature ventricular complexes  Right atrial enlargement  Nonspecific ST abnormality  Abnormal ECG  No previous ECGs available  Confirmed by GENERATED REPORT, COMPUTER ([id number]),  Nany Wakefield ([id number]) on 3/29/2024 4:25:21 PM         Imaging:  Head CT w/o contrast   Final Result   IMPRESSION:    1. No acute intracranial pathology.    2. 4.8 x 5.7 x 2.4 cm, iso/hyperdense, extra-axial mass over the   frontoparietal convexity. This mass encases the superior sagittal   sinus and has mass effect on the left greater than right underlying   parenchyma. This likely represents a meningioma.      BENITEZ VELEZ MD            SYSTEM ID:  [id number]         Laboratory:  Labs Ordered and Resulted from Time of ED Arrival to Time of ED Departure   BASIC METABOLIC PANEL - Abnormal       Result Value    Sodium 140      Potassium 3.8      Chloride 104      Carbon Dioxide (CO2) 24      Anion Gap 12      Urea Nitrogen 15.5      Creatinine 0.63      GFR Estimate 88      Calcium 9.3      Glucose 109 (*)    ROUTINE UA WITH MICROSCOPIC REFLEX TO CULTURE - Abnormal    Color Urine Straw      Appearance Urine Clear      Glucose Urine Negative      Bilirubin Urine Negative      Ketones Urine Negative      Specific Gravity Urine 1.011      Blood Urine Negative      pH Urine 7.0      Protein Albumin Urine 30 (*)     Urobilinogen Urine Normal      Nitrite Urine Negative      Leukocyte Esterase Urine Negative      Mucus Urine Present (*)     RBC Urine <1      WBC Urine 2     CBC WITH PLATELETS AND DIFFERENTIAL - Abnormal    WBC Count 9.8      RBC Count 4.16      Hemoglobin 10.4 (*)     Hematocrit 32.8 (*)     MCV 79      MCH 25.0 (*)     MCHC 31.7      RDW 15.1 (*)     Platelet Count 459 (*)     % Neutrophils 76      % Lymphocytes 15      % Monocytes 9      % Eosinophils 0      % Basophils 0      % Immature Granulocytes 0      NRBCs per 100 WBC 0      Absolute Neutrophils 7.3      Absolute Lymphocytes  1.5      Absolute Monocytes 0.9      Absolute Eosinophils 0.0      Absolute Basophils 0.0      Absolute Immature Granulocytes 0.0      Absolute NRBCs 0.0     IRON AND IRON BINDING CAPACITY - Abnormal    Iron 22 (*)     Iron Binding Capacity 363      Iron Sat Index 6 (*)    LACTIC ACID WHOLE BLOOD - Normal    Lactic Acid 1.3     PROCALCITONIN - Normal    Procalcitonin 0.06     RETICULOCYTE COUNT - Normal    % Reticulocyte 0.8      Absolute Reticulocyte 0.035     VITAMIN B12   FERRITIN   OCCULT BLOOD STOOL   GLUCOSE MONITOR NURSING POCT   BLOOD CULTURE   BLOOD CULTURE   LAB BLOOD MORPHOLOGY PATHOLOGIST REVIEW        Emergency Department Course & Assessments:       Interventions:  Medications   senna-docusate (SENOKOT-S/PERICOLACE) 8.6-50 MG per tablet 1 tablet (has no administration in time range)     Or   senna-docusate (SENOKOT-S/PERICOLACE) 8.6-50 MG per tablet 2 tablet (has no administration in time range)   ondansetron (ZOFRAN ODT) ODT tab 4 mg (has no administration in time range)     Or   ondansetron (ZOFRAN) injection 4 mg (has no administration in time range)   sodium chloride 0.9 % infusion (has no administration in time range)   acetaminophen (TYLENOL) tablet 650 mg (650 mg Oral $Given 3/29/24 1700)     Or   acetaminophen (TYLENOL) Suppository 650 mg ( Rectal See Alternative 3/29/24 1700)   QUEtiapine (SEROquel) half-tab 12.5 mg (has no administration in time range)   ceFEPIme (MAXIPIME) 2 g vial to attach to  mL bag for ADULTS or 50 mL bag for PEDS (2 g Intravenous $New Bag 3/29/24 1700)   acetaminophen (TYLENOL) tablet 650 mg (650 mg Oral Not Given 3/29/24 1701)   ketorolac (TORADOL) injection 15 mg (15 mg Intravenous $Given 3/29/24 1313)   LORazepam (ATIVAN) injection 1 mg (1 mg Intravenous $Given 3/29/24 1409)   LORazepam (ATIVAN) tablet 0.5 mg (0.5 mg Oral $Given 3/29/24 1701)        Independent Interpretation (X-rays, CTs, rhythm strip):  CT of the head shows  meningioma.    Assessments/Consultations/Discussion of Management or Tests:  ED Course as of 24 1736   Fri Mar 29, 2024   1217 I obtained the history and examined the patient as noted above.    1335 UA shows no sign of infection   1457 Reviewed CT findings of meningioma noted.  Patient had MRI March 3, 2024 at [facility].  Imaging was noted at that time no signs of midline shift.  I discussed with neurosurgery they recommend giving the patient in the hospital hospitalist admission.   1514 Discussed case with Dr. Hare, hospitalist       Social Determinants of Health affecting care:   None    Disposition:  The patient was admitted to the hospital under the care of Dr. Hare.     Impression & Plan        Medical Decision Makin-year-old female presenting today with  for concerns for possible UTI, increased confusion.   states that patient has had recurrent urinary tract infections currently on antibiotics on the 6-day of a 10-day course of antibiotics.  She has had increased confusion recently.  States that it is progressed over the last few days.   states that patient was admitted to [facility] for similar symptoms.  Patient agitated but redirectable.  She knows who she is but does not know where she is.  UA did not show signs of infection.  No signs of any leukocytosis.  She had a CT of the head done that showed a meningioma.  This was seen on prior imaging earlier this month via MRI at an outside facility.  Patient was admitted during that time.  Today there is evidence of midline shift on CT scan.  I discussed case with neurosurgery.  They recommend admission for further evaluation regarding this meningioma.  They will give further recommendations after seeing the patient.  I discussed case with hospitalist Dr. Hare and patient was admitted to their service.      Diagnosis:    ICD-10-CM    1. Altered mental status, unspecified altered mental status type  R41.82       2. Meningioma (H)   D32.9            Discharge Medications:  New Prescriptions    No medications on file        Scribe Disclosure:  I, Deana David, am serving as a scribe at 12:25 PM on 3/29/2024 to document services personally performed by June King DO based on my observations and the provider's statements to me.     3/29/2024   June King DO Doan, Tiffani, DO  03/29/24 1736

## 2024-03-29 NOTE — ED NOTES
Patient frequently yelling out for staff. Inconsolable. No sitters available at this time. Curtain left open for visualization of patient.

## 2024-03-29 NOTE — ED NOTES
"Patient was yelling out to staff, \"water, water, water\", she was given some water and asked if she wanted more, patient mumbling and then would not drink again.   " PRE-OPERATIVE NOTE   Chief Complaint had concerns including Office Visit and Pre-Op Exam (Prostate needle biopsy on 9/26 with Dr Rose at [facility]).  Surgery Date: 09/26/2023  Planned Surgery: MR FUSION PROSTATE NEEDLE BIOSPY   Type of Anesthesia: Monitor Anesthesia Care  Pre-op Diagnosis: Elevated prostate specific antigen (PSA) [R97.20]  Requesting Surgeon: Waldo Rose MD    Subjective   BRIEF HISTORY LEADING to SURGERY: Fernandez Melgoza is a 68 year old male here for pre-operative anesthesia consult for surgery as requested by the surgeon.  Pt has had prior same surgery 2 yrs ago with no issue.  Has increaing psa level, known bph and an MRI showing an increasing prostate size.    Review of Systems   Constitutional: Negative.    HENT: Negative.    Respiratory: Negative.    Cardiovascular: Negative.    Gastrointestinal: Positive for abdominal pain (chronic diffuse abdominal pain (present for 4 years)). Negative for blood in stool.   Genitourinary: Negative.    Musculoskeletal: Negative.           Objective    PHYSICAL EXAM  Vitals:    09/22/23 1425   BP: 121/81   Pulse: 86   Resp: 16   SpO2: 97%   Weight: 75.4 kg (166 lb 5.4 oz)   Height: 5' 5.5\" (1.664 m)   PainSc: 7      Physical Exam  Vitals and nursing note reviewed.   Constitutional:       General: He is not in acute distress.     Appearance: He is well-developed.   HENT:      Head: Normocephalic and atraumatic.      Neck: Normal range of motion.   Cardiovascular:      Rate and Rhythm: Normal rate and regular rhythm.      Heart sounds: Normal heart sounds. No murmur heard.  Pulmonary:      Effort: Pulmonary effort is normal. No respiratory distress.      Breath sounds: Normal breath sounds. No wheezing.   Abdominal:      General: Bowel sounds are normal.      Palpations: Abdomen is soft.      Tenderness: There is no abdominal tenderness. There is no guarding.   Musculoskeletal:      Right lower leg: No edema.      Left lower leg: No edema.    Skin:     General: Skin is warm and dry.   Neurological:      Mental Status: He is alert and oriented to person, place, and time.   Psychiatric:         Behavior: Behavior normal.                RESULTS REVIEW       Most Recent  Na+  135 (7/31/2023)  K+  4.8 (7/31/2023)   Glucose  218 (7/31/2023)  GFR  68 (7/31/2023)        Most Recent  WBC  7.4 (7/31/2023)  HGB  14.8 (7/31/2023)     Platelet  390 (7/31/2023)              Creatinine (mg/dL)   Date Value   07/31/2023 1.17   9/15/23 bun 19 and creatinine 1.66.  Bill on ckd.      PAST MEDICAL HISTORIES        Medications: has a current medication list which includes the following prescription(s): pantoprazole, levofloxacin, glipizide, dicyclomine, blood glucose, accu-chek guide me, softclix lancets, onetouch verio flex system, tramadol, empagliflozin, metformin, chlorthalidone, imipramine, irbesartan, atorvastatin, allopurinol, gabapentin, tamsulosin, adult blood pressure cuff lg, calcium citrate-vitamin d, vitamin c, imipramine, zoster vaccine recomb adjuvanted, aspirin, pantoprazole, diclofenac, and omega 3-6-9 fatty acids.  Problem List: has Seasonal allergic rhinitis; Diabetes mellitus (CMD); Benign essential hypertension; Hypercholesterolemia; Overweight; Chronic left shoulder pain; Well adult exam; Tubular adenoma; Degenerative arthritis of lumbar spine; Elevated prostate specific antigen (PSA); Chronic superficial gastritis without bleeding; Hypertensive kidney disease with stage 3 chronic kidney disease (CMD); Chronic tension-type headache, not intractable; BPH with urinary obstruction; Mass of finger of right hand; Generalized abdominal pain; History of needle biopsy of prostate with negative result; Bicipital tenosynovitis; Idiopathic chronic gout of right ankle without tophus; Noncompliance; Difficulty demonstrating health literacy; Acute pain of left hip; Right flank pain; Other proteinuria; Primary osteoarthritis of right knee; and Pain of right thigh on  their problem list.  Medical:  has a past medical history of Benign essential hypertension (03/06/2018), BPH (benign prostatic hyperplasia), Chest pain (03/06/2018), Closed fracture of lateral malleolus, Closed fracture of right ankle (03/20/2023), Colon cancer screening (08/03/2019), Colon cancer screening (08/03/2019), Degenerative arthritis of lumbar spine (08/03/2019), Elevated lipase (07/27/2021), Elevated PSA, GERD (gastroesophageal reflux disease), Gout, Helicobacter pylori gastritis (08/03/2019), Hyperkalemia (07/27/2021), Hyperplastic polyp of sigmoid colon (08/03/2019), Kidney disease, Knee pain (03/06/2018), Mixed hyperlipidemia (03/06/2018), Olecranon bursitis of right elbow (12/17/2019), Osteoarthritis of right knee, Renal failure, Seasonal allergic rhinitis (03/06/2018), Shoulder pain (03/06/2018), and Type 2 diabetes mellitus with renal complication (CMD) (03/06/2018).  Surgical:  has a past surgical history that includes Colonoscopy; Elbow surgery; biopsy of prostate,needle/punch (11/22/2021); and Colonoscopy w/ polypectomy (05/24/2022).  Family: family history includes Coronary Artery Disease in his sister; Diabetes in his mother; Kidney disease in his sister, sister, and sister; Seizure Disorder in his brother.  Social:  reports that he has never smoked. He has never used smokeless tobacco. He reports that he does not currently use alcohol. He reports that he does not use drugs.  Allergies: has No Known Allergies.     SURGICAL RISK AND SCREENINGS     Family History Risks  Adverse Reaction to Anesthesia: No  Bleeding or Blood Disorder: No  Malignant Hyperthermia: No    Personal Surgical History  Anesthetic Complications: No  Bleeding Problems: No  Problems with Surgery: No         Malnutrition Screening Tool Current weight 166 pounds  Have you recently lost weight without trying? No  Have you been eating poorly because of a decreased appetite? No  Score = 0, Not at Risk       Functional  Capacity  Are you able to do light housework, dusting, wash dishes, climb a flight of stairs or walk up a hill without chest pain or problems breathing (>4 METS)? Yes    Revised Cardiac Risk Index  Intermediate or Higher Risk Surgery No   History of Ischemic Heart Disease or Cardiac Chest Pain No   Congestive Heart Failure No;     History of Stroke or TIA No   Last Creatinine >2 No; 1.17 g/dL (07/31/2023)   Prescribed Insulin No   Estimated Risk of a Major Cardiac Complication 0 risk factors = 3.9%       Acute Kidney Injury Prevention:  PAM Risk is Medium based on criteria below:  Chronic Kidney Disease  Diabetes  Age >65  Plan  - 24 hours before surgery hold ACE-I/ARB, Diuretics and Potassium  - 5 days before surgery hold NSAIDs including ASA  - Stop Jardiance 25 mg 24 hours prior    Advance care planning documents on file - no    ASSESSMENT AND PLAN       1. Preoperative clearance  2. Elevated prostate specific antigen (PSA)  3. Benign prostatic hyperplasia, unspecified whether lower urinary tract symptoms present  4. Benign essential hypertension  5. Type 2 diabetes mellitus with stage 3a chronic kidney disease, without long-term current use of insulin (CMD)  Assessment & Plan:  8.3% a1c 5 weeks ago.  Pt is on metfomrin/glipizide/jardinace.  Not on insulin, should be fine for expected surgery.    6. Hypertensive kidney disease with stage 3a chronic kidney disease (CMD)    Preop Optimization  - OPTIMIZED for SURGERY: YES patient is medically optimized for surgery.  - Workup reviewed and/or ordered: no additional workup needed  - Pre-Op management of pacemaker, dialysis or steroids: not needed.  - Post-Op DVT prophylaxis: per surgical team  - Smoking Status: Never Smoker.     Pertinent Conditions  None  #Diabetes Mellitus (Cmd) the last A1c was 8.3 (08/10/2023), this is trending down compared to  8.8 (06/26/2023).    #Hypertensive Kidney Disease With Stage 3 Chronic Kidney Disease (Cmd) today's blood pressure was  /81    #Hypertensive Kidney Disease With Stage 3 Chronic Kidney Disease (Cmd) the last eGFR was 68 (7/31/2023)      Medications to Hold   Jardiance and Irbesartan 24 hrs prior.  Metformin/glimepiride the day of the surgery along with other meds.  Medications and Supplements:  - Morning of surgery hold any Multivitamins AND for 2 weeks hold any Vitamin E or Herbals     Anticoagulation:   - 7 days before high bleeding risk surgery hold (aspirin (ECOTRIN) 81 MG EC tablet [[id number]])    Antidiabetic:   - Morning of surgery hold (glipiZIDE (GLUCOTROL) 10 MG tablet [[id number]], metformin (GLUCOPHAGE) 1000 MG tablet [[id number]])  - 3 days before surgery hold (empagliflozin (JARDIANCE) 25 MG tablet [[id number]])    PAM Risk (Diuretics, ACE-I/ARB, NSAIDs):   - 24 hours before surgery hold (irbesartan (AVAPRO) 300 MG tablet [[id number]])  - 24 hours before surgery hold (chlorthalidone (THALITONE) 25 MG tablet [[id number]]) and if taking potassium      Continue all other medications unless an alternative plan was advised with the surgeon and/or specialist.      Follow Up    Matthieu Zuluaga MD  Copy sent to: Waldo Rose MD

## 2024-03-29 NOTE — PROGRESS NOTES
Neurosurgery progress note:    I spoke with the ER regarding this patient:    83-year-old female with history of CVA with residual left-sided weakness and ovarian cancer that presented with weakness, confusion, dysuria and dark urine.     CT head completed in the ER shows  IMPRESSION:   1. No acute intracranial pathology.   2. 4.8 x 5.7 x 2.4 cm, iso/hyperdense, extra-axial mass over the  frontoparietal convexity. This mass encases the superior sagittal  sinus and has mass effect on the left greater than right underlying  parenchyma. This likely represents a meningioma.    MRI report reviewed from 3/2/2024  Impression:    1. There is a 4.9 cm extra-axial mass at the vertex in the midline. This mass is most likely a meningioma. There is probable invasion of the superior sagittal sinus and parietal bones. Mild displacement of both parietal lobes with no hydrocephalus or midline shift. Note that the presumed meningioma shows mixed signal characteristics in the anterior portion on T2 and postcontrast images, which may indicate regions of different tumor grade and/or cytologic type.   2. Additional 2.0 x 2.5 x 0.6 cm left parasellar mass, consistent with en plaque meningioma along the left cavernous sinus.   3. Mild atrophy and chronic small vessel ischemic disease. Chronic lacunar infarctions of right basal ganglia and cerebellum     Plan:   -Hospitalist admitting  -NSGY will see in consultation in am.    Yolie LUNDBERG [facility]surgery  [address] [address]  [phone]

## 2024-03-29 NOTE — ED NOTES
NEUROSURGERY FOLLOW-UP OFFICE NOTE    CC: Neck pain, mid back pain, low back pain    HPI: The patient is a 64 year old male who presents in follow-up from neck pain, mid back pain, low back pain.  He reports that the symptoms began approximately 5 years ago when he fell while at work.  He states he fell 15 feet and landed on his back.      ROS:   CONSTITUTIONAL: negative for fevers and night sweats  HEENT: negative for voice change  CARDIOVASCULAR: negative for chest pain  RESPIRATORY: negative for cough and hemoptysis  GASTROINTESTINAL: negative for nausea/vomitting  GENITOURINARY: negative for dysuria and hematuria  SKIN: negative for rash  PSYCHOSOCIAL: negative for anxiety and depression  HEMATOLOGIC: negative for easy bruising  ENDOCRINE: negative for cold intolerance and heat intolerance  NEUROLOGIC: see below    Past Medical History:   Diagnosis Date   • Bulging lumbar disc    • COPD (chronic obstructive pulmonary disease) (CMS/[facility])    • Diabetes mellitus (CMS/[facility])    • Essential (primary) hypertension         Past Surgical History:   Procedure Laterality Date   • Amputation finger/thumb     • Hernia repair     • Rotator cuff repair  2014   • Tonsillectomy          Family History   Problem Relation Age of Onset   • Cancer Sister        Social History     Tobacco Use   • Smoking status: Current Every Day Smoker     Packs/day: 2.00     Years: 51.00     Pack years: 102.00   • Smokeless tobacco: Never Used   Vaping Use   • Vaping Use: never used   Substance Use Topics   • Alcohol use: Never   • Drug use: Never       ALLERGIES:   Allergen Reactions   • Morphine Nausea & Vomiting   • Oxycodone Nausea & Vomiting     itchy     • Penicillins HIVES        Current Outpatient Medications   Medication Sig Dispense Refill   • HYDROcodone-acetaminophen (Norco) 5-325 MG per tablet Take 1 tablet by mouth every 6 hours as needed for Pain. 20 tablet 0   • lidocaine (LIDOCARE) 4 % patch Place 1 patch onto the skin daily. Do not  [facility]  ED Nurse Handoff Report    ED Chief complaint: UTI      ED Diagnosis:   Final diagnoses:   Altered mental status, unspecified altered mental status type   Meningioma (H)       Code Status: DNR / DNI    Allergies:   Allergies   Allergen Reactions    Diltiazem Palpitations    Hydrochlorothiazide W-Amiloride Palpitations    Lisinopril Palpitations    Losartan Potassium Visual Disturbance    Meclizine Palpitations    Metoprolol Palpitations    Tetracyclines & Related Nausea and Vomiting       Patient Story:  Patient coming in with increasing confusion and weakness over the last several days. Pt has hx of encephalopathy following UTIs, but her urine here was clean. Pt CT showed brain mass. Pt A&Ox2. Very confused and calls out frequently.     Focused Assessment:    A&O to place and self. No cough or SOB. Breathing rate, rhythm and SPO2 WDL. Denies chest pain. HR WDL. States leg pain.     Labs Ordered and Resulted from Time of ED Arrival to Time of ED Departure   BASIC METABOLIC PANEL - Abnormal       Result Value    Sodium 140      Potassium 3.8      Chloride 104      Carbon Dioxide (CO2) 24      Anion Gap 12      Urea Nitrogen 15.5      Creatinine 0.63      GFR Estimate 88      Calcium 9.3      Glucose 109 (*)    ROUTINE UA WITH MICROSCOPIC REFLEX TO CULTURE - Abnormal    Color Urine Straw      Appearance Urine Clear      Glucose Urine Negative      Bilirubin Urine Negative      Ketones Urine Negative      Specific Gravity Urine 1.011      Blood Urine Negative      pH Urine 7.0      Protein Albumin Urine 30 (*)     Urobilinogen Urine Normal      Nitrite Urine Negative      Leukocyte Esterase Urine Negative      Mucus Urine Present (*)     RBC Urine <1      WBC Urine 2     CBC WITH PLATELETS AND DIFFERENTIAL - Abnormal    WBC Count 9.8      RBC Count 4.16      Hemoglobin 10.4 (*)     Hematocrit 32.8 (*)     MCV 79      MCH 25.0 (*)     MCHC 31.7      RDW 15.1 (*)     Platelet Count 459 (*)      % Neutrophils 76      % Lymphocytes 15      % Monocytes 9      % Eosinophils 0      % Basophils 0      % Immature Granulocytes 0      NRBCs per 100 WBC 0      Absolute Neutrophils 7.3      Absolute Lymphocytes 1.5      Absolute Monocytes 0.9      Absolute Eosinophils 0.0      Absolute Basophils 0.0      Absolute Immature Granulocytes 0.0      Absolute NRBCs 0.0     IRON AND IRON BINDING CAPACITY - Abnormal    Iron 22 (*)     Iron Binding Capacity 363      Iron Sat Index 6 (*)    LACTIC ACID WHOLE BLOOD - Normal    Lactic Acid 1.3     PROCALCITONIN - Normal    Procalcitonin 0.06     RETICULOCYTE COUNT - Normal    % Reticulocyte 0.8      Absolute Reticulocyte 0.035     VITAMIN B12   FERRITIN   OCCULT BLOOD STOOL   GLUCOSE MONITOR NURSING POCT   BLOOD CULTURE   BLOOD CULTURE   LAB BLOOD MORPHOLOGY PATHOLOGIST REVIEW       Head CT w/o contrast   Final Result   IMPRESSION:    1. No acute intracranial pathology.    2. 4.8 x 5.7 x 2.4 cm, iso/hyperdense, extra-axial mass over the   frontoparietal convexity. This mass encases the superior sagittal   sinus and has mass effect on the left greater than right underlying   parenchyma. This likely represents a meningioma.      BENITEZ VELEZ MD            SYSTEM ID:  [id number]            Treatments and/or interventions provided:  Medications   senna-docusate (SENOKOT-S/PERICOLACE) 8.6-50 MG per tablet 1 tablet (has no administration in time range)     Or   senna-docusate (SENOKOT-S/PERICOLACE) 8.6-50 MG per tablet 2 tablet (has no administration in time range)   ondansetron (ZOFRAN ODT) ODT tab 4 mg (has no administration in time range)     Or   ondansetron (ZOFRAN) injection 4 mg (has no administration in time range)   sodium chloride 0.9 % infusion (has no administration in time range)   acetaminophen (TYLENOL) tablet 650 mg (650 mg Oral $Given 3/29/24 1700)     Or   acetaminophen (TYLENOL) Suppository 650 mg ( Rectal See Alternative 3/29/24 1700)   QUEtiapine (SEROquel)  start before August 31, 2021. 100 patch 3   • nicotine (NICODERM) 21 MG/24HR patch Place 1 patch onto the skin daily. Do not start before August 31, 2021. 15 each 0   • ibuprofen (MOTRIN) 800 MG tablet Take 1 tablet by mouth every 6 hours as needed for Pain. 60 tablet 3   • losartan (COZAAR) 50 MG tablet Take 50 mg by mouth daily.     • metFORMIN (GLUCOPHAGE) 500 MG tablet Take 500 mg by mouth daily (with breakfast).      • atorvastatin (LIPITOR) 10 MG tablet Take 10 mg by mouth daily.       No current facility-administered medications for this visit.          PHYSICAL EXAMINATION:  /84   Pulse 83   Ht 5' 7\" (1.702 m)   Wt 80.3 kg (177 lb)   BMI 27.72 kg/m²   BSA 1.92 m²     General: Well-developed, well-nourished, in no acute distress  Eyes: Pupils symmetric, no conjuctival injection  ENMT: no external ear and nose abnormalities  Neck: Trachea midline  Respiratory: Normal respiratory effort, no wheezing  Cardiovascular: No clubbing or cyanosis noted  Skin: No rashes or lesions noted  Neurologic: Alert and Oriented to name, place, and year  Follows commands  Cranial Nerves II-XII are grossly intact  Motor examination: 5/5 strength in all 4 extremities  Sensory: intact to light touch and pinprick in all 4 extremities  Reflexes: no evidence of spaulding's, clonus, or babinski sign. Biceps, triceps, patellar, and achilles reflexes are all 2+ bilaterally  Gait: able to walk on heels, toes, and tandem gait without difficulty  Coordination: no dysmetria on finger to nose testing      RADIOGRAPHIC IMAGING:  Imaging available for review. I personally viewed the imaging myself and here are the findings.    MRI cervical spine reveals evidence of moderate cord compression at C4-5, C5-6, C6-7    MRI of the thoracic spine reveals no evidence of severe spinal cord compression    MRI of the lumbar spine reveals no evidence of severe nerve root compression    ASSESSMENT/PLAN: 64 year old male with cervical spondylosis and  half-tab 12.5 mg (has no administration in time range)   ceFEPIme (MAXIPIME) 2 g vial to attach to  mL bag for ADULTS or 50 mL bag for PEDS (2 g Intravenous $New Bag 3/29/24 1700)   acetaminophen (TYLENOL) tablet 650 mg (650 mg Oral Not Given 3/29/24 1701)   ketorolac (TORADOL) injection 15 mg (15 mg Intravenous $Given 3/29/24 1313)   LORazepam (ATIVAN) injection 1 mg (1 mg Intravenous $Given 3/29/24 1409)   LORazepam (ATIVAN) tablet 0.5 mg (0.5 mg Oral $Given 3/29/24 1701)       Patient's response to treatments and/or interventions:  Patient still calls out frequently. Ativan helps.     To be done/followed up on inpatient unit:   See any in-patient orders. Neurosurgery follow up.     Does this patient have any cognitive concerns?: Baseline dementia    Activity level - Baseline/Home:    Total Care    Activity Level - Current:    Total Care    Patient's Preferred language: English     Needed?: No    Isolation: None  Infection: Not Applicable  Patient tested for COVID 19 prior to admission: NO    Bariatric?: No    Vital Signs:   Vitals:    03/29/24 1615 03/29/24 1630 03/29/24 1711 03/29/24 1715   BP: (!) 170/133 (!) 146/81  135/75   Pulse: 102 94  94   Resp:       Temp:       TempSrc:       SpO2:   96%    Weight:           Cardiac Rhythm:     Was the PSS-3 completed:   Yes  What interventions are required if any?                 Family Comments:  reachable by phone    OBS brochure/video discussed/provided to patient/family: No              Name of person given brochure if not patient: N/A              Relationship to patient: N/A    For the majority of the shift this patient's behavior was Green.  Behavioral interventions performed were N/A.    ED NURSE PHONE NUMBER: *[phone]    lumbago    I will order a CT of the cervical spine to evaluate for OPLL.  There is evidence of spinal cord compression in the neck.  He will follow up with me after the CT is completed.        I answered all the patient's questions fully and the patient is in agreement with the treatment plan.     Naveen Murcia MD FAANS  Board Certified Neurosurgeon  [facility]  Office [phone]

## 2024-03-30 ENCOUNTER — APPOINTMENT (OUTPATIENT)
Dept: GENERAL RADIOLOGY | Facility: CLINIC | Age: 84
DRG: 070 | End: 2024-03-30
Attending: NURSE PRACTITIONER
Payer: COMMERCIAL

## 2024-03-30 LAB
ALBUMIN SERPL BCG-MCNC: 4.1 G/DL (ref 3.5–5.2)
ALP SERPL-CCNC: 74 U/L (ref 40–150)
ALT SERPL W P-5'-P-CCNC: 13 U/L (ref 0–50)
ANION GAP SERPL CALCULATED.3IONS-SCNC: 21 MMOL/L (ref 7–15)
AST SERPL W P-5'-P-CCNC: 47 U/L (ref 0–45)
BASOPHILS # BLD AUTO: 0 10E3/UL (ref 0–0.2)
BASOPHILS NFR BLD AUTO: 0 %
BILIRUB DIRECT SERPL-MCNC: <0.2 MG/DL (ref 0–0.3)
BILIRUB SERPL-MCNC: 0.4 MG/DL
BUN SERPL-MCNC: 14 MG/DL (ref 8–23)
CALCIUM SERPL-MCNC: 8.8 MG/DL (ref 8.8–10.2)
CHLORIDE SERPL-SCNC: 102 MMOL/L (ref 98–107)
CREAT SERPL-MCNC: 0.69 MG/DL (ref 0.51–0.95)
DEPRECATED HCO3 PLAS-SCNC: 15 MMOL/L (ref 22–29)
EGFRCR SERPLBLD CKD-EPI 2021: 86 ML/MIN/1.73M2
EOSINOPHIL # BLD AUTO: 0 10E3/UL (ref 0–0.7)
EOSINOPHIL NFR BLD AUTO: 0 %
ERYTHROCYTE [DISTWIDTH] IN BLOOD BY AUTOMATED COUNT: 15 % (ref 10–15)
FLUAV RNA SPEC QL NAA+PROBE: NEGATIVE
FLUBV RNA RESP QL NAA+PROBE: NEGATIVE
FOLATE SERPL-MCNC: 12.4 NG/ML (ref 4.6–34.8)
GLUCOSE SERPL-MCNC: 119 MG/DL (ref 70–99)
HCT VFR BLD AUTO: 32.3 % (ref 35–47)
HGB BLD-MCNC: 10.4 G/DL (ref 11.7–15.7)
IMM GRANULOCYTES # BLD: 0 10E3/UL
IMM GRANULOCYTES NFR BLD: 0 %
LACTATE SERPL-SCNC: 1.4 MMOL/L (ref 0.7–2)
LACTATE SERPL-SCNC: 2.2 MMOL/L (ref 0.7–2)
LYMPHOCYTES # BLD AUTO: 1.6 10E3/UL (ref 0.8–5.3)
LYMPHOCYTES NFR BLD AUTO: 14 %
MAGNESIUM SERPL-MCNC: 1.9 MG/DL (ref 1.7–2.3)
MCH RBC QN AUTO: 24.9 PG (ref 26.5–33)
MCHC RBC AUTO-ENTMCNC: 32.2 G/DL (ref 31.5–36.5)
MCV RBC AUTO: 77 FL (ref 78–100)
MONOCYTES # BLD AUTO: 0.8 10E3/UL (ref 0–1.3)
MONOCYTES NFR BLD AUTO: 7 %
MRSA DNA SPEC QL NAA+PROBE: NEGATIVE
NEUTROPHILS # BLD AUTO: 9.1 10E3/UL (ref 1.6–8.3)
NEUTROPHILS NFR BLD AUTO: 79 %
NRBC # BLD AUTO: 0 10E3/UL
NRBC BLD AUTO-RTO: 0 /100
PHOSPHATE SERPL-MCNC: 2.5 MG/DL (ref 2.5–4.5)
PLATELET # BLD AUTO: 453 10E3/UL (ref 150–450)
POTASSIUM SERPL-SCNC: 3.7 MMOL/L (ref 3.4–5.3)
PROCALCITONIN SERPL IA-MCNC: 0.12 NG/ML
PROT SERPL-MCNC: 7 G/DL (ref 6.4–8.3)
RBC # BLD AUTO: 4.18 10E6/UL (ref 3.8–5.2)
RETICS # AUTO: 0.04 10E6/UL (ref 0.03–0.1)
RETICS/RBC NFR AUTO: 0.9 % (ref 0.5–2)
RSV RNA SPEC NAA+PROBE: NEGATIVE
SA TARGET DNA: NEGATIVE
SARS-COV-2 RNA RESP QL NAA+PROBE: NEGATIVE
SODIUM SERPL-SCNC: 138 MMOL/L (ref 135–145)
WBC # BLD AUTO: 11.5 10E3/UL (ref 4–11)

## 2024-03-30 PROCEDURE — 36415 COLL VENOUS BLD VENIPUNCTURE: CPT | Performed by: INTERNAL MEDICINE

## 2024-03-30 PROCEDURE — 80048 BASIC METABOLIC PNL TOTAL CA: CPT | Performed by: HOSPITALIST

## 2024-03-30 PROCEDURE — 250N000011 HC RX IP 250 OP 636

## 2024-03-30 PROCEDURE — 120N000013 HC R&B IMCU

## 2024-03-30 PROCEDURE — 36415 COLL VENOUS BLD VENIPUNCTURE: CPT

## 2024-03-30 PROCEDURE — 83735 ASSAY OF MAGNESIUM: CPT | Performed by: INTERNAL MEDICINE

## 2024-03-30 PROCEDURE — 36415 COLL VENOUS BLD VENIPUNCTURE: CPT | Performed by: HOSPITALIST

## 2024-03-30 PROCEDURE — 85025 COMPLETE CBC W/AUTO DIFF WBC: CPT | Performed by: HOSPITALIST

## 2024-03-30 PROCEDURE — 82248 BILIRUBIN DIRECT: CPT

## 2024-03-30 PROCEDURE — 84100 ASSAY OF PHOSPHORUS: CPT | Performed by: INTERNAL MEDICINE

## 2024-03-30 PROCEDURE — 99232 SBSQ HOSP IP/OBS MODERATE 35: CPT | Mod: FS | Performed by: PHYSICIAN ASSISTANT

## 2024-03-30 PROCEDURE — 250N000013 HC RX MED GY IP 250 OP 250 PS 637: Performed by: HOSPITALIST

## 2024-03-30 PROCEDURE — 71045 X-RAY EXAM CHEST 1 VIEW: CPT

## 2024-03-30 PROCEDURE — 258N000003 HC RX IP 258 OP 636: Performed by: HOSPITALIST

## 2024-03-30 PROCEDURE — 258N000003 HC RX IP 258 OP 636

## 2024-03-30 PROCEDURE — 99291 CRITICAL CARE FIRST HOUR: CPT

## 2024-03-30 PROCEDURE — 99207 PR APP CREDIT; MD BILLING SHARED VISIT: CPT | Performed by: INTERNAL MEDICINE

## 2024-03-30 PROCEDURE — 258N000003 HC RX IP 258 OP 636: Performed by: INTERNAL MEDICINE

## 2024-03-30 PROCEDURE — 87637 SARSCOV2&INF A&B&RSV AMP PRB: CPT | Performed by: NURSE PRACTITIONER

## 2024-03-30 PROCEDURE — 82746 ASSAY OF FOLIC ACID SERUM: CPT | Performed by: HOSPITALIST

## 2024-03-30 PROCEDURE — 85045 AUTOMATED RETICULOCYTE COUNT: CPT | Performed by: HOSPITALIST

## 2024-03-30 PROCEDURE — 99207 PR APP CREDIT; MD BILLING SHARED VISIT: CPT | Performed by: NURSE PRACTITIONER

## 2024-03-30 PROCEDURE — 84145 PROCALCITONIN (PCT): CPT

## 2024-03-30 PROCEDURE — 83605 ASSAY OF LACTIC ACID: CPT

## 2024-03-30 PROCEDURE — 87641 MR-STAPH DNA AMP PROBE: CPT

## 2024-03-30 PROCEDURE — 83605 ASSAY OF LACTIC ACID: CPT | Performed by: INTERNAL MEDICINE

## 2024-03-30 PROCEDURE — 250N000011 HC RX IP 250 OP 636: Performed by: INTERNAL MEDICINE

## 2024-03-30 RX ORDER — HYDRALAZINE HYDROCHLORIDE 20 MG/ML
10 INJECTION INTRAMUSCULAR; INTRAVENOUS EVERY 4 HOURS PRN
Status: DISCONTINUED | OUTPATIENT
Start: 2024-03-30 | End: 2024-04-04 | Stop reason: HOSPADM

## 2024-03-30 RX ORDER — VANCOMYCIN HYDROCHLORIDE 1 G/200ML
1000 INJECTION, SOLUTION INTRAVENOUS EVERY 24 HOURS
Qty: 180000 ML | Refills: 0 | Status: DISCONTINUED | OUTPATIENT
Start: 2024-03-31 | End: 2024-04-01

## 2024-03-30 RX ORDER — HALOPERIDOL 5 MG/ML
1-2 INJECTION INTRAMUSCULAR EVERY 4 HOURS PRN
Status: DISCONTINUED | OUTPATIENT
Start: 2024-03-30 | End: 2024-04-01

## 2024-03-30 RX ORDER — PIPERACILLIN SODIUM, TAZOBACTAM SODIUM 4; .5 G/20ML; G/20ML
4.5 INJECTION, POWDER, LYOPHILIZED, FOR SOLUTION INTRAVENOUS EVERY 6 HOURS
Status: DISCONTINUED | OUTPATIENT
Start: 2024-03-30 | End: 2024-04-02

## 2024-03-30 RX ADMIN — SODIUM CHLORIDE: 9 INJECTION, SOLUTION INTRAVENOUS at 04:14

## 2024-03-30 RX ADMIN — PIPERACILLIN AND TAZOBACTAM 4.5 G: 4; .5 INJECTION, POWDER, FOR SOLUTION INTRAVENOUS at 11:42

## 2024-03-30 RX ADMIN — ACETAMINOPHEN 650 MG: 650 SUPPOSITORY RECTAL at 01:29

## 2024-03-30 RX ADMIN — SODIUM CHLORIDE: 9 INJECTION, SOLUTION INTRAVENOUS at 12:54

## 2024-03-30 RX ADMIN — PIPERACILLIN AND TAZOBACTAM 4.5 G: 4; .5 INJECTION, POWDER, FOR SOLUTION INTRAVENOUS at 17:50

## 2024-03-30 RX ADMIN — HALOPERIDOL LACTATE 1 MG: 5 INJECTION, SOLUTION INTRAMUSCULAR at 16:44

## 2024-03-30 RX ADMIN — HALOPERIDOL LACTATE 1 MG: 5 INJECTION, SOLUTION INTRAMUSCULAR at 18:02

## 2024-03-30 RX ADMIN — PIPERACILLIN AND TAZOBACTAM 4.5 G: 4; .5 INJECTION, POWDER, FOR SOLUTION INTRAVENOUS at 23:35

## 2024-03-30 RX ADMIN — ACETAMINOPHEN 650 MG: 650 SUPPOSITORY RECTAL at 14:51

## 2024-03-30 RX ADMIN — ACETAMINOPHEN 650 MG: 650 SUPPOSITORY RECTAL at 18:45

## 2024-03-30 RX ADMIN — ACETAMINOPHEN 650 MG: 650 SUPPOSITORY RECTAL at 05:34

## 2024-03-30 RX ADMIN — SODIUM CHLORIDE 500 ML: 9 INJECTION, SOLUTION INTRAVENOUS at 08:32

## 2024-03-30 RX ADMIN — VANCOMYCIN HYDROCHLORIDE 1250 MG: 10 INJECTION, POWDER, LYOPHILIZED, FOR SOLUTION INTRAVENOUS at 09:31

## 2024-03-30 RX ADMIN — QUETIAPINE 12.5 MG: 25 TABLET, FILM COATED ORAL at 00:38

## 2024-03-30 RX ADMIN — ACETAMINOPHEN 650 MG: 650 SUPPOSITORY RECTAL at 10:26

## 2024-03-30 ASSESSMENT — ACTIVITIES OF DAILY LIVING (ADL)
ADLS_ACUITY_SCORE: 47
ADLS_ACUITY_SCORE: 46
ADLS_ACUITY_SCORE: 47

## 2024-03-30 NOTE — PROGRESS NOTES
Status: Patient admitted on 3/29 with confusion, UTI, change in known brain mass on CT  Vitals: Blood pressure hypertensive within parameters, HR tachy in 120's, T max 101.1, RR 30-40. RRT called this AM   Tele: Sinus tach with PVC's  Neuros: Improved this afternoon, patient able to state name and birthday month, has illogical/incoherent rambling speech, moves all extremities spontaneously and to command intermittently, left side weaker than right from previous CVA, pupils 4mm brisk equal  IV: PIV infusing NS at 75mL/hr  Labs/Electrolytes: Lactic acid this AM 2.2, re-draw 1.4, WBC's 11.5  Resp/trach: Lung sounds diminished in lower lobes  Diet: NPO pending swallow study  Bowel status: No BM today, BS+  : Jordan catheter placed this AM for retention  Skin: Generalized bruising throughout  Pain: CHANDNI, Tylenol suppository given Q4hrs for fevers  Activity: Turn and repo Q2hrs  Social: Patient confused, yelling, bedside attendant for pulling at lines,  at 1mg IV Haldol given bedside this afternoon, 2x for agitation, supportive with cares  Plan: Continue IV antibiotics, infectious work-up, SLP/PT/OT evaluations, continue to monitor and follow POC

## 2024-03-30 NOTE — CONSULTS
"CLINICAL NUTRITION SERVICES  -  ASSESSMENT NOTE    Recommendations Ordered by Registered Dietitian (RD):   - NPO - no interventions available at this time   Future/Additional Recommendations:   - Add supplements and thera vit M once able  - Consider nutrition support if intakes remain poor, or if unable to advance diet.    Malnutrition:   % Weight Loss:  Weight loss does not meet criteria for malnutrition   % Intake:  Unable to fully assess   Subcutaneous Fat Loss:  Orbital region moderate depletion, Upper arm region severe depletion, and Thoracic region severe depletion  Muscle Loss:  Temporal region moderate-severe depletion, Clavicle bone region severe depletion, Acromion bone region severe depletion, Scapular bone region severe depletion, and Dorsal hand region moderate-severe depletion  Fluid Retention:  None noted    Malnutrition Diagnosis: Severe malnutrition  In Context of:  Chronic illness or disease     REASON FOR ASSESSMENT  July Huang is a 83 year old female seen by Registered Dietitian for Provider Order - \"History of malnutrition, assess nutritional status, need for supplements if appropriate\"    NUTRITION HISTORY  - Information obtained from chart review. Visited patient, too confused, no family present.   - Frequent UTIs the past 2 months, causing acute confusion.   - Has been eating/drinking less than normal.   - H/o HTN, HLD, GERD, CVD w/ prior stroke and left hemiparesis, depression, anxiety, ovarian cancer, bladder cancer, meningioma, osteoporosis, malnutrition, chronic neck and back pain, admitted with worsening confusion.     - Pt seen in room. She was very confused, calling out for help. Sitter at bedside. No family present.     CURRENT NUTRITION ORDERS  Diet Order:     NPO     Current Intake/Tolerance:  No intake recorded since admission     NUTRITION FOCUSED PHYSICAL ASSESSMENT FOR DIAGNOSING MALNUTRITION)  Yes          Observed:    Muscle wasting (refer to documentation in " "Malnutrition section) and Subcutaneous fat loss (refer to documentation in Malnutrition section)    Obtained from Chart/Interdisciplinary Team:  3/29 - CT head: \"likely represents a meningioma\"    ANTHROPOMETRICS  Height: 5' 1\"  Weight: 108 lbs 0 oz (49 kg)   Body mass index is 20.41 kg/m .  Weight Status:  Normal BMI  IBW: 47.7 kg   % IBW: 103%  Weight History:   Wt Readings from Last 10 Encounters:   03/29/24 49 kg (108 lb)   02/05/24 49 kg (108 lb)   12/27/23 49.6 kg (109 lb 6.4 oz)   10/31/23 46.5 kg (102 lb 9.6 oz)   08/15/23 40.8 kg (90 lb)   08/04/23 41 kg (90 lb 6.4 oz)   06/12/23 43.1 kg (95 lb)   05/22/23 43.8 kg (96 lb 9.6 oz)   04/07/23 45.6 kg (100 lb 9.6 oz)   01/09/23 46.7 kg (103 lb)       LABS  Labs reviewed    MEDICATIONS  Medications reviewed  Vitamin B12 1000 mcg daily - held today   Zinc 220 mg - held today   NaCl VIF @ 75 mL/hr     ASSESSED NUTRITION NEEDS PER APPROVED PRACTICE GUIDELINES:  Dosing Weight 49 kg   Estimated Energy Needs: 1882-3662+ kcals (25-30+ Kcal/Kg)  Justification: maintenance  Estimated Protein Needs: 59-74 grams protein (1.2-1.5 g pro/Kg)  Justification: preservation of lean body mass  Estimated Fluid Needs: 1 mL/kcal   Justification: maintenance    MALNUTRITION:  % Weight Loss:  Weight loss does not meet criteria for malnutrition   % Intake:  Unable to fully assess   Subcutaneous Fat Loss:  Orbital region moderate depletion, Upper arm region severe depletion, and Thoracic region severe depletion  Muscle Loss:  Temporal region moderate-severe depletion, Clavicle bone region severe depletion, Acromion bone region severe depletion, Scapular bone region severe depletion, and Dorsal hand region moderate-severe depletion  Fluid Retention:  None noted    Malnutrition Diagnosis: Severe malnutrition  In Context of:  Chronic illness or disease    NUTRITION DIAGNOSIS:  Malnutrition related to suspected inadequate nutrient intake at baseline as evidenced by minimal stores of " fat/muscle on exam.     NUTRITION INTERVENTIONS  Recommendations / Nutrition Prescription  ADAT  Supplements vs nutrition support pending diet tolerance  Thera vit M once diet advances     Implementation  Nutrition education: Not appropriate at this time due to patient condition    Nutrition Goals  Diet >CLD in the next 48 hours.     MONITORING AND EVALUATION:  Progress towards goals will be monitored and evaluated per protocol and Practice Guidelines    Annalise Khalil RD, LD  Pager: [phone]  Weekend Pager: [phone]

## 2024-03-30 NOTE — PLAN OF CARE
Goal Outcome Evaluation: Progressing    Reason for Admission: Confusion and meningioma    Evaluation of Goal:   Patient very difficult to understand and only intermittently answers yes and no questions. Otherwise, patient does not respond to most questions or follow commands thus unable to complete full neurological exam. Per chart review patient has previously had left sided weakness from a stroke but unable to assess this properly. Due to patient current level of consciousness, she was made NPO overnight. Patient febrile up to 102.9 axillary and tachycardic up into 140s along with being restless and agitated unable to redirect or have any relief with seroquel thus rapid called (see note for additional information). Patient continues to be tachycardic with frequent PVCs on telemetry otherwise vitals are stable are 1-2L NC when sleeping. Patient intermittently will yell out and groan. Patient turned and repositioned overnight. Adequate urine output. Sleep Quality moderate. Patient scoring yellow on the Aggression Stoplight Tool. Pt calm and cooperative with cares, able to make needs known, call light within reach, bed alarm on for safety. Discharge disposition is pending.     Leni Warner RN on 3/30/2024 at 6:49 AM

## 2024-03-30 NOTE — CONSULTS
[facility]    Neurosurgery Consultation     Date of Admission:  3/29/2024  Date of Consult (When I saw the patient): 03/30/24    Assessment & Plan   July Huang is a 83 year old female who was admitted on 3/29/2024. All history obtained through chart review due to patient condition   All history obtained through chart review due to patient condition     July Huang is a 83 year old female who presented 3/29/24 with confusion, weakness, UTI, meenakshi with imaging evidence as below. Per chart review patient has had 3 bouts of encephalopathy related to UTIs. She is currently confused, agitated, yelling out with any attempt at exam. History of prior stroke and left hemiparesis. Prior hospitalizations [facility] and [facility] March 2024 for toxic metabolic encephalopathy and delirium. Known meningioma for several years per - for which she has had radiation in September 2022.  Primary neurosurgery team is [facility] per . Rpt Mri at [facility] was reviewed with neurosurgery team and thought it was stable and recommended follow up with PCP and outside neurology team.     Code status is DNR/DNI.  does not think patient would be able to sustain a large operation at her age. If indicated, would hve to be a larger discussion.     Narrative & Impression   EXAM: CT HEAD W/O CONTRAST  3/29/2024 2:24 PM      HISTORY:  AMS        COMPARISON:  No prior similar studies     TECHNIQUE: Using multidetector thin collimation helical acquisition  technique, axial, coronal and sagittal CT images from the skull base  to the vertex were obtained without intravenous contrast.   (topogram) image(s) also obtained and reviewed. Dose reduction  techniques were performed.     FINDINGS:  There is a 4.8 x 5.7 x 2.4 cm, iso/hyperdense, extra-axial mass over  the frontoparietal convexity. This mass encases the superior sagittal  sinus and has mass effect on the left greater than right underlying  parenchyma. No  intracranial hemorrhage or midline shift. No acute loss  of gray-white matter differentiation in the cerebral hemispheres.  Ventricles are proportionate to the cerebral sulci. Clear basal  cisterns. Chronic lacunar infarct in the right corona radiata and  right cerebellar hemisphere. Mild diffuse cerebral volume loss. Mild  periventricular hypoattenuation, consistent with chronic small vessel  ischemic disease.     The bony calvaria and the bones of the skull base are normal. The  visualized portions of the paranasal sinuses and mastoid air cells are  clear. Grossly normal orbits.                                                                       IMPRESSION:   1. No acute intracranial pathology.   2. 4.8 x 5.7 x 2.4 cm, iso/hyperdense, extra-axial mass over the  frontoparietal convexity. This mass encases the superior sagittal  sinus and has mass effect on the left greater than right underlying  parenchyma. This likely represents a meningioma.     BENITEZ VELEZ MD      Clinical history, imaging and plans reviewed myself as well as with Dr. Ortiz.    Recommend:   UTI treatment per hospitalist   Obtain prior MRIs to review   Likely will recommend continued follow up with primary neurosurgery team at [facility] and sign off after MRI has been reviewed. Updated spouse who was in agreement. He notes meningioma has remained stable for several years and last mrI indicated stability as well    I have discussed the following assessment and plan with Dr. Ortiz who is in agreement with the initial plan and will follow up with further consultation recommendations.    Yasmin Ruvalcaba PA-C  [facility] Neurosurgery  [address] [address]    [facility] AVAILABLE      Code Status    No CPR- Do NOT Intubate    Reason for Consult   Reason for consult: I was asked by Dr. Hare to evaluate this patient for meningioma.    Primary Care Physician   Denton Barahona    Chief  Complaint   Confusion, fever, UTI     History is obtained from the patient, electronic health record, and emergency department physician    History of Present Illness   All history obtained through chart review due to patient condition     July Huang is a 83 year old female who presented 3/29/24 with confusion, weakness, UTI, meenakshi with imaging evidence as below. Per chart review patient has had 3 bouts of encephalopathy related to UTIs. She is currently confused, agitated, yelling out with any attempt at exam. History of prior stroke and left hemiparesis. Prior hospitalizations [facility] March 2024 for toxic metabolic encephalopathy and delirium. Known meningioma for several years per - for which she has had radiation in September 2022.  Primary neurosurgery team is [facility] per . Rpt Mri at [facility] was reviewed with neurosurgery team and thought it was stable and recommended follow up with PCP and outside neurology team.     Code status is DNR/DNI.  does not think patient would be able to sustain a large operation at her age. If indicated, would hve to be a larger discussion.     Narrative & Impression   EXAM: CT HEAD W/O CONTRAST  3/29/2024 2:24 PM      HISTORY:  AMS        COMPARISON:  No prior similar studies     TECHNIQUE: Using multidetector thin collimation helical acquisition  technique, axial, coronal and sagittal CT images from the skull base  to the vertex were obtained without intravenous contrast.   (topogram) image(s) also obtained and reviewed. Dose reduction  techniques were performed.     FINDINGS:  There is a 4.8 x 5.7 x 2.4 cm, iso/hyperdense, extra-axial mass over  the frontoparietal convexity. This mass encases the superior sagittal  sinus and has mass effect on the left greater than right underlying  parenchyma. No intracranial hemorrhage or midline shift. No acute loss  of gray-white matter differentiation in the cerebral hemispheres.  Ventricles are  proportionate to the cerebral sulci. Clear basal  cisterns. Chronic lacunar infarct in the right corona radiata and  right cerebellar hemisphere. Mild diffuse cerebral volume loss. Mild  periventricular hypoattenuation, consistent with chronic small vessel  ischemic disease.     The bony calvaria and the bones of the skull base are normal. The  visualized portions of the paranasal sinuses and mastoid air cells are  clear. Grossly normal orbits.                                                                       IMPRESSION:   1. No acute intracranial pathology.   2. 4.8 x 5.7 x 2.4 cm, iso/hyperdense, extra-axial mass over the  frontoparietal convexity. This mass encases the superior sagittal  sinus and has mass effect on the left greater than right underlying  parenchyma. This likely represents a meningioma.     BENITEZ VELEZ MD        Past Medical History   I have reviewed this patient's medical history and updated it with pertinent information if needed.   Past Medical History:   Diagnosis Date    Cancer of ovary (H)     Cerebrovascular accident (CVA) (H) 11/30/2020    Depression, major, single episode, severe (H) 12/23/2022    Eye muscle weakness, left 2/17/2016    GERD (gastroesophageal reflux disease) 7/8/2009    H/O total hysterectomy with bilateral salpingo-oophorectomy (BSO) 1995    ovarian cancer    Irregular heart beat 2009    nl CT angiogram    Non-cardiac chest pain 7/8/2009    S/P coronary angiogram 2005    negative    Stress 12/22/2010    Upper back pain 2/17/2016       Past Surgical History   I have reviewed this patient's surgical history and updated it with pertinent information if needed.  Past Surgical History:   Procedure Laterality Date    BIOPSY BREAST      HYSTERECTOMY TOTAL ABDOMINAL, BILATERAL SALPINGO-OOPHORECTOMY, COMBINED  1995    HYSTERECTOMY, PAP NO LONGER INDICATED      ELZBIETA BSO    OVARY SURGERY         Prior to Admission Medications   Prior to Admission Medications   Prescriptions  Last Dose Informant Patient Reported? Taking?   Ascorbic Acid (VITAMIN C) 500 MG CAPS Past Week  Yes Yes   Sig: Take 1,000 mg by mouth daily   CRANBERRY EXTRACT PO Past Week  Yes Yes   Sig: Take 1 capsule by mouth daily   Cholecalciferol (VITAMIN D) 1000 UNIT capsule Past Week  Yes Yes   Sig: Take 1 capsule by mouth daily 5000 unit 2-3 times per week   HYDROcodone-acetaminophen (NORCO) 5-325 MG tablet  at prn  No Yes   Sig: Take 1/2 to 1 tablet daily as needed for severe back and leg pain   Lactobacillus Rhamnosus, GG, (RA PROBIOTIC DIGESTIVE CARE) CAPS Past Week  Yes Yes   Sig: Take 1 capsule by mouth daily   Lutein-Zeaxanthin 20-1 MG CAPS Past Week  Yes Yes   Sig: Take 1 capsule by mouth daily   Quercetin 500 MG CAPS Past Week  Yes Yes   Sig: Take 500 mg by mouth daily   acetaminophen (TYLENOL) 500 MG tablet  at prn  Yes Yes   Sig: Take 1,000 mg by mouth every 6 hours as needed for fever or pain   aspirin (ASA) 81 MG EC tablet Past Week  Yes Yes   Sig: Take 81 mg by mouth daily   baclofen (LIORESAL) 10 MG tablet  at prn  No Yes   Sig: Take 0.5 tablets (5 mg) by mouth 3 times daily as needed for muscle spasms   calcium citrate-vitamin D (CITRACAL) 315-5 MG-MCG TABS per tablet 3/28/2024  Yes Yes   Sig: Take 1 tablet by mouth daily   clonazePAM (KLONOPIN) 0.5 MG tablet Past Week  No Yes   Sig: TAKE 1/2 TO 1 TABLET(0.25 TO 0.5 MG) BY MOUTH AT BEDTIME   cyanocobalamin (VITAMIN B-12) 1000 MCG tablet Past Week  Yes Yes   Sig: Take 1 tablet (1,000 mcg) by mouth daily   diclofenac (VOLTAREN) 1 % topical gel  at prn  Yes Yes   Sig: Apply topically daily as needed for moderate pain   famotidine (PEPCID) 20 MG tablet  at prn  No Yes   Sig: Take 1 tablet (20 mg) by mouth 2 times daily as needed   melatonin 5 MG tablet  at prn  Yes Yes   Sig: Take 5 mg by mouth nightly as needed   polyethylene glycol (GLYCOLAX) 17 GM/Dose powder  at prn  Yes Yes   Sig: Take 1 Capful by mouth daily as needed for constipation   rosuvastatin  (CRESTOR) 10 MG tablet Past Week  Yes Yes   Sig: Take 10 mg by mouth daily   valACYclovir (VALTREX) 1000 mg tablet  at prn  Yes Yes   Sig: Take 1,000 mg by mouth 2 times daily as needed (HSV infxn PRN)   zinc sulfate (ZINCATE) 220 (50 Zn) MG capsule Past Week  Yes Yes   Sig: Take 220 mg by mouth daily      Facility-Administered Medications: None     Allergies   Allergies   Allergen Reactions    Diltiazem Palpitations    Hydrochlorothiazide W-Amiloride Palpitations    Lisinopril Palpitations    Losartan Potassium Visual Disturbance    Meclizine Palpitations    Metoprolol Palpitations    Tetracyclines & Related Nausea and Vomiting       Social History   I have reviewed this patient's social history and updated it with pertinent information if needed. July Huang  reports that she quit smoking about 29 years ago. Her smoking use included cigarettes. She has never used smokeless tobacco. She reports current alcohol use. She reports that she does not use drugs.    Family History   I have reviewed this patient's family history and updated it with pertinent information if needed.   Family History   Problem Relation Age of Onset    Cerebrovascular Disease Mother     Cancer Sister         lung cancer    Asthma Sister     Neurologic Disorder Sister         parkinson's     Heart Disease Father     Diabetes Father     Lung Cancer Sister     Breast Cancer No family hx of     Colon Cancer No family hx of     Parkinsonism Sister     Asthma Sister        Review of Systems    ROS: 10 point ROS neg other than the symptoms noted above in the HPI.        Physical Exam   Temp: 99  F (37.2  C) Temp src: Oral BP: (!) 158/87 Pulse: (!) 127   Resp: (!) 32 SpO2: 97 % O2 Device: Nasal cannula Oxygen Delivery: 1 LPM  Vital Signs with Ranges  Temp:  [99  F (37.2  C)-102.9  F (39.4  C)] 99  F (37.2  C)  Pulse:  [] 127  Resp:  [14-40] 32  BP: ()/() 158/87  SpO2:  [86 %-99 %] 97 %  108 lbs 0 oz     , Blood pressure (!)  158/87, pulse (!) 127, temperature 99  F (37.2  C), temperature source Oral, resp. rate (!) 32, weight 108 lb (49 kg), SpO2 97%, not currently breastfeeding.  108 lbs 0 oz    NEUROLOGICAL EXAMINATION:   Very limited neuro exam due to encephalopathy, poor participation. Patient screaming anytime attempt an exam     Opens eyes spontaneously, does not participate in EOMs or CN exam  Does not participate in strength exam and when attempting to examine screams consistently       Data   All new lab and imaging data was personally reviewed by me.  Narrative & Impression   EXAM: CT HEAD W/O CONTRAST  3/29/2024 2:24 PM      HISTORY:  AMS        COMPARISON:  No prior similar studies     TECHNIQUE: Using multidetector thin collimation helical acquisition  technique, axial, coronal and sagittal CT images from the skull base  to the vertex were obtained without intravenous contrast.   (topogram) image(s) also obtained and reviewed. Dose reduction  techniques were performed.     FINDINGS:  There is a 4.8 x 5.7 x 2.4 cm, iso/hyperdense, extra-axial mass over  the frontoparietal convexity. This mass encases the superior sagittal  sinus and has mass effect on the left greater than right underlying  parenchyma. No intracranial hemorrhage or midline shift. No acute loss  of gray-white matter differentiation in the cerebral hemispheres.  Ventricles are proportionate to the cerebral sulci. Clear basal  cisterns. Chronic lacunar infarct in the right corona radiata and  right cerebellar hemisphere. Mild diffuse cerebral volume loss. Mild  periventricular hypoattenuation, consistent with chronic small vessel  ischemic disease.     The bony calvaria and the bones of the skull base are normal. The  visualized portions of the paranasal sinuses and mastoid air cells are  clear. Grossly normal orbits.                                                                       IMPRESSION:   1. No acute intracranial pathology.   2. 4.8 x 5.7 x 2.4  "cm, iso/hyperdense, extra-axial mass over the  frontoparietal convexity. This mass encases the superior sagittal  sinus and has mass effect on the left greater than right underlying  parenchyma. This likely represents a meningioma.     BENITEZ VELEZ MD      CBC RESULTS:   Recent Labs   Lab Test 03/30/24 0613   WBC 11.5*   RBC 4.18   HGB 10.4*   HCT 32.3*   MCV 77*   MCH 24.9*   MCHC 32.2   RDW 15.0   *     Basic Metabolic Panel:  Lab Results   Component Value Date     03/30/2024     05/06/2022      Lab Results   Component Value Date    POTASSIUM 3.7 03/30/2024    POTASSIUM 3.8 05/06/2022     Lab Results   Component Value Date    CHLORIDE 102 03/30/2024    CHLORIDE 101 05/06/2022     Lab Results   Component Value Date    TOMEKA 8.8 03/30/2024    TOMEKA 9.4 05/06/2022     Lab Results   Component Value Date    CO2 15 03/30/2024    CO2 23 08/09/2021    CO2 23 12/16/2011     Lab Results   Component Value Date    BUN 14.0 03/30/2024    BUN 12 05/06/2022    BUN 18 05/06/2022     Lab Results   Component Value Date    CR 0.69 03/30/2024    CR 0.67 05/06/2022     Lab Results   Component Value Date     03/30/2024    GLC 92 05/06/2022     INR:  No results found for: \"INR\"      "

## 2024-03-30 NOTE — CODE/RAPID RESPONSE
[facility]    House ADAM RRT Note  3/30/2024   Time Called: 0127    RRT called for: Tachycardia    Assessment & Plan     Sinus tachycardia likely multifactorial 2/2 fever, agitation.  Fever possibly 2/2 recent UTI (recurrent).    Acute hypoxic respiratory failure possibly 2/2 medication effect (seroquel) vs fever.  - Upon arrival, pt lying in bed, eyes closed, resting, in no overt distress with VS noting HR 110s, SBP low 100s (initial reading in the 80s with subsequent > 100), RR 10s-low 20s, O2 sats 87% on RA, > 92% on 2L O2 via oxymask, temp 102.  Nursing notes pt recently agitated, tachycardic with HR 140s.  While present at bedside, pt intermittently yells/cries out, HR up to the 130s, remains sinus with frequent PVCs, but quickly trendly back to 110s when resting.        INTERVENTIONS:  - Nursing recently administered APAP NV  - Noted procalcitonin, WBC WNL on admission  - UA bland  - Blood and urine cultures pending  - CXR obtained day prior to admission with no obvious infiltrate noted  - Will check COVID/influenza/RSV PCR  - Stat CXR as last noted imaging in EMR completed 3/18/24 and pt now febrile, hypoxic  - Noted EKG completed in ED noting similar rhythm, will defer repeat EKG at this time - treat underlying cause of sinus tachycardia  - Pt continues on IV cefepime  - Pt remains on telemetry monitoring   - Continues on MIVF NS at 75 ml/hr    At the end of the RRT pt resting in bed, bedside attendant remains at pt's bedside, intermittently yells out but calms quickly, remains hemodynamically stable    Discussed with and defer further cares to nursing and hospitalist    Interval History     July Huang is a 83 year old female who was admitted on 3/29/2024 for worsening confusion.    Medical history significant for:   Past Medical History:   Diagnosis Date    Cancer of ovary (H)     Cerebrovascular accident (CVA) (H) 11/30/2020    Depression, major, single episode, severe (H)  12/23/2022    Eye muscle weakness, left 2/17/2016    GERD (gastroesophageal reflux disease) 7/8/2009    H/O total hysterectomy with bilateral salpingo-oophorectomy (BSO) 1995    ovarian cancer    Irregular heart beat 2009    nl CT angiogram    Non-cardiac chest pain 7/8/2009    S/P coronary angiogram 2005    negative    Stress 12/22/2010    Upper back pain 2/17/2016     Code Status: No CPR- Do NOT Intubate    Allergies   Allergies   Allergen Reactions    Diltiazem Palpitations    Hydrochlorothiazide W-Amiloride Palpitations    Lisinopril Palpitations    Losartan Potassium Visual Disturbance    Meclizine Palpitations    Metoprolol Palpitations    Tetracyclines & Related Nausea and Vomiting       Physical Exam   Vital Signs with Ranges:  Temp:  [99.2  F (37.3  C)-100.8  F (38.2  C)] 100.8  F (38.2  C)  Pulse:  [] 85  Resp:  [14-20] 20  BP: ()/() 115/73  SpO2:  [91 %-97 %] 95 %  I/O last 3 completed shifts:  In: 100 [IV Piggyback:100]  Out: -     Constitutional: Pt lying in bed, eyes closed, resting, in no overt distress   Neck: No upper airway wheezes or stridor noted  Pulmonary: In no overt respiratory, mildly tachypneic at times, coarse lung sounds throughout, no obvious crackles or wheezes noted  Cardiovascular: Tachycardic, irregular rate and rhythm, normal S1S2, no murmur, rub or gallop noted  GI: Flat, soft, nondistended, no obvious tenderness noted with palpation, no guarding or rebound tenderness noted, active bowel sounds  Skin/Integumen: Warm, dry, pink  Neuro: Eyes closed, moving all extremities, intermittently yells/cries out, does not follow commands  Psych:  Intermittently restless/agitated  Extremities: No peripheral edema    Data     IMAGING: (X-ray/CT/MRI)   Recent Results (from the past 24 hour(s))   Head CT w/o contrast    Narrative    EXAM: CT HEAD W/O CONTRAST  3/29/2024 2:24 PM     HISTORY:  AMS       COMPARISON:  No prior similar studies    TECHNIQUE: Using multidetector thin  collimation helical acquisition  technique, axial, coronal and sagittal CT images from the skull base  to the vertex were obtained without intravenous contrast.   (topogram) image(s) also obtained and reviewed. Dose reduction  techniques were performed.    FINDINGS:  There is a 4.8 x 5.7 x 2.4 cm, iso/hyperdense, extra-axial mass over  the frontoparietal convexity. This mass encases the superior sagittal  sinus and has mass effect on the left greater than right underlying  parenchyma. No intracranial hemorrhage or midline shift. No acute loss  of gray-white matter differentiation in the cerebral hemispheres.  Ventricles are proportionate to the cerebral sulci. Clear basal  cisterns. Chronic lacunar infarct in the right corona radiata and  right cerebellar hemisphere. Mild diffuse cerebral volume loss. Mild  periventricular hypoattenuation, consistent with chronic small vessel  ischemic disease.    The bony calvaria and the bones of the skull base are normal. The  visualized portions of the paranasal sinuses and mastoid air cells are  clear. Grossly normal orbits.       Impression    IMPRESSION:   1. No acute intracranial pathology.   2. 4.8 x 5.7 x 2.4 cm, iso/hyperdense, extra-axial mass over the  frontoparietal convexity. This mass encases the superior sagittal  sinus and has mass effect on the left greater than right underlying  parenchyma. This likely represents a meningioma.    BENITEZ VELEZ MD         SYSTEM ID:  [id number]   XR Chest Port 1 View    Narrative    EXAM: XR CHEST PORT 1 VIEW  LOCATION: [facility]  DATE: 3/30/2024    INDICATION: RRT, hypoxia, fever  COMPARISON: 02/01/2022      Impression    IMPRESSION: Cardiomediastinal silhouette within normal limits. No focal consolidation or pleural effusion. Atherosclerotic aorta. Thoracolumbar scoliosis.      Latest Reference Range & Units 03/30/24 01:59   SARS CoV2 PCR Negative  Negative   Influenza A Negative  Negative    Influenza B Negative  Negative   Resp Syncytial Virus Negative  Negative     Medical Decision Making       30 MINUTES SPENT BY ME on the date of service doing chart review, history, exam, documentation & further activities per the note.       SERGIO [facility]  Hospitalist-House ADAM  Hospitalist Service  Securely message with [employer] (more info)  Text page via [facility] Paging/Directory

## 2024-03-30 NOTE — PHARMACY-VANCOMYCIN DOSING SERVICE
"Pharmacy Vancomycin Initial Note  Date of Service 2024  Patient's  1940  83 year old, female    Indication: Sepsis    Current estimated CrCl = Estimated Creatinine Clearance: 47.8 mL/min (based on SCr of 0.69 mg/dL).    Creatinine for last 3 days  3/29/2024:  1:03 PM Creatinine 0.63 mg/dL  3/30/2024:  6:13 AM Creatinine 0.69 mg/dL    Recent Vancomycin Level(s) for last 3 days  No results found for requested labs within last 3 days.      Vancomycin IV Administrations (past 72 hours)                     vancomycin (VANCOCIN) 1,250 mg in 0.9% NaCl 250 mL intermittent infusion (mg) 1,250 mg New Bag 24 0931                    Nephrotoxins and other renal medications (From now, onward)      Start     Dose/Rate Route Frequency Ordered Stop    24 0900  vancomycin (VANCOCIN) 1,000 mg in 200 mL dextrose intermittent infusion         1,000 mg  200 mL/hr over 1 Hours Intravenous EVERY 24 HOURS 24 0945 26 0859    24 0900  piperacillin-tazobactam (ZOSYN) 4.5 g vial to attach to  mL bag        Note to Pharmacy: For [facility], [facility] and [facility]: For Zosyn-naive patients, use the \"Zosyn initial dose + extended infusion\" order panel.    4.5 g  over 30 Minutes Intravenous EVERY 6 HOURS 24 0832      24 0900  vancomycin (VANCOCIN) 1,250 mg in 0.9% NaCl 250 mL intermittent infusion         1,250 mg  over 90 Minutes Intravenous ONCE 24 0844              Contrast Orders - past 72 hours (72h ago, onward)      None            [employer] Prediction of Planned Initial Vancomycin Regimen  Loading dose: N/A  Regimen: 1000 mg IV every 24 hours.  Start time: 09:31 on 2024  Exposure target: AUC24 (range)400-600 mg/L.hr   AUC24,ss: 460 mg/L.hr  Probability of AUC24 > 400: 66 %  Ctrough,ss: 13 mg/L  Probability of Ctrough,ss > 20: 16 %  Probability of nephrotoxicity (Lodise KEE ): 8 %        {Plan:  Start vancomycin  1000 mg IV q24h.   Vancomycin monitoring method: AUC  Vancomycin " therapeutic monitoring goal: 400-600 mg*h/L  Pharmacy will check vancomycin levels as appropriate in 1-3 Days.    Serum creatinine levels will be ordered daily for the first week of therapy and at least twice weekly for subsequent weeks.      Jenifer Santizo RPH

## 2024-03-30 NOTE — PROGRESS NOTES
[facility] - Internal Medicine Daily Note   Date of Service: 3/30/2024  Patient: July Huang  MRN: [id number]  Admission Date: 3/29/2024  Hospital Day # 1    Assessment & Plan    July Huang is a 83 year old female with past medical history of hypertension, hyperlipidemia, gastroesophageal reflux disease, cerebrovascular disease with prior stroke and left hemiparesis, depression, anxiety, ovarian cancer, bladder cancer, meningioma, osteoporosis, malnutrition, chronic neck and back pain, admitted 3/29/2024 with worsening confusion, recent urinary tract infection, and questionable changes in brain mass on CT imaging with past history of meningioma     Acute encephalopathy, recurrent.  Suspect acute toxic encephalopathy in setting of known intracranial mass/meningioma with mass effect.  Fever.  Source is unclear.  This could be central fever.  Procalcitonin initial from 3/29 within normal limits.  Patient has mild leukocytosis.  UA does not show any evidence of UTI.  Respiratory pathogen screen for influenza a and B, SARS-CoV-2 and RSV negative.  Patient received cefepime.  Rapid response was called due to tachycardia and tachypnea.  Lactic acid elevated 2.2.  Patient was given IV fluid boluses.  Was hypotensive.  Sepsis of unknown source suspected and patient was put on Zosyn and vancomycin.  Lactic acid level elevated: 2.2 in the AM.  Repeat lactic acid to be obtained after IV fluid boluses  Hypotension.  Responded to IV fluid boluses.  4.8 x 5.7 x 2.4 extra-axial mass over the frontoparietal convexity on CT. neurosurgery consulted.  Patient was confused, yelling continuously and therefore complete evaluation could not be done per the note.   Known meningioma diagnosed 2016, previous radiation by report  History of urinary tract infection, recurrent  -Several day history of increasing confusion with past history of recurrent encephalopathy, typically related to urinary tract infection; 2 prior  hospitalizations in recent months, recently discharged from [facility] on 3/4/2024; on admission 3/29 CT head concerning for possible changes in established mass felt to be meningioma, neurosurgery consulted; past MRI brain on 3/3/2024 - see report  -Admission vitals, 117/61, temperature 99.2  F, pulse 92, respiratory rate 20, O2 saturation 97% room air  -Admission labs, WBC 9800, hemoglobin 10.4, platelets 459,000, MCV 79, sodium 140, potassium 3.8, creatinine 0.63, glucose 109, lactic acid 1.3, procalcitonin 0.06; urinalysis negative nitrite and leukocyte esterase; 2 WBC, less than 1 red blood cell, clear urine  -EKG sinus tachycardia, rate 110 bpm, right atrial enlargement, premature ventricular complexes, nonspecific ST changes lateral precordial leads  -Admission noncontrast head CT no acute intracranial pathology; 4.8 x 5.7 x 2.4 naso-/hyperdense, extra-axial mass over the frontal parietal convexity, likely meningioma, see report  -Day prior to admission 3/28/2024: Chest x-ray no significant change in mild diffuse coarse interstitial opacity, no focal airspace opacities suspected, no pleural effusion or pneumothorax; CT lumbar spine no evidence of acute fracture or dislocation thoracic spine with large lateral hernia; no evidence for acute fracture/dislocation of the lumbar spine with multilevel degenerative disc disease and facet arthropathy; left renal stone; gallbladder stone  -Prior MRI brain 3/3/2024 showed 4 point centimeters extra-axial mass at the vertex in the midline, consistent with meningioma, see official report  -Of note, prior hospitalization at [facility] 3/1/2024 through 3/4/2024 for toxic metabolic encephalopathy and delirium thought secondary to polypharmacy with concerns of known meningioma and acute urinary retention; for details, see dismissal summary dated 3/4/2024  -Hospital admission this occasion for worsening confusion, recent urinary tract  infection, and history of recurrent acute encephalopathy with 2 prior hospitalizations in the past couple of months; established meningioma reportedly present since 2016; follow-up CT on admission reviewed with plans for neurosurgery consultation, initiated by ER provider     Admit observation, see hospital orders  Neurosurgery consulted, notified by ER on admission  Defer to neurosurgery indication for steroids or acute intervention regarding brain mass and possible changes, felt to be meningioma in the past  Empiric IV antibiotics started in ER given recurrent pattern of acute encephalopathy associated with UTI; reviewing treatment with pharmacy, past urine culture 10/31/2023 grew Citrobacter koseri, ampicillin resistant; in the setting pharmacy recommends avoiding cephalosporins such as ceftriaxone and instead using cefepime; IV cefepime ordered per pharmacy dosing, awaiting culture results and response to treatment  Neurochecks every 4 hours  Vital signs every 4 hours  Monitor oxygen saturations, oxygen therapy as required  Telemetry  Rule out infection; blood and urine cultures ordered and pending  Monitor electrolytes, glucose, serum creatinine  IV fluids with 0.9 normal saline, reassess daily  Physical therapy, occupational therapy consults  Blood cultures  Urine culture  Consider neurology consultation if worsening mental status and no clear infection established to explain symptoms  One-to-one sitter for safety and supervision if needed  I/O's, daily weights  Nutrition consult  Regular diet  Pharmacy consult for medication review and reconciliation  Delirium precautions, see order set  Quetiapine (Seroquel) as needed for increased agitation or worsening delirium not responding to nonpharmacologic measures  AM labs as ordered     Essential hypertension  Monitor blood pressure and heart rate  Review prior to admission medication(s) once reconciled by pharmacy     Hyperlipidemia  Continue prior to admission  statin once reconciled by pharmacy      Cerebrovascular disease, history of stroke  History of left hemiparesis  Baseline left-sided weakness with prior stroke; CT head on admission as above  Therapy consults as above  Continue prior to admission aspirin once reconciled by pharmacy      Gastroesophageal reflux disease  Continue prior to admission famotidine (Pepcid) as needed once reconciled by pharmacy      Anemia, normocytic  -Admission Hb 10.4; historic baseline 9.6 from 3/3/2024 and 13.1 from 1/9/2023  Monitor hemoglobin  Check B12, folic acid  Check iron studies, peripheral blood smear, reticulocyte count  Check stool for occult blood  Follow-up with AM rounding provider and primary clinic provider      Recent Labs   Lab 03/29/24  1303   HGB 10.4*      History of depression and anxiety  HOLD prior to admission benzodiazepine(s), review once reconciled by pharmacy   Comfort and support offered     History of ovarian cancer - noted  History of bladder cancer - noted  History of chronic neck and back pain - pain control, see hospital orders  History of osteoporosis - noted, follow-up with primary clinic provider  History of malnutrition - nutrition consult, assess nutritional status  DNR, DNI code status - confirmed with  on admission     Disposition  Estimated length of stay 3 to 4 days  Anticipated discharge to home vs transitional care unit  Social work consult  Care plan discussed with patient's  by phone on 3/29/2024     Change in hospitalist provider tomorrow with one of my [facility] hospitalist colleagues assuming care.        Diet: Regular Diet AdultRegular  DVT Prophylaxis: Pneumatic Compression Devices  Jordan Catheter: Not present  Lines: None     Cardiac Monitoring: None  Code Status: No CPR- Do NOT IntubateDNR, DNI code status, confirmed on admission with     Kamar Swanson MD  Internal Medicine Staff Hospitalist   [facility]   Pager: [phone]  Page  Cross Cover after 5 pm: pager [phone]   ___________________________________________________________________    Subjective & Interval Hx:    Patient was noted to have tachycardia with heart rate in the 120s and 130s.  She was also tachypneic with heart rate of about 40.  Rapid response was called this morning.  Lactic acid level checked was 2.2.  She was hypotensive.  Was given IV fluid boluses and hypotension corrected.  She had a fever of 102.9.  This morning she had a temp of 101.1.  Tachycardia is now better after IV fluids.  On oxygen supplementation via nasal cannula.  Started on Vanco and Zosyn  Neurosurgery is consulted for mass effect from intracranial mass suspected to be due to meningioma.  Patient is DNR/DNI    Last 24 hr care team notes reviewed.   ROS:  4 point ROS including Respiratory, CV, GI and , other than that noted in the HPI, is negative.    Medications: Reviewed in EPIC. List below for reference    Current Facility-Administered Medications:     acetaminophen (TYLENOL) tablet 650 mg, 650 mg, Oral, Q4H PRN, 650 mg at 03/29/24 2129 **OR** acetaminophen (TYLENOL) Suppository 650 mg, 650 mg, Rectal, Q4H PRN, Varinder Hare MD, 650 mg at 03/30/24 1026    acetaminophen (TYLENOL) tablet 650 mg, 650 mg, Oral, TID w/meals, Varinder Hare MD    aspirin EC tablet 81 mg, 81 mg, Oral, Daily, Varinder Hare MD    cyanocobalamin (VITAMIN B-12) tablet 1,000 mcg, 1,000 mcg, Oral, Daily, Varinder Hare MD    ondansetron (ZOFRAN ODT) ODT tab 4 mg, 4 mg, Oral, Q6H PRN **OR** ondansetron (ZOFRAN) injection 4 mg, 4 mg, Intravenous, Q6H PRN, Varinder Hare MD    piperacillin-tazobactam (ZOSYN) 4.5 g vial to attach to  mL bag, 4.5 g, Intravenous, Q6H, Jose James, NP, 4.5 g at 03/30/24 1142    polyethylene glycol (MIRALAX) Packet 17 g, 17 g, Oral, Daily PRN, Varinder Hare MD    QUEtiapine (SEROquel) half-tab 12.5 mg, 12.5 mg, Oral, Q6H PRN, Varinder Hare MD, 12.5  mg at 03/30/24 0038    senna-docusate (SENOKOT-S/PERICOLACE) 8.6-50 MG per tablet 1 tablet, 1 tablet, Oral, BID PRN **OR** senna-docusate (SENOKOT-S/PERICOLACE) 8.6-50 MG per tablet 2 tablet, 2 tablet, Oral, BID PRN, Varinder Hare MD    sodium chloride 0.9 % infusion, , Intravenous, Continuous, Varinder Hare MD, Last Rate: 75 mL/hr at 03/30/24 0414, New Bag at 03/30/24 0414    [START ON 3/31/2024] vancomycin (VANCOCIN) 1,000 mg in 200 mL dextrose intermittent infusion, 1,000 mg, Intravenous, Q24H, Kamar Swanson MD    zinc sulfate (ZINCATE) capsule 220 mg, 220 mg, Oral, Daily, Varinder Hare MD    Physical Exam:    BP (!) 82/52 (BP Location: Left arm)   Pulse 100   Temp 99  F (37.2  C) (Oral)   Resp 30   Wt 49 kg (108 lb)   SpO2 97%   BMI 20.41 kg/m       GENERAL: Awake and anxious and screaming constantly.  HEENT: Anicteric sclera. Mucous membranes moist.   CV: Tachycardic.  S1, S2. No murmurs appreciated.   RESPIRATORY: Effort normal on 1 L of oxygen. Lungs CTAB with no wheezing, rales, rhonchi.   GI: Nondistended soft bowel sounds are active   NEUROLOGICAL: Anxious and not following commands, yelling constantly.  EXTREMITIES: No peripheral edema. Intact bilateral pedal pulses.   SKIN: No jaundice. No rashes.     Labs & Studies of Note: I personally reviewed the following studies:  CBC RESULTS:   Recent Labs   Lab Test 03/30/24  0613   WBC 11.5*   RBC 4.18   HGB 10.4*   HCT 32.3*   MCV 77*   MCH 24.9*   MCHC 32.2   RDW 15.0   *   Last Comprehensive Metabolic Panel:  Lab Results   Component Value Date     03/30/2024    POTASSIUM 3.7 03/30/2024    CHLORIDE 102 03/30/2024    CO2 15 (L) 03/30/2024    ANIONGAP 21 (H) 03/30/2024     (H) 03/30/2024    BUN 14.0 03/30/2024    CR 0.69 03/30/2024    GFRESTIMATED 86 03/30/2024    TOMEKA 8.8 03/30/2024

## 2024-03-30 NOTE — PROGRESS NOTES
RRT called this AM for RR of 40, , T 101.1, lactic acid 2.2. 500mL NS bolus given IV Vanco started. Jordan catheter placed per order. Will given Tylenol supp when available, will continue to monitor.

## 2024-03-30 NOTE — CODE/RAPID RESPONSE
[facility]    Sepsis Evaluation Progress Note    Date of Service: 03/30/2024    I was called to see July Huang due to lactic acid of 2.2 and abnormal vital signs triggering the Sepsis SIRS screening alert. She is known to have an infection.     Vital Signs:  Temp: 99  F (37.2  C) Temp src: Oral BP: (!) 82/52 Pulse: 100   Resp: 30 SpO2: 97 % O2 Device: Nasal cannula Oxygen Delivery: 1 LPM    Lab:  Lactic Acid   Date Value Ref Range Status   03/30/2024 2.2 (H) 0.7 - 2.0 mmol/L Final       Assessment and Plan    Severe sepsis 2/2 suspected urinary source vs. Other source of infection  Sinus tachycardia 2/2 above  Acute metabolic encephalopathy 2/2 above  Urinary retention  Upon arrival patient is ill appearing, tachypneic, without accessory muscle use. She is lethargic, arouseable to voice, but confused. VS include axillary temp 101.1, , /93, RR 36, and SpO2 97%. On exam skin is warm and dry. Lungs are clear, diminished in bases. HR tachycardic, regular. Her abdomen is soft, slight tenderness over bladder. No rebound tenderness or gaurding. Bladder feels distended.     Patient had RRT earlier this morning, in which infectious workup was performed. CXR negative, UA not concerning for infection. COVID, influenza/ RSV PCR negative. Procalcitonin not elevated on admission. Blood cultures pending, negative to date. Patient was treated for UTI about 1 week prior to presenting to hospital with Macrobid. Urine culture pending. Patient is on 1L oxygen. Nursing staff doesn't report patient's been coughing. No consolidation on CXR. Possible patient has developing pneumonia, which has not yet presented. She is currently NPO. No witnessed aspiration events.      ID: The patient is currently on the following antibiotics:  Anti-infectives (From now, onward)      Start     Dose/Rate Route Frequency Ordered Stop    03/30/24 0900  piperacillin-tazobactam (ZOSYN) 4.5 g vial to attach to   "mL bag        Note to Pharmacy: For [facility], [facility] and [facility]: For Zosyn-naive patients, use the \"Zosyn initial dose + extended infusion\" order panel.    4.5 g  over 30 Minutes Intravenous EVERY 6 HOURS 03/30/24 0832            Current antibiotic coverage requires additional antibiotics for unknown source. .    Interventions:  - 500 ml bolus NS  - mg, phos  - repeat lactic acid at 11am  - indwelling rich catheter  - administer PRN tylenol when next available  - procalcitonin  - Antibiotics changed from cefepime to zosyn and vancomycin  - Pharmacy to dose vancomycin  - Transition to IM Status  - Consider further imaging such as CT Chest/abd/pelvis for evaluating source of infection, if patient does not improve with broadening of antibiotics.       Fluid:  500 mL fluids ORDERED to be given .    Lab: Repeat lactic acid ordered by reflex for 3 hours from initial collection.    Disposition: The patient will remain on the current unit. We will continue to monitor this patient closely. and [place] [facility]    Jose James NP  [facility]  Securely message with the [employer] Web Console (learn more here)  Text page via [facility] Paging/Directory            "

## 2024-03-30 NOTE — PROVIDER NOTIFICATION
Provider Notification    Dr. Walker was notified at 0121 via [employer] messaging    Message: pt is restless and agitated. gave seroquel with no relief. she is tachypneic and tachycardic. ativan given in the ED with good respond. Patient not follow commands and unable to take pills appropriately just fyi - only taking ice chips currently    Response: provider requested a rapid be called      Leni Warner RN on 3/30/2024 at 1:21 AM

## 2024-03-30 NOTE — ED NOTES
Patient resting comfortably, respirations even and unlabored.  at bedside. Linens changed and purewick in place.

## 2024-03-30 NOTE — PROGRESS NOTES
RECEIVING UNIT ED HANDOFF REVIEW    ED Nurse Handoff Report was reviewed by: Leni Warner RN on March 29, 2024 at 9:51 PM

## 2024-03-31 ENCOUNTER — APPOINTMENT (OUTPATIENT)
Dept: PHYSICAL THERAPY | Facility: CLINIC | Age: 84
DRG: 070 | End: 2024-03-31
Attending: HOSPITALIST
Payer: COMMERCIAL

## 2024-03-31 ENCOUNTER — APPOINTMENT (OUTPATIENT)
Dept: SPEECH THERAPY | Facility: CLINIC | Age: 84
DRG: 070 | End: 2024-03-31
Attending: INTERNAL MEDICINE
Payer: COMMERCIAL

## 2024-03-31 LAB
CREAT SERPL-MCNC: 0.73 MG/DL (ref 0.51–0.95)
EGFRCR SERPLBLD CKD-EPI 2021: 81 ML/MIN/1.73M2

## 2024-03-31 PROCEDURE — 250N000013 HC RX MED GY IP 250 OP 250 PS 637: Performed by: INTERNAL MEDICINE

## 2024-03-31 PROCEDURE — 93005 ELECTROCARDIOGRAM TRACING: CPT

## 2024-03-31 PROCEDURE — 250N000011 HC RX IP 250 OP 636: Performed by: INTERNAL MEDICINE

## 2024-03-31 PROCEDURE — 99233 SBSQ HOSP IP/OBS HIGH 50: CPT | Performed by: INTERNAL MEDICINE

## 2024-03-31 PROCEDURE — 97530 THERAPEUTIC ACTIVITIES: CPT | Mod: GP | Performed by: PHYSICAL THERAPIST

## 2024-03-31 PROCEDURE — 250N000011 HC RX IP 250 OP 636

## 2024-03-31 PROCEDURE — 82565 ASSAY OF CREATININE: CPT | Performed by: INTERNAL MEDICINE

## 2024-03-31 PROCEDURE — 36415 COLL VENOUS BLD VENIPUNCTURE: CPT | Performed by: INTERNAL MEDICINE

## 2024-03-31 PROCEDURE — 92610 EVALUATE SWALLOWING FUNCTION: CPT | Mod: GN

## 2024-03-31 PROCEDURE — 92526 ORAL FUNCTION THERAPY: CPT | Mod: GN

## 2024-03-31 PROCEDURE — 97161 PT EVAL LOW COMPLEX 20 MIN: CPT | Mod: GP | Performed by: PHYSICAL THERAPIST

## 2024-03-31 PROCEDURE — 250N000013 HC RX MED GY IP 250 OP 250 PS 637: Performed by: HOSPITALIST

## 2024-03-31 PROCEDURE — 120N000013 HC R&B IMCU

## 2024-03-31 RX ORDER — FAMOTIDINE 20 MG/1
20 TABLET, FILM COATED ORAL DAILY PRN
Status: DISCONTINUED | OUTPATIENT
Start: 2024-03-31 | End: 2024-04-04 | Stop reason: HOSPADM

## 2024-03-31 RX ORDER — ROSUVASTATIN CALCIUM 5 MG/1
10 TABLET, COATED ORAL DAILY
Status: DISCONTINUED | OUTPATIENT
Start: 2024-03-31 | End: 2024-04-04 | Stop reason: HOSPADM

## 2024-03-31 RX ADMIN — ACETAMINOPHEN 650 MG: 650 SUPPOSITORY RECTAL at 07:24

## 2024-03-31 RX ADMIN — ACETAMINOPHEN 650 MG: 650 SUPPOSITORY RECTAL at 17:55

## 2024-03-31 RX ADMIN — PIPERACILLIN AND TAZOBACTAM 4.5 G: 4; .5 INJECTION, POWDER, FOR SOLUTION INTRAVENOUS at 11:53

## 2024-03-31 RX ADMIN — PIPERACILLIN AND TAZOBACTAM 4.5 G: 4; .5 INJECTION, POWDER, FOR SOLUTION INTRAVENOUS at 23:39

## 2024-03-31 RX ADMIN — VANCOMYCIN HYDROCHLORIDE 1000 MG: 1 INJECTION, SOLUTION INTRAVENOUS at 08:39

## 2024-03-31 RX ADMIN — PIPERACILLIN AND TAZOBACTAM 4.5 G: 4; .5 INJECTION, POWDER, FOR SOLUTION INTRAVENOUS at 17:56

## 2024-03-31 RX ADMIN — ROSUVASTATIN CALCIUM 10 MG: 5 TABLET, FILM COATED ORAL at 20:33

## 2024-03-31 RX ADMIN — ACETAMINOPHEN 650 MG: 325 TABLET, FILM COATED ORAL at 13:14

## 2024-03-31 RX ADMIN — ASPIRIN 81 MG: 81 TABLET, COATED ORAL at 13:14

## 2024-03-31 RX ADMIN — PIPERACILLIN AND TAZOBACTAM 4.5 G: 4; .5 INJECTION, POWDER, FOR SOLUTION INTRAVENOUS at 05:18

## 2024-03-31 ASSESSMENT — ACTIVITIES OF DAILY LIVING (ADL)
ADLS_ACUITY_SCORE: 55
ADLS_ACUITY_SCORE: 47
ADLS_ACUITY_SCORE: 47
ADLS_ACUITY_SCORE: 55
ADLS_ACUITY_SCORE: 47
ADLS_ACUITY_SCORE: 59
ADLS_ACUITY_SCORE: 50
ADLS_ACUITY_SCORE: 47
ADLS_ACUITY_SCORE: 50
ADLS_ACUITY_SCORE: 50
ADLS_ACUITY_SCORE: 48
ADLS_ACUITY_SCORE: 55
ADLS_ACUITY_SCORE: 48
ADLS_ACUITY_SCORE: 48
ADLS_ACUITY_SCORE: 55
ADLS_ACUITY_SCORE: 47
ADLS_ACUITY_SCORE: 48
ADLS_ACUITY_SCORE: 47
ADLS_ACUITY_SCORE: 50
ADLS_ACUITY_SCORE: 47
ADLS_ACUITY_SCORE: 47
ADLS_ACUITY_SCORE: 48
ADLS_ACUITY_SCORE: 48

## 2024-03-31 NOTE — PLAN OF CARE
Goal Outcome Evaluation:      Plan of Care Reviewed With: patient    Overall Patient Progress: improvingOverall Patient Progress: improving         Pt here with AMS, UTI and meningioma. A&O x4. Neuros L sided weakness 3/5, occasional illogical/incoherent speech. VSS on RA, SBP goal <180. Tele SR/ST with PVC's. NPO. Not OOB this shift. Denies pain. Pt pulled rich out, bladder scanned 13ml, PW now in place. Pt scoring yellow on the Aggression Stop Light Tool, sitter at bedside. Discharge pending.

## 2024-03-31 NOTE — PROGRESS NOTES
Status: Patient admitted on 3/29 with confusion, UTI, change in known brain mass on CT  Vitals: VSS  Tele: Sinus tach with bigeminy PVC's  Neuros: Oriented x4, following most commands, intermittently lethargic, slow to respond, left facial droop, left side (3/5) weaker than right (5/5) from previous stroke  IV: PIV saline locked  Labs/Electrolytes: WNL  Resp/trach: Lung sounds diminished in lower lobes  Diet: soft and bite sized with thins, NO straws per SLP  Bowel status: Small BM today, BS+, passing flatus  : Purewick in place, patient pulled rich overnight, bladder scanned patient for 98mL this afternoon  Skin: Generalized bruising throughout  Pain: Denies, Tylenol suppository given Q4hrs for fevers  Activity: Turn and repo Q2hrs, up with A2 and Ana steady, up in recliner 2x today  Social: Patient calm today, more cooperative with cares, attempts to get out of bed and pulls at lines intermittently, sitter at bedside for safety  Plan: Continue IV antibiotics, infectious work-up, discharge home when medically stable, continue to monitor and follow POC

## 2024-03-31 NOTE — PROGRESS NOTES
[facility]    Neurosurgery  Daily Note    Assessment & Plan   July Huang is a 83 year old female who presented 3/29/24 with confusion, weakness, UTI, meenakshi with imaging evidence as below. Per chart review patient has had 3 bouts of encephalopathy related to UTIs. History of prior stroke and left hemiparesis. Prior hospitalizations [facility] March 2024 for toxic metabolic encephalopathy and delirium. Known meningioma for several years per - for which she has had radiation in September 2022.  Primary neurosurgery team is [facility] per . Rpt Mri at [facility] was reviewed with neurosurgery team and thought it was stable and recommended follow up with PCP and outside neurology team.      Code status is DNR/DNI.  does not think patient would be able to sustain a large operation at her age    Plan:  -No urgent neurosurgical intervention planned.  -Patient to follow up with primary treating neurosurgery team.   -NSGY will plan to sign off at this time. Please alert if any questions or concerns.     JUAN RAMON MONTIEL PA-C    Interval History   Stable    Physical Exam   Temp: 97.4  F (36.3  C) Temp src: Axillary BP: 112/53 Pulse: 93   Resp: 17 SpO2: 99 % O2 Device: None (Room air)    Vitals:    03/29/24 1205   Weight: 49 kg (108 lb)     Vital Signs with Ranges  Temp:  [97.4  F (36.3  C)-100.2  F (37.9  C)] 97.4  F (36.3  C)  Pulse:  [] 93  Resp:  [11-26] 17  BP: (104-137)/(42-83) 112/53  SpO2:  [88 %-99 %] 99 %  I/O last 3 completed shifts:  In: -   Out: 1275 [Urine:1275]    Alert and oriented.will follow commands for exam for exam today  Moves all extremities equally.  Sensation intact to light touch  Negative drift  CN II-XII grossly intact      Medications       acetaminophen  650 mg Oral TID w/meals    aspirin  81 mg Oral Daily    cyanocobalamin  1,000 mcg Oral Daily    piperacillin-tazobactam  4.5 g Intravenous Q6H    vancomycin  1,000 mg Intravenous Q24H     zinc sulfate  220 mg Oral Daily       Plans discussed with Dr. Ortiz who was in agreement with plans    Yolie LUNDBERG [facility] Neurosurgery  [address] [address]

## 2024-03-31 NOTE — PROGRESS NOTES
"   03/31/24 1115   Appointment Info   Signing Clinician's Name / Credentials (PT) Rosina Florence DPT   Rehab Comments (PT) chronic left side weakness from previous stroke   Living Environment   People in Home spouse   Current Living Arrangements house   Home Accessibility stairs to enter home   Number of Stairs, Main Entrance 4   Living Environment Comments Pt is a questionable historian but states she lives at home with spouse Deny and pivot transfers with his help from bed <> transport chairs they use. If she needs to leave the house her spouse Deny and a neighbor Arnaud \"carry\" her.   Self-Care   Usual Activity Tolerance fair   Current Activity Tolerance poor   Equipment Currently Used at Home   (pt reports she uses 2 transport chairs)   Fall history within last six months   (unknown)   Activity/Exercise/Self-Care Comment Pt is a questionable historian, no family at bedside. Per RN at bedside pt has not walked much since december after a hip fracture. Pt has had many admissions and spouse usually brings pt home from hospital   General Information   Onset of Illness/Injury or Date of Surgery 03/31/24   Referring Physician Varinder Hare MD   Patient/Family Therapy Goals Statement (PT) none stated   Pertinent History of Current Problem (include personal factors and/or comorbidities that impact the POC) 83 year old female with past medical history of hypertension, hyperlipidemia, gastroesophageal reflux disease, cerebrovascular disease with prior stroke and left hemiparesis, depression, anxiety, ovarian cancer, bladder cancer, meningioma, osteoporosis, malnutrition, chronic neck and back pain, admitted 3/29/2024 with worsening confusion, recent urinary tract infection, and questionable changes in brain mass on CT imaging with past history of meningioma   Cognition   Affect/Mental Status (Cognition) confused   Orientation Status (Cognition) oriented to;person   Follows Commands (Cognition) follows one-step commands "   Pain Assessment   Patient Currently in Pain No   Posture    Posture Forward head position;Protracted shoulders;Kyphosis   Range of Motion (ROM)   Range of Motion ROM deficits secondary to weakness   Strength (Manual Muscle Testing)   Strength (Manual Muscle Testing) Deficits observed during functional mobility   Strength Comments L LE weaker than R LE which is baseline from previous CVA; grossly deconditioned and frail   Bed Mobility   Comment, (Bed Mobility) Max A x 1 supine <> sit   Transfers   Comment, (Transfers) Max A x 1 sit <> stand, pivot transfer bed <> Chair with another person SBA for safety   Gait/Stairs (Locomotion)   Distance in Feet (Gait) unable to ambulate; appears pt has been non-ambulatory prior to this admission   Balance   Balance Comments high fall risk d/t generalized weakness and deconditioning, confusion   Sensory Examination   Sensory Perception patient reports no sensory changes   Clinical Impression   Criteria for Skilled Therapeutic Intervention Yes, treatment indicated   PT Diagnosis (PT) impaired mobility   Influenced by the following impairments weakness, impaired balance   Functional limitations due to impairments fall risk   Clinical Presentation (PT Evaluation Complexity) stable   Clinical Presentation Rationale clincial judgement   Clinical Decision Making (Complexity) low complexity   Planned Therapy Interventions (PT) balance training;bed mobility training;gait training;neuromuscular re-education;patient/family education;stair training;strengthening;transfer training   Risk & Benefits of therapy have been explained evaluation/treatment results reviewed;risks/benefits reviewed;care plan/treatment goals reviewed;current/potential barriers reviewed;participants voiced agreement with care plan;participants included;patient   PT Total Evaluation Time   PT Eval, Low Complexity Minutes (42323) 10   Physical Therapy Goals   PT Frequency 5x/week   PT Predicted Duration/Target Date for  Goal Attainment 04/08/24   PT Goals Bed Mobility;Transfers   PT: Bed Mobility Supervision/stand-by assist;Supine to/from sit   PT: Transfers Minimal assist;Sit to/from stand;Bed to/from chair   Interventions   Interventions Quick Adds Therapeutic Activity   Therapeutic Activity   Therapeutic Activities: dynamic activities to improve functional performance Minutes (85695) 25   Symptoms Noted During/After Treatment Fatigue   Treatment Detail/Skilled Intervention Pt needing increased time d/t slow processing and increased time for command follow. Performed supine <> sit Max A, increased time to allow pt to push to scoot towards EOB and spend time working on sitting balance EOB. Needing fluctuating level of assist Max A to CGA in sitting balacne d/t tendency to lean back with weakness/fatigue and fear of falling. Sitting EOB x 12 mins, donned shoes prior to transfer training needing an extra person to assist don shoes while PT maintaining sitting balance safely. Trialed sit <> stand with max A x 1 and knee blocking. Pt unable to bring feet back for keep weight forward needing to sit after 15 seconds. Trialed again with feet further back in sitting before standing with success. Recliner positioned close and angled towards pt's left side, performed pivot transfer Max A x 1 with another person SBA for safety. With increased time, verbal/tactile cues to assist LE advancement pt was able to perform stand pivot transfer bed > chair. Reports dizziness, BP monitored and 119/73 mmHg once reclined in chair. Left all needs in reach and chair alarm on, 1:1 present.   PT Discharge Planning   PT Plan verify home set up if spouse present, progress safety and independence with bed <> chair transfer via pivot transfers A x 2 for safety; does best with shoes on   PT Discharge Recommendation (DC Rec) Transitional Care Facility;Long term care facility   PT Rationale for DC Rec Pt currently needing A x1-2 pivot transfers from bed <> chair  and demonstrates significant weakness, deconditioning. Pending prior level of function verification from pt's spouse and family's goals of care/d/c disposition preference recommend considering TCU vs. LTC. Will continue to assess   PT Brief overview of current status Recommend nsg use janneth steady for transfers. Max A bed mob, pivot transfer bed <> recliner chair   Total Session Time   Timed Code Treatment Minutes 25   Total Session Time (sum of timed and untimed services) 35

## 2024-03-31 NOTE — PROGRESS NOTES
"Clinical Swallow Evaluation  [facility]     03/31/24 1300   Appointment Info   Signing Clinician's Name / Credentials (SLP) Kassi Batista MS, CCC-SLP   General Information   Onset of Illness/Injury or Date of Surgery 03/29/24   Referring Physician Candy Berry MD   Patient/Family Therapy Goal Statement (SLP) none stated   Pertinent History of Current Problem Per provider note: \"July Huang is a 83 year old female with past medical history of hypertension, hyperlipidemia, gastroesophageal reflux disease, cerebrovascular disease with prior stroke and left hemiparesis, depression, anxiety, ovarian cancer, bladder cancer, meningioma, osteoporosis, malnutrition, chronic neck and back pain, admitted 3/29/2024 with worsening confusion, recent urinary tract infection, and questionable changes in brain mass on CT imaging with past history of meningioma\" Rapid response called at 0100 today. Speech consulted to determine safest and least restrictive diet.   General Observations Patient alert and upright in chair upon arrival. Confused/poor historian. Spouse present and states patient does primarily consume soft foods at home and requires food cut into pieces.   Type of Evaluation   Type of Evaluation Swallow Evaluation   Oral Motor   Oral Musculature generally intact   Structural Abnormalities none present   Mucosal Quality good   Dentition (Oral Motor)   Dentition (Oral Motor) natural dentition   Facial Symmetry (Oral Motor)   Facial Symmetry (Oral Motor) WNL   Lip Function (Oral Motor)   Lip Range of Motion (Oral Motor) WNL   Lip Strength (Oral Motor) WFL   Tongue Function (Oral Motor)   Tongue Strength (Oral Motor) WFL   Tongue ROM (Oral Motor) WNL   Facial Sensation   Facial Sensation WNL   Cough/Swallow/Gag Reflex (Oral Motor)   Soft Palate/Velum (Oral Motor) WNL   Volitional Throat Clear/Cough (Oral Motor) WNL   Volitional Swallow (Oral Motor) WNL   Vocal Quality/Secretion " Management (Oral Motor)   Vocal Quality (Oral Motor) WFL   Secretion Management (Oral Motor)   (some mild saliva loss on L side)   General Swallowing Observations   Past History of Dysphagia none reported   Respiratory Support room air   Current Diet/Method of Nutritional Intake (General Swallowing Observations, NIS) NPO   Swallowing Evaluation Clinical swallow evaluation   Clinical Swallow Evaluation   Feeding Assistance set up only required   Clinical Swallow Evaluation Textures Trialed thin liquids;pureed;solid foods   Clinical Swallow Eval: Thin Liquid Texture Trial   Mode of Presentation, Thin Liquids cup;straw;self-fed   Volume of Liquid or Food Presented 5 oz   Oral Phase of Swallow   (some anterior loss)   Pharyngeal Phase of Swallow coughing/choking;impaired   Diagnostic Statement Inconsistent coughing following trials of thin liquids via straw. No overt s/sx of aspiration with trials of thin liquids via cup rim. Cues for small sips   Clinical Swallow Evaluation: Puree Solid Texture Trial   Mode of Presentation, Puree spoon;self-fed   Volume of Puree Presented 3 oz   Oral Phase, Puree WFL   Pharyngeal Phase, Puree intact   Diagnostic Statement No overt s/sx of aspiration with all trials of puree solids.   Clinical Swallow Evaluation: Solid Food Texture Trial   Mode of Presentation self-fed   Volume Presented one cracker   Oral Phase WFL   Oral Residue   (mild residue which cleared with liquid wash)   Pharyngeal Phase intact   Diagnostic Statement No overt s/sx of aspiration with all trials of regular solids, though oral phase was delayed with bolus formation and transport prolonged. Residue with regular solids   Esophageal Phase of Swallow   Patient reports or presents with symptoms of esophageal dysphagia No   Swallowing Recommendations   Diet Consistency Recommendations soft & bite-sized (level 6);thin liquids (level 0)   Supervision Level for Intake close supervision needed   Mode of Delivery  Recommendations bolus size, small;slow rate of intake   Swallowing Maneuver Recommendations alternate food and liquid intake   Monitoring/Assistance Required (Eating/Swallowing) stop eating activities when fatigue is present;monitor for cough or change in vocal quality with intake   Recommended Feeding/Eating Techniques (Swallow Eval) maintain upright sitting position for eating;maintain upright posture during/after eating for 30 minutes;minimize distractions during oral intake   Medication Administration Recommendations, Swallowing (SLP) whole or crushed in puree   Clinical Impression   Criteria for Skilled Therapeutic Interventions Met (SLP Eval) Yes, treatment indicated   SLP Diagnosis Mild oropharyngeal dysphagia   Risks & Benefits of therapy have been explained evaluation/treatment results reviewed;care plan/treatment goals reviewed;risks/benefits reviewed;current/potential barriers reviewed;participants voiced agreement with care plan;participants included;patient;spouse/significant other   Clinical Impression Comments Patient with mild oropharygneal dysphagia at this time. Medical hx of CVA, GERD, left hemiparesis, and reported swallowing difficulty. On room air and maintaining adequate sats. Trialed puree solids, regular solids and thin liquids with no overt s/sx of aspiration overall, though some coughing with liquids via straw and prolonged mastication and oral clearance with crackers. Recommend soft and bite-sized solids and thin liquids at this time.   SLP Total Evaluation Time   Eval: oral/pharyngeal swallow function, clinical swallow Minutes (60988) 15   SLP Goals   Therapy Frequency (SLP Eval) 4 times/week   SLP Goals Swallow   SLP: Safely tolerate diet without signs/symptoms of aspiration Easy to chew diet;Thin liquids   Interventions   Interventions Quick Adds Swallowing Dysfunction   Swallowing Dysfunction &/or Oral Function for Feeding   Treatment of Swallowing Dysfunction &/or Oral Function for  Feeding Minutes (90677) 10   Symptoms Noted During/After Treatment None   Treatment Detail/Skilled Intervention Education for role/purpose of SLP, diet recommendations, reducing risk of aspiration/safe swallow strategies, swallow anatomy/function, etc. with patient and spouse verbalizing understanding and agreement   SLP Discharge Planning   SLP Plan diet tolerance and advancement if able   SLP Discharge Recommendation home with assist;Transitional Care Facility;home with home health   SLP Rationale for DC Rec swallow slightly below baseline; would benefit from SLP monitor and adjust recommended diet as needed   SLP Brief overview of current status  Soft and bite-sized solids and thin liquids, no straws; would benefit from review of safe swallow strategies   Total Session Time   Total Session Time (sum of timed and untimed services) 25

## 2024-03-31 NOTE — PROGRESS NOTES
[facility]    Medicine Progress Note - Hospitalist Service    Date of Admission:  3/29/2024    Assessment & Plan         83 year old female with past medical history of hypertension, hyperlipidemia, gastroesophageal reflux disease, cerebrovascular disease with prior stroke and left hemiparesis, depression, anxiety, ovarian cancer, bladder cancer, meningioma, osteoporosis, malnutrition, chronic neck and back pain, admitted 3/29/2024 with worsening confusion, recent urinary tract infection, and questionable changes in brain mass on CT imaging with past history of meningioma     Acute encephalopathy, recurrent.    Suspect acute toxic encephalopathy in setting of known intracranial mass/meningioma with mass effect.  Fever.  Source is unclear.  This could be central fever.  Procalcitonin initial from 3/29 within normal limits.  Patient has mild leukocytosis.  UA does not show any evidence of UTI.  Respiratory pathogen screen for influenza a and B, SARS-CoV-2 and RSV negative.    Patient received cefepime.    Rapid response was called due to tachycardia and tachypnea.  Lactic acid elevated 2.2.  Patient was given IV fluid boluses.  Was hypotensive.  Sepsis of unknown source suspected and patient was put on Zosyn and vancomycin.  Lactic acid level elevated: 2.2 in the AM.  Repeat lactic acid to be obtained after IV fluid boluses  Hypotension.  Responded to IV fluid boluses.  4.8 x 5.7 x 2.4 extra-axial mass over the frontoparietal convexity on CT. neurosurgery consulted.  Patient was confused, yelling continuously and therefore complete evaluation could not be done per the note.   Known meningioma diagnosed 2016, previous radiation by report  History of urinary tract infection, recurrent  -Several day history of increasing confusion with past history of recurrent encephalopathy, typically related to urinary tract infection; 2 prior hospitalizations in recent months, recently discharged from  [facility] on 3/4/2024; on admission 3/29 CT head concerning for possible changes in established mass felt to be meningioma, neurosurgery consulted; past MRI brain on 3/3/2024 - see report  -Admission vitals, 117/61, temperature 99.2  F, pulse 92, respiratory rate 20, O2 saturation 97% room air  -Admission labs, WBC 9800, hemoglobin 10.4, platelets 459,000, MCV 79, sodium 140, potassium 3.8, creatinine 0.63, glucose 109, lactic acid 1.3, procalcitonin 0.06; urinalysis negative nitrite and leukocyte esterase; 2 WBC, less than 1 red blood cell, clear urine  -EKG sinus tachycardia, rate 110 bpm, right atrial enlargement, premature ventricular complexes, nonspecific ST changes lateral precordial leads  -Admission noncontrast head CT no acute intracranial pathology; 4.8 x 5.7 x 2.4 naso-/hyperdense, extra-axial mass over the frontal parietal convexity, likely meningioma, see report  -Day prior to admission 3/28/2024: Chest x-ray no significant change in mild diffuse coarse interstitial opacity, no focal airspace opacities suspected, no pleural effusion or pneumothorax; CT lumbar spine no evidence of acute fracture or dislocation thoracic spine with large lateral hernia; no evidence for acute fracture/dislocation of the lumbar spine with multilevel degenerative disc disease and facet arthropathy; left renal stone; gallbladder stone  -Prior MRI brain 3/3/2024 showed 4 point centimeters extra-axial mass at the vertex in the midline, consistent with meningioma, see official report  -Of note, prior hospitalization at [facility] 3/1/2024 through 3/4/2024 for toxic metabolic encephalopathy and delirium thought secondary to polypharmacy with concerns of known meningioma and acute urinary retention; for details, see dismissal summary dated 3/4/2024  -Hospital admission this occasion for worsening confusion, recent urinary tract infection, and history of recurrent acute encephalopathy with 2  prior hospitalizations in the past couple of months; established meningioma reportedly present since 2016; follow-up CT on admission reviewed with plans for neurosurgery consultation, initiated by ER provider     Neurosurgery consulted, t  Empiric IV antibiotics started in ER g  Monitor oxygen saturations, oxygen therapy as required  Telemetry  Monitor electrolytes, glucose, serum creatinine  IV fluids with 0.9 normal saline, reassess daily  Physical therapy, occupational therapy consults  Blood cultures  Urine culture  One-to-one sitter for safety and supervision if needed  I/O's, daily weights  Nutrition consult  Regular diet  Delirium precautions, see order set  Quetiapine (Seroquel) as needed for increased agitation or worsening delirium not responding to nonpharmacologic measures  Patient was seen by me surgery  They recommend UTI treatment  They will obtain prior MRIs to review  They would recommend continue follow-up with primary neurosurgery team at [facility] and Ms. Huber after MRIs are reviewed    Rapid response team was called yesterday on March 30  This change her antibiotics from cefepime to Zosyn and vancomycin  Patient has improved per her  and nursing staff  At this point continue this antibiotic  Discontinue vancomycin tomorrow if she continues to improve  And then Zosyn can be discontinued as well and switch to oral antibiotic if continue to improve     Essential hypertension  Monitor blood pressure and heart rate  Due to infection her blood pressure was low so she is on IV fluids     Hyperlipidemia  Continue prior to admission Crestor     Cerebrovascular disease, history of stroke  History of left hemiparesis  Baseline left-sided weakness with prior stroke; CT head on admission as above  Therapy consults as above  Continue prior to admission aspirin      Gastroesophageal reflux disease  Continue prior to admission famotidine (Pepcid) as needed      Anemia, normocytic  -Admission  Hb 10.4; historic baseline 9.6 from 3/3/2024 and 13.1 from 1/9/2023  Monitor hemoglobin  Check B12, folic acid  Serum iron is low  We will give 1 dose of IV iron  Venofer 300 mg 1 dose is ordered as she would have difficulty taking oral iron due to acid reflux  Check stool for occult blood       History of depression and anxiety  HOLD prior to admission benzodiazepine(s)  Comfort and support offered     History of ovarian cancer - noted  History of bladder cancer - noted  History of chronic neck and back pain - pain control, see hospital orders  History of osteoporosis - noted, follow-up with primary clinic provider  History of malnutrition - nutrition consult, assess nutritional status  DNR, DNI code status - confirmed with  on admission and also on rounds today      Discussed care with patient and her RN  Discussed care with patient's  also    I had discussion with her  as he had several questions  Patient had multiple hospital and ER visit  I discussed with him about symptomatic treatment as patient is 83 and overall her quality of life is not good  She is totally dependent on her   Her mobility is also limited   He will think about it and may consider conservative treatment in future    I also discussed about getting good pelvic exam once she is a stable to find out the etiology of recurrent UTI              Diet: Combination Diet Soft and Bite Sized Diet (level 6); Thin Liquids (level 0) (NO STRAWS)  Room Service    DVT Prophylaxis: Pneumatic Compression Devices  Jordan Catheter: Not present  Lines: None     Cardiac Monitoring: None  Code Status: No CPR- Do NOT Intubate      Clinically Significant Risk Factors             # Anion Gap Metabolic Acidosis: Highest Anion Gap = 21 mmol/L in last 2 days, will monitor and treat as appropriate      # Hypertension: Noted on problem list         # Severe Malnutrition: based on nutrition assessment, PRESENT ON ADMISSION          Disposition Plan       Expected Discharge Date: 04/03/2024                    Candy Berry MD  Hospitalist Service  [facility]  Securely message with [employer] (more info)  Text page via [email or web address] Paging/Directory   ______________________________________________________________________    Interval History   She is sitting in chair   She looks much better than yesterday per her  and her RN      Physical Exam   Vital Signs: Temp: (!) 96.5  F (35.8  C) Temp src: Oral BP: 106/50 Pulse: 55   Resp: 16 SpO2: 95 % O2 Device: None (Room air)    Weight: 108 lbs 0 oz    She was sitting in a chair  She was eating little bit  She did answer some questions  Per nursing staff she is looking much better than yesterday  At least she is able to sit and open her eyes and answer some question  She was very lethargic yesterday    Medical Decision Making       50 MINUTES SPENT BY ME on the date of service doing chart review, history, exam, documentation & further activities per the note.      Data     I have personally reviewed the following data over the past 24 hrs:    N/A  \   N/A   / N/A     N/A N/A N/A /  N/A   N/A N/A 0.73 \

## 2024-04-01 ENCOUNTER — APPOINTMENT (OUTPATIENT)
Dept: SPEECH THERAPY | Facility: CLINIC | Age: 84
DRG: 070 | End: 2024-04-01
Payer: COMMERCIAL

## 2024-04-01 ENCOUNTER — APPOINTMENT (OUTPATIENT)
Dept: CT IMAGING | Facility: CLINIC | Age: 84
DRG: 070 | End: 2024-04-01
Attending: HOSPITALIST
Payer: COMMERCIAL

## 2024-04-01 ENCOUNTER — APPOINTMENT (OUTPATIENT)
Dept: OCCUPATIONAL THERAPY | Facility: CLINIC | Age: 84
DRG: 070 | End: 2024-04-01
Attending: HOSPITALIST
Payer: COMMERCIAL

## 2024-04-01 LAB
ALBUMIN SERPL BCG-MCNC: 3.6 G/DL (ref 3.5–5.2)
ALP SERPL-CCNC: 58 U/L (ref 40–150)
ALT SERPL W P-5'-P-CCNC: 15 U/L (ref 0–50)
ANION GAP SERPL CALCULATED.3IONS-SCNC: 17 MMOL/L (ref 7–15)
AST SERPL W P-5'-P-CCNC: 33 U/L (ref 0–45)
BILIRUB DIRECT SERPL-MCNC: <0.2 MG/DL (ref 0–0.3)
BILIRUB SERPL-MCNC: 0.3 MG/DL
BUN SERPL-MCNC: 18.1 MG/DL (ref 8–23)
CALCIUM SERPL-MCNC: 8.6 MG/DL (ref 8.8–10.2)
CHLORIDE SERPL-SCNC: 109 MMOL/L (ref 98–107)
CREAT SERPL-MCNC: 0.72 MG/DL (ref 0.51–0.95)
CREAT SERPL-MCNC: 0.73 MG/DL (ref 0.51–0.95)
DEPRECATED HCO3 PLAS-SCNC: 17 MMOL/L (ref 22–29)
EGFRCR SERPLBLD CKD-EPI 2021: 81 ML/MIN/1.73M2
EGFRCR SERPLBLD CKD-EPI 2021: 83 ML/MIN/1.73M2
ERYTHROCYTE [DISTWIDTH] IN BLOOD BY AUTOMATED COUNT: 15.2 % (ref 10–15)
GLUCOSE SERPL-MCNC: 159 MG/DL (ref 70–99)
HCT VFR BLD AUTO: 28.7 % (ref 35–47)
HGB BLD-MCNC: 9.2 G/DL (ref 11.7–15.7)
MCH RBC QN AUTO: 24.7 PG (ref 26.5–33)
MCHC RBC AUTO-ENTMCNC: 32.1 G/DL (ref 31.5–36.5)
MCV RBC AUTO: 77 FL (ref 78–100)
PATH REPORT.COMMENTS IMP SPEC: NORMAL
PATH REPORT.COMMENTS IMP SPEC: NORMAL
PATH REPORT.FINAL DX SPEC: NORMAL
PATH REPORT.MICROSCOPIC SPEC OTHER STN: NORMAL
PATH REPORT.MICROSCOPIC SPEC OTHER STN: NORMAL
PLATELET # BLD AUTO: 384 10E3/UL (ref 150–450)
POTASSIUM SERPL-SCNC: 2.7 MMOL/L (ref 3.4–5.3)
POTASSIUM SERPL-SCNC: 3 MMOL/L (ref 3.4–5.3)
PROT SERPL-MCNC: 6.1 G/DL (ref 6.4–8.3)
RBC # BLD AUTO: 3.72 10E6/UL (ref 3.8–5.2)
SODIUM SERPL-SCNC: 143 MMOL/L (ref 135–145)
WBC # BLD AUTO: 10.7 10E3/UL (ref 4–11)

## 2024-04-01 PROCEDURE — 250N000013 HC RX MED GY IP 250 OP 250 PS 637: Performed by: HOSPITALIST

## 2024-04-01 PROCEDURE — 85060 BLOOD SMEAR INTERPRETATION: CPT | Performed by: PATHOLOGY

## 2024-04-01 PROCEDURE — 250N000011 HC RX IP 250 OP 636: Performed by: INTERNAL MEDICINE

## 2024-04-01 PROCEDURE — 82247 BILIRUBIN TOTAL: CPT | Performed by: HOSPITALIST

## 2024-04-01 PROCEDURE — 92526 ORAL FUNCTION THERAPY: CPT | Mod: GN | Performed by: REHABILITATION PRACTITIONER

## 2024-04-01 PROCEDURE — 74177 CT ABD & PELVIS W/CONTRAST: CPT

## 2024-04-01 PROCEDURE — 36415 COLL VENOUS BLD VENIPUNCTURE: CPT | Performed by: INTERNAL MEDICINE

## 2024-04-01 PROCEDURE — 36415 COLL VENOUS BLD VENIPUNCTURE: CPT | Performed by: HOSPITALIST

## 2024-04-01 PROCEDURE — 85041 AUTOMATED RBC COUNT: CPT | Performed by: HOSPITALIST

## 2024-04-01 PROCEDURE — 250N000011 HC RX IP 250 OP 636

## 2024-04-01 PROCEDURE — 120N000001 HC R&B MED SURG/OB

## 2024-04-01 PROCEDURE — 84132 ASSAY OF SERUM POTASSIUM: CPT | Performed by: HOSPITALIST

## 2024-04-01 PROCEDURE — 97535 SELF CARE MNGMENT TRAINING: CPT | Mod: GO

## 2024-04-01 PROCEDURE — 250N000009 HC RX 250: Performed by: INTERNAL MEDICINE

## 2024-04-01 PROCEDURE — 99233 SBSQ HOSP IP/OBS HIGH 50: CPT | Performed by: HOSPITALIST

## 2024-04-01 PROCEDURE — 82310 ASSAY OF CALCIUM: CPT | Performed by: HOSPITALIST

## 2024-04-01 PROCEDURE — 82565 ASSAY OF CREATININE: CPT | Performed by: INTERNAL MEDICINE

## 2024-04-01 PROCEDURE — 258N000003 HC RX IP 258 OP 636: Performed by: INTERNAL MEDICINE

## 2024-04-01 PROCEDURE — 97165 OT EVAL LOW COMPLEX 30 MIN: CPT | Mod: GO

## 2024-04-01 RX ORDER — POTASSIUM CHLORIDE 1.5 G/1.58G
40 POWDER, FOR SOLUTION ORAL ONCE
Status: COMPLETED | OUTPATIENT
Start: 2024-04-01 | End: 2024-04-01

## 2024-04-01 RX ORDER — IOPAMIDOL 755 MG/ML
53 INJECTION, SOLUTION INTRAVASCULAR ONCE
Status: COMPLETED | OUTPATIENT
Start: 2024-04-01 | End: 2024-04-01

## 2024-04-01 RX ORDER — PROCHLORPERAZINE 25 MG
12.5 SUPPOSITORY, RECTAL RECTAL EVERY 12 HOURS PRN
Status: DISCONTINUED | OUTPATIENT
Start: 2024-04-01 | End: 2024-04-04 | Stop reason: HOSPADM

## 2024-04-01 RX ORDER — PROCHLORPERAZINE MALEATE 5 MG
5 TABLET ORAL EVERY 6 HOURS PRN
Status: DISCONTINUED | OUTPATIENT
Start: 2024-04-01 | End: 2024-04-04 | Stop reason: HOSPADM

## 2024-04-01 RX ORDER — BISACODYL 10 MG
10 SUPPOSITORY, RECTAL RECTAL DAILY PRN
Status: DISCONTINUED | OUTPATIENT
Start: 2024-04-01 | End: 2024-04-04 | Stop reason: HOSPADM

## 2024-04-01 RX ADMIN — IOPAMIDOL 53 ML: 755 INJECTION, SOLUTION INTRAVENOUS at 15:08

## 2024-04-01 RX ADMIN — PIPERACILLIN AND TAZOBACTAM 4.5 G: 4; .5 INJECTION, POWDER, FOR SOLUTION INTRAVENOUS at 11:15

## 2024-04-01 RX ADMIN — CYANOCOBALAMIN TAB 1000 MCG 1000 MCG: 1000 TAB at 10:01

## 2024-04-01 RX ADMIN — DOCUSATE SODIUM 50 MG AND SENNOSIDES 8.6 MG 2 TABLET: 8.6; 5 TABLET, FILM COATED ORAL at 16:26

## 2024-04-01 RX ADMIN — QUETIAPINE 12.5 MG: 25 TABLET, FILM COATED ORAL at 20:50

## 2024-04-01 RX ADMIN — PIPERACILLIN AND TAZOBACTAM 4.5 G: 4; .5 INJECTION, POWDER, FOR SOLUTION INTRAVENOUS at 18:19

## 2024-04-01 RX ADMIN — ACETAMINOPHEN 650 MG: 325 TABLET, FILM COATED ORAL at 14:10

## 2024-04-01 RX ADMIN — POTASSIUM CHLORIDE 40 MEQ: 1.5 POWDER, FOR SOLUTION ORAL at 14:10

## 2024-04-01 RX ADMIN — ACETAMINOPHEN 650 MG: 325 TABLET, FILM COATED ORAL at 20:50

## 2024-04-01 RX ADMIN — VANCOMYCIN HYDROCHLORIDE 1000 MG: 1 INJECTION, SOLUTION INTRAVENOUS at 12:03

## 2024-04-01 RX ADMIN — ACETAMINOPHEN 650 MG: 325 TABLET, FILM COATED ORAL at 10:01

## 2024-04-01 RX ADMIN — PIPERACILLIN AND TAZOBACTAM 4.5 G: 4; .5 INJECTION, POWDER, FOR SOLUTION INTRAVENOUS at 05:40

## 2024-04-01 RX ADMIN — IRON SUCROSE 300 MG: 20 INJECTION, SOLUTION INTRAVENOUS at 00:12

## 2024-04-01 RX ADMIN — SODIUM CHLORIDE 55 ML: 9 INJECTION, SOLUTION INTRAVENOUS at 15:09

## 2024-04-01 RX ADMIN — ASPIRIN 81 MG: 81 TABLET, COATED ORAL at 10:01

## 2024-04-01 RX ADMIN — ZINC SULFATE 220 MG (50 MG) CAPSULE 220 MG: CAPSULE at 10:01

## 2024-04-01 RX ADMIN — PIPERACILLIN AND TAZOBACTAM 4.5 G: 4; .5 INJECTION, POWDER, FOR SOLUTION INTRAVENOUS at 23:49

## 2024-04-01 RX ADMIN — POLYETHYLENE GLYCOL 3350 17 G: 17 POWDER, FOR SOLUTION ORAL at 16:26

## 2024-04-01 ASSESSMENT — ACTIVITIES OF DAILY LIVING (ADL)
ADLS_ACUITY_SCORE: 55
ADLS_ACUITY_SCORE: 55
ADLS_ACUITY_SCORE: 59
ADLS_ACUITY_SCORE: 59
ADLS_ACUITY_SCORE: 53
ADLS_ACUITY_SCORE: 55
ADLS_ACUITY_SCORE: 59
ADLS_ACUITY_SCORE: 57
ADLS_ACUITY_SCORE: 53
ADLS_ACUITY_SCORE: 59
ADLS_ACUITY_SCORE: 59
ADLS_ACUITY_SCORE: 57
ADLS_ACUITY_SCORE: 59
ADLS_ACUITY_SCORE: 55
ADLS_ACUITY_SCORE: 59
ADLS_ACUITY_SCORE: 57
ADLS_ACUITY_SCORE: 57
ADLS_ACUITY_SCORE: 59
ADLS_ACUITY_SCORE: 57
ADLS_ACUITY_SCORE: 59
ADLS_ACUITY_SCORE: 55

## 2024-04-01 NOTE — PROGRESS NOTES
[facility]    Medicine Progress Note - Hospitalist Service    Date of Admission:  3/29/2024    Assessment & Plan   July Huang is a 83 year old female admitted on 3/29/2024.  past medical history of hypertension, hyperlipidemia, gastroesophageal reflux disease, cerebrovascular disease with prior stroke and left hemiparesis, depression, anxiety, ovarian cancer, bladder cancer, meningioma, osteoporosis, malnutrition, chronic neck and back pain, admitted 3/29/2024 with worsening confusion, recent urinary tract infection, and questionable changes in brain mass on CT imaging with past history of meningioma.      Note two prior hospitalizations in recent months, recently discharged from [facility] on 3/4/2024.       Acute encephalopathy, suspected infectious  Sepsis of unclear source  Lactic acidosis, resolved  *presents with several day history of increasing confusion (with past history of recurrent encephalopathy, typically related to urinary tract infection)  *admission 3/29 CT head with possible changes in established mass (no acute pathology) - however, NSG consulted and reviewed outside imaging and feel this is stable  *admission vitals and initial work-up otherwise unrevealing (see H&P) - PTA Klonopin and baclofen held  *RRT called 3/30 shortly after admission for sepsis as febrile (102), tachycardic (110's), hypoxia (87% on RA), leukocytosis (11.2), lactic acidosis (2.2) - given IVF and initiated on vanco (stopped 4/1) and zosyn  *UA unemarkable, COVID/flu/RSV testing negative, CXR negative for infiltrate     - blood cultures ngtd and MRSA nasal swab negative, discontinue vanco  - continue zosyn  - fever curve trending down, will repeat CBC 04/01/24  - reporting RUQ pain on 04/01/24; will repeat LFT's and obtain CT abd/pelvis with unclear source of sepsis   - mental status somewhat improved 04/01/24, but still quite confused and not at baseline    Hypokalemia    *K low 04/01/24  - replace per protocol    Hx meningioma  *followed by Neurosurgery at [facility] with MRI stable 3/4/24 (performed at [facility])  *reportedly received radiation in 2022  *Neurosurgery consulted on admission, reviewed CT head and outside images and feel mass is stable and recommend follow up with outpatient surgeon    Hx CVA with left hemiparesis  *Baseline left-sided weakness with prior stroke; CT head on admission as above  - therapies recommend TCU/LTC  - continue PTA aspirin     Long QTc  *QTc 510 on EKG 3/31/24  - avoid QT prolonging agents    Anemia, normocytic  *historic baseline 9.6 from 3/3/2024 and 13.1 from 1/9/2023  *hgb stable ~10 g/dL this admission; B12 and folate wnl  *iron studies 3/29/24 suggest deficiency and chronic disease with peripheral smear showing microcytic anemia; received dose of Venofer 4/1  - CBC 04/01/24 pending     Hx Essential hypertension  - not currently on antihypertensives     Hyperlipidemia  - Continue PTA Crestor     Gastroesophageal reflux disease  - continue PTA famotidine (Pepcid) as needed      History of depression and anxiety  - HOLD PTA Klonopin    Severe malnutrition  Oropharyngeal dysphagia, mild  *has subcutaneous fat and muscle loss on exam  - nutrition following  - easy to chew diet with thin liquids per SLP     History of ovarian cancer - noted  History of bladder cancer - noted  History of chronic neck and back pain - pain control, see hospital orders  History of osteoporosis - noted, follow-up with primary clinic provider            Diet: Combination Diet Soft and Bite Sized Diet (level 6); Thin Liquids (level 0) (NO STRAWS)  Room Service    DVT Prophylaxis: Pneumatic Compression Devices  Jordan Catheter: Not present  Lines: None     Cardiac Monitoring: None  Code Status: No CPR- Do NOT Intubate   confirmed on admission    Clinically Significant Risk Factors                  # Hypertension: Noted on problem list         # Severe Malnutrition:  based on nutrition assessment, PRESENT ON ADMISSION          Disposition Plan      Expected Discharge Date: 04/05/2024                    Jeffy Chavarria MD  Hospitalist Service  [facility]  Securely message with [email or web address] (more info)  Text page via [employer] Paging/Directory   ______________________________________________________________________    Interval History   She complains of right upper quadrant pain today, though denies any nausea and feels hungry.  Reports a cough but denies any nausea.  Initially agitated and worried about her , who she stated had laid down for a nap, but she reports she does not currently see him in the room.      Physical Exam   Vital Signs: Temp: 97.6  F (36.4  C) Temp src: Axillary BP: (!) 150/84 Pulse: 65   Resp: 16 SpO2: 98 % O2 Device: None (Room air)    Weight: 108 lbs 0 oz    General Appearance: Thin elderly female in NAD  Respiratory: lungs CTAB, no wheezes or crackles  Cardiovascular: RRR, normal s1/s2 without murmur  GI: reports RUQ tenderness to palpation, normal bowel sounds, nondistended  Skin: no edema   Other: Awake and alert, oriented x3 but clearly confused regarding situation     Medical Decision Making       50 MINUTES SPENT BY ME on the date of service doing chart review, history, exam, documentation & further activities per the note.  MANAGEMENT DISCUSSED with the following over the past 24 hours: bedside RN, patient's    Tests ORDERED & REVIEWED in the past 24 hours:  - CBC  - LFTs  - Cr  Medical complexity over the past 24 hours:  - Prescription DRUG MANAGEMENT performed      Data     I have personally reviewed the following data over the past 24 hrs:    N/A  \   N/A   / N/A     N/A N/A N/A /  N/A   N/A N/A 0.72 \

## 2024-04-01 NOTE — PROGRESS NOTES
"   04/01/24 0905   Appointment Info   Signing Clinician's Name / Credentials (OT) Sydnee Nicholeradha, OTR/L   Living Environment   People in Home spouse   Current Living Arrangements house   Home Accessibility stairs to enter home   Number of Stairs, Main Entrance 4   Living Environment Comments Pt is a questionable historian but states she lives at home with spouse Deny and pivot transfers with his help from bed <> transport chairs they use. If she needs to leave the house her spouse Deny and a neighbor Arnaud \"carry\" her.   Self-Care   Usual Activity Tolerance fair   Current Activity Tolerance poor   Fall history within last six months   (unknown)   Activity/Exercise/Self-Care Comment Pt reports she can complete UB dressing, feeding and seated g/h tasks. Per chart, pt has not walked much since hip fx in December. Questionable historian so unclear accuracy of report.   Instrumental Activities of Daily Living (IADL)   IADL Comments Pt appears to have assist w/ all I/ADL tasks, questionable historian   General Information   Onset of Illness/Injury or Date of Surgery 03/29/24   Referring Physician Varinder Hare MD   Additional Occupational Profile Info/Pertinent History of Current Problem 83 year old female with past medical history of hypertension, hyperlipidemia, gastroesophageal reflux disease, cerebrovascular disease with prior stroke and left hemiparesis, depression, anxiety, ovarian cancer, bladder cancer, meningioma, osteoporosis, malnutrition, chronic neck and back pain, admitted 3/29/2024 with worsening confusion, recent urinary tract infection, and questionable changes in brain mass on CT imaging with past history of meningioma   Existing Precautions/Restrictions fall   Limitations/Impairments safety/cognitive   Cognitive Status Examination   Orientation Status person;place;time   Follows Commands follows one-step commands;50-74% accuracy;delayed response/completion;repetition of directions required   Memory " Deficit moderate deficit   Cognitive Status Comments Pt oriented x3-4, following commands fairly consistently though does need inc processing time as well as some additional cues. Will continue to monitor   Visual Perception   Visual Impairment/Limitations WFL;corrective lenses full-time   Pain Assessment   Patient Currently in Pain No   Range of Motion Comprehensive   Comment, General Range of Motion LUE limited to <90deg shoulder flexion, RUE appears WFL   Strength Comprehensive (MMT)   Comment, General Manual Muscle Testing (MMT) Assessment generalized weakness in BUE, RUE 4/5, LUE 2+/5   Coordination   Coordination Comments impaired in BUE d/t weakness   Transfers   Transfers sit-stand transfer   Sit-Stand Transfer   Sit-Stand [place] (Transfers) moderate assist (50% patient effort);verbal cues   Assistive Device (Sit-Stand Transfers) walker, standard  (Ana Rubin)   Balance   Balance Comments unsteady in standing, posterior lean   Activities of Daily Living   BADL Assessment/Intervention bathing;upper body dressing;lower body dressing;grooming;toileting   Bathing Assessment/Intervention   [place] Level (Bathing) maximum assist (25% patient effort)   Comment, (Bathing) per clinical judgement   Upper Body Dressing Assessment/Training   Comment, (Upper Body Dressing) per clinical judgement   [place] Level (Upper Body Dressing) minimum assist (75% patient effort)   Lower Body Dressing Assessment/Training   [place] Level (Lower Body Dressing) dependent (less than 25% patient effort)   Grooming Assessment/Training   [place] Level (Grooming) set up   Comment, (Grooming) seated   Toileting   Comment, (Toileting) per clinical judgement   [place] Level (Toileting) maximum assist (25% patient effort)   Clinical Impression   Criteria for Skilled Therapeutic Interventions Met (OT) Yes, treatment indicated   OT Diagnosis decreased ADL independence   OT Problem List-Impairments impacting ADL  problems related to;activity tolerance impaired;balance;cognition;mobility;postural control;strength   Assessment of Occupational Performance 3-5 Performance Deficits   Identified Performance Deficits impaired cognition, functional mobility, toileting and dressing   Planned Therapy Interventions (OT) ADL retraining;cognition;strengthening;transfer training;home program guidelines;progressive activity/exercise;risk factor education   Clinical Decision Making Complexity (OT) problem focused assessment/low complexity   Risk & Benefits of therapy have been explained evaluation/treatment results reviewed;care plan/treatment goals reviewed;participants included;patient   OT Total Evaluation Time   OT Eval, Low Complexity Minutes (67644) 10   OT Goals   Therapy Frequency (OT) 3 times/week   OT Predicted Duration/Target Date for Goal Attainment 04/15/24   OT Goals Hygiene/Grooming;Upper Body Dressing;Lower Body Dressing;Toilet Transfer/Toileting;Cognition;OT Goal 1   OT: Hygiene/Grooming supervision/stand-by assist;from wheelchair   OT: Upper Body Dressing Supervision/stand-by assist;from wheelchair   OT: Lower Body Dressing Supervision/stand-by assist;using adaptive equipment   OT: Toilet Transfer/Toileting Minimal assist;toilet transfer;cleaning and garment management;using adaptive equipment   OT: Cognitive Patient/caregiver will verbalize understanding of cognitive assessment results/recommendations as needed for safe discharge planning   OT: Goal 1 Pt will complete BUE HEP to progress strength/activity tolerance for increased independence w/ ADL tasks and transfers.   Interventions   Interventions Quick Adds Self-Care/Home Management   Self-Care/Home Management   Self-Care/Home Mgmt/ADL, Compensatory, Meal Prep Minutes (95821) 15   Symptoms Noted During/After Treatment (Meal Preparation/Planning Training) fatigue   Treatment Detail/Skilled Intervention Pt in chair upon arrival, agreeable to OT. Oriented x3-4, delayed  "processing noted, pt needing increased cues throughout session. /77 pre activity, w/in parameters <180. Pt attempting to doff socks but unable to reach, total A to don shoes. Pt stood initially w/ FWW, Mod A and VC for hand placement. Pt w/ strong posterior lean, cued to shift weight forward but pt says she \"doesn't like to do that\". Returned to sitting. Stood again w/ SS and Min A. Better posture in SS, pt able to engage in standing reaching w/ BUE, Min A for balance. Pt fatigued, returned to sitting. Pt able to wash face and comb hair w/ set up A while seated. Left w/ all needs met, alarm on and sitter in room at exit.   OT Discharge Planning   OT Plan UE strengthening, seated g/h at EOB, toilet transfer, monitor cognition and screen as appropriate   OT Discharge Recommendation (DC Rec) Transitional Care Facility;Long term care facility   OT Rationale for DC Rec Pt currently needing A x1-2 for transfers as well as dressing, bathing, toileting. Limited by weakness, decreased activity tolerance, confusion. Pt poor historian and no family present today so difficult to determine PLOF. Pending prior level of function verification from pt's spouse and family's goals of care/d/c disposition preference recommend considering TCU vs. LTC for continued therapy prior to return home. Will continue to update recs as appropriate.   OT Brief overview of current status Mod A sit<>stand from chair w/ FWW, posterior lean. Oriented x3-4, following commands w/ delay   Total Session Time   Timed Code Treatment Minutes 15   Total Session Time (sum of timed and untimed services) 25     "

## 2024-04-01 NOTE — PLAN OF CARE
Goal Outcome Evaluation:      Plan of Care Reviewed With: patient    Overall Patient Progress: improvingOverall Patient Progress: improving         Pt here with AMS, UTI and meningioma. A&O x4. Neuros L sided weakness 3/5, occasional illogical/incoherent speech. VSS on RA, SBP goal <180. Soft and bite sized diet, thin liquids - no straws. Ax2 SS. Denies pain. Pt scoring yellow on the Aggression Stop Light Tool, sitter at bedside. Discharge pending.

## 2024-04-01 NOTE — PROGRESS NOTES
Patient is getting more agitated in the evening.   The spouse mentioned that getting more agitated in the evening is not a new occurrence for Jamila.

## 2024-04-02 ENCOUNTER — APPOINTMENT (OUTPATIENT)
Dept: PHYSICAL THERAPY | Facility: CLINIC | Age: 84
DRG: 070 | End: 2024-04-02
Payer: COMMERCIAL

## 2024-04-02 ENCOUNTER — APPOINTMENT (OUTPATIENT)
Dept: SPEECH THERAPY | Facility: CLINIC | Age: 84
DRG: 070 | End: 2024-04-02
Payer: COMMERCIAL

## 2024-04-02 LAB
ANION GAP SERPL CALCULATED.3IONS-SCNC: 13 MMOL/L (ref 7–15)
ATRIAL RATE - MUSE: 95 BPM
BUN SERPL-MCNC: 13.3 MG/DL (ref 8–23)
CALCIUM SERPL-MCNC: 8.6 MG/DL (ref 8.8–10.2)
CHLORIDE SERPL-SCNC: 112 MMOL/L (ref 98–107)
CREAT SERPL-MCNC: 0.65 MG/DL (ref 0.51–0.95)
DEPRECATED HCO3 PLAS-SCNC: 20 MMOL/L (ref 22–29)
DIASTOLIC BLOOD PRESSURE - MUSE: NORMAL MMHG
EGFRCR SERPLBLD CKD-EPI 2021: 87 ML/MIN/1.73M2
GLUCOSE SERPL-MCNC: 128 MG/DL (ref 70–99)
HGB BLD-MCNC: 9 G/DL (ref 11.7–15.7)
INTERPRETATION ECG - MUSE: NORMAL
P AXIS - MUSE: 72 DEGREES
POTASSIUM SERPL-SCNC: 3.5 MMOL/L (ref 3.4–5.3)
POTASSIUM SERPL-SCNC: 3.5 MMOL/L (ref 3.4–5.3)
PR INTERVAL - MUSE: 120 MS
QRS DURATION - MUSE: 74 MS
QT - MUSE: 406 MS
QTC - MUSE: 510 MS
R AXIS - MUSE: 64 DEGREES
SODIUM SERPL-SCNC: 145 MMOL/L (ref 135–145)
SYSTOLIC BLOOD PRESSURE - MUSE: NORMAL MMHG
T AXIS - MUSE: 233 DEGREES
VENTRICULAR RATE- MUSE: 95 BPM

## 2024-04-02 PROCEDURE — 250N000013 HC RX MED GY IP 250 OP 250 PS 637: Performed by: HOSPITALIST

## 2024-04-02 PROCEDURE — 99232 SBSQ HOSP IP/OBS MODERATE 35: CPT | Performed by: HOSPITALIST

## 2024-04-02 PROCEDURE — 250N000013 HC RX MED GY IP 250 OP 250 PS 637: Performed by: INTERNAL MEDICINE

## 2024-04-02 PROCEDURE — 36415 COLL VENOUS BLD VENIPUNCTURE: CPT | Performed by: HOSPITALIST

## 2024-04-02 PROCEDURE — 120N000001 HC R&B MED SURG/OB

## 2024-04-02 PROCEDURE — 92526 ORAL FUNCTION THERAPY: CPT | Mod: GN | Performed by: SPEECH-LANGUAGE PATHOLOGIST

## 2024-04-02 PROCEDURE — 250N000011 HC RX IP 250 OP 636: Performed by: HOSPITALIST

## 2024-04-02 PROCEDURE — 85018 HEMOGLOBIN: CPT | Performed by: HOSPITALIST

## 2024-04-02 PROCEDURE — 80048 BASIC METABOLIC PNL TOTAL CA: CPT | Performed by: HOSPITALIST

## 2024-04-02 PROCEDURE — 250N000011 HC RX IP 250 OP 636

## 2024-04-02 PROCEDURE — 97530 THERAPEUTIC ACTIVITIES: CPT | Mod: GP

## 2024-04-02 RX ORDER — OLANZAPINE 5 MG/1
5 TABLET, ORALLY DISINTEGRATING ORAL AT BEDTIME
Status: DISCONTINUED | OUTPATIENT
Start: 2024-04-02 | End: 2024-04-03

## 2024-04-02 RX ORDER — POTASSIUM CHLORIDE 20MEQ/15ML
40 LIQUID (ML) ORAL ONCE
Status: COMPLETED | OUTPATIENT
Start: 2024-04-02 | End: 2024-04-02

## 2024-04-02 RX ORDER — OLANZAPINE 2.5 MG/1
2.5 TABLET, FILM COATED ORAL 2 TIMES DAILY PRN
Status: DISCONTINUED | OUTPATIENT
Start: 2024-04-02 | End: 2024-04-04 | Stop reason: HOSPADM

## 2024-04-02 RX ORDER — MULTIPLE VITAMINS W/ MINERALS TAB 9MG-400MCG
1 TAB ORAL DAILY
Status: DISCONTINUED | OUTPATIENT
Start: 2024-04-02 | End: 2024-04-04 | Stop reason: HOSPADM

## 2024-04-02 RX ORDER — CEFTRIAXONE 2 G/1
2 INJECTION, POWDER, FOR SOLUTION INTRAMUSCULAR; INTRAVENOUS EVERY 24 HOURS
Status: COMPLETED | OUTPATIENT
Start: 2024-04-02 | End: 2024-04-04

## 2024-04-02 RX ADMIN — CYANOCOBALAMIN TAB 1000 MCG 1000 MCG: 1000 TAB at 08:47

## 2024-04-02 RX ADMIN — ACETAMINOPHEN 650 MG: 325 TABLET, FILM COATED ORAL at 20:34

## 2024-04-02 RX ADMIN — Medication 1 TABLET: at 13:49

## 2024-04-02 RX ADMIN — PIPERACILLIN AND TAZOBACTAM 4.5 G: 4; .5 INJECTION, POWDER, FOR SOLUTION INTRAVENOUS at 06:13

## 2024-04-02 RX ADMIN — ACETAMINOPHEN 650 MG: 325 TABLET, FILM COATED ORAL at 11:54

## 2024-04-02 RX ADMIN — POTASSIUM CHLORIDE 40 MEQ: 20 SOLUTION ORAL at 04:16

## 2024-04-02 RX ADMIN — ACETAMINOPHEN 650 MG: 325 TABLET, FILM COATED ORAL at 08:47

## 2024-04-02 RX ADMIN — OLANZAPINE 5 MG: 5 TABLET, ORALLY DISINTEGRATING ORAL at 20:34

## 2024-04-02 RX ADMIN — CEFTRIAXONE SODIUM 2 G: 2 INJECTION, POWDER, FOR SOLUTION INTRAMUSCULAR; INTRAVENOUS at 11:54

## 2024-04-02 RX ADMIN — ASPIRIN 81 MG: 81 TABLET, COATED ORAL at 08:46

## 2024-04-02 RX ADMIN — ROSUVASTATIN CALCIUM 10 MG: 5 TABLET, FILM COATED ORAL at 08:46

## 2024-04-02 RX ADMIN — ZINC SULFATE 220 MG (50 MG) CAPSULE 220 MG: CAPSULE at 08:46

## 2024-04-02 ASSESSMENT — ACTIVITIES OF DAILY LIVING (ADL)
ADLS_ACUITY_SCORE: 57
ADLS_ACUITY_SCORE: 58
ADLS_ACUITY_SCORE: 57
ADLS_ACUITY_SCORE: 54
ADLS_ACUITY_SCORE: 58
ADLS_ACUITY_SCORE: 54
ADLS_ACUITY_SCORE: 57
ADLS_ACUITY_SCORE: 58
ADLS_ACUITY_SCORE: 53
ADLS_ACUITY_SCORE: 57
DEPENDENT_IADLS:: CLEANING;LAUNDRY;COOKING;SHOPPING;MEAL PREPARATION;MEDICATION MANAGEMENT;MONEY MANAGEMENT;TRANSPORTATION
ADLS_ACUITY_SCORE: 58
ADLS_ACUITY_SCORE: 58
ADLS_ACUITY_SCORE: 57
ADLS_ACUITY_SCORE: 57
ADLS_ACUITY_SCORE: 56
ADLS_ACUITY_SCORE: 57
ADLS_ACUITY_SCORE: 56
ADLS_ACUITY_SCORE: 58
ADLS_ACUITY_SCORE: 58

## 2024-04-02 NOTE — PLAN OF CARE
Problem: Adult Inpatient Plan of Care  Goal: Plan of Care Review  Outcome: Progressing   Goal Outcome Evaluation:    Patient on Soft & Bite Sized Diet. Consuming 25-75% intakes per nursing flowsheets. Will order Ensure x1 and MVI/M.    Kelle Verma RD, LD  Clinical Dietitian - [facility]

## 2024-04-02 NOTE — CONSULTS
Care Management Initial Consult    General Information  Assessment completed with: Spouse or significant otherJohn  Type of CM/SW Visit: Initial Assessment    Primary Care Provider verified and updated as needed: Yes   Readmission within the last 30 days: current reason for admission unrelated to previous admission      Reason for Consult: discharge planning  Advance Care Planning: Advance Care Planning Reviewed: no concerns identified        Communication Assessment  Patient's communication style: spoken language (English or Bilingual)        Cognitive  Cognitive/Neuro/Behavioral: .WDL except  Level of Consciousness: confused  Arousal Level: opens eyes spontaneously  Orientation: disoriented to, situation  Mood/Behavior: calm, cooperative  Best Language: 0 - No aphasia  Speech: spontaneous, illogical    Living Environment:   People in home: spouse     Current living Arrangements: house      Able to return to prior arrangements: no (currently needing higher level of assistance)     Family/Social Support:  Care provided by: homecare agency, spouse/significant other  Provides care for: no one  Marital Status:   , Children  John       Description of Support System: Supportive, Involved    Support Assessment: Adequate family and caregiver support, Adequate social supports    Current Resources:   Patient receiving home care services: Yes  Skilled Home Care Services: Skilled Nursing, Physical Therapy, Occupational Therapy  Community Resources: None  Equipment currently used at home:  (pt reports she uses 2 transport chairs)  Supplies currently used at home: Incontinence Supplies    Employment/Financial:  Employment Status: disabled        Financial Concerns: none   Referral to Financial Worker: No     Does the patient's insurance plan have a 3 day qualifying hospital stay waiver?  Yes   Which insurance plan 3 day waiver is available? Alternative insurance waiver  Will the waiver be used for  post-acute placement? No    Lifestyle & Psychosocial Needs:  Social Determinants of Health     Food Insecurity: Low Risk  (3/3/2024)    Food Insecurity     Within the past 12 months, did you worry that your food would run out before you got money to buy more?: No     Within the past 12 months, did the food you bought just not last and you didn t have money to get more?: No   Depression: Not at risk (2/5/2024)    PHQ-2     PHQ-2 Score: 0   Housing Stability: Low Risk  (3/3/2024)    Housing Stability     Do you have housing? : Yes     Are you worried about losing your housing?: No   Tobacco Use: Medium Risk (3/11/2024)    Patient History     Smoking Tobacco Use: Former     Smokeless Tobacco Use: Never     Passive Exposure: Not on file   Financial Resource Strain: High Risk (3/3/2024)    Financial Resource Strain     Within the past 12 months, have you or your family members you live with been unable to get utilities (heat, electricity) when it was really needed?: Yes   Alcohol Use: Not on file   Transportation Needs: Low Risk  (3/3/2024)    Transportation Needs     Within the past 12 months, has lack of transportation kept you from medical appointments, getting your medicines, non-medical meetings or appointments, work, or from getting things that you need?: No   Physical Activity: Not on file   Interpersonal Safety: Not on file   Stress: Not on file   Social Connections: Not on file     Functional Status:  Prior to admission patient needed assistance:   Dependent ADLs:: Ambulation-walker, Bathing, Dressing, Eating, Grooming, Toileting, Transfers, Positioning  Dependent IADLs:: Cleaning, Laundry, Cooking, Shopping, Meal Preparation, Medication Management, Money Management, Transportation     Mental Health Status:  Mental Health Status: Current Concern  Mental Health Management: Medication    Chemical Dependency Status:  Chemical Dependency Status: No Current Concerns           Values/Beliefs:  Spiritual, Cultural  "Beliefs, Holiness Practices, Values that affect care: no             Additional Information:  SW consulted for discharge planning and completed chart review. Per ED Provider notes, patient comes with history of CVA with residual left-sided weakness and ovarian cancer s/p hysterectomy with bilateral salpingo-oophorectom. She presented to the emergency department with weakness, confusion, dysuria and dark urine. Patient currently has a sitter and is confused, forgetful, with illogical speech per nursing notes. SW spoke to spouse over the phone to introduce themselves, confirm info in the chart, and discuss discharge options. Patient comes from a home where she resides with her spouse. Spouse provides all cares: dressing, bathing, toileting, medications, meals, and transfers with a transfer chair. He drives her to all appointments and has the help of a very physically capable neighbor. Patient can be agitated at baseline he reports. She has HHC weekly (PT, OT, nursing through [facility], spouse reports) after [place] [facility] They have a monthly nurse check in through medicare. Son Lars lives in the area and visits the patient if spouse ever leaves. There is always someone there 24.7.     Spoke with spouse about his hopes for short term and long term plans. Patient has been to TCU recently at [facility] (did not like it and left \"AMA\" after one week) and [facility]. Left the last TCU 1/29 or 1/30. Spouse felt he had to be there constantly to help care for patient. Recommendation is TCU again, patient is an assist of 2 and spouse said it is not possible to care for her when needing two people. Discussed long term care and he said he would consider it. He is aware of potential cost of needing down payment ranging from a couple to several thousand dollars and $400-500 a day. Discussed private paying for home care, with 24.7 care being around $650 a day. Discussed eventual application for MA if " assets are spent down. He needs time to consider options.     JOANNE Morin, [facility]   Social Work   [facility]

## 2024-04-02 NOTE — PLAN OF CARE
Reason for Admission: sepsis, encephalopathy    Cognitive/Mentation: A/Ox 3-4, forgetful  Neuros/CMS: Intact ex baseline L-sided weakness, LUE 3-4/5, LLE 3/5, mild L droop, intermittent confusion/hallucinations, illogical speech  VS: hypertensive w/in parameters, SBP <180.   GI: 3 soft/loose BM's overnight. inContinent.  : unable to urinate overnight - straight cath for 600cc's @0630  Pulmonary: LS clear.  Pain: denies.     Drains/Lines: 2 R PIV SL  Skin: scattered bruising to extremities  Activity: Assist x 2 with GB + janneth steady.  Diet: soft and bite sized with thin liquids. Takes pills crushed in applesauce.     Therapies recs: TCU vs. LTC  Discharge: pending    Aggression Stoplight Tool: green    End of shift summary: sitter at bedside overnight for confusion, neuro exam stable, K+ replaced recheck ordered

## 2024-04-02 NOTE — PROGRESS NOTES
CLINICAL NUTRITION SERVICES - REASSESSMENT NOTE    Recommendations Ordered by Registered Dietitian (RD):   Ordered MVI/M  Ordered chocolate Ensure Enlive at 10am   Malnutrition: (3/30)  % Weight Loss:  Weight loss does not meet criteria for malnutrition   % Intake:  Unable to fully assess   Subcutaneous Fat Loss:  Orbital region moderate depletion, Upper arm region severe depletion, and Thoracic region severe depletion  Muscle Loss:  Temporal region moderate-severe depletion, Clavicle bone region severe depletion, Acromion bone region severe depletion, Scapular bone region severe depletion, and Dorsal hand region moderate-severe depletion  Fluid Retention:  None noted     Malnutrition Diagnosis: Severe malnutrition  In Context of:  Chronic illness or disease     EVALUATION OF PROGRESS TOWARD GOALS   Diet:  Soft and Bite Sized Diet (level 6); Thin Liquids (level 0)   Room service w/ assist    Intake/Tolerance:    - 25-75% intakes documented per flowsheets  - Attempted to see patient in room however patient sleeping. Per sitter, pt very confused at this time. Having hallucinations/illogical speech per chart review.  - Will send Ensure x1 to help with intakes while on dysphagia diet. Will also order MVI/M.    - SLP 3/31 = Soft and bite-sized solids and thin liquids, no straws; would benefit from review of safe swallow strategies     ASSESSED NUTRITION NEEDS:  Dosing Weight 49 kg   Estimated Energy Needs: 0718-1372+ kcals (25-30+ Kcal/Kg)  Justification: maintenance  Estimated Protein Needs: 59-74 grams protein (1.2-1.5 g pro/Kg)  Justification: preservation of lean body mass  Estimated Fluid Needs: 1 mL/kcal   Justification: maintenance    NEW FINDINGS:   Labs: -159 (H)    Meds: vitamin B12    Skin: no concerns    Previous Goals:   Diet >CLD in the next 48 hours.    Evaluation: Met    Previous Nutrition Diagnosis:   Malnutrition related to suspected inadequate nutrient intake at baseline as evidenced by minimal  stores of fat/muscle on exam.   Evaluation: No change    CURRENT NUTRITION DIAGNOSIS  Malnutrition related to suspected inadequate nutrient intake at baseline as evidenced by minimal stores of fat/muscle on exam.     INTERVENTIONS  Recommendations / Nutrition Prescription  See above    Implementation  Medical Food Supplement - ordered  Multivitamin/Mineral - ordered    Goals  Patient to consume >75% of TID meals and 1 Ensure/day    MONITORING AND EVALUATION:  Progress towards goals will be monitored and evaluated per protocol and Practice Guidelines    Kelle Verma RD, LD  Clinical Dietitian - [facility]

## 2024-04-02 NOTE — PLAN OF CARE
Shift: [id number]    Cognition/Mentation/Neuro: Aox3, disoriented to situation. L sided weakness and facial droop. Confused, agitated and hallucinating. Speech illogical at times.   VS: VSS on RA  GI/: Mostly continent, up to commode. Multiple loose stools. Voiding well, PVR at 1800 was 65ml   Pulmonary: LS clear  Pain: Denies   Drains/Lines: SL PIV  Skin: Intact   Activity: A2 janneth steady  Diet: Soft bit size, thin liquids, pills in applesauce  Discharge: Pending    Shift summary: Sitter at bedside for safety

## 2024-04-02 NOTE — PROGRESS NOTES
[facility]    Medicine Progress Note - Hospitalist Service    Date of Admission:  3/29/2024    Assessment & Plan   July Huang is a 83 year old female admitted on 3/29/2024.  past medical history of hypertension, hyperlipidemia, gastroesophageal reflux disease, cerebrovascular disease with prior stroke and left hemiparesis, depression, anxiety, ovarian cancer, bladder cancer, meningioma, osteoporosis, malnutrition, chronic neck and back pain, admitted 3/29/2024 with worsening confusion, recent urinary tract infection, and questionable changes in brain mass on CT imaging with past history of meningioma.      Note two prior hospitalizations in recent months, recently discharged from [facility] on 3/4/2024.       Acute encephalopathy, suspected infectious  Sepsis of unclear source  Lactic acidosis, resolved  *presents with several day history of increasing confusion (with past history of recurrent encephalopathy, typically related to urinary tract infection)  *admission 3/29 CT head with possible changes in established mass (no acute pathology) - however, NSG consulted and reviewed outside imaging and feel this is stable  *admission vitals and initial work-up otherwise unrevealing (see H&P) - PTA Klonopin and baclofen held  *RRT called 3/30 shortly after admission for sepsis as febrile (102), tachycardic (110's), hypoxia (87% on RA), leukocytosis (11.2), lactic acidosis (2.2) - given IVF and initiated on vanco (stopped 4/1 with negative MRSA nasal swab) and zosyn  *UA unemarkable, COVID/flu/RSV testing negative, CXR negative for infiltrate  *CT abd/pelvis (for reported abd pain) 4/1 showing cholelithiasis without cholecystitis, moderate stool burden but no other acute pathology; LFT's remain wnl 4/1     - blood cultures ngtd   - infectious source remains unclear; will stop zosyn and start ceftriaxone and follow fever curve and CBC  - oriented x3 and answering  questions appropriately during interview 04/02/24; however, RN reporting intermittent agitation and visual hallucinations  - discussed with , will trial zyprexa 5 mg at bedtime and 2.5 mg bid prn (she responded well to haldol in past per his report, but would avoid with long QT)  - he denies any history of dementia and has not noted cognitive impairment outside of hospitalizations for acute illness - would recommend outpatient neuropsych testing    Hypokalemia   *K low 04/01/24  - replace per protocol    Hx meningioma  *followed by Neurosurgery at [facility] with MRI stable 3/4/24 (performed at [facility])  *reportedly received radiation in 2022  *Neurosurgery consulted on admission, reviewed CT head and outside images and feel mass is stable and recommend follow up with outpatient surgeon    Hx CVA with left hemiparesis  *Baseline left-sided weakness with prior stroke; CT head on admission as above  - therapies recommend TCU/LTC  - continue PTA aspirin     Long QTc  *QTc 510 on EKG 3/31/24  - avoid QT prolonging agents    Anemia, normocytic  *historic baseline 9.6 from 3/3/2024 and 13.1 from 1/9/2023  *B12 and folate wnl  *iron studies 3/29/24 suggest deficiency and chronic disease with peripheral smear showing microcytic anemia; received dose of Venofer 4/1  - hgb stable about 9 g/dL 04/02/24     Hx Essential hypertension  - not currently on antihypertensives  - intermittently hypertensive     Hyperlipidemia  - Continue PTA Crestor     Gastroesophageal reflux disease  - continue PTA famotidine (Pepcid) as needed      History of depression and anxiety  - HOLD PTA Klonopin    Severe malnutrition  Oropharyngeal dysphagia, mild  *has subcutaneous fat and muscle loss on exam  - nutrition following  - easy to chew diet with thin liquids per SLP     History of ovarian cancer - noted  History of bladder cancer - noted  History of chronic neck and back pain - pain control, see hospital orders  History of osteoporosis - noted,  follow-up with primary clinic provider            Diet: Room Service  Combination Diet Soft and Bite Sized Diet (level 6); Thin Liquids (level 0)    DVT Prophylaxis: Pneumatic Compression Devices  Jordan Catheter: Not present  Lines: None     Cardiac Monitoring: None  Code Status: No CPR- Do NOT Intubate   confirmed on admission    Clinically Significant Risk Factors        # Hypokalemia: Lowest K = 2.7 mmol/L in last 2 days, will replace as needed           # Hypertension: Noted on problem list         # Severe Malnutrition: based on nutrition assessment, PRESENT ON ADMISSION          Disposition Plan      Expected Discharge Date: 04/05/2024                    Jeffy Chavarria MD  Hospitalist Service  [facility]  Securely message with [employer] (more info)  Text page via [employer] Paging/Directory   ______________________________________________________________________    Interval History   She is sleepy but arouses to voice and asks appropriate questions regarding her blood pressure and management.  Denies any abdominal pain, has started moving bowels.  Reports a slight cough, no dyspnea.  No other complaints.     Physical Exam   Vital Signs: Temp: 98.9  F (37.2  C) Temp src: Axillary BP: 136/71 Pulse: 76   Resp: 16 SpO2: 98 % O2 Device: None (Room air)    Weight: 108 lbs 0 oz    General Appearance: Thin elderly female in NAD  Respiratory: lungs CTAB, no wheezes or crackles  Cardiovascular: RRR, normal s1/s2 without murmur  GI: nontender, normal bowel sounds, nondistended  Skin: no edema   Other: Awake and appropriate, oriented x3    Medical Decision Making       40 MINUTES SPENT BY ME on the date of service doing chart review, history, exam, documentation & further activities per the note.  MANAGEMENT DISCUSSED with the following over the past 24 hours: bedside RN, patient's    Tests ORDERED & REVIEWED in the past 24 hours:  - BMP  - hemoglobin  Medical complexity over the past 24  hours:  - Prescription DRUG MANAGEMENT performed      Data     I have personally reviewed the following data over the past 24 hrs:    10.7  \   9.0 (L)   / 384     145 112 (H) 13.3 /  128 (H)   3.5; 3.5 20 (L) 0.65 \     ALT: N/A AST: N/A AP: N/A TBILI: N/A   ALB: N/A TOT PROTEIN: N/A LIPASE: N/A

## 2024-04-03 ENCOUNTER — APPOINTMENT (OUTPATIENT)
Dept: OCCUPATIONAL THERAPY | Facility: CLINIC | Age: 84
DRG: 070 | End: 2024-04-03
Payer: COMMERCIAL

## 2024-04-03 ENCOUNTER — APPOINTMENT (OUTPATIENT)
Dept: PHYSICAL THERAPY | Facility: CLINIC | Age: 84
DRG: 070 | End: 2024-04-03
Payer: COMMERCIAL

## 2024-04-03 LAB
ANION GAP SERPL CALCULATED.3IONS-SCNC: 15 MMOL/L (ref 7–15)
BACTERIA BLD CULT: NO GROWTH
BACTERIA BLD CULT: NO GROWTH
BUN SERPL-MCNC: 9.1 MG/DL (ref 8–23)
CALCIUM SERPL-MCNC: 8.6 MG/DL (ref 8.8–10.2)
CHLORIDE SERPL-SCNC: 105 MMOL/L (ref 98–107)
CREAT SERPL-MCNC: 0.52 MG/DL (ref 0.51–0.95)
DEPRECATED HCO3 PLAS-SCNC: 20 MMOL/L (ref 22–29)
EGFRCR SERPLBLD CKD-EPI 2021: >90 ML/MIN/1.73M2
ERYTHROCYTE [DISTWIDTH] IN BLOOD BY AUTOMATED COUNT: 15.9 % (ref 10–15)
GLUCOSE SERPL-MCNC: 146 MG/DL (ref 70–99)
HCT VFR BLD AUTO: 30.5 % (ref 35–47)
HGB BLD-MCNC: 9.6 G/DL (ref 11.7–15.7)
MCH RBC QN AUTO: 24.9 PG (ref 26.5–33)
MCHC RBC AUTO-ENTMCNC: 31.5 G/DL (ref 31.5–36.5)
MCV RBC AUTO: 79 FL (ref 78–100)
PLATELET # BLD AUTO: 408 10E3/UL (ref 150–450)
POTASSIUM SERPL-SCNC: 2.8 MMOL/L (ref 3.4–5.3)
POTASSIUM SERPL-SCNC: 3.6 MMOL/L (ref 3.4–5.3)
RBC # BLD AUTO: 3.86 10E6/UL (ref 3.8–5.2)
SODIUM SERPL-SCNC: 140 MMOL/L (ref 135–145)
WBC # BLD AUTO: 7.9 10E3/UL (ref 4–11)

## 2024-04-03 PROCEDURE — 250N000013 HC RX MED GY IP 250 OP 250 PS 637: Performed by: HOSPITALIST

## 2024-04-03 PROCEDURE — 84132 ASSAY OF SERUM POTASSIUM: CPT | Performed by: HOSPITALIST

## 2024-04-03 PROCEDURE — 85027 COMPLETE CBC AUTOMATED: CPT | Performed by: HOSPITALIST

## 2024-04-03 PROCEDURE — 250N000011 HC RX IP 250 OP 636: Performed by: HOSPITALIST

## 2024-04-03 PROCEDURE — 36415 COLL VENOUS BLD VENIPUNCTURE: CPT | Performed by: HOSPITALIST

## 2024-04-03 PROCEDURE — 97116 GAIT TRAINING THERAPY: CPT | Mod: GP

## 2024-04-03 PROCEDURE — 99233 SBSQ HOSP IP/OBS HIGH 50: CPT | Performed by: HOSPITALIST

## 2024-04-03 PROCEDURE — 97535 SELF CARE MNGMENT TRAINING: CPT | Mod: GO

## 2024-04-03 PROCEDURE — 97530 THERAPEUTIC ACTIVITIES: CPT | Mod: GP

## 2024-04-03 PROCEDURE — 120N000001 HC R&B MED SURG/OB

## 2024-04-03 PROCEDURE — 80048 BASIC METABOLIC PNL TOTAL CA: CPT | Performed by: HOSPITALIST

## 2024-04-03 RX ORDER — LIDOCAINE 4 G/G
2 PATCH TOPICAL
Status: DISCONTINUED | OUTPATIENT
Start: 2024-04-04 | End: 2024-04-03

## 2024-04-03 RX ORDER — LIDOCAINE 4 G/G
2 PATCH TOPICAL
Status: DISCONTINUED | OUTPATIENT
Start: 2024-04-03 | End: 2024-04-04 | Stop reason: HOSPADM

## 2024-04-03 RX ORDER — VALACYCLOVIR HYDROCHLORIDE 1 G/1
1000 TABLET, FILM COATED ORAL EVERY 12 HOURS SCHEDULED
Status: DISCONTINUED | OUTPATIENT
Start: 2024-04-03 | End: 2024-04-04 | Stop reason: HOSPADM

## 2024-04-03 RX ORDER — POTASSIUM CHLORIDE 1.5 G/1.58G
40 POWDER, FOR SOLUTION ORAL ONCE
Status: COMPLETED | OUTPATIENT
Start: 2024-04-03 | End: 2024-04-03

## 2024-04-03 RX ADMIN — VALACYCLOVIR HYDROCHLORIDE 1000 MG: 1 TABLET, FILM COATED ORAL at 12:12

## 2024-04-03 RX ADMIN — LIDOCAINE 2 PATCH: 4 PATCH TOPICAL at 14:13

## 2024-04-03 RX ADMIN — CYANOCOBALAMIN TAB 1000 MCG 1000 MCG: 1000 TAB at 10:30

## 2024-04-03 RX ADMIN — MELATONIN 5 MG TABLET 5 MG: at 19:33

## 2024-04-03 RX ADMIN — POTASSIUM CHLORIDE 40 MEQ: 1.5 POWDER, FOR SOLUTION ORAL at 10:30

## 2024-04-03 RX ADMIN — Medication 1 TABLET: at 10:30

## 2024-04-03 RX ADMIN — ASPIRIN 81 MG: 81 TABLET, COATED ORAL at 10:30

## 2024-04-03 RX ADMIN — VALACYCLOVIR HYDROCHLORIDE 1000 MG: 1 TABLET, FILM COATED ORAL at 19:32

## 2024-04-03 RX ADMIN — ACETAMINOPHEN 650 MG: 325 TABLET, FILM COATED ORAL at 17:12

## 2024-04-03 RX ADMIN — CEFTRIAXONE SODIUM 2 G: 2 INJECTION, POWDER, FOR SOLUTION INTRAMUSCULAR; INTRAVENOUS at 13:43

## 2024-04-03 RX ADMIN — ACETAMINOPHEN 650 MG: 325 TABLET, FILM COATED ORAL at 02:39

## 2024-04-03 RX ADMIN — DICLOFENAC 2 G: 10 GEL TOPICAL at 17:12

## 2024-04-03 RX ADMIN — ACETAMINOPHEN 650 MG: 325 TABLET, FILM COATED ORAL at 10:30

## 2024-04-03 RX ADMIN — ZINC SULFATE 220 MG (50 MG) CAPSULE 220 MG: CAPSULE at 10:30

## 2024-04-03 ASSESSMENT — ACTIVITIES OF DAILY LIVING (ADL)
ADLS_ACUITY_SCORE: 54
ADLS_ACUITY_SCORE: 53
ADLS_ACUITY_SCORE: 54
ADLS_ACUITY_SCORE: 53
ADLS_ACUITY_SCORE: 54
ADLS_ACUITY_SCORE: 53
ADLS_ACUITY_SCORE: 53
ADLS_ACUITY_SCORE: 54
ADLS_ACUITY_SCORE: 53
ADLS_ACUITY_SCORE: 54

## 2024-04-03 NOTE — PROGRESS NOTES
Care Management Follow Up    Length of Stay (days): 5    Expected Discharge Date: 04/05/2024     Concerns to be Addressed: discharge planning    Patient plan of care discussed at interdisciplinary rounds: Yes    Anticipated Discharge Disposition:  TCU    Referrals Placed by CM/MILAN:  TCU  Private pay costs discussed: Not applicable    Additional Information:  SW spoke to spouse about discharge and he states with today's progress, he wants SW to send referrals to TCU. He is open when it comes to location but prefers a better rated facility with medicare. Discussed that patient may be better suited to a facility that can meet her cognitive needs, he understood. Tomorrow morning SW will first [facility], [facility], [facility], [facility], [facility], [facility], and [facility]. He prefers a private room and is aware of fees, but is also open to whatever is available.    JOANNE Morin, [facility]   Social Work   [facility]

## 2024-04-03 NOTE — PROGRESS NOTES
[facility]    Medicine Progress Note - Hospitalist Service    Date of Admission:  3/29/2024    Assessment & Plan   July Huang is a 83 year old female admitted on 3/29/2024.  past medical history of hypertension, hyperlipidemia, gastroesophageal reflux disease, cerebrovascular disease with prior stroke and left hemiparesis, depression, anxiety, ovarian cancer, bladder cancer, meningioma, osteoporosis, malnutrition, chronic neck and back pain, admitted 3/29/2024 with worsening confusion, recent urinary tract infection, and questionable changes in brain mass on CT imaging with past history of meningioma.      Note two prior hospitalizations in recent months, recently discharged from [facility] on 3/4/2024.       Acute encephalopathy, suspected infectious, resolving  Sepsis of unclear source  Lactic acidosis, resolved  *presents with several day history of increasing confusion (with past history of recurrent encephalopathy, typically related to urinary tract infection)  *admission 3/29 CT head with possible changes in established mass (no acute pathology) - however, NSG consulted and reviewed outside imaging and feel this is stable  *admission vitals and initial work-up otherwise unrevealing (see H&P) - PTA Klonopin and baclofen held  *RRT called 3/30 shortly after admission for sepsis as febrile (102), tachycardic (110's), hypoxia (87% on RA), leukocytosis (11.2), lactic acidosis (2.2) - given IVF and initiated on vanco (stopped 4/1 with negative MRSA nasal swab) and zosyn (transitioned to ceftriaxone 4/2)  *UA unemarkable, COVID/flu/RSV testing negative, CXR negative for infiltrate  *CT abd/pelvis (for reported abd pain) 4/1 showing cholelithiasis without cholecystitis, moderate stool burden but no other acute pathology; LFT's remain wnl 4/1     - blood cultures ngtd   - continue ceftriaxone to complete empiric 7-day course of abx; remains afebrile without  leukocytosis  - remains oriented x3 and asking appropriate questions and providing appropriate history 04/03/24;  also noting improvement and feels she is nearing baseline mental status  - 1:1 sitter has been removed  - up much of the night per RN despite bedtime zyprexa, will discontinue and use prn  - start melatonin 5 mg at bedtime  -  reports she has been using 1/2 tab klonopin recently at bedtime, but has not been doing this for weeks/months so unlikely she is withdrawing; also reports sparing use of Vicodin and does not appear to have any opiate withdrawal symptoms  - will hold on EEG and Neuro consult as appears to be improving  - recommend outpatient neuropsych testing    History of chronic neck and back pain  Hx muscle spasms  *has prn Vicodin, prn methocarbamol at home;  reports issues with muscle spasms following her stroke  - reporting uncontrolled pain as cause for her insomnia  - continue scheduled tylenol  - start lidocaine patches, voltaren gel 04/03/24   - hold on resuming muscle relaxant until at baseline mental status; could reconsider if experiencing significant spasms    HSV  *note active lesion of left lower lip  - start valcyclovir bid x3 days 04/03/24     Hypokalemia   *K low 04/01/24  - replace per protocol; needs replacement 04/03/24     Hx meningioma  *followed by Neurosurgery at [facility] with MRI stable 3/4/24 (performed at [facility])  *reportedly received radiation in 2022  *Neurosurgery consulted on admission, reviewed CT head and outside images and feel mass is stable and recommend follow up with outpatient surgeon    Hx CVA with left hemiparesis  *Baseline left-sided weakness with prior stroke; CT head on admission as above  - therapies recommend TCU/LTC  - continue PTA aspirin     Long QTc  *QTc 510 on EKG 3/31/24  - avoid QT prolonging agents    Anemia, normocytic  *historic baseline 9.6 from 3/3/2024 and 13.1 from 1/9/2023  *B12 and folate wnl  *iron studies 3/29/24  suggest deficiency and chronic disease with peripheral smear showing microcytic anemia; received dose of Venofer 4/1  - hgb stable about 9 g/dL 04/03/24; no further monitoring warranted    Hx Essential hypertension  - not currently on antihypertensives  - intermittently hypertensive     Hyperlipidemia  - Continue PTA Crestor     Gastroesophageal reflux disease  - continue PTA famotidine (Pepcid) as needed      History of depression and anxiety  - HOLD PTA Klonopin; would not plan to resume at discharge    Severe malnutrition  Oropharyngeal dysphagia, mild  *has subcutaneous fat and muscle loss on exam  - nutrition following  - easy to chew diet with thin liquids per SLP     History of ovarian cancer - noted  History of bladder cancer - noted  History of osteoporosis - noted, follow-up with primary clinic provider            Diet: Room Service  Combination Diet Soft and Bite Sized Diet (level 6); Thin Liquids (level 0)  Snacks/Supplements Adult: Ensure Enlive; Between Meals    DVT Prophylaxis: Pneumatic Compression Devices  Jordan Catheter: Not present  Lines: None     Cardiac Monitoring: None  Code Status: No CPR- Do NOT Intubate   confirmed on admission    Clinically Significant Risk Factors        # Hypokalemia: Lowest K = 2.8 mmol/L in last 2 days, will replace as needed           # Hypertension: Noted on problem list         # Severe Malnutrition: based on nutrition assessment    # Financial/Environmental Concerns: none         Disposition Plan      Expected Discharge Date: 04/05/2024    Discharge Delays: 1:1 Sitter still ordered - MD to assess                Jeffy Chavarria MD  Hospitalist Service  [facility]  Securely message with [employer] (more info)  Text page via [employer] Paging/Directory   ______________________________________________________________________    Interval History   Complains of pain of upper and lower back.  No fever/chills, cough.  Endorses sore lip lesion when  asked.  Reports poor sleep due to pain overnight.  Asking about resuming home medications such as robaxin, vicodin, klonopin.     Physical Exam   Vital Signs: Temp: 98.7  F (37.1  C) Temp src: Oral BP: (!) 146/87 Pulse: 101   Resp: 16 SpO2: 95 % O2 Device: None (Room air)    Weight: 108 lbs 0 oz    General Appearance: Thin elderly female in NAD  Respiratory: lungs CTAB, no wheezes or crackles  Cardiovascular: RRR, normal s1/s2 without murmur  GI: nontender, normal bowel sounds, nondistended  Skin: developing ulcer of left lower lip  Other: Awake and appropriate, oriented x3    Medical Decision Making       60 MINUTES SPENT BY ME on the date of service doing chart review, history, exam, documentation & further activities per the note.  MANAGEMENT DISCUSSED with the following over the past 24 hours: bedside nurse, patient's    Tests ORDERED & REVIEWED in the past 24 hours:  - BMP  Medical complexity over the past 24 hours:  - Prescription DRUG MANAGEMENT performed      Data     I have personally reviewed the following data over the past 24 hrs:    7.9  \   9.6 (L)   / 408     140 105 9.1 /  146 (H)   2.8 (L) 20 (L) 0.52 \

## 2024-04-03 NOTE — PROGRESS NOTES
Patient is AO X4; VSS; on RA; c/o shoulder, back pain.   Lidocaine patches were placed on her lower back. Diclofenac topical was added 4X daily.  She might be retaining urine. Last bladder scannin ml.   Jamila is up with Ana Steady and assist X 2; pills whole with water; following commands.   She did not sleep last night and has been awake today, except for when she had a catnap.   Skin is intact, some blanchable redness on her coccyx was noted.

## 2024-04-03 NOTE — PLAN OF CARE
Reason for Admission: sepsis, encephalopathy     Cognitive/Mentation: A/Ox 3-4, forgetful  Neuros/CMS: Intact ex baseline L-sided weakness, LUE 3-4/5, LLE 3/5, mild L droop, intermittent confusion/hallucinations, illogical speech at times  VS: stable on RA, SBP <180.   GI: small soft BM this AM. Continent/incontinent.  : unable to void overnight - straight cath @0630 for 550cc's.    Pulmonary: LS clear.  Pain: c/o HA + lower back pain rated 7/10, PRN tylenol given x2.      Drains/Lines: R PIV SL  Skin: scattered bruising to extremities  Activity: Assist x 2 with GB + janneth steady.  Diet: soft and bite sized with thin liquids. Takes pills whole with water.     Therapies recs: TCU vs. LTC  Discharge: pending     Aggression Stoplight Tool: green     End of shift summary: assisted pt up to bedside commode 4 times overnight w/ little to no urination each time. Placed purewick with no success. Bladder scanned after each attempt w/ increasing PVR's.

## 2024-04-04 ENCOUNTER — APPOINTMENT (OUTPATIENT)
Dept: OCCUPATIONAL THERAPY | Facility: CLINIC | Age: 84
DRG: 070 | End: 2024-04-04
Payer: COMMERCIAL

## 2024-04-04 ENCOUNTER — APPOINTMENT (OUTPATIENT)
Dept: PHYSICAL THERAPY | Facility: CLINIC | Age: 84
DRG: 070 | End: 2024-04-04
Payer: COMMERCIAL

## 2024-04-04 ENCOUNTER — LAB REQUISITION (OUTPATIENT)
Dept: LAB | Facility: CLINIC | Age: 84
End: 2024-04-04
Payer: COMMERCIAL

## 2024-04-04 ENCOUNTER — APPOINTMENT (OUTPATIENT)
Dept: SPEECH THERAPY | Facility: CLINIC | Age: 84
DRG: 070 | End: 2024-04-04
Payer: COMMERCIAL

## 2024-04-04 ENCOUNTER — DOCUMENTATION ONLY (OUTPATIENT)
Dept: GERIATRICS | Facility: CLINIC | Age: 84
End: 2024-04-04
Payer: COMMERCIAL

## 2024-04-04 VITALS
OXYGEN SATURATION: 98 % | TEMPERATURE: 98.7 F | RESPIRATION RATE: 16 BRPM | HEART RATE: 98 BPM | SYSTOLIC BLOOD PRESSURE: 154 MMHG | BODY MASS INDEX: 20.41 KG/M2 | DIASTOLIC BLOOD PRESSURE: 82 MMHG | WEIGHT: 108 LBS

## 2024-04-04 DIAGNOSIS — Z11.1 ENCOUNTER FOR SCREENING FOR RESPIRATORY TUBERCULOSIS: ICD-10-CM

## 2024-04-04 LAB
ANION GAP SERPL CALCULATED.3IONS-SCNC: 13 MMOL/L (ref 7–15)
BUN SERPL-MCNC: 7.6 MG/DL (ref 8–23)
CALCIUM SERPL-MCNC: 8.4 MG/DL (ref 8.8–10.2)
CHLORIDE SERPL-SCNC: 105 MMOL/L (ref 98–107)
CREAT SERPL-MCNC: 0.47 MG/DL (ref 0.51–0.95)
CREAT SERPL-MCNC: 0.48 MG/DL (ref 0.51–0.95)
DEPRECATED HCO3 PLAS-SCNC: 21 MMOL/L (ref 22–29)
EGFRCR SERPLBLD CKD-EPI 2021: >90 ML/MIN/1.73M2
EGFRCR SERPLBLD CKD-EPI 2021: >90 ML/MIN/1.73M2
GLUCOSE SERPL-MCNC: 110 MG/DL (ref 70–99)
MAGNESIUM SERPL-MCNC: 1.7 MG/DL (ref 1.7–2.3)
POTASSIUM SERPL-SCNC: 3.1 MMOL/L (ref 3.4–5.3)
POTASSIUM SERPL-SCNC: 3.2 MMOL/L (ref 3.4–5.3)
SODIUM SERPL-SCNC: 139 MMOL/L (ref 135–145)

## 2024-04-04 PROCEDURE — 250N000011 HC RX IP 250 OP 636: Performed by: HOSPITALIST

## 2024-04-04 PROCEDURE — 80048 BASIC METABOLIC PNL TOTAL CA: CPT | Performed by: HOSPITALIST

## 2024-04-04 PROCEDURE — 97535 SELF CARE MNGMENT TRAINING: CPT | Mod: GO

## 2024-04-04 PROCEDURE — 84132 ASSAY OF SERUM POTASSIUM: CPT | Performed by: INTERNAL MEDICINE

## 2024-04-04 PROCEDURE — 250N000013 HC RX MED GY IP 250 OP 250 PS 637: Performed by: INTERNAL MEDICINE

## 2024-04-04 PROCEDURE — 99239 HOSP IP/OBS DSCHRG MGMT >30: CPT | Performed by: HOSPITALIST

## 2024-04-04 PROCEDURE — 92526 ORAL FUNCTION THERAPY: CPT | Mod: GN | Performed by: SPEECH-LANGUAGE PATHOLOGIST

## 2024-04-04 PROCEDURE — 250N000013 HC RX MED GY IP 250 OP 250 PS 637: Performed by: HOSPITALIST

## 2024-04-04 PROCEDURE — 83735 ASSAY OF MAGNESIUM: CPT | Performed by: HOSPITALIST

## 2024-04-04 PROCEDURE — 97116 GAIT TRAINING THERAPY: CPT | Mod: GP

## 2024-04-04 PROCEDURE — 82565 ASSAY OF CREATININE: CPT | Performed by: INTERNAL MEDICINE

## 2024-04-04 PROCEDURE — 36415 COLL VENOUS BLD VENIPUNCTURE: CPT | Performed by: INTERNAL MEDICINE

## 2024-04-04 RX ORDER — ACETAMINOPHEN 325 MG/1
650 TABLET ORAL
DISCHARGE
Start: 2024-04-04 | End: 2024-04-18 | Stop reason: DRUGHIGH

## 2024-04-04 RX ORDER — LIDOCAINE 4 G/G
2 PATCH TOPICAL EVERY 24 HOURS
DISCHARGE
Start: 2024-04-04

## 2024-04-04 RX ORDER — VALACYCLOVIR HYDROCHLORIDE 1 G/1
1000 TABLET, FILM COATED ORAL EVERY 12 HOURS
DISCHARGE
Start: 2024-04-04 | End: 2024-04-09

## 2024-04-04 RX ADMIN — VALACYCLOVIR HYDROCHLORIDE 1000 MG: 1 TABLET, FILM COATED ORAL at 10:18

## 2024-04-04 RX ADMIN — ASPIRIN 81 MG: 81 TABLET, COATED ORAL at 10:18

## 2024-04-04 RX ADMIN — DICLOFENAC 2 G: 10 GEL TOPICAL at 15:05

## 2024-04-04 RX ADMIN — ACETAMINOPHEN 650 MG: 325 TABLET, FILM COATED ORAL at 10:40

## 2024-04-04 RX ADMIN — ACETAMINOPHEN 650 MG: 325 TABLET, FILM COATED ORAL at 15:04

## 2024-04-04 RX ADMIN — ZINC SULFATE 220 MG (50 MG) CAPSULE 220 MG: CAPSULE at 10:19

## 2024-04-04 RX ADMIN — ROSUVASTATIN CALCIUM 10 MG: 5 TABLET, FILM COATED ORAL at 11:07

## 2024-04-04 RX ADMIN — CYANOCOBALAMIN TAB 1000 MCG 1000 MCG: 1000 TAB at 10:19

## 2024-04-04 RX ADMIN — DICLOFENAC 2 G: 10 GEL TOPICAL at 10:17

## 2024-04-04 RX ADMIN — LIDOCAINE 2 PATCH: 4 PATCH TOPICAL at 10:19

## 2024-04-04 RX ADMIN — CEFTRIAXONE SODIUM 2 G: 2 INJECTION, POWDER, FOR SOLUTION INTRAMUSCULAR; INTRAVENOUS at 13:03

## 2024-04-04 RX ADMIN — Medication 1 TABLET: at 10:18

## 2024-04-04 ASSESSMENT — ACTIVITIES OF DAILY LIVING (ADL)
ADLS_ACUITY_SCORE: 58
ADLS_ACUITY_SCORE: 54
ADLS_ACUITY_SCORE: 58
ADLS_ACUITY_SCORE: 54
ADLS_ACUITY_SCORE: 58
ADLS_ACUITY_SCORE: 54
ADLS_ACUITY_SCORE: 54

## 2024-04-04 NOTE — PLAN OF CARE
Goal Outcome Evaluation:         Encephalopathy/sepsis.  Neuros - alert & oriented, forgetful & intermittent confusion overnight, following commands, vision deficits - glasses on and helpful; left hemiparesis/left facial droop/slurred speech from a prior stroke  VSS, RA.  Soft & bite sized diet/set up & encouraged, meds whole with water, declining ensure today/fair appetite.  Up with 2 assist/magen steady/gait belt to bedside commode/sat in recliner for meals & reclined feet after meals. Continent and incontinent of bladder/bm this am.  Chronic back pain, lidocaine patches on lower back - to be removed this evening, voltaren cream to posterior upper and middle back. Cold sore healed & dried up on left lower lip. Pt scoring green on the Aggression Stop Light Tool. Discharging to TCU at [facility] this afternoon, patient &  agree with plan, no unmet needs at this time. AVS reviewed with patient and /copy provided.

## 2024-04-04 NOTE — PLAN OF CARE
Pt here with Sepsis, Encephalopathy. A&Ox4, forgetful. Neuros are L sided weakness, L droop. . VSS, RA, sbp<180. Soft and bite sized diet, thin liquids. Takes pills whole. Up with A2/janneth steady. Bladder scanned for 678, 750 output for straight cath. Pt/family not wanting Jordan. Denies pain. Pt scoring green on the Aggression Stop Light Tool. Plan IS TCU. Discharge pending.

## 2024-04-04 NOTE — PROGRESS NOTES
Care Management Discharge Note    Discharge Date: 04/04/2024       Discharge Disposition:  [facility]CU  Discharge Services:  therapies  Discharge Transportation:      Private pay costs discussed: transportation costs    Does the patient's insurance plan have a 3 day qualifying hospital stay waiver?  Yes   Which insurance plan 3 day waiver is available? Alternative insurance waiver  Will the waiver be used for post-acute placement? No    PAS Confirmation Code:  [id number]  Patient/family educated on Medicare website which has current facility and service quality ratings:  yes    Education Provided on the Discharge Plan:  yes  Persons Notified of Discharge Plans: patient, bedside RN, facility, spouse   Patient/Family in Agreement with the Plan:  yes    Handoff Referral Completed: Yes    Additional Information:  Hospitalist informed SW that patient is ready to discharge, discharge orders completed. Patient has a shared bed at [facility] today after 1400. Hospitalist and SW met with patient to say [facility] has accepted for today, we do need to confirm the discharge plan with the spouse. Patient was agreeable to discharge but wants to confirm the location with spouse. SW left voicemail for spouse, as well as the hospitalist who left a voicemail for spouse. Orders faxed to facility.     Spoke to spouse a bit later on to confirm plan and provide information about the facility. He is agreeable and will plan to have a meeting sometime soon with the TCU to discuss goals there. [facility] wheelchair is scheduled for pickup between [phone] to go to [facility]CU. Admissions notified of discharge time.     PAS-RR    D: Per DHS regulation, SW completed and submitted PAS-RR to [place] [employer] Direct Connect via the Senior LinkAge Line.  PAS-RR confirmation # is : [id number]    P: Further questions may be directed to [facility] LinkAge Line at #[phone], option #4 for PAS-RR staff.    Blanca Vazquez, MSW, LGSW    Social Work   [facility]

## 2024-04-04 NOTE — PLAN OF CARE
PMH: HTN, HGLD, GERD, prior stroke with left chencho, depression, anxiety, ovarian cancer, meningioma, osteoporosis, herpes simplex.     Admit: 3/29 with confusion, brain mass changes.     Pain/comfort: Denies.    Assessment: Alert & oriented x4. Vital signs stable on room air. Neuro status assessed & monitored, WDL except: Left hemiplegia (LUE 4/5, LLE 3/5), left lower facial droop, slow & deliberate coordination, slightly slurred speech. Tends to lean right when sitting. Heart tones WDL. Scattered bruises on BUE. Urinary retention chronic according to patient & , BUS = 530 this shift, patient refused straight cath after reinforcing education -- purewick placed at patient's request. Potassium recheck in the AM.    Ana cardenas x2 assist. Soft & bite sized/thin. R PIV.    Vitals/neuro per unit protocol. Medications administered as ordered. Education regarding plan of care.    Laureen Valdivia RN on 4/4/2024 at 12:11 AM

## 2024-04-04 NOTE — PLAN OF CARE
Goal Outcome Evaluation:                      Patient has been discharged April 4, 2024 5:32 PM. Discharge instructions reviewed, with patient and  by prior RN. All belongings sent with pt and . Wheel chair ride to [facility]. Report given to [facility] just prior to discharge and pt had evening meal before discharge.

## 2024-04-04 NOTE — DISCHARGE SUMMARY
[facility]  Hospitalist Discharge Summary      Date of Admission:  3/29/2024  Date of Discharge:  4/4/2024  Discharging Provider: Jeffy Chavarria MD  Discharge Service: Hospitalist Service    Discharge Diagnoses   Acute encephalopathy, suspected infectious  Sepsis of unclear source  Lactic acidosis  Chronic neck and back pain  Hx muscle spasm  HSV  Hypokalemia   Hx meningioma  Hx CVA with left hemiparesis  Long QTc  Chronic normocytic anemia  HTN  HLD  GERD  Depression  Anxiety  Insomnia  Severe malnutrition  Oropharyngeal dysphagia   Hx ovarian cancer  Hx bladder cancer  Osteoporosis    Clinically Significant Risk Factors     # Severe Malnutrition: based on nutrition assessment      Follow-ups Needed After Discharge   Follow-up Appointments     Follow Up and recommended labs and tests      Follow up with Nursing home physician.  No follow up labs or test are   needed.            Discharge Disposition   Discharged to short-term care facility  Condition at discharge: Stable    Hospital Course   July Huang is a 83 year old female admitted on 3/29/2024.  past medical history of hypertension, hyperlipidemia, gastroesophageal reflux disease, cerebrovascular disease with prior stroke and left hemiparesis, depression, anxiety, ovarian cancer, bladder cancer, meningioma, osteoporosis, malnutrition, chronic neck and back pain, admitted 3/29/2024 with worsening confusion, recent urinary tract infection, and questionable changes in brain mass on CT imaging with past history of meningioma.      Note two prior hospitalizations in recent months, recently discharged from [facility] on 3/4/2024.       Acute encephalopathy, suspected infectious, resolving  Sepsis of unclear source, resolved  Lactic acidosis, resolved  Presented with several day history of increasing confusion (with past history of recurrent encephalopathy, typically related to urinary tract infection).  Admission CT  head with possible changes in established mass (no acute pathology) - however, NSG consulted and reviewed outside imaging and feel this is stable.  Initial vitals and work-up were unremarkable, but a few hours after admission became septic and placed on empiric vanco and zosyn.  Infectious work-up including blood cultures, UA, COVID/flu/RSV, CXR, CT abd/pelvis were all negative/wnl.  She was also noted to be on multiple agents that could effect mentation (Klonopin, hydrocodone, balofen) which were held.  Sepsis resolved and mentation gradually improved on empiric antibiotics (vanco discontinued day 2 with negative blood and nasal MRSA cultures, zosyn switched to ceftriaxone and complete 7-day total course in hospital).   feels she is very near mental baseline at discharge.  Could consider neuropsych testing in follow up with PCP, though  reports no concern for underlying cognitive impairment despite similar presentations prior.  Therapies recommended TCU.     History of chronic neck and back pain  Hx muscle spasms  She was placed on scheduled tylenol tid, lidocaine patches and voltaren gel with improvement.  May consider resuming baclofen at low dose if needed as now at mental baseline, but do not plan to resume Vicodin at discharge.     HSV  Noted active lesion of left lower lip following admission and initiated on valcyclovir bid x3 days starting 04/03/24.    Hypokalemia, resolved   Replaced per protocol.    Hx meningioma s/p radiation   Followed by Neurosurgery at [facility] with MRI stable 3/4/24 (performed at [facility]).  Neurosurgery consulted on admission, reviewed CT head and outside images and feel mass is stable and recommend follow up with outpatient surgeon.    Hx CVA with left hemiparesis  Baseline left-sided weakness with prior stroke; CT head on admission as above.  Continue PTA aspirin and statin.      Long QTc  QTc 510 on EKG 3/31/24.  Avoid QT prolonging agents    Chronic anemia,  normocytic  Historic baseline 9.6 from 3/3/2024 and 13.1 from 1/9/2023.  Hgb remained stable about 9 g/dL this admission.  B12 and folate wnl.  Iron studies 3/29/24 suggest deficiency and chronic disease with peripheral smear showing microcytic anemia and received dose of Venofer 4/1.    Hx Essential hypertension  Not currently on antihypertensives, though noted to have mild intermittent hypertension (140's) this admission.      Hyperlipidemia  Continue PTA Crestor     Gastroesophageal reflux disease  Continue PTA famotidine (Pepcid) as needed      History of depression and anxiety  Do not plan to resume PTA Klonopin at discharge.   Continue melatonin at bedtime.     Severe malnutrition  Oropharyngeal dysphagia, mild  Has subcutaneous fat and muscle loss on exam.  Nutrition and SLP consulted.  Recommend continued speech therapy at TCU.      History of ovarian cancer   History of bladder cancer   History of osteoporosis      Consultations This Hospital Stay   NEUROSURGERY IP CONSULT  PHARMACY IP CONSULT  NUTRITION SERVICES ADULT IP CONSULT  PHARMACY IP CONSULT  CARE MANAGEMENT / SOCIAL WORK IP CONSULT  PHYSICAL THERAPY ADULT IP CONSULT  OCCUPATIONAL THERAPY ADULT IP CONSULT  PHARMACY TO DOSE VANCO  SPEECH LANGUAGE PATH ADULT IP CONSULT  PHYSICAL THERAPY ADULT IP CONSULT  OCCUPATIONAL THERAPY ADULT IP CONSULT  SPEECH LANGUAGE PATH ADULT IP CONSULT    Code Status   No CPR- Do NOT Intubate    Time Spent on this Encounter   I, Jeffy Chavarria MD, personally saw the patient today and spent greater than 30 minutes discharging this patient.       Jeffy Chavarria MD  [facility] NEUROSCIENCE UNIT  [facility] [place] [address]  Phone: [phone]  ______________________________________________________________________    Physical Exam   Vital Signs: Temp: 97.6  F (36.4  C) Temp src: Oral BP: 127/72 Pulse: 101   Resp: 16 SpO2: 95 % O2 Device: None (Room air)    Weight: 108 lbs 0 oz  General  Appearance: Thin elderly female in NAD  Respiratory: lungs CTAB, no wheezes or crackles  Cardiovascular: RRR, normal s1/s2 without murmur  GI: normal bowel sounds  Other: Alert and oriented x4, chronic left sided weakness        Primary Care Physician   Denton Barahona    Discharge Orders      General info for SNF    Length of Stay Estimate: Short Term Care: Estimated # of Days <30  Condition at Discharge: Improving  Level of care:skilled   Rehabilitation Potential: Good  Admission H&P remains valid and up-to-date: Yes  Recent Chemotherapy: N/A  Use Nursing Home Standing Orders: Yes     Mantoux instructions    Give two-step Mantoux (PPD) Per Facility Policy Yes     Follow Up and recommended labs and tests    Follow up with Nursing home physician.  No follow up labs or test are needed.     Reason for your hospital stay    You were admitted for confusion, likely due to infection of unclear etiology, but you have improved with empiric antibiotic treatment.     Activity - Up with nursing assistance     Physical Therapy Adult Consult    Evaluate and treat as clinically indicated.    Reason:  physical deconditioning     Occupational Therapy Adult Consult    Evaluate and treat as clinically indicated.    Reason:  physical deconditioning     Speech Language Path Adult Consult    Evaluate and treat as clinically indicated.    Reason:  dysphagia     Diet    Follow this diet upon discharge:       Snacks/Supplements Adult: Ensure Enlive; Between Meals      Combination Diet Soft and Bite Sized Diet (level 6); Thin Liquids (level 0)       Significant Results and Procedures   Most Recent 3 CBC's:  Recent Labs   Lab Test 04/03/24  0754 04/02/24  0812 04/01/24  1244 03/30/24  0613   WBC 7.9  --  10.7 11.5*   HGB 9.6* 9.0* 9.2* 10.4*   MCV 79  --  77* 77*     --  384 453*     Most Recent 3 BMP's:  Recent Labs   Lab Test 04/04/24  0721 04/03/24  1534 04/03/24  0754 04/02/24  0812     --  140 145   POTASSIUM 3.2*  3.1*  3.6 2.8* 3.5  3.5   CHLORIDE 105  --  105 112*   CO2 21*  --  20* 20*   BUN 7.6*  --  9.1 13.3   CR 0.48*  0.47*  --  0.52 0.65   ANIONGAP 13  --  15 13   TOMEKA 8.4*  --  8.6* 8.6*   *  --  146* 128*     Most Recent 2 LFT's:  Recent Labs   Lab Test 04/01/24  0746 03/30/24  0613   AST 33 47*   ALT 15 13   ALKPHOS 58 74   BILITOTAL 0.3 0.4     7-Day Micro Results       Collected Updated Procedure Result Status      03/30/2024 1205 03/30/2024 1642 MRSA MSSA PCR, Nasal Swab [[id number]]    Swab from Nares, Bilateral    Final result Component Value   MRSA Target DNA Negative   SA Target DNA Negative            03/30/2024 0159 03/30/2024 0248 Asymptomatic Influenza A/B, RSV, & SARS-CoV2 PCR (COVID-19) Nose [[id number]]    Swab from Nose    Final result Component Value   Influenza A PCR Negative   Influenza B PCR Negative   RSV PCR Negative   SARS CoV2 PCR Negative   NEGATIVE: SARS-CoV-2 (COVID-19) RNA not detected, presumed negative.            03/29/2024 1303 04/03/2024 1532 Blood Culture Peripheral Blood [[id number]]   Peripheral Blood    Final result Component Value   Culture No Growth               03/29/2024 1303 04/03/2024 1532 Blood Culture Peripheral Blood [[id number]]   Peripheral Blood    Final result Component Value   Culture No Growth                     Most Recent Urinalysis:  Recent Labs   Lab Test 03/29/24  1245 10/17/22  0000 06/13/22  1120   COLOR Straw   < > Straw   APPEARANCE Clear  --   --    URINEGLC Negative  --   --    URINEBILI Negative  --   --    URINEKETONE Negative  --   --    SG 1.011   < > 1.015   UBLD Negative  --   --    URINEPH 7.0   < > 7.5   PROTEIN 30*  --   --    UROBILINOGEN  --   --  0.2   NITRITE Negative  --  Neg   LEUKEST Negative  --   --    RBCU <1   < > rare   WBCU 2   < > 0-3    < > = values in this interval not displayed.   ,   Results for orders placed or performed during the hospital encounter of 03/29/24   Head CT w/o contrast    Narrative    EXAM: CT HEAD  W/O CONTRAST  3/29/2024 2:24 PM     HISTORY:  AMS       COMPARISON:  No prior similar studies    TECHNIQUE: Using multidetector thin collimation helical acquisition  technique, axial, coronal and sagittal CT images from the skull base  to the vertex were obtained without intravenous contrast.   (topogram) image(s) also obtained and reviewed. Dose reduction  techniques were performed.    FINDINGS:  There is a 4.8 x 5.7 x 2.4 cm, iso/hyperdense, extra-axial mass over  the frontoparietal convexity. This mass encases the superior sagittal  sinus and has mass effect on the left greater than right underlying  parenchyma. No intracranial hemorrhage or midline shift. No acute loss  of gray-white matter differentiation in the cerebral hemispheres.  Ventricles are proportionate to the cerebral sulci. Clear basal  cisterns. Chronic lacunar infarct in the right corona radiata and  right cerebellar hemisphere. Mild diffuse cerebral volume loss. Mild  periventricular hypoattenuation, consistent with chronic small vessel  ischemic disease.    The bony calvaria and the bones of the skull base are normal. The  visualized portions of the paranasal sinuses and mastoid air cells are  clear. Grossly normal orbits.       Impression    IMPRESSION:   1. No acute intracranial pathology.   2. 4.8 x 5.7 x 2.4 cm, iso/hyperdense, extra-axial mass over the  frontoparietal convexity. This mass encases the superior sagittal  sinus and has mass effect on the left greater than right underlying  parenchyma. This likely represents a meningioma.    BENITEZ VELEZ MD         SYSTEM ID:  [id number]   XR Chest Port 1 View    Narrative    EXAM: XR CHEST PORT 1 VIEW  LOCATION: [facility]  DATE: 3/30/2024    INDICATION: RRT, hypoxia, fever  COMPARISON: 02/01/2022      Impression    IMPRESSION: Cardiomediastinal silhouette within normal limits. No focal consolidation or pleural effusion. Atherosclerotic aorta. Thoracolumbar  scoliosis.   CT Abdomen Pelvis w Contrast    Narrative    CT ABDOMEN PELVIS WITH CONTRAST 4/1/2024 3:23 PM    CLINICAL HISTORY: Reporting right upper quadrant pain, sepsis of  unclear etiology.    TECHNIQUE: CT scan of the abdomen and pelvis was performed following  injection of IV contrast. Multiplanar reformats were obtained. Dose  reduction techniques were used.  CONTRAST: 53 mL Isovue-370    COMPARISON: None.    FINDINGS:   LOWER CHEST: Small hiatal hernia.    HEPATOBILIARY: Small hepatic cyst. Cholelithiasis.    PANCREAS: Normal.    SPLEEN: Normal.    ADRENAL GLANDS: Normal.    KIDNEYS/BLADDER: 8 mm nonobstructing left renal calculus.    BOWEL: No obstruction or inflammatory changes. Moderate stool  throughout the colon. Large stool ball in the rectum.    PELVIC ORGANS: Normal.    ADDITIONAL FINDINGS: None.    MUSCULOSKELETAL: Left hip arthroplasty.      Impression    IMPRESSION:   1.  Cholelithiasis without signs of cholecystitis.  2.  No specific finding to explain sepsis.  3.  Constipation.    MARY ELLEN MANLEY MD         SYSTEM ID:  [id number]       Discharge Medications   Current Discharge Medication List        START taking these medications    Details   Lidocaine (LIDOCARE) 4 % Patch Place 2 patches onto the skin every 24 hours To prevent lidocaine toxicity, patient should be patch free for 12 hrs daily. Do NOT apply heat over patch    Associated Diagnoses: Chronic neck and back pain      !! melatonin 5 MG tablet Take 1 tablet (5 mg) by mouth at bedtime    Associated Diagnoses: Insomnia, unspecified type       !! - Potential duplicate medications found. Please discuss with provider.        CONTINUE these medications which have CHANGED    Details   acetaminophen (TYLENOL) 325 MG tablet Take 2 tablets (650 mg) by mouth 3 times daily (with meals)    Associated Diagnoses: Chronic neck and back pain      diclofenac (VOLTAREN) 1 % topical gel Apply 2 g topically 4 times daily To painful area of back    Associated  Diagnoses: Chronic neck and back pain      valACYclovir (VALTREX) 1000 mg tablet Take 1 tablet (1,000 mg) by mouth every 12 hours for 3 doses Then 2 times daily as needed for HSV ulcer recurrence    Associated Diagnoses: HSV (herpes simplex virus) infection           CONTINUE these medications which have NOT CHANGED    Details   Ascorbic Acid (VITAMIN C) 500 MG CAPS Take 1,000 mg by mouth daily      aspirin (ASA) 81 MG EC tablet Take 81 mg by mouth daily      calcium citrate-vitamin D (CITRACAL) 315-5 MG-MCG TABS per tablet Take 1 tablet by mouth daily      Cholecalciferol (VITAMIN D) 1000 UNIT capsule Take 1 capsule by mouth daily 5000 unit 2-3 times per week      CRANBERRY EXTRACT PO Take 1 capsule by mouth daily      cyanocobalamin (VITAMIN B-12) 1000 MCG tablet Take 1 tablet (1,000 mcg) by mouth daily  Qty: 100 tablet, Refills: 1    Comments: 06/28/22 patient reports taking this      famotidine (PEPCID) 20 MG tablet Take 1 tablet (20 mg) by mouth 2 times daily as needed  Qty: 180 tablet, Refills: 3    Associated Diagnoses: Oropharyngeal dysphagia      Lutein-Zeaxanthin 20-1 MG CAPS Take 1 capsule by mouth daily      !! melatonin 5 MG tablet Take 5 mg by mouth nightly as needed      polyethylene glycol (GLYCOLAX) 17 GM/Dose powder Take 1 Capful by mouth daily as needed for constipation      rosuvastatin (CRESTOR) 10 MG tablet Take 10 mg by mouth daily      zinc sulfate (ZINCATE) 220 (50 Zn) MG capsule Take 220 mg by mouth daily       !! - Potential duplicate medications found. Please discuss with provider.        STOP taking these medications       baclofen (LIORESAL) 10 MG tablet Comments:   Reason for Stopping:         clonazePAM (KLONOPIN) 0.5 MG tablet Comments:   Reason for Stopping:         HYDROcodone-acetaminophen (NORCO) 5-325 MG tablet Comments:   Reason for Stopping:         Lactobacillus Rhamnosus, GG, (RA PROBIOTIC DIGESTIVE CARE) CAPS Comments:   Reason for Stopping:         Quercetin 500 MG CAPS  Comments:   Reason for Stopping:             Allergies   Allergies   Allergen Reactions    Diltiazem Palpitations    Hydrochlorothiazide W-Amiloride Palpitations    Lisinopril Palpitations    Losartan Potassium Visual Disturbance    Meclizine Palpitations    Metoprolol Palpitations    Tetracyclines & Related Nausea and Vomiting

## 2024-04-05 ENCOUNTER — LAB REQUISITION (OUTPATIENT)
Dept: LAB | Facility: CLINIC | Age: 84
End: 2024-04-05
Payer: COMMERCIAL

## 2024-04-05 ENCOUNTER — TRANSITIONAL CARE UNIT VISIT (OUTPATIENT)
Dept: GERIATRICS | Facility: CLINIC | Age: 84
End: 2024-04-05
Payer: COMMERCIAL

## 2024-04-05 VITALS
WEIGHT: 108 LBS | DIASTOLIC BLOOD PRESSURE: 78 MMHG | RESPIRATION RATE: 18 BRPM | BODY MASS INDEX: 20.39 KG/M2 | HEART RATE: 78 BPM | SYSTOLIC BLOOD PRESSURE: 172 MMHG | HEIGHT: 61 IN | OXYGEN SATURATION: 99 % | TEMPERATURE: 98.4 F

## 2024-04-05 DIAGNOSIS — M54.9 CHRONIC NECK AND BACK PAIN: ICD-10-CM

## 2024-04-05 DIAGNOSIS — D32.9 MENINGIOMA (H): ICD-10-CM

## 2024-04-05 DIAGNOSIS — R41.89 COGNITIVE IMPAIRMENT: Primary | ICD-10-CM

## 2024-04-05 DIAGNOSIS — R53.81 PHYSICAL DECONDITIONING: ICD-10-CM

## 2024-04-05 DIAGNOSIS — M54.2 CHRONIC NECK AND BACK PAIN: ICD-10-CM

## 2024-04-05 DIAGNOSIS — F32.A ANXIETY AND DEPRESSION: ICD-10-CM

## 2024-04-05 DIAGNOSIS — G89.29 CHRONIC NECK AND BACK PAIN: ICD-10-CM

## 2024-04-05 DIAGNOSIS — R94.31 PROLONGED QT INTERVAL: ICD-10-CM

## 2024-04-05 DIAGNOSIS — D64.9 ANEMIA, UNSPECIFIED: ICD-10-CM

## 2024-04-05 DIAGNOSIS — R13.12 OROPHARYNGEAL DYSPHAGIA: ICD-10-CM

## 2024-04-05 DIAGNOSIS — I10 BENIGN ESSENTIAL HYPERTENSION: ICD-10-CM

## 2024-04-05 DIAGNOSIS — D64.9 CHRONIC ANEMIA: ICD-10-CM

## 2024-04-05 DIAGNOSIS — B00.9 HSV (HERPES SIMPLEX VIRUS) INFECTION: ICD-10-CM

## 2024-04-05 DIAGNOSIS — E87.6 HYPOKALEMIA: ICD-10-CM

## 2024-04-05 DIAGNOSIS — E83.51 HYPOCALCEMIA: ICD-10-CM

## 2024-04-05 DIAGNOSIS — I69.354 HEMIPARESIS AFFECTING LEFT SIDE AS LATE EFFECT OF CEREBROVASCULAR ACCIDENT (CVA) (H): ICD-10-CM

## 2024-04-05 DIAGNOSIS — E43 SEVERE MALNUTRITION (H): ICD-10-CM

## 2024-04-05 DIAGNOSIS — F41.9 ANXIETY AND DEPRESSION: ICD-10-CM

## 2024-04-05 PROCEDURE — 86481 TB AG RESPONSE T-CELL SUSP: CPT | Performed by: NURSE PRACTITIONER

## 2024-04-05 PROCEDURE — 99310 SBSQ NF CARE HIGH MDM 45: CPT | Performed by: NURSE PRACTITIONER

## 2024-04-05 PROCEDURE — P9604 ONE-WAY ALLOW PRORATED TRIP: HCPCS | Performed by: NURSE PRACTITIONER

## 2024-04-05 PROCEDURE — 36415 COLL VENOUS BLD VENIPUNCTURE: CPT | Performed by: NURSE PRACTITIONER

## 2024-04-05 NOTE — PLAN OF CARE
Occupational Therapy Discharge Summary    Reason for therapy discharge:    Discharged to transitional care facility.    Progress towards therapy goal(s). See goals on Care Plan in [facility] electronic health record for goal details.  Goals partially met.  Barriers to achieving goals:   discharge from facility.    Therapy recommendation(s):      Continued therapy is recommended.  Rationale/Recommendations:   .OT Rationale for DC Rec: pt improved today this date. recommend TCU. likely would benefit from more supportive living at discharge from TCU    Writing therapist did not treat pt, note written based on previous treating therapist notes and recommendations. Please see chart and flow sheet for additional details.

## 2024-04-05 NOTE — PLAN OF CARE
Physical Therapy Discharge Summary    Reason for therapy discharge:    Discharged to transitional care facility.    Progress towards therapy goal(s). See goals on Care Plan in [facility] electronic health record for goal details.  Goals not met.  Barriers to achieving goals:   discharge from facility.    Therapy recommendation(s):    Continued therapy is recommended.  Rationale/Recommendations:  PT at TCU to progress safety and independence with mobility.

## 2024-04-05 NOTE — LETTER
4/5/2024        RE: July Huang  [address]  [facility] [address]        [facility] GERIATRICS    PRIMARY CARE PROVIDER AND CLINIC:  Denton Barahona MD, [address] / [facility] [address]  Chief Complaint   Patient presents with     [facility] Medical Record Number:  [id number]  Place of Service where encounter took place:  [facility] (TCU) [[id number]]    July Huang  is a 83 year old  (1940), admitted to the above facility from  [facility]. Hospital stay 3/29/24 through 4/4/24..       HPI:      PMH: hypertension, hyperlipidemia, gastroesophageal reflux disease, cerebrovascular disease with prior stroke and left hemiparesis, depression, anxiety, ovarian cancer, bladder cancer, meningioma, osteoporosis, malnutrition, chronic neck and back pain     Admitted to [facility] 3/29-4/4/24 due to increased confusion, recent UTI, concern for changes in brain mass on CT w/hx meningioma. NSG consulted, reported images stable. After admission, patient became septic and started on vanco and zosyn, then transitioned to ceftriaxone x 7 days. ID work-up negative for acute findings. PTA Klonopin, hydrocodone, balofen held. Consider outpatient neurospych testing.    Transferred to [facility] on 4/4/24.      Today's concerns:    During exam, patient seen resting in bed. Reports doing well. Has L sided weakness following CVA, requesting possible room change to be able to accommodate bedside table on R side of bed. Admits to fair appetite, tolerating current diet. Denies coughing w/meals. Endorses chronic neck and back pain, requesting to restart PTA baclofen PRN; ok with starting at lower dose. Denies constipation, diarrhea. Denies chest pain, SOB, headache, syncope. PTA lives w/spouse, goal is to discharge home following TCU stay.      CODE STATUS/ADVANCE DIRECTIVES DISCUSSION:  DNR / DNI  ALLERGIES:   Allergies   Allergen Reactions      Diltiazem Palpitations     Hydrochlorothiazide W-Amiloride Palpitations     Lisinopril Palpitations     Losartan Potassium Visual Disturbance     Meclizine Palpitations     Metoprolol Palpitations     Tetracyclines & Related Nausea and Vomiting      PAST MEDICAL HISTORY:   Past Medical History:   Diagnosis Date     Cancer of ovary (H)      Cerebrovascular accident (CVA) (H) 11/30/2020     Depression, major, single episode, severe (H) 12/23/2022     Eye muscle weakness, left 2/17/2016     GERD (gastroesophageal reflux disease) 7/8/2009     H/O total hysterectomy with bilateral salpingo-oophorectomy (BSO) 1995    ovarian cancer     Irregular heart beat 2009    nl CT angiogram     Non-cardiac chest pain 7/8/2009     S/P coronary angiogram 2005    negative     Stress 12/22/2010     Upper back pain 2/17/2016      PAST SURGICAL HISTORY:   has a past surgical history that includes Ovary surgery; hysterectomy, pap no longer indicated; Hysterectomy total abdominal, bilateral salpingo-oophorectomy, combined (1995); and Biopsy breast.  FAMILY HISTORY: family history includes Asthma in her sister and sister; Cancer in her sister; Cerebrovascular Disease in her mother; Diabetes in her father; Heart Disease in her father; Lung Cancer in her sister; Neurologic Disorder in her sister; Parkinsonism in her sister.  SOCIAL HISTORY:   reports that she quit smoking about 29 years ago. Her smoking use included cigarettes. She has never used smokeless tobacco. She reports current alcohol use. She reports that she does not use drugs.  Patient's living condition: lives with spouse    Post Discharge Medication Reconciliation Status:   MED REC REQUIRED  Post Medication Reconciliation Status: discharge medications reconciled and changed, per note/orders    Current Outpatient Medications   Medication Sig Dispense Refill     acetaminophen (TYLENOL) 325 MG tablet Take 2 tablets (650 mg) by mouth 3 times daily (with meals)       Ascorbic Acid  "(VITAMIN C) 500 MG CAPS Take 1,000 mg by mouth daily       aspirin (ASA) 81 MG EC tablet Take 81 mg by mouth daily       calcium citrate-vitamin D (CITRACAL) 315-5 MG-MCG TABS per tablet Take 1 tablet by mouth daily       Cholecalciferol (VITAMIN D) 1000 UNIT capsule Take 1 capsule by mouth daily 5000 unit 2-3 times per week       CRANBERRY EXTRACT PO Take 1 capsule by mouth daily       cyanocobalamin (VITAMIN B-12) 1000 MCG tablet Take 1 tablet (1,000 mcg) by mouth daily 100 tablet 1     diclofenac (VOLTAREN) 1 % topical gel Apply 2 g topically 4 times daily To painful area of back       famotidine (PEPCID) 20 MG tablet Take 1 tablet (20 mg) by mouth 2 times daily as needed 180 tablet 3     Lidocaine (LIDOCARE) 4 % Patch Place 2 patches onto the skin every 24 hours To prevent lidocaine toxicity, patient should be patch free for 12 hrs daily. Do NOT apply heat over patch       Lutein-Zeaxanthin 20-1 MG CAPS Take 1 capsule by mouth daily       melatonin 5 MG tablet Take 1 tablet (5 mg) by mouth at bedtime       melatonin 5 MG tablet Take 5 mg by mouth nightly as needed       polyethylene glycol (GLYCOLAX) 17 GM/Dose powder Take 1 Capful by mouth daily as needed for constipation       rosuvastatin (CRESTOR) 10 MG tablet Take 10 mg by mouth daily       valACYclovir (VALTREX) 1000 mg tablet Take 1 tablet (1,000 mg) by mouth every 12 hours for 3 doses Then 2 times daily as needed for HSV ulcer recurrence       zinc sulfate (ZINCATE) 220 (50 Zn) MG capsule Take 220 mg by mouth daily       No current facility-administered medications for this visit.       ROS:  10 point ROS of systems including Constitutional, Eyes, Respiratory, Cardiovascular, Gastroenterology, Genitourinary, Integumentary, Musculoskeletal, Psychiatric were all negative except for pertinent positives noted in my HPI.      Vitals:  BP (!) 172/78   Pulse 78   Temp 98.4  F (36.9  C)   Resp 18   Ht 1.549 m (5' 1\")   Wt 49 kg (108 lb)   SpO2 99%   BMI " 20.41 kg/m    Exam:  GENERAL APPEARANCE:  Alert, in no distress, oriented, cooperative  ENT:  Mouth and posterior oropharynx normal, moist mucous membranes, normal hearing acuity  EYES:  EOM, conjunctivae, lids, pupils and irises normal, PERRL  RESP:  respiratory effort and palpation of chest normal, lungs clear to auscultation , no respiratory distress  CV:  regular rate and rhythm, no murmur, rub, or gallop, no edema  M/S:   Gait and station abnormal, resting in bed. L sided weakness r/t prior CVA.  SKIN:  Inspection of skin and subcutaneous tissue baseline, Palpation of skin and subcutaneous tissue baseline  NEURO:   Cranial nerves 2-12 are normal tested and grossly at patient's baseline, Examination of sensation by touch normal  PSYCH:  oriented X 3, affect and mood normal    Wt Readings from Last 4 Encounters:   04/05/24 49 kg (108 lb)   03/29/24 49 kg (108 lb)   02/05/24 49 kg (108 lb)   12/27/23 49.6 kg (109 lb 6.4 oz)         Lab/Diagnostic data:  Recent labs in [facility] reviewed by me today.   Most Recent 3 CBC's:  Recent Labs   Lab Test 04/03/24  0754 04/02/24  0812 04/01/24  1244 03/30/24  0613   WBC 7.9  --  10.7 11.5*   HGB 9.6* 9.0* 9.2* 10.4*   MCV 79  --  77* 77*     --  384 453*     Most Recent 3 BMP's:  Recent Labs   Lab Test 04/04/24  0721 04/03/24  1534 04/03/24  0754 04/02/24  0812     --  140 145   POTASSIUM 3.2*  3.1* 3.6 2.8* 3.5  3.5   CHLORIDE 105  --  105 112*   CO2 21*  --  20* 20*   BUN 7.6*  --  9.1 13.3   CR 0.48*  0.47*  --  0.52 0.65   ANIONGAP 13  --  15 13   TOMEKA 8.4*  --  8.6* 8.6*   *  --  146* 128*     Liver Function Studies -   Recent Labs   Lab Test 04/01/24  0746   PROTTOTAL 6.1*   ALBUMIN 3.6   BILITOTAL 0.3   ALKPHOS 58   AST 33   ALT 15       Ferritin   Date Value Ref Range Status   03/29/2024 21 11 - 328 ng/mL Final     Iron   Date Value Ref Range Status   03/29/2024 22 (L) 37 - 145 ug/dL Final     Iron Binding Capacity   Date Value Ref Range Status    03/29/2024 363 240 - 430 ug/dL Final         ASSESSMENT/PLAN:    (R41.89) Cognitive impairment  (primary encounter diagnosis)  (I69.354) Hemiparesis affecting left side as late effect of cerebrovascular accident (CVA) (H)  (R53.81) Physical deconditioning  Comment: Chronic cognitive impairment w/recent encephalopathy w/unclear source of sepsis during hospital stay. Completed course of antibiotics. Hx CVA w/left-sided weakness. Cognition back to baseline.   Plan:   - Check BMP, Hgb, vitamin D level on 4/8/24 dx anemia, hypokalemia, hypocalcemia  - Continue ASA, lipitor  - Continue PT, OT  - POLST: DNR/DNI  - SW following for discharge planning. Goal is to discharge home w/spouse.  - Reviewed patient status and treatment plan with RN, plans to review with /[facility] team options for alternative room to accommodate patient's L sided weakness    (M54.2,  M54.9,  G89.29) Chronic neck and back pain  Comment: Chronic; PTA norco and baclofen dc'd during hospital stay.  Plan:   - Add baclofen 5mg every 6hrs PRN dx muscle spasms  - Clarification: Vitamin D 1000units every day dx vitamin D deficiency  - Continue tylenol, lidocaine patches, voltaren gel    (B00.9) HSV (herpes simplex virus) infection  Comment: Active lesion of L lower lip, appears to be improving  Plan:   - Continue valcyclovir  - Monitor symptoms    (D32.9) Meningioma (H)  Comment: Chronic meningioma s/p radiation. MRI 3/4/24 stable.   Plan:   - Patient to follow-up with NSG as directed    (D64.9) Chronic anemia  Comment: Chronic anemia. Received IV venofer on 4/1/24. Vitamin B12, folic acid level normal.   Plan:   - Monitor Hgb    (I10) Benign essential hypertension  Comment: Chronic, no on anti-hypertensive medications  Plan:   - Monitor BP/HR    (R94.31) Prolonged QT interval  Comment: QTc 510 on 3/31/24  Plan:   - Avoid QTs prolonging agents    (F41.9,  F32.A) Anxiety and depression  Comment: Chronic. PTA klonopin dc'd during hospital stay.   Plan:   -  Continue melatonin  - Monitor changes in mood or behaviors    (E43) Severe malnutrition (H24)  (R13.12) Oropharyngeal dysphagia  Comment: Chronic malnutrition w/mild oropharyngeal dysphagia.  Plan:   - Continue DD3 w/thin liquid diet  - ST and dietician following  - Monitor intake, weights      Orders:  - Add baclofen 5mg every 6hrs PRN dx muscle spasms  - Check BMP, Hgb, vitamin D level on 4/8/24 dx anemia, hypokalemia, hypocalcemia  - Clarification: Vitamin D 1000units every day dx vitamin D deficiency        Total time spent during today's visit was 50 mins including patient visit and review of past records. Time also spent coordinating care with RN regarding patient status and treatment plan.     Electronically signed by:  SERGIO Ramos CNP                     Sincerely,        SERGIO Ramos CNP

## 2024-04-05 NOTE — PROGRESS NOTES
[facility] GERIATRICS    PRIMARY CARE PROVIDER AND CLINIC:  Denton Barahona MD, [address] / [facility] [address]  Chief Complaint   Patient presents with    [facility] Medical Record Number:  [id number]  Place of Service where encounter took place:  [facility] (TCU) [[id number]]    July Huang  is a 83 year old  (1940), admitted to the above facility from  [facility]. Hospital stay 3/29/24 through 4/4/24..       HPI:      PMH: hypertension, hyperlipidemia, gastroesophageal reflux disease, cerebrovascular disease with prior stroke and left hemiparesis, depression, anxiety, ovarian cancer, bladder cancer, meningioma, osteoporosis, malnutrition, chronic neck and back pain     Admitted to [facility] 3/29-4/4/24 due to increased confusion, recent UTI, concern for changes in brain mass on CT w/hx meningioma. NSG consulted, reported images stable. After admission, patient became septic and started on vanco and zosyn, then transitioned to ceftriaxone x 7 days. ID work-up negative for acute findings. PTA Klonopin, hydrocodone, balofen held. Consider outpatient neurospych testing.    Transferred to [facility]CU on 4/4/24.      Today's concerns:    During exam, patient seen resting in bed. Reports doing well. Has L sided weakness following CVA, requesting possible room change to be able to accommodate bedside table on R side of bed. Admits to fair appetite, tolerating current diet. Denies coughing w/meals. Endorses chronic neck and back pain, requesting to restart PTA baclofen PRN; ok with starting at lower dose. Denies constipation, diarrhea. Denies chest pain, SOB, headache, syncope. PTA lives w/spouse, goal is to discharge home following TCU stay.      CODE STATUS/ADVANCE DIRECTIVES DISCUSSION:  DNR / DNI  ALLERGIES:   Allergies   Allergen Reactions    Diltiazem Palpitations    Hydrochlorothiazide W-Amiloride Palpitations    Lisinopril  Palpitations    Losartan Potassium Visual Disturbance    Meclizine Palpitations    Metoprolol Palpitations    Tetracyclines & Related Nausea and Vomiting      PAST MEDICAL HISTORY:   Past Medical History:   Diagnosis Date    Cancer of ovary (H)     Cerebrovascular accident (CVA) (H) 11/30/2020    Depression, major, single episode, severe (H) 12/23/2022    Eye muscle weakness, left 2/17/2016    GERD (gastroesophageal reflux disease) 7/8/2009    H/O total hysterectomy with bilateral salpingo-oophorectomy (BSO) 1995    ovarian cancer    Irregular heart beat 2009    nl CT angiogram    Non-cardiac chest pain 7/8/2009    S/P coronary angiogram 2005    negative    Stress 12/22/2010    Upper back pain 2/17/2016      PAST SURGICAL HISTORY:   has a past surgical history that includes Ovary surgery; hysterectomy, pap no longer indicated; Hysterectomy total abdominal, bilateral salpingo-oophorectomy, combined (1995); and Biopsy breast.  FAMILY HISTORY: family history includes Asthma in her sister and sister; Cancer in her sister; Cerebrovascular Disease in her mother; Diabetes in her father; Heart Disease in her father; Lung Cancer in her sister; Neurologic Disorder in her sister; Parkinsonism in her sister.  SOCIAL HISTORY:   reports that she quit smoking about 29 years ago. Her smoking use included cigarettes. She has never used smokeless tobacco. She reports current alcohol use. She reports that she does not use drugs.  Patient's living condition: lives with spouse    Post Discharge Medication Reconciliation Status:   MED REC REQUIRED  Post Medication Reconciliation Status: discharge medications reconciled and changed, per note/orders    Current Outpatient Medications   Medication Sig Dispense Refill    acetaminophen (TYLENOL) 325 MG tablet Take 2 tablets (650 mg) by mouth 3 times daily (with meals)      Ascorbic Acid (VITAMIN C) 500 MG CAPS Take 1,000 mg by mouth daily      aspirin (ASA) 81 MG EC tablet Take 81 mg by mouth  "daily      calcium citrate-vitamin D (CITRACAL) 315-5 MG-MCG TABS per tablet Take 1 tablet by mouth daily      Cholecalciferol (VITAMIN D) 1000 UNIT capsule Take 1 capsule by mouth daily 5000 unit 2-3 times per week      CRANBERRY EXTRACT PO Take 1 capsule by mouth daily      cyanocobalamin (VITAMIN B-12) 1000 MCG tablet Take 1 tablet (1,000 mcg) by mouth daily 100 tablet 1    diclofenac (VOLTAREN) 1 % topical gel Apply 2 g topically 4 times daily To painful area of back      famotidine (PEPCID) 20 MG tablet Take 1 tablet (20 mg) by mouth 2 times daily as needed 180 tablet 3    Lidocaine (LIDOCARE) 4 % Patch Place 2 patches onto the skin every 24 hours To prevent lidocaine toxicity, patient should be patch free for 12 hrs daily. Do NOT apply heat over patch      Lutein-Zeaxanthin 20-1 MG CAPS Take 1 capsule by mouth daily      melatonin 5 MG tablet Take 1 tablet (5 mg) by mouth at bedtime      melatonin 5 MG tablet Take 5 mg by mouth nightly as needed      polyethylene glycol (GLYCOLAX) 17 GM/Dose powder Take 1 Capful by mouth daily as needed for constipation      rosuvastatin (CRESTOR) 10 MG tablet Take 10 mg by mouth daily      valACYclovir (VALTREX) 1000 mg tablet Take 1 tablet (1,000 mg) by mouth every 12 hours for 3 doses Then 2 times daily as needed for HSV ulcer recurrence      zinc sulfate (ZINCATE) 220 (50 Zn) MG capsule Take 220 mg by mouth daily       No current facility-administered medications for this visit.       ROS:  10 point ROS of systems including Constitutional, Eyes, Respiratory, Cardiovascular, Gastroenterology, Genitourinary, Integumentary, Musculoskeletal, Psychiatric were all negative except for pertinent positives noted in my HPI.      Vitals:  BP (!) 172/78   Pulse 78   Temp 98.4  F (36.9  C)   Resp 18   Ht 1.549 m (5' 1\")   Wt 49 kg (108 lb)   SpO2 99%   BMI 20.41 kg/m    Exam:  GENERAL APPEARANCE:  Alert, in no distress, oriented, cooperative  ENT:  Mouth and posterior oropharynx " normal, moist mucous membranes, normal hearing acuity  EYES:  EOM, conjunctivae, lids, pupils and irises normal, PERRL  RESP:  respiratory effort and palpation of chest normal, lungs clear to auscultation , no respiratory distress  CV:  regular rate and rhythm, no murmur, rub, or gallop, no edema  M/S:   Gait and station abnormal, resting in bed. L sided weakness r/t prior CVA.  SKIN:  Inspection of skin and subcutaneous tissue baseline, Palpation of skin and subcutaneous tissue baseline  NEURO:   Cranial nerves 2-12 are normal tested and grossly at patient's baseline, Examination of sensation by touch normal  PSYCH:  oriented X 3, affect and mood normal    Wt Readings from Last 4 Encounters:   04/05/24 49 kg (108 lb)   03/29/24 49 kg (108 lb)   02/05/24 49 kg (108 lb)   12/27/23 49.6 kg (109 lb 6.4 oz)         Lab/Diagnostic data:  Recent labs in [facility] reviewed by me today.   Most Recent 3 CBC's:  Recent Labs   Lab Test 04/03/24  0754 04/02/24  0812 04/01/24  1244 03/30/24  0613   WBC 7.9  --  10.7 11.5*   HGB 9.6* 9.0* 9.2* 10.4*   MCV 79  --  77* 77*     --  384 453*     Most Recent 3 BMP's:  Recent Labs   Lab Test 04/04/24  0721 04/03/24  1534 04/03/24  0754 04/02/24  0812     --  140 145   POTASSIUM 3.2*  3.1* 3.6 2.8* 3.5  3.5   CHLORIDE 105  --  105 112*   CO2 21*  --  20* 20*   BUN 7.6*  --  9.1 13.3   CR 0.48*  0.47*  --  0.52 0.65   ANIONGAP 13  --  15 13   TOMEKA 8.4*  --  8.6* 8.6*   *  --  146* 128*     Liver Function Studies -   Recent Labs   Lab Test 04/01/24  0746   PROTTOTAL 6.1*   ALBUMIN 3.6   BILITOTAL 0.3   ALKPHOS 58   AST 33   ALT 15       Ferritin   Date Value Ref Range Status   03/29/2024 21 11 - 328 ng/mL Final     Iron   Date Value Ref Range Status   03/29/2024 22 (L) 37 - 145 ug/dL Final     Iron Binding Capacity   Date Value Ref Range Status   03/29/2024 363 240 - 430 ug/dL Final         ASSESSMENT/PLAN:    (R41.89) Cognitive impairment  (primary encounter  diagnosis)  (I69.354) Hemiparesis affecting left side as late effect of cerebrovascular accident (CVA) (H)  (R53.81) Physical deconditioning  Comment: Chronic cognitive impairment w/recent encephalopathy w/unclear source of sepsis during hospital stay. Completed course of antibiotics. Hx CVA w/left-sided weakness. Cognition back to baseline.   Plan:   - Check BMP, Hgb, vitamin D level on 4/8/24 dx anemia, hypokalemia, hypocalcemia  - Continue ASA, lipitor  - Continue PT, OT  - POLST: DNR/DNI  - SW following for discharge planning. Goal is to discharge home w/spouse.  - Reviewed patient status and treatment plan with RN, [facility] for alternative room to accommodate patient's L sided weakness    (M54.2,  M54.9,  G89.29) Chronic neck and back pain  Comment: Chronic; PTA norco and baclofen dc'd during hospital stay.  Plan:   - Add baclofen 5mg every 6hrs PRN dx muscle spasms  - Clarification: Vitamin D 1000units every day dx vitamin D deficiency  - Continue tylenol, lidocaine patches, voltaren gel    (B00.9) HSV (herpes simplex virus) infection  Comment: Active lesion of L lower lip, appears to be improving  Plan:   - Continue valcyclovir  - Monitor symptoms    (D32.9) Meningioma (H)  Comment: Chronic meningioma s/p radiation. MRI 3/4/24 stable.   Plan:   - Patient to follow-up with NSG as directed    (D64.9) Chronic anemia  Comment: Chronic anemia. Received IV venofer on 4/1/24. Vitamin B12, folic acid level normal.   Plan:   - Monitor Hgb    (I10) Benign essential hypertension  Comment: Chronic, no on anti-hypertensive medications  Plan:   - Monitor BP/HR    (R94.31) Prolonged QT interval  Comment: QTc 510 on 3/31/24  Plan:   - Avoid QTs prolonging agents    (F41.9,  F32.A) Anxiety and depression  Comment: Chronic. PTA klonopin dc'd during hospital stay.   Plan:   - Continue melatonin  - Monitor changes in mood or behaviors    (E43) Severe malnutrition (H24)  (R13.12) Oropharyngeal  dysphagia  Comment: Chronic malnutrition w/mild oropharyngeal dysphagia.  Plan:   - Continue DD3 w/thin liquid diet  - ST and dietician following  - Monitor intake, weights      Orders:  - Add baclofen 5mg every 6hrs PRN dx muscle spasms  - Check BMP, Hgb, vitamin D level on 4/8/24 dx anemia, hypokalemia, hypocalcemia  - Clarification: Vitamin D 1000units every day dx vitamin D deficiency        Total time spent during today's visit was 50 mins including patient visit and review of past records. Time also spent coordinating care with RN regarding patient status and treatment plan.     Electronically signed by:  SERGIO Ramos CNP

## 2024-04-06 ENCOUNTER — DOCUMENTATION ONLY (OUTPATIENT)
Dept: OTHER | Facility: CLINIC | Age: 84
End: 2024-04-06
Payer: COMMERCIAL

## 2024-04-07 LAB
GAMMA INTERFERON BACKGROUND BLD IA-ACNC: 0.03 IU/ML
M TB IFN-G BLD-IMP: NEGATIVE
M TB IFN-G CD4+ BCKGRND COR BLD-ACNC: 8.58 IU/ML
MITOGEN IGNF BCKGRD COR BLD-ACNC: 0.03 IU/ML
MITOGEN IGNF BCKGRD COR BLD-ACNC: 0.03 IU/ML
QUANTIFERON MITOGEN: 8.61 IU/ML
QUANTIFERON NIL TUBE: 0.03 IU/ML
QUANTIFERON TB1 TUBE: 0.06 IU/ML
QUANTIFERON TB2 TUBE: 0.06

## 2024-04-08 LAB
ANION GAP SERPL CALCULATED.3IONS-SCNC: 11 MMOL/L (ref 7–15)
BUN SERPL-MCNC: 8.8 MG/DL (ref 8–23)
CALCIUM SERPL-MCNC: 8.6 MG/DL (ref 8.8–10.2)
CHLORIDE SERPL-SCNC: 103 MMOL/L (ref 98–107)
CREAT SERPL-MCNC: 0.56 MG/DL (ref 0.51–0.95)
DEPRECATED HCO3 PLAS-SCNC: 26 MMOL/L (ref 22–29)
EGFRCR SERPLBLD CKD-EPI 2021: 90 ML/MIN/1.73M2
GLUCOSE SERPL-MCNC: 89 MG/DL (ref 70–99)
HGB BLD-MCNC: 9.6 G/DL (ref 11.7–15.7)
POTASSIUM SERPL-SCNC: 3.5 MMOL/L (ref 3.4–5.3)
SODIUM SERPL-SCNC: 140 MMOL/L (ref 135–145)
VIT D+METAB SERPL-MCNC: 46 NG/ML (ref 20–50)

## 2024-04-08 PROCEDURE — 80048 BASIC METABOLIC PNL TOTAL CA: CPT | Performed by: NURSE PRACTITIONER

## 2024-04-08 PROCEDURE — P9604 ONE-WAY ALLOW PRORATED TRIP: HCPCS | Performed by: NURSE PRACTITIONER

## 2024-04-08 PROCEDURE — 85018 HEMOGLOBIN: CPT | Performed by: NURSE PRACTITIONER

## 2024-04-08 PROCEDURE — 36415 COLL VENOUS BLD VENIPUNCTURE: CPT | Performed by: NURSE PRACTITIONER

## 2024-04-08 PROCEDURE — 82306 VITAMIN D 25 HYDROXY: CPT | Performed by: NURSE PRACTITIONER

## 2024-04-09 ENCOUNTER — TRANSITIONAL CARE UNIT VISIT (OUTPATIENT)
Dept: GERIATRICS | Facility: CLINIC | Age: 84
End: 2024-04-09
Payer: COMMERCIAL

## 2024-04-09 VITALS
RESPIRATION RATE: 18 BRPM | HEIGHT: 61 IN | WEIGHT: 101.8 LBS | HEART RATE: 81 BPM | SYSTOLIC BLOOD PRESSURE: 152 MMHG | BODY MASS INDEX: 19.22 KG/M2 | OXYGEN SATURATION: 97 % | DIASTOLIC BLOOD PRESSURE: 86 MMHG | TEMPERATURE: 97.2 F

## 2024-04-09 DIAGNOSIS — R13.12 OROPHARYNGEAL DYSPHAGIA: ICD-10-CM

## 2024-04-09 DIAGNOSIS — I69.354 HEMIPARESIS AFFECTING LEFT SIDE AS LATE EFFECT OF CEREBROVASCULAR ACCIDENT (CVA) (H): ICD-10-CM

## 2024-04-09 DIAGNOSIS — R41.89 COGNITIVE IMPAIRMENT: Primary | ICD-10-CM

## 2024-04-09 DIAGNOSIS — D32.9 MENINGIOMA (H): ICD-10-CM

## 2024-04-09 DIAGNOSIS — D64.9 CHRONIC ANEMIA: ICD-10-CM

## 2024-04-09 DIAGNOSIS — R53.81 PHYSICAL DECONDITIONING: ICD-10-CM

## 2024-04-09 DIAGNOSIS — F41.9 ANXIETY AND DEPRESSION: ICD-10-CM

## 2024-04-09 DIAGNOSIS — F32.A ANXIETY AND DEPRESSION: ICD-10-CM

## 2024-04-09 PROCEDURE — 99309 SBSQ NF CARE MODERATE MDM 30: CPT | Performed by: NURSE PRACTITIONER

## 2024-04-09 RX ORDER — BACLOFEN 5 MG/1
5 TABLET ORAL EVERY 6 HOURS PRN
COMMUNITY
End: 2024-05-10

## 2024-04-09 RX ORDER — VITAMIN B COMPLEX
1 TABLET ORAL DAILY
COMMUNITY

## 2024-04-09 NOTE — PROGRESS NOTES
"[facility] GERIATRICS    Chief Complaint   Patient presents with    RECHECK     HPI:  July Huang is a 83 year old  (1940), who is being seen today for an episodic care visit at: [facility] ([facility]) [[id number]].       Today's concern is:     RN staff report patient dislikes dysphagia diet, plans to review with ST and dietician. Also report patient not sleeping well at night, will often call out for help throughout the night.    During exam, patient seen sleeping in bed. Able to wake up for visit today. Reports doing well. Admits to fatigue, states didn't sleep well last night and tired today. States lights were on all night causing difficulty to fall asleep. Has been participating in therapy. Denies back pain today. Admits to good appetite. Denies chest pain, SOB, headache, syncope.      Allergies, and PMH/PSH reviewed in EPIC today.    REVIEW OF SYSTEMS:  4 point ROS including Respiratory, CV, GI and , other than that noted in the HPI,  is negative      Objective:   BP (!) 152/86   Pulse 81   Temp 97.2  F (36.2  C)   Resp 18   Ht 1.549 m (5' 1\")   Wt 46.2 kg (101 lb 12.8 oz)   SpO2 97%   BMI 19.23 kg/m    GENERAL APPEARANCE:  Alert, in no distress, oriented, cooperative  RESP:  respiratory effort and palpation of chest normal, lungs clear to auscultation , no respiratory distress  CV:  regular rate and rhythm, no murmur, rub, or gallop, no edema  M/S:   Gait and station abnormal, resting in bed. L sided weakness r/t prior CVA.  SKIN:  Inspection of skin and subcutaneous tissue baseline, Palpation of skin and subcutaneous tissue baseline  NEURO:   Cranial nerves 2-12 are normal tested and grossly at patient's baseline, Examination of sensation by touch normal  PSYCH:  oriented X 3, affect and mood normal    Wt Readings from Last 4 Encounters:   04/09/24 46.2 kg (101 lb 12.8 oz)   04/05/24 49 kg (108 lb)   03/29/24 49 kg (108 lb)   02/05/24 49 kg (108 lb)       Recent " labs in EPIC reviewed by me today.    Most Recent 3 CBC's:  Recent Labs   Lab Test 04/08/24  0714 04/03/24  0754 04/02/24  0812 04/01/24  1244 03/30/24  0613   WBC  --  7.9  --  10.7 11.5*   HGB 9.6* 9.6* 9.0* 9.2* 10.4*   MCV  --  79  --  77* 77*   PLT  --  408  --  384 453*     Most Recent 3 BMP's:  Recent Labs   Lab Test 04/08/24  0714 04/04/24  0721 04/03/24  1534 04/03/24  0754    139  --  140   POTASSIUM 3.5 3.2*  3.1* 3.6 2.8*   CHLORIDE 103 105  --  105   CO2 26 21*  --  20*   BUN 8.8 7.6*  --  9.1   CR 0.56 0.48*  0.47*  --  0.52   ANIONGAP 11 13  --  15   TOMEKA 8.6* 8.4*  --  8.6*   GLC 89 110*  --  146*       TSH 3/1/24:  5.04 (H)        Latest Reference Range & Units 04/08/24 07:14   Vitamin D, Total (25-Hydroxy) 20 - 50 ng/mL 46     Ferritin   Date Value Ref Range Status   03/29/2024 21 11 - 328 ng/mL Final     Iron   Date Value Ref Range Status   03/29/2024 22 (L) 37 - 145 ug/dL Final     Iron Binding Capacity   Date Value Ref Range Status   03/29/2024 363 240 - 430 ug/dL Final         Assessment/Plan:    (R41.89) Cognitive impairment  (primary encounter diagnosis)  (I69.354) Hemiparesis affecting left side as late effect of cerebrovascular accident (CVA) (H)  (F41.9,  F32.A) Anxiety and depression  (R53.81) Physical deconditioning  Comment: Chronic cognitive impairment with hx CVA (2020) and ongoing physical deconditioning. Hx CVA w/left-sided weakness. Hx L femur fracture s/p L hip hemiarthroplasty on 12/21/23. SLUMS 17/30. Acute insomnia with intermittent behaviors at night.  Plan:   - Continue ASA, lipitor  - Continue melatonin  - Continue PT, OT  - SW following for discharge planning. Goal is to discharge home w/spouse.   - Consider repeat EKG due to hx prolonged QTc interval. Consider addition of remeron for insomnia, anxiety; patient declined at this time  - Left voicemail for John (spouse to review patient status and treatment plan    (D32.9) Meningioma (H)  Comment: Chronic meningioma  s/p radiation. MRI 3/4/24 stable.   Plan:   - Patient to follow-up with NSG as directed    (D64.9) Chronic anemia  Comment:  Chronic anemia. Received IV venofer on 4/1/24. Vitamin B12, folic acid level normal. Hgb stable.  Plan:   - Monitor Hgb    (R13.12) Oropharyngeal dysphagia  Comment: Chronic malnutrition w/mild oropharyngeal dysphagia.  Plan:   - Consider addition of remeron  - Continue DD2 w/thin liquid diet; diet downgraded on 4/5/24 due to coughing episode  - ST and dietician following  - Monitor intake, weights        Electronically signed by: SERGIO Ramos CNP

## 2024-04-12 ENCOUNTER — TRANSITIONAL CARE UNIT VISIT (OUTPATIENT)
Dept: GERIATRICS | Facility: CLINIC | Age: 84
End: 2024-04-12
Payer: COMMERCIAL

## 2024-04-12 VITALS
SYSTOLIC BLOOD PRESSURE: 118 MMHG | HEART RATE: 80 BPM | BODY MASS INDEX: 19.22 KG/M2 | WEIGHT: 101.8 LBS | TEMPERATURE: 97.8 F | RESPIRATION RATE: 18 BRPM | OXYGEN SATURATION: 95 % | DIASTOLIC BLOOD PRESSURE: 72 MMHG | HEIGHT: 61 IN

## 2024-04-12 DIAGNOSIS — D64.9 CHRONIC ANEMIA: ICD-10-CM

## 2024-04-12 DIAGNOSIS — F41.9 ANXIETY AND DEPRESSION: ICD-10-CM

## 2024-04-12 DIAGNOSIS — I69.354 HEMIPARESIS AFFECTING LEFT SIDE AS LATE EFFECT OF CEREBROVASCULAR ACCIDENT (CVA) (H): ICD-10-CM

## 2024-04-12 DIAGNOSIS — I10 BENIGN ESSENTIAL HYPERTENSION: ICD-10-CM

## 2024-04-12 DIAGNOSIS — R41.89 COGNITIVE IMPAIRMENT: Primary | ICD-10-CM

## 2024-04-12 DIAGNOSIS — R53.81 PHYSICAL DECONDITIONING: ICD-10-CM

## 2024-04-12 DIAGNOSIS — R13.12 OROPHARYNGEAL DYSPHAGIA: ICD-10-CM

## 2024-04-12 DIAGNOSIS — F32.A ANXIETY AND DEPRESSION: ICD-10-CM

## 2024-04-12 PROCEDURE — 99309 SBSQ NF CARE MODERATE MDM 30: CPT | Performed by: NURSE PRACTITIONER

## 2024-04-12 NOTE — PROGRESS NOTES
"[facility] GERIATRICS    Chief Complaint   Patient presents with    RECHECK     HPI:  July Huang is a 83 year old  (1940), who is being seen today for an episodic care visit at: [facility] ([facility]) [[id number]].       Today's concern is:     During exam, patient seen resting in bed. Reports doing well. Has been participating in therapy, feels stronger. Concerned about not taking PTA amlodipine, reports BP has been too high. Denies chest pain, SOB, headache, syncope. Confirms signed risk/benefit to upgrade diet from DD2 to DD3; aware of risks associated with pneumonia. Continues to work with ST and dietician. SW following for discharge planning. Goal is to discharge home w/spouse.       Allergies, and PMH/PSH reviewed in [facility] today.    REVIEW OF SYSTEMS:  4 point ROS including Respiratory, CV, GI and , other than that noted in the HPI,  is negative    Objective:   /72   Pulse 80   Temp 97.8  F (36.6  C)   Resp 18   Ht 1.549 m (5' 1\")   Wt 46.2 kg (101 lb 12.8 oz)   SpO2 95%   BMI 19.23 kg/m    GENERAL APPEARANCE:  Alert, in no distress, oriented, cooperative  RESP:  respiratory effort and palpation of chest normal, lungs clear to auscultation , no respiratory distress  CV:  regular rate and rhythm, no murmur, rub, or gallop, RLE +1 ankle/foot edema  M/S:   Gait and station abnormal, resting in bed. L sided weakness r/t prior CVA.  SKIN:  Inspection of skin and subcutaneous tissue baseline, Palpation of skin and subcutaneous tissue baseline  NEURO:   Cranial nerves 2-12 are normal tested and grossly at patient's baseline, Examination of sensation by touch normal  PSYCH:  oriented X 2, affect and mood normal    Recent labs in [facility] reviewed by me today.    Most Recent 3 CBC's:  Recent Labs   Lab Test 04/08/24  0714 04/03/24  0754 04/02/24  0812 04/01/24  1244 03/30/24  0613   WBC  --  7.9  --  10.7 11.5*   HGB 9.6* 9.6* 9.0* 9.2* 10.4*   MCV  --  79  --  77* 77*   PLT "  --  408  --  384 453*     Most Recent 3 BMP's:  Recent Labs   Lab Test 04/08/24  0714 04/04/24  0721 04/03/24  1534 04/03/24  0754    139  --  140   POTASSIUM 3.5 3.2*  3.1* 3.6 2.8*   CHLORIDE 103 105  --  105   CO2 26 21*  --  20*   BUN 8.8 7.6*  --  9.1   CR 0.56 0.48*  0.47*  --  0.52   ANIONGAP 11 13  --  15   TOMEKA 8.6* 8.4*  --  8.6*   GLC 89 110*  --  146*     Liver Function Studies -   Recent Labs   Lab Test 04/01/24  0746   PROTTOTAL 6.1*   ALBUMIN 3.6   BILITOTAL 0.3   ALKPHOS 58   AST 33   ALT 15       Ferritin   Date Value Ref Range Status   03/29/2024 21 11 - 328 ng/mL Final     Iron   Date Value Ref Range Status   03/29/2024 22 (L) 37 - 145 ug/dL Final     Iron Binding Capacity   Date Value Ref Range Status   03/29/2024 363 240 - 430 ug/dL Final         Assessment/Plan:    (R41.89) Cognitive impairment  (primary encounter diagnosis)  (I69.354) Hemiparesis affecting left side as late effect of cerebrovascular accident (CVA) (H)  (F41.9,  F32.A) Anxiety and depression  (R53.81) Physical deconditioning  Comment: Chronic cognitive impairment with hx CVA (2020) and ongoing physical deconditioning. Hx CVA w/left-sided weakness. Hx L femur fracture s/p L hip hemiarthroplasty on 12/21/23. SLUMS 17/30. Acute insomnia with intermittent behaviors at night.  Last therapy update 4/10/24:   Transfers min-CGA  Bed mobility mod  Unable to ambulate  Eating ind  Grooming/hygiene min  UB dressing mod  LB dressing max  Toileting max  Plan:   - Therapy to eval/treat edema  - Continue ASA, lipitor  - Continue melatonin  - Continue PT, OT  - SW following for discharge planning. Goal is to discharge home w/spouse.     (D64.9) Chronic anemia  Comment:  Chronic anemia. Received IV venofer on 4/1/24. Vitamin B12, folic acid level normal. Hgb stable.  Plan:   - Monitor Hgb     (R13.12) Oropharyngeal dysphagia  Comment: Chronic malnutrition w/mild oropharyngeal dysphagia.   Plan:   - Signed risk/benefit to increase diet to  DD3; monitor signs of aspiration  - ST and dietician following  - Monitor intake, weights     (I10) Benign essential hypertension  Comment: BP controlled. SBP range 90-140s over the past week.  Plan:   - Check orthostatic vital signs; update provider on 4/15/24 with results       Orders:  - Therapy to eval/treat edema  - Check orthostatic vital signs; update provider on 4/15/24 with results         Electronically signed by: SERGIO Ramos CNP

## 2024-04-17 ENCOUNTER — TRANSITIONAL CARE UNIT VISIT (OUTPATIENT)
Dept: GERIATRICS | Facility: CLINIC | Age: 84
End: 2024-04-17
Payer: COMMERCIAL

## 2024-04-17 VITALS
WEIGHT: 101.6 LBS | OXYGEN SATURATION: 97 % | SYSTOLIC BLOOD PRESSURE: 112 MMHG | HEART RATE: 86 BPM | TEMPERATURE: 97.6 F | DIASTOLIC BLOOD PRESSURE: 68 MMHG | RESPIRATION RATE: 18 BRPM | HEIGHT: 61 IN | BODY MASS INDEX: 19.18 KG/M2

## 2024-04-17 DIAGNOSIS — I69.354 HEMIPARESIS AFFECTING LEFT SIDE AS LATE EFFECT OF CEREBROVASCULAR ACCIDENT (CVA) (H): Primary | ICD-10-CM

## 2024-04-17 DIAGNOSIS — R53.81 PHYSICAL DECONDITIONING: ICD-10-CM

## 2024-04-17 DIAGNOSIS — R79.89 ELEVATED TSH: ICD-10-CM

## 2024-04-17 DIAGNOSIS — F41.9 ANXIETY AND DEPRESSION: ICD-10-CM

## 2024-04-17 DIAGNOSIS — F32.A ANXIETY AND DEPRESSION: ICD-10-CM

## 2024-04-17 DIAGNOSIS — E43 SEVERE MALNUTRITION (H): ICD-10-CM

## 2024-04-17 DIAGNOSIS — R13.12 OROPHARYNGEAL DYSPHAGIA: ICD-10-CM

## 2024-04-17 DIAGNOSIS — D64.9 CHRONIC ANEMIA: ICD-10-CM

## 2024-04-17 DIAGNOSIS — F41.9 ANXIETY: Primary | ICD-10-CM

## 2024-04-17 DIAGNOSIS — R41.89 COGNITIVE IMPAIRMENT: ICD-10-CM

## 2024-04-17 PROCEDURE — 99310 SBSQ NF CARE HIGH MDM 45: CPT | Performed by: NURSE PRACTITIONER

## 2024-04-17 RX ORDER — VALACYCLOVIR HYDROCHLORIDE 1 G/1
1000 TABLET, FILM COATED ORAL 2 TIMES DAILY PRN
COMMUNITY

## 2024-04-17 RX ORDER — CLONAZEPAM 0.5 MG/1
0.25 TABLET ORAL AT BEDTIME
Qty: 4 TABLET | Refills: 0 | Status: SHIPPED | OUTPATIENT
Start: 2024-04-17 | End: 2024-04-17

## 2024-04-17 RX ORDER — CLONAZEPAM 0.5 MG/1
0.25 TABLET ORAL AT BEDTIME
Qty: 5 TABLET | Refills: 0 | Status: SHIPPED | OUTPATIENT
Start: 2024-04-17 | End: 2024-04-23

## 2024-04-18 ENCOUNTER — TELEPHONE (OUTPATIENT)
Dept: GERIATRICS | Facility: CLINIC | Age: 84
End: 2024-04-18
Payer: COMMERCIAL

## 2024-04-18 VITALS
RESPIRATION RATE: 20 BRPM | OXYGEN SATURATION: 95 % | HEIGHT: 61 IN | WEIGHT: 101.6 LBS | BODY MASS INDEX: 19.18 KG/M2 | HEART RATE: 76 BPM | TEMPERATURE: 97.2 F | DIASTOLIC BLOOD PRESSURE: 78 MMHG | SYSTOLIC BLOOD PRESSURE: 137 MMHG

## 2024-04-18 RX ORDER — ACETAMINOPHEN 500 MG
1000 TABLET ORAL DAILY PRN
Status: ON HOLD | COMMUNITY
End: 2024-06-05

## 2024-04-18 NOTE — TELEPHONE ENCOUNTER
[facility] GERIATRIC SERVICES NON-EMERGENT ENCOUNTER    July Huang is a 83 year old  (1940)    Disposition  Pt requesting increase in Tylenol to home dosing.    ORDER given to Isela ALVARADO/TCU from Tonya Haase NP:  Increase Tylenol to 1000 mg PO TID      Electronically signed by:   Reba Gross RN

## 2024-04-18 NOTE — PROGRESS NOTES
[facility] GERIATRICS    PRIMARY CARE PROVIDER AND CLINIC:  Denton Barahona MD, [address] / [facility] [address]  Chief Complaint   Patient presents with    Hospital F/U      [place] Medical Record Number:  [id number]      Place of Service where encounter took place:  [facility] ([facility])      July Huang  is a 83 year old  (1940), admitted to the above facility from  [facility]. Hospital stay 3/29/24 through 4/4/24..      Hospital course was reviewed by me, is as per the hospital discharge summary and nurse practitioner note    Patient is a past medical history of hypertension, hyperlipidemia, gastroesophageal reflux disease, cerebrovascular disease with prior stroke and left hemiparesis, depression, anxiety, ovarian cancer, bladder cancer, meningioma, osteoporosis, malnutrition, chronic neck and back pain      She was hospitalized at [facility] 3/29-4/4/24 for the evaluation of increased confusion, recent UTI, concern for changes in brain mass on CT w/hx meningioma. NSG consulted, reported images stable. \\    After admission, patient became febrile with temperature 102 degrees, tachycardic, hypoxic, was noted to have leukocytosis, mildly elevated lactic acid level.  She was started on vanco and zosyn, then transitioned to ceftriaxone x 7 days.   UA unremarkable.  COVID, influenza, RSV testing negative.  Chest x-ray negative.  CT abdomen pelvis revealed cholelithiasis without cholecystitis.  Blood cultures negative.   PTA Klonopin, hydrocodone, balofen held.   Mental status felt to be back to baseline per family discussion and hospital    Other medical issues addressed included acute on chronic anemia.  Previous iron studies suggest iron deficiency and chronic disease.  Patient received 1 dose of IV iron.  Hemoglobin stable at around 9  History of hypertension stable on no medications  History of depression and anxiety.  Prior to admission  Klonopin was held and not reordered on discharge.  It was restarted at a dose of 0.25 mg at bedtime following TCU admission, after discussion between nurse practitioner and patient and .  She has taken this chronically for insomnia.  Patient was noted to have a probable herpetic lesion left lower lip following admission.  She was initiated on valacyclovir for 3 days.  Patient has a history of oropharyngeal dysphagia, was seen by nutrition and speech pathology.  She was discharged on a soft and bite-size diet with thin liquids.    At baseline, patient does have cognitive impairment related to CVA 2020 as well as mild left-sided weakness.  She recently fractured her left femur, status post hemiarthroplasty on 12/21/2023.  SLUMS17/30.  At baseline patient does not ambulate.    Patient's course since admission to the TCU has been unremarkable.  She has been participating in therapy.  Appetite has been good.  She denies cough, chest pain, shortness of breath, nausea, vomiting.  She denies left hip pain      CODE STATUS/ADVANCE DIRECTIVES DISCUSSION:  No CPR- Do NOT Intubate      ALLERGIES:   Allergies   Allergen Reactions    Diltiazem Palpitations    Hydrochlorothiazide W-Amiloride Palpitations    Lisinopril Palpitations    Losartan Potassium Visual Disturbance    Meclizine Palpitations    Metoprolol Palpitations    Tetracyclines & Related Nausea and Vomiting      PAST MEDICAL HISTORY:   Past Medical History:   Diagnosis Date    Cancer of ovary (H)     Cerebrovascular accident (CVA) (H) 11/30/2020    Depression, major, single episode, severe (H) 12/23/2022    Eye muscle weakness, left 2/17/2016    GERD (gastroesophageal reflux disease) 7/8/2009    H/O total hysterectomy with bilateral salpingo-oophorectomy (BSO) 1995    ovarian cancer    Irregular heart beat 2009    nl CT angiogram    Non-cardiac chest pain 7/8/2009    S/P coronary angiogram 2005    negative    Stress 12/22/2010    Upper back pain 2/17/2016       PAST SURGICAL HISTORY:   has a past surgical history that includes Ovary surgery; hysterectomy, pap no longer indicated; Hysterectomy total abdominal, bilateral salpingo-oophorectomy, combined (1995); and Biopsy breast.  FAMILY HISTORY: family history includes Asthma in her sister and sister; Cancer in her sister; Cerebrovascular Disease in her mother; Diabetes in her father; Heart Disease in her father; Lung Cancer in her sister; Neurologic Disorder in her sister; Parkinsonism in her sister.  SOCIAL HISTORY:   reports that she quit smoking about 29 years ago. Her smoking use included cigarettes. She has never used smokeless tobacco. She reports current alcohol use. She reports that she does not use drugs.  Patient's living condition: lives with spouse        Current Outpatient Medications   Medication Sig Dispense Refill    acetaminophen (TYLENOL) 325 MG tablet Take 2 tablets (650 mg) by mouth 3 times daily (with meals)      aspirin (ASA) 81 MG EC tablet Take 81 mg by mouth daily      Baclofen (LIORESAL) 5 MG tablet Take 5 mg by mouth every 6 hours as needed for muscle spasms      calcium citrate-vitamin D (CITRACAL) 315-5 MG-MCG TABS per tablet Take 1 tablet by mouth daily      clonazePAM (KLONOPIN) 0.5 MG tablet Take 0.5 tablets (0.25 mg) by mouth at bedtime 5 tablet 0    cyanocobalamin (VITAMIN B-12) 1000 MCG tablet Take 1 tablet (1,000 mcg) by mouth daily 100 tablet 1    diclofenac (VOLTAREN) 1 % topical gel Apply 2 g topically 4 times daily To painful area of back      famotidine (PEPCID) 20 MG tablet Take 1 tablet (20 mg) by mouth 2 times daily as needed 180 tablet 3    Lidocaine (LIDOCARE) 4 % Patch Place 2 patches onto the skin every 24 hours To prevent lidocaine toxicity, patient should be patch free for 12 hrs daily. Do NOT apply heat over patch      melatonin 5 MG tablet Take 1 tablet (5 mg) by mouth at bedtime (Patient taking differently: Take 5 mg by mouth at bedtime And 5mg at bedtime PRN)       "polyethylene glycol (GLYCOLAX) 17 GM/Dose powder Take 1 Capful by mouth daily as needed for constipation      rosuvastatin (CRESTOR) 10 MG tablet Take 10 mg by mouth daily      valACYclovir (VALTREX) 1000 mg tablet Take 1,000 mg by mouth 2 times daily as needed (HSV ulcer)      Vitamin D3 (CHOLECALCIFEROL) 25 mcg (1000 units) tablet Take 1 tablet by mouth daily       No current facility-administered medications for this visit.       ROS:  10 point ROS of systems including Constitutional, Eyes, Respiratory, Cardiovascular, Gastroenterology, Genitourinary, Integumentary, Musculoskeletal, Psychiatric were all negative except for pertinent positives noted in my HPI.    Vitals:  /78   Pulse 76   Temp 97.2  F (36.2  C)   Resp 20   Ht 1.549 m (5' 1\")   Wt 46.1 kg (101 lb 9.6 oz)   SpO2 95%   BMI 19.20 kg/m    Exam:  Pleasant, frail appearing female lying in bed  In good spirits, appears comfortable  HEENT oral mucosa moist  Neck supple  Lungs clear  CV RRR  Abdomen soft nondistended nontender  Extremities without edema  Left hip incision was not examined by me.  No pain with range of motion left hip  Neuro: Alert, oriented to person and place  Face symmetric.  Minimal left-sided weakness as assessed the patient lying in bed.    Lab/Diagnostic data:  Most Recent 3 CBC's:  Recent Labs   Lab Test 04/08/24  0714 04/03/24  0754 04/02/24  0812 04/01/24  1244 03/30/24  0613   WBC  --  7.9  --  10.7 11.5*   HGB 9.6* 9.6* 9.0* 9.2* 10.4*   MCV  --  79  --  77* 77*   PLT  --  408  --  384 453*     Most Recent 3 BMP's:  Recent Labs   Lab Test 04/08/24  0714 04/04/24  0721 04/03/24  1534 04/03/24  0754    139  --  140   POTASSIUM 3.5 3.2*  3.1* 3.6 2.8*   CHLORIDE 103 105  --  105   CO2 26 21*  --  20*   BUN 8.8 7.6*  --  9.1   CR 0.56 0.48*  0.47*  --  0.52   ANIONGAP 11 13  --  15   TOMEKA 8.6* 8.4*  --  8.6*   GLC 89 110*  --  146*     Most Recent 2 LFT's:  Recent Labs   Lab Test 04/01/24  0746 03/30/24  0613 "   AST 33 47*   ALT 15 13   ALKPHOS 58 74   BILITOTAL 0.3 0.4       Most Recent Anemia Panel:  Recent Labs   Lab Test 04/08/24  0714 04/03/24  0754 04/01/24  1244 03/30/24  0733 03/30/24  0613 03/29/24  1303   WBC  --  7.9   < >  --  11.5* 9.8   HGB 9.6* 9.6*   < >  --  10.4* 10.4*   HCT  --  30.5*   < >  --  32.3* 32.8*   MCV  --  79   < >  --  77* 79   PLT  --  408   < >  --  453* 459*   IRON  --   --   --   --   --  22*   IRONSAT  --   --   --   --   --  6*   RETICABSCT  --   --   --   --  0.039 0.035   RETP  --   --   --   --  0.9 0.8   FEB  --   --   --   --   --  363   SAMANTHA  --   --   --   --   --  21   B12  --   --   --   --   --  1,190   FOLIC  --   --   --  12.4  --   --     < > = values in this interval not displayed.       ASSESSMENT/PLAN:    Recent hospitalization for encephalopathy in the setting of presumed sepsis, with fever, hypoxia.  Source unclear however.  Possibly represent a viral illness the patient has a history of recurrent UTI.  Patient also did develop an outbreak of herpes simplex.  Unclear if however if febrile illness was related to herpetic infection.  Mental status felt to be improved, back to baseline at the time of hospital discharge.  History of baseline cognitive impairment, left-sided weakness related to past CVA.  Concern for polypharmacy contributing to confusion per hospital notes as prior to admission clonazepam was discontinued though has been resumed at a low dose at bedtime here in the TCU.  Plan: Continue therapies.  Ongoing review with patient's family regarding baseline level of function and safety back at home.  Monitor for recurrent fever    Malnutrition, likely related to cognitive impairment, dysphagia, possibly polypharmacy, recent acute febrile illnesses.    Plan: Dietitian, speech therapy following    History meningioma status post radiation  Stable per neurosurgery    History CVA with mild left-sided weakness  Plan: Continue aspirin, statin    Anemia possibly  multifactorial i.e. iron deficiency as well as acute/chronic disease  Plan: Monitor hemoglobin    History of chronic neck and back pain  Previously on hydrocodone and baclofen  Plan: Monitor, avoid CNS active medications as possible    History of ovarian and bladder cancer  Felt to be stable prior to admission  Plan: Routine follow-up      Kolby Hu MD

## 2024-04-20 ENCOUNTER — TRANSITIONAL CARE UNIT VISIT (OUTPATIENT)
Dept: GERIATRICS | Facility: CLINIC | Age: 84
End: 2024-04-20
Payer: COMMERCIAL

## 2024-04-20 DIAGNOSIS — G93.41 SEPTIC ENCEPHALOPATHY: ICD-10-CM

## 2024-04-20 DIAGNOSIS — F32.A ANXIETY AND DEPRESSION: ICD-10-CM

## 2024-04-20 DIAGNOSIS — I69.354 HEMIPARESIS AFFECTING LEFT SIDE AS LATE EFFECT OF CEREBROVASCULAR ACCIDENT (CVA) (H): ICD-10-CM

## 2024-04-20 DIAGNOSIS — R41.89 COGNITIVE IMPAIRMENT: ICD-10-CM

## 2024-04-20 DIAGNOSIS — E43 SEVERE MALNUTRITION (H): Primary | ICD-10-CM

## 2024-04-20 DIAGNOSIS — D64.9 CHRONIC ANEMIA: ICD-10-CM

## 2024-04-20 DIAGNOSIS — F41.9 ANXIETY AND DEPRESSION: ICD-10-CM

## 2024-04-20 PROCEDURE — 99305 1ST NF CARE MODERATE MDM 35: CPT | Performed by: INTERNAL MEDICINE

## 2024-04-20 NOTE — LETTER
4/20/2024        RE: July Huang  [address]  [facility] [address]        [facility] GERIATRICS    PRIMARY CARE PROVIDER AND CLINIC:  Denton Barahona MD, [address] / [facility] [address]  Chief Complaint   Patient presents with     Hospital F/U      [facility] Medical Record Number:  [id number]      Place of Service where encounter took place:  [facility] (TCU)      July Huang  is a 83 year old  (1940), admitted to the above facility from  [facility]. Hospital stay 3/29/24 through 4/4/24..      Hospital course was reviewed by me, is as per the hospital discharge summary and nurse practitioner note    Patient is a past medical history of hypertension, hyperlipidemia, gastroesophageal reflux disease, cerebrovascular disease with prior stroke and left hemiparesis, depression, anxiety, ovarian cancer, bladder cancer, meningioma, osteoporosis, malnutrition, chronic neck and back pain      She was hospitalized at [facility] 3/29-4/4/24 for the evaluation of increased confusion, recent UTI, concern for changes in brain mass on CT w/hx meningioma. NSG consulted, reported images stable. \\    After admission, patient became febrile with temperature 102 degrees, tachycardic, hypoxic, was noted to have leukocytosis, mildly elevated lactic acid level.  She was started on vanco and zosyn, then transitioned to ceftriaxone x 7 days.   UA unremarkable.  COVID, influenza, RSV testing negative.  Chest x-ray negative.  CT abdomen pelvis revealed cholelithiasis without cholecystitis.  Blood cultures negative.   PTA Klonopin, hydrocodone, balofen held.   Mental status felt to be back to baseline per family discussion and hospital    Other medical issues addressed included acute on chronic anemia.  Previous iron studies suggest iron deficiency and chronic disease.  Patient received 1 dose of IV iron.  Hemoglobin stable at around 9  History of  hypertension stable on no medications  History of depression and anxiety.  Prior to admission Klonopin was held and not reordered on discharge.  It was restarted at a dose of 0.25 mg at bedtime following TCU admission, after discussion between nurse practitioner and patient and .  She has taken this chronically for insomnia.  Patient was noted to have a probable herpetic lesion left lower lip following admission.  She was initiated on valacyclovir for 3 days.  Patient has a history of oropharyngeal dysphagia, was seen by nutrition and speech pathology.  She was discharged on a soft and bite-size diet with thin liquids.    At baseline, patient does have cognitive impairment related to CVA 2020 as well as mild left-sided weakness.  She recently fractured her left femur, status post hemiarthroplasty on 12/21/2023.  SLUMS17/30.  At baseline patient does not ambulate.    Patient's course since admission to the TCU has been unremarkable.  She has been participating in therapy.  Appetite has been good.  She denies cough, chest pain, shortness of breath, nausea, vomiting.  She denies left hip pain      CODE STATUS/ADVANCE DIRECTIVES DISCUSSION:  No CPR- Do NOT Intubate      ALLERGIES:   Allergies   Allergen Reactions     Diltiazem Palpitations     Hydrochlorothiazide W-Amiloride Palpitations     Lisinopril Palpitations     Losartan Potassium Visual Disturbance     Meclizine Palpitations     Metoprolol Palpitations     Tetracyclines & Related Nausea and Vomiting      PAST MEDICAL HISTORY:   Past Medical History:   Diagnosis Date     Cancer of ovary (H)      Cerebrovascular accident (CVA) (H) 11/30/2020     Depression, major, single episode, severe (H) 12/23/2022     Eye muscle weakness, left 2/17/2016     GERD (gastroesophageal reflux disease) 7/8/2009     H/O total hysterectomy with bilateral salpingo-oophorectomy (BSO) 1995    ovarian cancer     Irregular heart beat 2009    nl CT angiogram     Non-cardiac chest pain  7/8/2009     S/P coronary angiogram 2005    negative     Stress 12/22/2010     Upper back pain 2/17/2016      PAST SURGICAL HISTORY:   has a past surgical history that includes Ovary surgery; hysterectomy, pap no longer indicated; Hysterectomy total abdominal, bilateral salpingo-oophorectomy, combined (1995); and Biopsy breast.  FAMILY HISTORY: family history includes Asthma in her sister and sister; Cancer in her sister; Cerebrovascular Disease in her mother; Diabetes in her father; Heart Disease in her father; Lung Cancer in her sister; Neurologic Disorder in her sister; Parkinsonism in her sister.  SOCIAL HISTORY:   reports that she quit smoking about 29 years ago. Her smoking use included cigarettes. She has never used smokeless tobacco. She reports current alcohol use. She reports that she does not use drugs.  Patient's living condition: lives with spouse        Current Outpatient Medications   Medication Sig Dispense Refill     acetaminophen (TYLENOL) 325 MG tablet Take 2 tablets (650 mg) by mouth 3 times daily (with meals)       aspirin (ASA) 81 MG EC tablet Take 81 mg by mouth daily       Baclofen (LIORESAL) 5 MG tablet Take 5 mg by mouth every 6 hours as needed for muscle spasms       calcium citrate-vitamin D (CITRACAL) 315-5 MG-MCG TABS per tablet Take 1 tablet by mouth daily       clonazePAM (KLONOPIN) 0.5 MG tablet Take 0.5 tablets (0.25 mg) by mouth at bedtime 5 tablet 0     cyanocobalamin (VITAMIN B-12) 1000 MCG tablet Take 1 tablet (1,000 mcg) by mouth daily 100 tablet 1     diclofenac (VOLTAREN) 1 % topical gel Apply 2 g topically 4 times daily To painful area of back       famotidine (PEPCID) 20 MG tablet Take 1 tablet (20 mg) by mouth 2 times daily as needed 180 tablet 3     Lidocaine (LIDOCARE) 4 % Patch Place 2 patches onto the skin every 24 hours To prevent lidocaine toxicity, patient should be patch free for 12 hrs daily. Do NOT apply heat over patch       melatonin 5 MG tablet Take 1 tablet  "(5 mg) by mouth at bedtime (Patient taking differently: Take 5 mg by mouth at bedtime And 5mg at bedtime PRN)       polyethylene glycol (GLYCOLAX) 17 GM/Dose powder Take 1 Capful by mouth daily as needed for constipation       rosuvastatin (CRESTOR) 10 MG tablet Take 10 mg by mouth daily       valACYclovir (VALTREX) 1000 mg tablet Take 1,000 mg by mouth 2 times daily as needed (HSV ulcer)       Vitamin D3 (CHOLECALCIFEROL) 25 mcg (1000 units) tablet Take 1 tablet by mouth daily       No current facility-administered medications for this visit.       ROS:  10 point ROS of systems including Constitutional, Eyes, Respiratory, Cardiovascular, Gastroenterology, Genitourinary, Integumentary, Musculoskeletal, Psychiatric were all negative except for pertinent positives noted in my HPI.    Vitals:  /78   Pulse 76   Temp 97.2  F (36.2  C)   Resp 20   Ht 1.549 m (5' 1\")   Wt 46.1 kg (101 lb 9.6 oz)   SpO2 95%   BMI 19.20 kg/m    Exam:  Pleasant, frail appearing female lying in bed  In good spirits, appears comfortable  HEENT oral mucosa moist  Neck supple  Lungs clear  CV RRR  Abdomen soft nondistended nontender  Extremities without edema  Left hip incision was not examined by me.  No pain with range of motion left hip  Neuro: Alert, oriented to person and place  Face symmetric.  Minimal left-sided weakness as assessed the patient lying in bed.    Lab/Diagnostic data:  Most Recent 3 CBC's:  Recent Labs   Lab Test 04/08/24  0714 04/03/24  0754 04/02/24  0812 04/01/24  1244 03/30/24  0613   WBC  --  7.9  --  10.7 11.5*   HGB 9.6* 9.6* 9.0* 9.2* 10.4*   MCV  --  79  --  77* 77*   PLT  --  408  --  384 453*     Most Recent 3 BMP's:  Recent Labs   Lab Test 04/08/24  0714 04/04/24  0721 04/03/24  1534 04/03/24  0754    139  --  140   POTASSIUM 3.5 3.2*  3.1* 3.6 2.8*   CHLORIDE 103 105  --  105   CO2 26 21*  --  20*   BUN 8.8 7.6*  --  9.1   CR 0.56 0.48*  0.47*  --  0.52   ANIONGAP 11 13  --  15   TOMEKA 8.6* " 8.4*  --  8.6*   GLC 89 110*  --  146*     Most Recent 2 LFT's:  Recent Labs   Lab Test 04/01/24  0746 03/30/24  0613   AST 33 47*   ALT 15 13   ALKPHOS 58 74   BILITOTAL 0.3 0.4       Most Recent Anemia Panel:  Recent Labs   Lab Test 04/08/24  0714 04/03/24  0754 04/01/24  1244 03/30/24  0733 03/30/24  0613 03/29/24  1303   WBC  --  7.9   < >  --  11.5* 9.8   HGB 9.6* 9.6*   < >  --  10.4* 10.4*   HCT  --  30.5*   < >  --  32.3* 32.8*   MCV  --  79   < >  --  77* 79   PLT  --  408   < >  --  453* 459*   IRON  --   --   --   --   --  22*   IRONSAT  --   --   --   --   --  6*   RETICABSCT  --   --   --   --  0.039 0.035   RETP  --   --   --   --  0.9 0.8   FEB  --   --   --   --   --  363   SAMANTHA  --   --   --   --   --  21   B12  --   --   --   --   --  1,190   FOLIC  --   --   --  12.4  --   --     < > = values in this interval not displayed.       ASSESSMENT/PLAN:    Recent hospitalization for encephalopathy in the setting of presumed sepsis, with fever, hypoxia.  Source unclear however.  Possibly represent a viral illness the patient has a history of recurrent UTI.  Patient also did develop an outbreak of herpes simplex.  Unclear if however if febrile illness was related to herpetic infection.  Mental status felt to be improved, back to baseline at the time of hospital discharge.  History of baseline cognitive impairment, left-sided weakness related to past CVA.  Concern for polypharmacy contributing to confusion per hospital notes as prior to admission clonazepam was discontinued though has been resumed at a low dose at bedtime here in the TCU.  Plan: Continue therapies.  Ongoing review with patient's family regarding baseline level of function and safety back at home.  Monitor for recurrent fever    Malnutrition, likely related to cognitive impairment, dysphagia, possibly polypharmacy, recent acute febrile illnesses.    Plan: Dietitian, speech therapy following    History meningioma status post radiation  Stable  per neurosurgery    History CVA with mild left-sided weakness  Plan: Continue aspirin, statin    Anemia possibly multifactorial i.e. iron deficiency as well as acute/chronic disease  Plan: Monitor hemoglobin    History of chronic neck and back pain  Previously on hydrocodone and baclofen  Plan: Monitor, avoid CNS active medications as possible    History of ovarian and bladder cancer  Felt to be stable prior to admission  Plan: Routine follow-up      Kolby Hu MD                Sincerely,        Kolby Hu MD

## 2024-04-21 ENCOUNTER — LAB REQUISITION (OUTPATIENT)
Dept: LAB | Facility: CLINIC | Age: 84
End: 2024-04-21
Payer: COMMERCIAL

## 2024-04-21 DIAGNOSIS — D64.9 ANEMIA, UNSPECIFIED: ICD-10-CM

## 2024-04-21 DIAGNOSIS — R94.6 ABNORMAL RESULTS OF THYROID FUNCTION STUDIES: ICD-10-CM

## 2024-04-21 DIAGNOSIS — R13.19 OTHER DYSPHAGIA: ICD-10-CM

## 2024-04-22 LAB
ALBUMIN SERPL BCG-MCNC: 4.3 G/DL (ref 3.5–5.2)
ALP SERPL-CCNC: 87 U/L (ref 40–150)
ALT SERPL W P-5'-P-CCNC: 9 U/L (ref 0–50)
ANION GAP SERPL CALCULATED.3IONS-SCNC: 16 MMOL/L (ref 7–15)
AST SERPL W P-5'-P-CCNC: 16 U/L (ref 0–45)
BILIRUB SERPL-MCNC: 0.2 MG/DL
BUN SERPL-MCNC: 14.6 MG/DL (ref 8–23)
CALCIUM SERPL-MCNC: 9.4 MG/DL (ref 8.8–10.2)
CHLORIDE SERPL-SCNC: 99 MMOL/L (ref 98–107)
CREAT SERPL-MCNC: 0.58 MG/DL (ref 0.51–0.95)
DEPRECATED HCO3 PLAS-SCNC: 23 MMOL/L (ref 22–29)
EGFRCR SERPLBLD CKD-EPI 2021: 89 ML/MIN/1.73M2
ERYTHROCYTE [DISTWIDTH] IN BLOOD BY AUTOMATED COUNT: 20.8 % (ref 10–15)
GLUCOSE SERPL-MCNC: 123 MG/DL (ref 70–99)
HCT VFR BLD AUTO: 37.4 % (ref 35–47)
HGB BLD-MCNC: 11.5 G/DL (ref 11.7–15.7)
MCH RBC QN AUTO: 25.9 PG (ref 26.5–33)
MCHC RBC AUTO-ENTMCNC: 30.7 G/DL (ref 31.5–36.5)
MCV RBC AUTO: 84 FL (ref 78–100)
PLATELET # BLD AUTO: 422 10E3/UL (ref 150–450)
POTASSIUM SERPL-SCNC: 3.3 MMOL/L (ref 3.4–5.3)
PROT SERPL-MCNC: 7.3 G/DL (ref 6.4–8.3)
RBC # BLD AUTO: 4.44 10E6/UL (ref 3.8–5.2)
SODIUM SERPL-SCNC: 138 MMOL/L (ref 135–145)
T3FREE SERPL-MCNC: 2.7 PG/ML (ref 2–4.4)
T4 FREE SERPL-MCNC: 1.16 NG/DL (ref 0.9–1.7)
TSH SERPL DL<=0.005 MIU/L-ACNC: 3.3 UIU/ML (ref 0.3–4.2)
WBC # BLD AUTO: 6.2 10E3/UL (ref 4–11)

## 2024-04-22 PROCEDURE — 85048 AUTOMATED LEUKOCYTE COUNT: CPT | Performed by: NURSE PRACTITIONER

## 2024-04-22 PROCEDURE — 36415 COLL VENOUS BLD VENIPUNCTURE: CPT | Performed by: NURSE PRACTITIONER

## 2024-04-22 PROCEDURE — 84481 FREE ASSAY (FT-3): CPT | Performed by: NURSE PRACTITIONER

## 2024-04-22 PROCEDURE — 84439 ASSAY OF FREE THYROXINE: CPT | Performed by: NURSE PRACTITIONER

## 2024-04-22 PROCEDURE — 84443 ASSAY THYROID STIM HORMONE: CPT | Performed by: NURSE PRACTITIONER

## 2024-04-22 PROCEDURE — P9604 ONE-WAY ALLOW PRORATED TRIP: HCPCS | Performed by: NURSE PRACTITIONER

## 2024-04-22 PROCEDURE — 82247 BILIRUBIN TOTAL: CPT | Performed by: NURSE PRACTITIONER

## 2024-04-23 DIAGNOSIS — F41.9 ANXIETY AND DEPRESSION: ICD-10-CM

## 2024-04-23 DIAGNOSIS — F32.A ANXIETY AND DEPRESSION: ICD-10-CM

## 2024-04-23 RX ORDER — CLONAZEPAM 0.5 MG/1
0.25 TABLET ORAL AT BEDTIME
Qty: 15 TABLET | Refills: 0 | Status: SHIPPED | OUTPATIENT
Start: 2024-04-23 | End: 2024-04-26 | Stop reason: DRUGHIGH

## 2024-04-25 ENCOUNTER — TRANSITIONAL CARE UNIT VISIT (OUTPATIENT)
Dept: GERIATRICS | Facility: CLINIC | Age: 84
End: 2024-04-25
Payer: COMMERCIAL

## 2024-04-25 VITALS
HEIGHT: 61 IN | DIASTOLIC BLOOD PRESSURE: 70 MMHG | WEIGHT: 102.8 LBS | BODY MASS INDEX: 19.41 KG/M2 | SYSTOLIC BLOOD PRESSURE: 151 MMHG | OXYGEN SATURATION: 95 % | TEMPERATURE: 98.1 F | RESPIRATION RATE: 18 BRPM | HEART RATE: 93 BPM

## 2024-04-25 DIAGNOSIS — F32.A ANXIETY AND DEPRESSION: ICD-10-CM

## 2024-04-25 DIAGNOSIS — E43 SEVERE MALNUTRITION (H): ICD-10-CM

## 2024-04-25 DIAGNOSIS — F41.9 ANXIETY AND DEPRESSION: ICD-10-CM

## 2024-04-25 DIAGNOSIS — R13.12 OROPHARYNGEAL DYSPHAGIA: ICD-10-CM

## 2024-04-25 DIAGNOSIS — F03.A4 MILD DEMENTIA WITH ANXIETY, UNSPECIFIED DEMENTIA TYPE (H): Primary | ICD-10-CM

## 2024-04-25 PROCEDURE — 99309 SBSQ NF CARE MODERATE MDM 30: CPT | Performed by: NURSE PRACTITIONER

## 2024-04-25 NOTE — PROGRESS NOTES
"[facility] GERIATRICS    Chief Complaint   Patient presents with    RECHECK     HPI:  July Huang is a 83 year old  (1940), who is being seen today for an episodic care visit at: [facility] ([facility]) [[id number]].       Today's concern is:     Staff report patient has ongoing anxiety/intermittent agitation throughout the day and not sleeping well at night. Per staff report, often will start screaming when call light isn't answered immediately.    During exam, patient seen resting in bed. Reports doing ok, struggling with recent death in the family. Has been working with therapy, reports slow progress. Admits to good appetite. Sleeping well at night. Denies chest pain, SOB, headache, syncope.      Allergies, and PMH/PSH reviewed in [facility] today.    REVIEW OF SYSTEMS:  4 point ROS including Respiratory, CV, GI and , other than that noted in the HPI,  is negative      Objective:   BP (!) 151/70   Pulse 93   Temp 98.1  F (36.7  C)   Resp 18   Ht 1.549 m (5' 1\")   Wt 46.6 kg (102 lb 12.8 oz)   SpO2 95%   BMI 19.42 kg/m    GENERAL APPEARANCE:  Alert, in no distress, oriented, cooperative  RESP:  respiratory effort and palpation of chest normal, lungs clear to auscultation , no respiratory distress  CV:  regular rate and rhythm, no murmur, rub, or gallop, RLE +1 ankle/foot edema  M/S:   Gait and station abnormal, resting in bed. L sided weakness r/t prior CVA.  SKIN:  Inspection of skin and subcutaneous tissue baseline, Palpation of skin and subcutaneous tissue baseline  NEURO:   Cranial nerves 2-12 are normal tested and grossly at patient's baseline, Examination of sensation by touch normal  PSYCH:  oriented X 2, affect and mood normal      Wt Readings from Last 4 Encounters:   04/25/24 46.6 kg (102 lb 12.8 oz)   04/18/24 46.1 kg (101 lb 9.6 oz)   04/17/24 46.1 kg (101 lb 9.6 oz)   04/12/24 46.2 kg (101 lb 12.8 oz)       Recent labs in [facility] reviewed by me today.  Most Recent 3 " CBC's:  Recent Labs   Lab Test 04/22/24  0909 04/08/24  0714 04/03/24  0754 04/02/24  0812 04/01/24  1244   WBC 6.2  --  7.9  --  10.7   HGB 11.5* 9.6* 9.6*   < > 9.2*   MCV 84  --  79  --  77*     --  408  --  384    < > = values in this interval not displayed.     Most Recent 3 BMP's:  Recent Labs   Lab Test 04/22/24  0909 04/08/24  0714 04/04/24  0721    140 139   POTASSIUM 3.3* 3.5 3.2*  3.1*   CHLORIDE 99 103 105   CO2 23 26 21*   BUN 14.6 8.8 7.6*   CR 0.58 0.56 0.48*  0.47*   ANIONGAP 16* 11 13   TOMEKA 9.4 8.6* 8.4*   * 89 110*     Liver Function Studies -   Recent Labs   Lab Test 04/22/24  0909   PROTTOTAL 7.3   ALBUMIN 4.3   BILITOTAL 0.2   ALKPHOS 87   AST 16   ALT 9     TSH   Date Value Ref Range Status   04/22/2024 3.30 0.30 - 4.20 uIU/mL Final       EKG 4/24/24  Impression:   HR 83  QRS 70ms  QT interval 394ms  QTc interval 464ms          Estimated Creatinine Clearance: 54.1 mL/min (based on SCr of 0.58 mg/dL).        Assessment/Plan:    (I69.354) Hemiparesis affecting left side as late effect of cerebrovascular accident (CVA) (H)  (primary encounter diagnosis)  (R41.89) Cognitive impairment  (F41.9,  F32.A) Anxiety and depression  (R53.81) Physical deconditioning  Comment: Chronic cognitive impairment with hx CVA (2020) and ongoing physical deconditioning. Hx CVA w/left-sided weakness. Hx L femur fracture s/p L hip hemiarthroplasty on 12/21/23. Acute on chronic insomnia with intermittent behaviors at night.   PTA lives w/spouse. SLUMS 17/30. Unable to ambulate. Requires assistance w/ADLs.  Last therapy update 4/24/24:   Transfers mod  Bed mobility mod  Ambulating 8 feet in parallel bars max  Eating ind  Grooming/hygiene ind  UB dressing min  LB dressing max  Toileting mod  Working on swallowing with ST  Plan:   - Increase klonopin to 0.5mg at bedtime dx insomnia  - Add buspar 5mg BID at 8AM, 2PM dx anxiety  - Continue melatonin  - Continue ASA, lipitor   - Continue PT, OT  - SW  following for discharge planning. Goal is to discharge home w/spouse.   - House psychologist and  following    (E43) Severe malnutrition (H24)  (R13.12) Oropharyngeal dysphagia  Comment: Chronic malnutrition w/mild oropharyngeal dysphagia.   Plan:   - Continue DD3 diet, monitor signs of aspiration  - Continue ensure every day   - ST and dietician following  - Monitor intake, weights       Orders:  - Increase klonopin to 0.5mg at bedtime dx insomnia  - Add buspar 5mg BID at 8AM, 2PM dx anxiety        Electronically signed by: SERGIO Ramos CNP

## 2024-04-26 ENCOUNTER — LAB REQUISITION (OUTPATIENT)
Dept: LAB | Facility: CLINIC | Age: 84
End: 2024-04-26
Payer: COMMERCIAL

## 2024-04-26 DIAGNOSIS — E87.6 HYPOKALEMIA: ICD-10-CM

## 2024-04-26 DIAGNOSIS — E46 UNSPECIFIED PROTEIN-CALORIE MALNUTRITION (H): ICD-10-CM

## 2024-04-26 RX ORDER — BUSPIRONE HYDROCHLORIDE 5 MG/1
5 TABLET ORAL 2 TIMES DAILY
COMMUNITY
End: 2024-05-10

## 2024-04-26 RX ORDER — CLONAZEPAM 0.5 MG/1
0.5 TABLET ORAL AT BEDTIME
Qty: 20 TABLET | Refills: 0 | Status: SHIPPED | OUTPATIENT
Start: 2024-04-26 | End: 2024-05-15

## 2024-04-29 ENCOUNTER — TRANSITIONAL CARE UNIT VISIT (OUTPATIENT)
Dept: GERIATRICS | Facility: CLINIC | Age: 84
End: 2024-04-29
Payer: COMMERCIAL

## 2024-04-29 VITALS
OXYGEN SATURATION: 95 % | DIASTOLIC BLOOD PRESSURE: 88 MMHG | WEIGHT: 102.8 LBS | RESPIRATION RATE: 18 BRPM | BODY MASS INDEX: 19.41 KG/M2 | SYSTOLIC BLOOD PRESSURE: 167 MMHG | HEART RATE: 90 BPM | HEIGHT: 61 IN | TEMPERATURE: 97.1 F

## 2024-04-29 DIAGNOSIS — R53.81 PHYSICAL DECONDITIONING: ICD-10-CM

## 2024-04-29 DIAGNOSIS — I69.354 HEMIPARESIS AFFECTING LEFT SIDE AS LATE EFFECT OF CEREBROVASCULAR ACCIDENT (CVA) (H): ICD-10-CM

## 2024-04-29 DIAGNOSIS — F41.9 ANXIETY AND DEPRESSION: ICD-10-CM

## 2024-04-29 DIAGNOSIS — F32.A ANXIETY AND DEPRESSION: ICD-10-CM

## 2024-04-29 DIAGNOSIS — E43 SEVERE MALNUTRITION (H): ICD-10-CM

## 2024-04-29 DIAGNOSIS — R13.12 OROPHARYNGEAL DYSPHAGIA: ICD-10-CM

## 2024-04-29 DIAGNOSIS — F03.A4 MILD DEMENTIA WITH ANXIETY, UNSPECIFIED DEMENTIA TYPE (H): Primary | ICD-10-CM

## 2024-04-29 LAB
ANION GAP SERPL CALCULATED.3IONS-SCNC: 14 MMOL/L (ref 7–15)
BUN SERPL-MCNC: 14.7 MG/DL (ref 8–23)
CALCIUM SERPL-MCNC: 9.1 MG/DL (ref 8.8–10.2)
CHLORIDE SERPL-SCNC: 100 MMOL/L (ref 98–107)
CREAT SERPL-MCNC: 0.56 MG/DL (ref 0.51–0.95)
DEPRECATED HCO3 PLAS-SCNC: 24 MMOL/L (ref 22–29)
EGFRCR SERPLBLD CKD-EPI 2021: 90 ML/MIN/1.73M2
GLUCOSE SERPL-MCNC: 81 MG/DL (ref 70–99)
POTASSIUM SERPL-SCNC: 3.7 MMOL/L (ref 3.4–5.3)
SODIUM SERPL-SCNC: 138 MMOL/L (ref 135–145)

## 2024-04-29 PROCEDURE — 80048 BASIC METABOLIC PNL TOTAL CA: CPT | Performed by: NURSE PRACTITIONER

## 2024-04-29 PROCEDURE — 99309 SBSQ NF CARE MODERATE MDM 30: CPT | Performed by: NURSE PRACTITIONER

## 2024-04-29 PROCEDURE — P9604 ONE-WAY ALLOW PRORATED TRIP: HCPCS | Performed by: NURSE PRACTITIONER

## 2024-04-29 PROCEDURE — 36415 COLL VENOUS BLD VENIPUNCTURE: CPT | Performed by: NURSE PRACTITIONER

## 2024-04-29 NOTE — PROGRESS NOTES
"[facility] GERIATRICS    Chief Complaint   Patient presents with    RECHECK     HPI:  July Huang is a 83 year old  (1940), who is being seen today for an episodic care visit at: [facility] [[id number]].       Today's concern is:     Patient reports doing well. Has been participating in therapy, feels stronger. Goal is to discharge home w/spouse. States appetite improving, recently advanced to regular diet with ST. Denies coughing w/meals or difficulty swallowing. Denies chest pain, SOB, headache, syncope.      Allergies, and PMH/PSH reviewed in [facility] today.    REVIEW OF SYSTEMS:  4 point ROS including Respiratory, CV, GI and , other than that noted in the HPI,  is negative      Objective:   BP (!) 167/88   Pulse 90   Temp 97.1  F (36.2  C)   Resp 18   Ht 1.549 m (5' 1\")   Wt 46.6 kg (102 lb 12.8 oz)   SpO2 95%   BMI 19.42 kg/m    GENERAL APPEARANCE:  Alert, in no distress, oriented, cooperative  RESP:  respiratory effort and palpation of chest normal, lungs clear to auscultation , no respiratory distress  CV:  regular rate and rhythm, no murmur, rub, or gallop, RLE +1 ankle/foot edema  M/S:   Gait and station abnormal, resting in bed. L sided weakness r/t prior CVA.  SKIN:  Inspection of skin and subcutaneous tissue baseline, Palpation of skin and subcutaneous tissue baseline  NEURO:   Cranial nerves 2-12 are normal tested and grossly at patient's baseline, Examination of sensation by touch normal  PSYCH:  oriented X 2, affect and mood normal    Wt Readings from Last 4 Encounters:   04/29/24 46.6 kg (102 lb 12.8 oz)   04/25/24 46.6 kg (102 lb 12.8 oz)   04/18/24 46.1 kg (101 lb 9.6 oz)   04/17/24 46.1 kg (101 lb 9.6 oz)       Recent labs in [facility] reviewed by me today.    Most Recent 3 CBC's:  Recent Labs   Lab Test 04/22/24  0909 04/08/24  0714 04/03/24  0754 04/02/24  0812 04/01/24  1244   WBC 6.2  --  7.9  --  10.7   HGB 11.5* 9.6* 9.6*   < > 9.2*   MCV 84  --  " 79  --  77*     --  408  --  384    < > = values in this interval not displayed.     Most Recent 3 BMP's:  Recent Labs   Lab Test 04/22/24  0909 04/08/24  0714 04/04/24  0721    140 139   POTASSIUM 3.3* 3.5 3.2*  3.1*   CHLORIDE 99 103 105   CO2 23 26 21*   BUN 14.6 8.8 7.6*   CR 0.58 0.56 0.48*  0.47*   ANIONGAP 16* 11 13   TOMEKA 9.4 8.6* 8.4*   * 89 110*     Most Recent 2 LFT's:  Recent Labs   Lab Test 04/22/24  0909 04/01/24  0746   AST 16 33   ALT 9 15   ALKPHOS 87 58   BILITOTAL 0.2 0.3       Estimated Creatinine Clearance: 56 mL/min (based on SCr of 0.56 mg/dL).        Assessment/Plan:    (F03.A4) Mild dementia with anxiety, unspecified dementia type (H)  (primary encounter diagnosis)  (I69.354) Hemiparesis affecting left side as late effect of cerebrovascular accident (CVA) (H)  (F41.9,  F32.A) Anxiety and depression  (R53.81) Physical deconditioning  Comment: SLUMS 17/30, indicating mild-moderate dementia. Hx CVA w/left-sided weakness. Hx L femur fracture s/p L hip hemiarthroplasty on 12/21/23.   PTA lives w/spouse. Primarily wheelchair bound. Requires assistance with ADLs.   Last therapy update 4/24/24:   Transfers mod  Bed mobility mod  Ambulating 8 ft in parallel bars max  Eating ind  Grooming/hygiene ind  UB dressing min  LB dressing max  Toileting mod  Working on swallowing with ST  Plan:   - Continue buspar 5mg BID, klonopin 0.5mg at bedtime, melatonin  - Continue ASA, lipitor  - Continue PT, OT  - SW following for discharge planning. Goal is to discharge home w/spouse.   - House psychologist and  following  - Reviewed patient status and treatment plan with Deny (spouse). Reviewed SLUMS 17/30, as well as option for Neurology/Neuropsych referral for further cognitive testing.     (E43) Severe malnutrition (H24)  (R13.12) Oropharyngeal dysphagia  Comment: Chronic malnutrition w/hx dysphagia.   Plan:   - ST upgraded patient's diet to regular on 4/26/24; monitor for signs of  aspiration  - Continue ensure every day   - ST and dietician following  - Monitor intake, weights        Electronically signed by: SERGIO Ramos CNP

## 2024-05-02 ENCOUNTER — TRANSITIONAL CARE UNIT VISIT (OUTPATIENT)
Dept: GERIATRICS | Facility: CLINIC | Age: 84
End: 2024-05-02
Payer: COMMERCIAL

## 2024-05-02 VITALS
RESPIRATION RATE: 18 BRPM | OXYGEN SATURATION: 96 % | WEIGHT: 102.8 LBS | HEART RATE: 86 BPM | DIASTOLIC BLOOD PRESSURE: 62 MMHG | SYSTOLIC BLOOD PRESSURE: 129 MMHG | BODY MASS INDEX: 19.41 KG/M2 | TEMPERATURE: 97.9 F | HEIGHT: 61 IN

## 2024-05-02 DIAGNOSIS — F32.A ANXIETY AND DEPRESSION: ICD-10-CM

## 2024-05-02 DIAGNOSIS — I69.354 HEMIPARESIS AFFECTING LEFT SIDE AS LATE EFFECT OF CEREBROVASCULAR ACCIDENT (CVA) (H): ICD-10-CM

## 2024-05-02 DIAGNOSIS — F03.A4 MILD DEMENTIA WITH ANXIETY, UNSPECIFIED DEMENTIA TYPE (H): Primary | ICD-10-CM

## 2024-05-02 DIAGNOSIS — R53.81 PHYSICAL DECONDITIONING: ICD-10-CM

## 2024-05-02 DIAGNOSIS — R94.31 PROLONGED QT INTERVAL: ICD-10-CM

## 2024-05-02 DIAGNOSIS — F41.9 ANXIETY AND DEPRESSION: ICD-10-CM

## 2024-05-02 PROCEDURE — 99309 SBSQ NF CARE MODERATE MDM 30: CPT | Performed by: NURSE PRACTITIONER

## 2024-05-02 NOTE — PROGRESS NOTES
"[facility] GERIATRICS    Chief Complaint   Patient presents with    RECHECK     HPI:  July Huang is a 83 year old  (1940), who is being seen today for an episodic care visit at: [facility] ([facility]) [[id number]].       Today's concern is:     Staff report intermittent anxiety/screaming. Does better with 1:1 attention.    Reports doing well. Slept well last night, recently moved to private room. Has been participating in therapy, unable to ambulate w/walker. Has been walking w/parallel bars and gaining strength w/standing. Denies chest pain, SOB, headache, syncope.      Allergies, and PMH/PSH reviewed in [facility] today.    REVIEW OF SYSTEMS:  4 point ROS including Respiratory, CV, GI and , other than that noted in the HPI,  is negative      Objective:   /62   Pulse 86   Temp 97.9  F (36.6  C)   Resp 18   Ht 1.549 m (5' 1\")   Wt 46.6 kg (102 lb 12.8 oz)   SpO2 96%   BMI 19.42 kg/m    GENERAL APPEARANCE:  Alert, in no distress, oriented, cooperative  RESP:  respiratory effort and palpation of chest normal, lungs clear to auscultation , no respiratory distress  CV:  regular rate and rhythm, no murmur, rub, or gallop, RLE +1 ankle/foot edema  M/S:   Gait and station abnormal, primarily wheelchair bound. L sided weakness r/t prior CVA.  SKIN:  Inspection of skin and subcutaneous tissue baseline, Palpation of skin and subcutaneous tissue baseline  NEURO:   Cranial nerves 2-12 are normal tested and grossly at patient's baseline, Examination of sensation by touch normal  PSYCH:  oriented X 2, affect and mood normal    Wt Readings from Last 4 Encounters:   05/02/24 46.6 kg (102 lb 12.8 oz)   04/29/24 46.6 kg (102 lb 12.8 oz)   04/25/24 46.6 kg (102 lb 12.8 oz)   04/18/24 46.1 kg (101 lb 9.6 oz)       Recent labs in [facility] reviewed by me today.    Most Recent 3 CBC's:  Recent Labs   Lab Test 04/22/24  0909 04/08/24  0714 04/03/24  0754 04/02/24  0812 04/01/24  1244   WBC 6.2  --  " 7.9  --  10.7   HGB 11.5* 9.6* 9.6*   < > 9.2*   MCV 84  --  79  --  77*     --  408  --  384    < > = values in this interval not displayed.     Most Recent 3 BMP's:  Recent Labs   Lab Test 04/29/24  1045 04/22/24  0909 04/08/24  0714    138 140   POTASSIUM 3.7 3.3* 3.5   CHLORIDE 100 99 103   CO2 24 23 26   BUN 14.7 14.6 8.8   CR 0.56 0.58 0.56   ANIONGAP 14 16* 11   TOMEKA 9.1 9.4 8.6*   GLC 81 123* 89       EKG 4/24/24  Impression:   HR 83  QRS 70ms  QT interval 394ms  QTc interval 464ms      Assessment/Plan:    (F03.A4) Mild dementia with anxiety, unspecified dementia type (H)  (primary encounter diagnosis)  (I69.354) Hemiparesis affecting left side as late effect of cerebrovascular accident (CVA) (H)  (F41.9,  F32.A) Anxiety and depression  (R53.81) Physical deconditioning  (R94.31) Prolonged QT interval  Comment: SLUMS 17/30, indicating mild-moderate dementia. Hx CVA w/left-sided weakness. Hx L femur fracture s/p L hip hemiarthroplasty on 12/21/23. Hx prolonged QTc interval, last QTc interval 464ms noted on EKG 4/24/24.   PTA lives w/spouse. Primarily wheelchair bound. Requires assistance with ADLs.   Last therapy update 4/24/24:   Transfers mod  Bed mobility mod  Ambulating 8 ft in parallel bars max  Eating ind  Grooming/hygiene ind  UB dressing min  LB dressing max  Toileting mod  Working on swallowing with ST  Plan:   - Per chart review and discussion w/staff, behaviors seem to be associated with being alone. Offer activities for engagement during the day, encourage going to dining room for meals, participation in PT, OT.   - Consider addition of trazodone PRN for anxiety; avoid medications that could prolong QTc interval.  - Continue buspar 5mg BID, klonopin 0.5mg at bedtime, melatonin  - Continue ASA, lipitor  - Continue PT, OT  - SW following for discharge planning. Goal is to discharge home w/spouse.   - House psychologist and  following        Electronically signed by: Veena MIX  SERGIO Avitia CNP

## 2024-05-08 VITALS
HEART RATE: 71 BPM | HEIGHT: 61 IN | RESPIRATION RATE: 18 BRPM | DIASTOLIC BLOOD PRESSURE: 85 MMHG | BODY MASS INDEX: 19.37 KG/M2 | OXYGEN SATURATION: 96 % | WEIGHT: 102.6 LBS | TEMPERATURE: 98.1 F | SYSTOLIC BLOOD PRESSURE: 148 MMHG

## 2024-05-08 NOTE — PROGRESS NOTES
[facility] GERIATRICS DISCHARGE SUMMARY    PATIENT'S NAME: Jluy Huang  YOB: 1940  MEDICAL RECORD NUMBER:  [id number]  Place of Service where encounter took place:  [facility] ([facility]) [[id number]]    PRIMARY CARE PROVIDER AND CLINIC RESPONSIBLE AFTER TRANSFER:   Denton Barahona MD, [address] / [facility] [address]    Non-G Provider     Transferring providers: SERGIO Ramos CNP; Kolby Hu MD  Recent Hospitalization/ED:  [facility] Hospital stay 3/29/24 to 4/4/24.  Date of SNF Admission:  4/4/24  Date of SNF (anticipated) Discharge:  5/11/24  Discharged to: previous independent home  Cognitive Scores: SLUMS: 17/30  Physical Function: Ambulating 10 ft with walker. Requires mod-max assistance w/transfers, min-max assist with dressing and max assistance w/toileting.  DME: Wheelchair and Walker    CODE STATUS/ADVANCE DIRECTIVES DISCUSSION:  No CPR- Do NOT Intubate   ALLERGIES: Diltiazem, Hydrochlorothiazide w-amiloride, Lisinopril, Losartan potassium, Meclizine, Metoprolol, and Tetracyclines & related      NURSING FACILITY COURSE     Medication Changes/Rationale:   Restarted baclofen 5mg every 6hrs PRN dx muscle spasms  Added buspar 5mg BID dx anxiety  Restarted klonopin 0.5mg at bedtime dx insomnia      Summary of nursing facility stay:     PMH: hypertension, hyperlipidemia, gastroesophageal reflux disease, cerebrovascular disease with prior stroke and left hemiparesis, depression, anxiety, ovarian cancer, bladder cancer, meningioma, osteoporosis, malnutrition, chronic neck and back pain      Admitted to [facility] 3/29-4/4/24 due to increased confusion, recent UTI, concern for changes in brain mass on CT w/hx meningioma. NSG consulted, reported images stable. After admission, patient became septic and started on vanco and zosyn, then transitioned to ceftriaxone x 7 days. ID work-up negative for acute findings. PTA Klonopin,  hydrocodone, balofen held. Consider outpatient neurospych testing.     Transferred to [facility] on 4/4/24.       (R41.89) Cognitive impairment  (primary encounter diagnosis)  (I69.354) Hemiparesis affecting left side as late effect of cerebrovascular accident (CVA) (H)  (R53.81) Physical deconditioning  Comment: Chronic cognitive impairment/underlying dementia. Hx CVA w/left-sided weakness. Intermittent anxiety reported by staff. Ambulating 10 ft with walker. Requires mod-max assistance w/transfers, min-max assist with dressing and max assistance w/toileting.   Plan:   - Continue buspar, melatonin, klonopin  - Continue ASA, lipitor  - Continue PT, OT  - Patient discharging home w/spouse, as well as [facility] home care services, including home PT, OT, RN, HHA.      (M54.2,  M54.9,  G89.29) Chronic neck and back pain  Comment: Chronic; PTA norco and baclofen dc'd during hospital stay.  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 MRI 3/4/24 stable.   Plan:   - Patient to follow-up with NSG as directed     (D64.9) Chronic anemia  Comment: Chronic anemia. Received IV venofer on 4/1/24. Vitamin B12, folic acid level normal.   Plan:   - Monitor Hgb     (I10) Benign essential hypertension  Comment: Chronic, not on anti-hypertensive medications  Plan:   - Monitor BP/HR     (R94.31) Prolonged QT interval  Comment: QTc 510 on 3/31/24  Plan:   - Avoid QTs prolonging agents     (F41.9,  F32.A) Anxiety and depression  Comment: Chronic. PTA klonopin dc'd during hospital stay.   Plan:   - Continue melatonin  - Monitor changes in mood or behaviors     (E43) Severe malnutrition (H24)  (R13.12) Oropharyngeal dysphagia  Comment: Chronic malnutrition w/mild oropharyngeal  dysphagia.  Plan:   - Diet recently advanced to regular  - ST and dietician following  - Monitor intake, weights      Discharge Medications:    MED REC REQUIRED  Post Medication Reconciliation Status: discharge medications reconciled and changed, per note/orders     Current Outpatient Medications   Medication Sig Dispense Refill    acetaminophen (TYLENOL) 500 MG tablet Take 1,000 mg by mouth 3 times daily      aspirin (ASA) 81 MG EC tablet Take 81 mg by mouth daily      Baclofen (LIORESAL) 5 MG tablet Take 5 mg by mouth every 6 hours as needed for muscle spasms      busPIRone (BUSPAR) 5 MG tablet Take 5 mg by mouth 2 times daily      calcium citrate-vitamin D (CITRACAL) 315-5 MG-MCG TABS per tablet Take 1 tablet by mouth daily      clonazePAM (KLONOPIN) 0.5 MG tablet Take 1 tablet (0.5 mg) by mouth at bedtime 20 tablet 0    cyanocobalamin (VITAMIN B-12) 1000 MCG tablet Take 1 tablet (1,000 mcg) by mouth daily 100 tablet 1    diclofenac (VOLTAREN) 1 % topical gel Apply 2 g topically 4 times daily To painful area of back      famotidine (PEPCID) 20 MG tablet Take 1 tablet (20 mg) by mouth 2 times daily as needed 180 tablet 3    Lidocaine (LIDOCARE) 4 % Patch Place 2 patches onto the skin every 24 hours To prevent lidocaine toxicity, patient should be patch free for 12 hrs daily. Do NOT apply heat over patch      melatonin 5 MG tablet Take 1 tablet (5 mg) by mouth at bedtime (Patient taking differently: Take 5 mg by mouth at bedtime And 5mg at bedtime PRN)      polyethylene glycol (GLYCOLAX) 17 GM/Dose powder Take 1 Capful by mouth daily as needed for constipation      rosuvastatin (CRESTOR) 10 MG tablet Take 10 mg by mouth daily      valACYclovir (VALTREX) 1000 mg tablet Take 1,000 mg by mouth 2 times daily as needed (HSV ulcer)      Vitamin D3 (CHOLECALCIFEROL) 25 mcg (1000 units) tablet Take 1 tablet by mouth daily          Controlled medications:   Medication: klonopin , 20 tabs given to patient at the time of  "discharge to take home     Past Medical History:   Past Medical History:   Diagnosis Date    Cancer of ovary (H)     Cerebrovascular accident (CVA) (H) 11/30/2020    Depression, major, single episode, severe (H) 12/23/2022    Eye muscle weakness, left 2/17/2016    GERD (gastroesophageal reflux disease) 7/8/2009    H/O total hysterectomy with bilateral salpingo-oophorectomy (BSO) 1995    ovarian cancer    Irregular heart beat 2009    nl CT angiogram    Non-cardiac chest pain 7/8/2009    S/P coronary angiogram 2005    negative    Stress 12/22/2010    Upper back pain 2/17/2016       Physical Exam:   Vitals: BP (!) 148/85   Pulse 71   Temp 98.1  F (36.7  C)   Resp 18   Ht 1.549 m (5' 1\")   Wt 46.5 kg (102 lb 9.6 oz)   SpO2 96%   BMI 19.39 kg/m    BMI: Body mass index is 19.39 kg/m .  GENERAL APPEARANCE:  Alert, in no distress, oriented, cooperative  RESP:  respiratory effort and palpation of chest normal, lungs clear to auscultation , no respiratory distress  CV:  regular rate and rhythm, no murmur, rub, or gallop, RLE +1 ankle/foot edema  M/S:   Gait and station abnormal, primarily wheelchair bound. L sided weakness r/t prior CVA.  SKIN:  Inspection of skin and subcutaneous tissue baseline, Palpation of skin and subcutaneous tissue baseline  NEURO:   Cranial nerves 2-12 are normal tested and grossly at patient's baseline, Examination of sensation by touch normal  PSYCH:  oriented X 2, affect and mood normal       SNF labs: Recent labs in [facility] reviewed by me today.    Most Recent 3 CBC's:  Recent Labs   Lab Test 04/22/24  0909 04/08/24  0714 04/03/24  0754 04/02/24  0812 04/01/24  1244   WBC 6.2  --  7.9  --  10.7   HGB 11.5* 9.6* 9.6*   < > 9.2*   MCV 84  --  79  --  77*     --  408  --  384    < > = values in this interval not displayed.     Most Recent 3 BMP's:  Recent Labs   Lab Test 04/29/24  1045 04/22/24  0909 04/08/24  0714    138 140   POTASSIUM 3.7 3.3* 3.5   CHLORIDE 100 99 103   CO2 24 23 " 26   BUN 14.7 14.6 8.8   CR 0.56 0.58 0.56   ANIONGAP 14 16* 11   TOMEKA 9.1 9.4 8.6*   GLC 81 123* 89     Most Recent 2 LFT's:  Recent Labs   Lab Test 04/22/24  0909 04/01/24  0746   AST 16 33   ALT 9 15   ALKPHOS 87 58   BILITOTAL 0.2 0.3     Most Recent TSH and T4:  Recent Labs   Lab Test 04/22/24  0909   TSH 3.30   T4 1.16         DISCHARGE PLAN:    Follow up labs: No labs orders/due      Medical Follow Up:      Follow up with primary care provider in 2 weeks      [facility] scheduled appointments:  Appointments in Next Year      May 15, 2024  2:00 PM  SHORT with Denton Barahona MD  [facility] ([facility] [phone]             Discharge Services: Home Care:  Occupational Therapy, Physical Therapy, Registered Nurse, Home Health Aide, and From:  [facility]      Discharge Instructions Verbalized to Patient at Discharge:   Notify PCP if you have a fever greater than 100.5 degrees.   24-hour supervision is recommended for safety.         TOTAL DISCHARGE TIME:   Greater than 30 minutes    Electronically signed by:  SERGIO Ramos CNP

## 2024-05-09 ENCOUNTER — DISCHARGE SUMMARY NURSING HOME (OUTPATIENT)
Dept: GERIATRICS | Facility: CLINIC | Age: 84
End: 2024-05-09
Payer: COMMERCIAL

## 2024-05-09 DIAGNOSIS — E43 SEVERE MALNUTRITION (H): ICD-10-CM

## 2024-05-09 DIAGNOSIS — F41.9 ANXIETY AND DEPRESSION: ICD-10-CM

## 2024-05-09 DIAGNOSIS — F51.01 PRIMARY INSOMNIA: ICD-10-CM

## 2024-05-09 DIAGNOSIS — G89.29 CHRONIC NECK AND BACK PAIN: ICD-10-CM

## 2024-05-09 DIAGNOSIS — M54.9 CHRONIC NECK AND BACK PAIN: ICD-10-CM

## 2024-05-09 DIAGNOSIS — R13.12 OROPHARYNGEAL DYSPHAGIA: ICD-10-CM

## 2024-05-09 DIAGNOSIS — R94.31 PROLONGED QT INTERVAL: ICD-10-CM

## 2024-05-09 DIAGNOSIS — I69.354 HEMIPARESIS AFFECTING LEFT SIDE AS LATE EFFECT OF CEREBROVASCULAR ACCIDENT (CVA) (H): ICD-10-CM

## 2024-05-09 DIAGNOSIS — R53.81 PHYSICAL DECONDITIONING: ICD-10-CM

## 2024-05-09 DIAGNOSIS — R41.89 COGNITIVE IMPAIRMENT: Primary | ICD-10-CM

## 2024-05-09 DIAGNOSIS — I10 BENIGN ESSENTIAL HYPERTENSION: ICD-10-CM

## 2024-05-09 DIAGNOSIS — M54.2 CHRONIC NECK AND BACK PAIN: ICD-10-CM

## 2024-05-09 DIAGNOSIS — F32.A ANXIETY AND DEPRESSION: ICD-10-CM

## 2024-05-09 DIAGNOSIS — B00.9 HSV (HERPES SIMPLEX VIRUS) INFECTION: ICD-10-CM

## 2024-05-09 DIAGNOSIS — D32.9 MENINGIOMA (H): ICD-10-CM

## 2024-05-09 DIAGNOSIS — D64.9 CHRONIC ANEMIA: ICD-10-CM

## 2024-05-09 PROCEDURE — 99316 NF DSCHRG MGMT 30 MIN+: CPT | Performed by: NURSE PRACTITIONER

## 2024-05-09 NOTE — LETTER
5/9/2024        RE: Juyl Huang  [address]  [facility] [address]        [facility] GERIATRICS DISCHARGE SUMMARY    PATIENT'S NAME: July Huang  YOB: 1940  MEDICAL RECORD NUMBER:  [id number]  Place of Service where encounter took place:  [facility] ([facility]) [[id number]]    PRIMARY CARE PROVIDER AND CLINIC RESPONSIBLE AFTER TRANSFER:   Denton Barahona MD, [address] / [facility] [address]    Non-G Provider     Transferring providers: SERGIO Ramos CNP; Kolby Hu MD  Recent Hospitalization/ED:  [facility] Hospital stay 3/29/24 to 4/4/24.  Date of SNF Admission:  4/4/24  Date of SNF (anticipated) Discharge:  5/11/24  Discharged to: previous independent home  Cognitive Scores: SLUMS: 17/30  Physical Function: Ambulating 10 ft with walker. Requires mod-max assistance w/transfers, min-max assist with dressing and max assistance w/toileting.  DME: Wheelchair and Walker    CODE STATUS/ADVANCE DIRECTIVES DISCUSSION:  No CPR- Do NOT Intubate   ALLERGIES: Diltiazem, Hydrochlorothiazide w-amiloride, Lisinopril, Losartan potassium, Meclizine, Metoprolol, and Tetracyclines & related      NURSING FACILITY COURSE     Medication Changes/Rationale:   Restarted baclofen 5mg every 6hrs PRN dx muscle spasms  Added buspar 5mg BID dx anxiety  Restarted klonopin 0.5mg at bedtime dx insomnia      Summary of nursing facility stay:     PMH: hypertension, hyperlipidemia, gastroesophageal reflux disease, cerebrovascular disease with prior stroke and left hemiparesis, depression, anxiety, ovarian cancer, bladder cancer, meningioma, osteoporosis, malnutrition, chronic neck and back pain      Admitted to [facility] 3/29-4/4/24 due to increased confusion, recent UTI, concern for changes in brain mass on CT w/hx meningioma. NSG consulted, reported images stable. After admission, patient became septic and started on vanco and zosyn, then  transitioned to ceftriaxone x 7 days. ID work-up negative for acute findings. PTA Klonopin, hydrocodone, balofen held. Consider outpatient neurospych testing.     Transferred to [facility] on 4/4/24.       (R41.89) Cognitive impairment  (primary encounter diagnosis)  (I69.354) Hemiparesis affecting left side as late effect of cerebrovascular accident (CVA) (H)  (R53.81) Physical deconditioning  Comment: Chronic cognitive impairment/underlying dementia. Hx CVA w/left-sided weakness. Intermittent anxiety reported by staff. Ambulating 10 ft with walker. Requires mod-max assistance w/transfers, min-max assist with dressing and max assistance w/toileting.   Plan:   - Continue buspar, melatonin, klonopin  - Continue ASA, lipitor  - Continue PT, OT  - Patient discharging home w/spouse, as well as [facility] home care services, including home PT, OT, RN, HHA.      (M54.2,  M54.9,  G89.29) Chronic neck and back pain  Comment: Chronic; PTA norco and baclofen dc'd during hospital stay.  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 MRI 3/4/24 stable.   Plan:   - Patient to follow-up with NSG as directed     (D64.9) Chronic anemia  Comment: Chronic anemia. Received IV venofer on 4/1/24. Vitamin B12, folic acid level normal.   Plan:   - Monitor Hgb     (I10) Benign essential hypertension  Comment: Chronic, not on anti-hypertensive medications  Plan:   - Monitor BP/HR     (R94.31) Prolonged QT interval  Comment: QTc 510 on 3/31/24  Plan:   - Avoid QTs prolonging agents     (F41.9,  F32.A) Anxiety and depression  Comment: Chronic. PTA klonopin dc'd during hospital stay.   Plan:   - Continue melatonin  - Monitor changes in mood or behaviors     (E43) Severe malnutrition  (H24)  (R13.12) Oropharyngeal dysphagia  Comment: Chronic malnutrition w/mild oropharyngeal dysphagia.  Plan:   - Diet recently advanced to regular  - ST and dietician following  - Monitor intake, weights      Discharge Medications:    MED REC REQUIRED  Post Medication Reconciliation Status: discharge medications reconciled and changed, per note/orders     Current Outpatient Medications   Medication Sig Dispense Refill     acetaminophen (TYLENOL) 500 MG tablet Take 1,000 mg by mouth 3 times daily       aspirin (ASA) 81 MG EC tablet Take 81 mg by mouth daily       Baclofen (LIORESAL) 5 MG tablet Take 5 mg by mouth every 6 hours as needed for muscle spasms       busPIRone (BUSPAR) 5 MG tablet Take 5 mg by mouth 2 times daily       calcium citrate-vitamin D (CITRACAL) 315-5 MG-MCG TABS per tablet Take 1 tablet by mouth daily       clonazePAM (KLONOPIN) 0.5 MG tablet Take 1 tablet (0.5 mg) by mouth at bedtime 20 tablet 0     cyanocobalamin (VITAMIN B-12) 1000 MCG tablet Take 1 tablet (1,000 mcg) by mouth daily 100 tablet 1     diclofenac (VOLTAREN) 1 % topical gel Apply 2 g topically 4 times daily To painful area of back       famotidine (PEPCID) 20 MG tablet Take 1 tablet (20 mg) by mouth 2 times daily as needed 180 tablet 3     Lidocaine (LIDOCARE) 4 % Patch Place 2 patches onto the skin every 24 hours To prevent lidocaine toxicity, patient should be patch free for 12 hrs daily. Do NOT apply heat over patch       melatonin 5 MG tablet Take 1 tablet (5 mg) by mouth at bedtime (Patient taking differently: Take 5 mg by mouth at bedtime And 5mg at bedtime PRN)       polyethylene glycol (GLYCOLAX) 17 GM/Dose powder Take 1 Capful by mouth daily as needed for constipation       rosuvastatin (CRESTOR) 10 MG tablet Take 10 mg by mouth daily       valACYclovir (VALTREX) 1000 mg tablet Take 1,000 mg by mouth 2 times daily as needed (HSV ulcer)       Vitamin D3 (CHOLECALCIFEROL) 25 mcg (1000 units) tablet Take 1 tablet by mouth  "daily          Controlled medications:   Medication: klonopin , 20 tabs given to patient at the time of discharge to take home     Past Medical History:   Past Medical History:   Diagnosis Date     Cancer of ovary (H)      Cerebrovascular accident (CVA) (H) 11/30/2020     Depression, major, single episode, severe (H) 12/23/2022     Eye muscle weakness, left 2/17/2016     GERD (gastroesophageal reflux disease) 7/8/2009     H/O total hysterectomy with bilateral salpingo-oophorectomy (BSO) 1995    ovarian cancer     Irregular heart beat 2009    nl CT angiogram     Non-cardiac chest pain 7/8/2009     S/P coronary angiogram 2005    negative     Stress 12/22/2010     Upper back pain 2/17/2016       Physical Exam:   Vitals: BP (!) 148/85   Pulse 71   Temp 98.1  F (36.7  C)   Resp 18   Ht 1.549 m (5' 1\")   Wt 46.5 kg (102 lb 9.6 oz)   SpO2 96%   BMI 19.39 kg/m    BMI: Body mass index is 19.39 kg/m .  GENERAL APPEARANCE:  Alert, in no distress, oriented, cooperative  RESP:  respiratory effort and palpation of chest normal, lungs clear to auscultation , no respiratory distress  CV:  regular rate and rhythm, no murmur, rub, or gallop, RLE +1 ankle/foot edema  M/S:   Gait and station abnormal, primarily wheelchair bound. L sided weakness r/t prior CVA.  SKIN:  Inspection of skin and subcutaneous tissue baseline, Palpation of skin and subcutaneous tissue baseline  NEURO:   Cranial nerves 2-12 are normal tested and grossly at patient's baseline, Examination of sensation by touch normal  PSYCH:  oriented X 2, affect and mood normal       SNF labs: Recent labs in [facility] reviewed by me today.    Most Recent 3 CBC's:  Recent Labs   Lab Test 04/22/24  0909 04/08/24  0714 04/03/24  0754 04/02/24  0812 04/01/24  1244   WBC 6.2  --  7.9  --  10.7   HGB 11.5* 9.6* 9.6*   < > 9.2*   MCV 84  --  79  --  77*     --  408  --  384    < > = values in this interval not displayed.     Most Recent 3 BMP's:  Recent Labs   Lab Test " 24  1045 24  0909 24  0714    138 140   POTASSIUM 3.7 3.3* 3.5   CHLORIDE 100 99 103   CO2 24 23 26   BUN 14.7 14.6 8.8   CR 0.56 0.58 0.56   ANIONGAP 14 16* 11   TOMEKA 9.1 9.4 8.6*   GLC 81 123* 89     Most Recent 2 LFT's:  Recent Labs   Lab Test 24  0909 24  0746   AST 16 33   ALT 9 15   ALKPHOS 87 58   BILITOTAL 0.2 0.3     Most Recent TSH and T4:  Recent Labs   Lab Test 24  0909   TSH 3.30   T4 1.16         DISCHARGE PLAN:    Follow up labs: No labs orders/due      Medical Follow Up:      Follow up with primary care provider in 2 weeks      [facility] [place] scheduled appointments:  Appointments in Next Year      May 15, 2024  2:00 PM  SHORT with Denton Barahona MD  [facility] ([facility]) [phone]             Discharge Services: Home Care:  Occupational Therapy, Physical Therapy, Registered Nurse, Home Health Aide, and From:  [facility]      Discharge Instructions Verbalized to Patient at Discharge:   Notify PCP if you have a fever greater than 100.5 degrees.   24-hour supervision is recommended for safety.         TOTAL DISCHARGE TIME:   Greater than 30 minutes    Electronically signed by:  SERGIO Ramos [facility] Discharge Orders    Name: July Huang  : 1940  Planned Discharge Date: 24  Discharged to: previous independent home    MEDICAL FOLLOW UP  Follow up with PCP in 1-2 weeks     Next 5 appointments (look out 90 days)      May 15, 2024  2:00 PM  SHORT with Denton Barahona MD  [facility] ([facility]) [address] [place] [address] [phone]            FUTURE LABS: No labs orders/due    ORDER CHANGES:  Restarted baclofen 5mg every 6hrs PRN dx muscle spasms  Added buspar 5mg BID dx anxiety  Restarted klonopin 0.5mg at bedtime dx insomnia    DISCHARGE MEDICATIONS:  The patient s pharmacy is authorized to dispense a  30-day supply of medications. Refill requests should be directed to the primary provider, Denton Barahona.   Controlled medications:   Medication: klonopin , 20 tabs given to patient at the time of discharge to take home    Current Outpatient Medications   Medication Sig Dispense Refill     acetaminophen (TYLENOL) 500 MG tablet Take 1,000 mg by mouth 3 times daily       aspirin (ASA) 81 MG EC tablet Take 81 mg by mouth daily       Baclofen (LIORESAL) 5 MG tablet Take 5 mg by mouth every 6 hours as needed for muscle spasms       busPIRone (BUSPAR) 5 MG tablet Take 5 mg by mouth 2 times daily       calcium citrate-vitamin D (CITRACAL) 315-5 MG-MCG TABS per tablet Take 1 tablet by mouth daily       clonazePAM (KLONOPIN) 0.5 MG tablet Take 1 tablet (0.5 mg) by mouth at bedtime 20 tablet 0     cyanocobalamin (VITAMIN B-12) 1000 MCG tablet Take 1 tablet (1,000 mcg) by mouth daily 100 tablet 1     diclofenac (VOLTAREN) 1 % topical gel Apply 2 g topically 4 times daily To painful area of back       famotidine (PEPCID) 20 MG tablet Take 1 tablet (20 mg) by mouth 2 times daily as needed 180 tablet 3     Lidocaine (LIDOCARE) 4 % Patch Place 2 patches onto the skin every 24 hours To prevent lidocaine toxicity, patient should be patch free for 12 hrs daily. Do NOT apply heat over patch       melatonin 5 MG tablet Take 1 tablet (5 mg) by mouth at bedtime (Patient taking differently: Take 5 mg by mouth at bedtime And 5mg at bedtime PRN)       polyethylene glycol (GLYCOLAX) 17 GM/Dose powder Take 1 Capful by mouth daily as needed for constipation       rosuvastatin (CRESTOR) 10 MG tablet Take 10 mg by mouth daily       valACYclovir (VALTREX) 1000 mg tablet Take 1,000 mg by mouth 2 times daily as needed (HSV ulcer)       Vitamin D3 (CHOLECALCIFEROL) 25 mcg (1000 units) tablet Take 1 tablet by mouth daily         SERVICES:  Home Care:  Occupational Therapy, Physical Therapy, Registered Nurse, Home Health Aide, and From:   [facility]     ADDITIONAL INSTRUCTIONS:  Notify PCP if you have a fever greater than 100.5 degrees.   24-hour supervision is recommended for safety.         SERGIO Ramos CNP  This document was electronically signed on May 10, 2024          Sincerely,        SERGIO Ramos CNP

## 2024-05-10 RX ORDER — BUSPIRONE HYDROCHLORIDE 5 MG/1
5 TABLET ORAL 2 TIMES DAILY
Qty: 60 TABLET | Refills: 0 | Status: SHIPPED | OUTPATIENT
Start: 2024-05-10

## 2024-05-10 RX ORDER — BACLOFEN 5 MG/1
5 TABLET ORAL EVERY 6 HOURS PRN
Qty: 30 TABLET | Refills: 0 | Status: SHIPPED | OUTPATIENT
Start: 2024-05-10 | End: 2024-05-15

## 2024-05-10 NOTE — PROGRESS NOTES
[facility] Discharge Orders    Name: July Huang  : 1940  Planned Discharge Date: 24  Discharged to: previous independent home    MEDICAL FOLLOW UP  Follow up with PCP in 1-2 weeks     Next 5 appointments (look out 90 days)      May 15, 2024  2:00 PM  SHORT with Denton Barahona MD  [facility] ([facility] [address] [place] [address] [phone]            FUTURE LABS: No labs orders/due    ORDER CHANGES:  Restarted baclofen 5mg every 6hrs PRN dx muscle spasms  Added buspar 5mg BID dx anxiety  Restarted klonopin 0.5mg at bedtime dx insomnia    DISCHARGE MEDICATIONS:  The patient s pharmacy is authorized to dispense a 30-day supply of medications. Refill requests should be directed to the primary provider, Denton Barahona.   Controlled medications:   Medication: klonopin , 20 tabs given to patient at the time of discharge to take home    Current Outpatient Medications   Medication Sig Dispense Refill    acetaminophen (TYLENOL) 500 MG tablet Take 1,000 mg by mouth 3 times daily      aspirin (ASA) 81 MG EC tablet Take 81 mg by mouth daily      Baclofen (LIORESAL) 5 MG tablet Take 5 mg by mouth every 6 hours as needed for muscle spasms      busPIRone (BUSPAR) 5 MG tablet Take 5 mg by mouth 2 times daily      calcium citrate-vitamin D (CITRACAL) 315-5 MG-MCG TABS per tablet Take 1 tablet by mouth daily      clonazePAM (KLONOPIN) 0.5 MG tablet Take 1 tablet (0.5 mg) by mouth at bedtime 20 tablet 0    cyanocobalamin (VITAMIN B-12) 1000 MCG tablet Take 1 tablet (1,000 mcg) by mouth daily 100 tablet 1    diclofenac (VOLTAREN) 1 % topical gel Apply 2 g topically 4 times daily To painful area of back      famotidine (PEPCID) 20 MG tablet Take 1 tablet (20 mg) by mouth 2 times daily as needed 180 tablet 3    Lidocaine (LIDOCARE) 4 % Patch Place 2 patches onto the skin every 24 hours To prevent lidocaine toxicity, patient should be patch  free for 12 hrs daily. Do NOT apply heat over patch      melatonin 5 MG tablet Take 1 tablet (5 mg) by mouth at bedtime (Patient taking differently: Take 5 mg by mouth at bedtime And 5mg at bedtime PRN)      polyethylene glycol (GLYCOLAX) 17 GM/Dose powder Take 1 Capful by mouth daily as needed for constipation      rosuvastatin (CRESTOR) 10 MG tablet Take 10 mg by mouth daily      valACYclovir (VALTREX) 1000 mg tablet Take 1,000 mg by mouth 2 times daily as needed (HSV ulcer)      Vitamin D3 (CHOLECALCIFEROL) 25 mcg (1000 units) tablet Take 1 tablet by mouth daily         SERVICES:  Home Care:  Occupational Therapy, Physical Therapy, Registered Nurse, Home Health Aide, and From:  [facility]     ADDITIONAL INSTRUCTIONS:  Notify PCP if you have a fever greater than 100.5 degrees.   24-hour supervision is recommended for safety.         SERGIO Ramos CNP  This document was electronically signed on May 10, 2024

## 2024-05-15 ENCOUNTER — OFFICE VISIT (OUTPATIENT)
Dept: FAMILY MEDICINE | Facility: CLINIC | Age: 84
End: 2024-05-15

## 2024-05-15 ENCOUNTER — NURSE TRIAGE (OUTPATIENT)
Dept: NURSING | Facility: CLINIC | Age: 84
End: 2024-05-15
Payer: COMMERCIAL

## 2024-05-15 VITALS — DIASTOLIC BLOOD PRESSURE: 78 MMHG | OXYGEN SATURATION: 97 % | SYSTOLIC BLOOD PRESSURE: 143 MMHG | HEART RATE: 98 BPM

## 2024-05-15 DIAGNOSIS — F41.9 ANXIETY AND DEPRESSION: ICD-10-CM

## 2024-05-15 DIAGNOSIS — I10 PRIMARY HYPERTENSION: ICD-10-CM

## 2024-05-15 DIAGNOSIS — G89.29 CHRONIC NECK AND BACK PAIN: Primary | ICD-10-CM

## 2024-05-15 DIAGNOSIS — F41.9 ANXIETY: ICD-10-CM

## 2024-05-15 DIAGNOSIS — F32.A ANXIETY AND DEPRESSION: ICD-10-CM

## 2024-05-15 DIAGNOSIS — G47.00 INSOMNIA, UNSPECIFIED TYPE: ICD-10-CM

## 2024-05-15 DIAGNOSIS — D50.9 IRON DEFICIENCY ANEMIA, UNSPECIFIED IRON DEFICIENCY ANEMIA TYPE: ICD-10-CM

## 2024-05-15 DIAGNOSIS — M54.2 CHRONIC NECK AND BACK PAIN: Primary | ICD-10-CM

## 2024-05-15 DIAGNOSIS — M54.9 CHRONIC NECK AND BACK PAIN: Primary | ICD-10-CM

## 2024-05-15 LAB
% GRANULOCYTES: 75.4 % (ref 42.2–75.2)
HCT VFR BLD AUTO: 36.3 % (ref 35–46)
HEMOGLOBIN: 12.2 G/DL (ref 11.8–15.5)
LYMPHOCYTES NFR BLD AUTO: 18.4 % (ref 20.5–51.1)
MCH RBC QN AUTO: 27 PG (ref 27–31)
MCHC RBC AUTO-ENTMCNC: 33.5 G/DL (ref 33–37)
MCV RBC AUTO: 80.6 FL (ref 80–100)
MONOCYTES NFR BLD AUTO: 6.2 % (ref 1.7–9.3)
PLATELET # BLD AUTO: 412 K/UL (ref 140–450)
RBC # BLD AUTO: 4.51 X10/CMM (ref 3.7–5.2)
WBC # BLD AUTO: 7.1 X10/CMM (ref 3.8–11)

## 2024-05-15 PROCEDURE — 36415 COLL VENOUS BLD VENIPUNCTURE: CPT | Performed by: FAMILY MEDICINE

## 2024-05-15 PROCEDURE — 85025 COMPLETE CBC W/AUTO DIFF WBC: CPT | Performed by: FAMILY MEDICINE

## 2024-05-15 PROCEDURE — 99496 TRANSJ CARE MGMT HIGH F2F 7D: CPT | Performed by: FAMILY MEDICINE

## 2024-05-15 RX ORDER — AMLODIPINE BESYLATE 2.5 MG/1
2.5 TABLET ORAL DAILY
Qty: 90 TABLET | Refills: 0 | Status: SHIPPED | OUTPATIENT
Start: 2024-05-15

## 2024-05-15 RX ORDER — CLONAZEPAM 0.5 MG/1
0.5 TABLET ORAL AT BEDTIME
Qty: 30 TABLET | Refills: 0 | Status: SHIPPED | OUTPATIENT
Start: 2024-05-15

## 2024-05-15 RX ORDER — BACLOFEN 5 MG/1
5 TABLET ORAL EVERY 6 HOURS PRN
Qty: 120 TABLET | Refills: 0 | Status: SHIPPED | OUTPATIENT
Start: 2024-05-15 | End: 2024-05-28

## 2024-05-15 NOTE — PROGRESS NOTES
Hospital Follow-up Visit:    Hospital/Nursing Home/IP Rehab Facility: [facility]  Date of Admission: 3/29/24  Date of Discharge: 4/4/24    Hospital/Nursing Home/IP Rehab Facility: [facility] (TCU)  Date of Admission: 4/4/24  Date of Discharge: 5/11/24    Reason(s) for Admission:   Acute encephalopathy, suspected infectious  Sepsis of unclear source  Lactic acidosis  Altered mental status    Was the patient in the ICU or did the patient experience delirium during hospitalization?  Yes       Do you have any other stressors you would like to discuss with your provider? Health Concerns    Problems taking medications regularly:  None  Medication changes since discharge: None  Problems adhering to non-medication therapy:  None    Summary of hospitalization:  [facility] discharge summary reviewed  Diagnostic Tests/Treatments reviewed.  Follow up needed: none  Other Healthcare Providers Involved in Patient s Care:         None  Update since discharge: improved.         Plan of care communicated with patient and family             Subjective     Virginia is a 83 year old patient who presents to clinic for hospital and TCU follow up.  She had acute encephalopathy felt likely to be infectious.  No definite source identified.  Symptoms gradually improved on antibiotics.Hydrocodone, klonopin and baclofen held initially though now back on klonopin and baclofen.      Hx meningioma s/p radiation   Followed by Neurosurgery at [facility] with MRI stable 3/4/24 ([facility]  Neurosurgery consulted on admission, reviewed CT head and outside images and feel mass is stable and recommend follow up with outpatient surgeon.    Chronic anemia, normocytic  Historic baseline 9.6 from 3/3/2024 and 13.1 from 1/9/2023.  Hgb remained stable about 9 g/dL this admission.  B12 and folate wnl.  Iron studies 3/29/24 suggest deficiency and chronic disease with peripheral smear showing  microcytic anemia and received dose of Venofer 4/1.    Anxiety: on buspirone and klonopin at bedtime    She is receiving home PT    Other chronic issues stable    Review of Systems   Constitutional, HEENT, cardiovascular, pulmonary, GI, , musculoskeletal, neuro, skin, endocrine and psych systems are negative, except as otherwise noted.      Objective    BP (!) 143/78   Pulse 98   SpO2 97%     General: Well appearing, NAD  Psych: normal mood and affect        Results for orders placed or performed in visit on 05/15/24 (from the past 24 hour(s))   CBC with Diff/Plt ([facility])   Result Value Ref Range    WBC x10/cmm 7.1 3.8 - 11.0 x10/cmm    % Lymphocytes 18.4 (A) 20.5 - 51.1 %    % Monocytes 6.2 1.7 - 9.3 %    % Granulocytes 75.4 (A) 42.2 - 75.2 %    RBC x10/cmm 4.51 3.7 - 5.2 x10/cmm    Hemoglobin 12.2 11.8 - 15.5 g/dl    Hematocrit 36.3 35 - 46 %    MCV 80.6 80 - 100 fL    MCH 27.0 27.0 - 31.0 pg    MCHC 33.5 33.0 - 37.0 g/dL    Platelet Count 412 140 - 450 K/uL       Chronic neck and back pain  Chronic, stable  Cont current meds  If mentation decreases will strongly recommend stopping baclofen  - Baclofen (LIORESAL) 5 MG tablet; Take 1 tablet (5 mg) by mouth every 6 hours as needed for muscle spasms  - VENOUS COLLECTION    Anxiety and depression  Stable  Cont buspirone and klonopin  If mentation worsens will need to stop klonopin  - clonazePAM (KLONOPIN) 0.5 MG tablet; Take 1 tablet (0.5 mg) by mouth at bedtime  - VENOUS COLLECTION    Insomnia, unspecified type  Cont klonopin at bedtime  Aware of potential harms      Anxiety  See above      Iron deficiency anemia, unspecified iron deficiency anemia type  Hgb improved  Cont to monitor  - CBC with Diff/Plt ([facility])  - VENOUS COLLECTION    HTN: remains above goal, restart low dose amlodipine    Follow up in 4 weeks, sooner if needed

## 2024-05-16 NOTE — TELEPHONE ENCOUNTER
Nurse Triage SBAR    Is this a 2nd Level Triage? NO    Situation: Sores    Background: :na    Assessment: Patient reports multiple sores inside her nose and on the edge of nostril. She reports moderate pain. She denies fever, signs of infection, or red streaks.    Protocol Recommended Disposition:   According to the protocol, patient should see provider within 24 hours.  Care advice given.     ANTIBIOTIC OINTMENT: * Put a small amount of antibiotic ointment on the infected area 3 times per day. * You can get this over-the-counter (OTC) at a drugstore.     CALL BACK IF: * Fever occurs * You become worse    Patient verbalizes understanding and agrees with plan of care.     Harper Mejia RN  05/15/24 10:56 PM  [facility] Nurse Advisor    Reason for Disposition   [1] Unexplained sores AND [2] 3 or more    Additional Information   Negative: Sounds like a life-threatening emergency to the triager   Negative: Followed a burn   Negative: Followed an injury (i.e., from an abrasion or scratch)   Negative: Boil (abscess) suspected (painful red lump)   Negative: Chickenpox suspected (e.g., fever, widespread rash after known chickenpox exposure)   Negative: Cold sore suspected (i.e., fever blister sore on the outer lip)   Negative: Impetigo suspected (e.g., infected sore; soft yellow crusts or scabs)   Negative: Insect bite(s) suspected   Negative: Poison ivy, oak, or sumac rash suspected (e.g., itchy rash after contact with poison ivy)   Negative: Ringworm suspected (i.e., round pink patch, sometimes looks like ring, usually 1/2 to 1 inch [12-25 mm],  in size, slowly increasing in size)   Negative: Sore(s) on female genital area  (e.g., labia, vagina, vulva)   Negative: Sore(s) on male genital area (e.g., penis, scrotum)   Negative: Wound infection suspected (in traumatic or surgical wound)   Negative: Patient sounds very sick or weak to the triager   Negative: [1] Red area or streak AND [2] fever   Negative: [1]  Looks infected (spreading redness, pus) AND [2] large red area (> 2 in. or 5 cm)   Negative: [1] Looks infected (spreading redness, pus) AND [2] diabetes mellitus or weak immune system (e.g., HIV positive, cancer chemo, splenectomy, organ transplant, chronic steroids)   Negative: [1] Red streak runs from sore AND [2] longer than 1 inch (2.5 cm)   Negative: [1] Ulcer with black scab AND [2] spreading AND [3] painless AND [4] cause unknown   Negative: [1] Small red streak or spreading redness (< 2 inches or 5 cm) AND [2] no fever    Protocols used: Sores-A-AH

## 2024-05-20 ENCOUNTER — HOSPITAL REPORTS SCAN (OUTPATIENT)
Dept: FAMILY MEDICINE | Facility: CLINIC | Age: 84
End: 2024-05-20

## 2024-05-24 DIAGNOSIS — M54.2 CHRONIC NECK AND BACK PAIN: ICD-10-CM

## 2024-05-24 DIAGNOSIS — G89.29 CHRONIC NECK AND BACK PAIN: ICD-10-CM

## 2024-05-24 DIAGNOSIS — M54.9 CHRONIC NECK AND BACK PAIN: ICD-10-CM

## 2024-05-24 RX ORDER — BACLOFEN 5 MG/1
TABLET ORAL
Qty: 120 TABLET | Refills: 0 | OUTPATIENT
Start: 2024-05-24

## 2024-05-24 NOTE — TELEPHONE ENCOUNTER
Patient filled #[id number] on 5/16, no refill needed at this time. Patient schedule for follow up office visit 5/28/24. Josephine Hager

## 2024-05-28 ENCOUNTER — OFFICE VISIT (OUTPATIENT)
Dept: FAMILY MEDICINE | Facility: CLINIC | Age: 84
End: 2024-05-28

## 2024-05-28 VITALS — OXYGEN SATURATION: 97 % | SYSTOLIC BLOOD PRESSURE: 130 MMHG | HEART RATE: 80 BPM | DIASTOLIC BLOOD PRESSURE: 79 MMHG

## 2024-05-28 DIAGNOSIS — M54.9 CHRONIC NECK AND BACK PAIN: ICD-10-CM

## 2024-05-28 DIAGNOSIS — I69.354 HEMIPARESIS AFFECTING LEFT SIDE AS LATE EFFECT OF STROKE (H): ICD-10-CM

## 2024-05-28 DIAGNOSIS — G89.4 CHRONIC PAIN SYNDROME: ICD-10-CM

## 2024-05-28 DIAGNOSIS — R29.6 RECURRENT FALLS: ICD-10-CM

## 2024-05-28 DIAGNOSIS — G89.29 CHRONIC NECK AND BACK PAIN: ICD-10-CM

## 2024-05-28 DIAGNOSIS — S22.20XD CLOSED FRACTURE OF STERNUM WITH ROUTINE HEALING, UNSPECIFIED PORTION OF STERNUM, SUBSEQUENT ENCOUNTER: Primary | ICD-10-CM

## 2024-05-28 DIAGNOSIS — R54 FRAILTY SYNDROME IN GERIATRIC PATIENT: ICD-10-CM

## 2024-05-28 DIAGNOSIS — M54.2 CHRONIC NECK AND BACK PAIN: ICD-10-CM

## 2024-05-28 DIAGNOSIS — E44.1 MILD PROTEIN-CALORIE MALNUTRITION (H): ICD-10-CM

## 2024-05-28 PROCEDURE — 99496 TRANSJ CARE MGMT HIGH F2F 7D: CPT | Performed by: FAMILY MEDICINE

## 2024-05-28 RX ORDER — BACLOFEN 5 MG/1
5 TABLET ORAL 3 TIMES DAILY PRN
Qty: 90 TABLET | Refills: 0 | Status: SHIPPED | OUTPATIENT
Start: 2024-05-28
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
## 2024-05-29 ENCOUNTER — PATIENT OUTREACH (OUTPATIENT)
Dept: CARE COORDINATION | Facility: CLINIC | Age: 84
End: 2024-05-29
Payer: COMMERCIAL

## 2024-05-29 ASSESSMENT — ACTIVITIES OF DAILY LIVING (ADL)
DEPENDENT_IADLS:: CLEANING;LAUNDRY;COOKING;SHOPPING;MEAL PREPARATION;MEDICATION MANAGEMENT;MONEY MANAGEMENT;TRANSPORTATION

## 2024-05-29 NOTE — PROGRESS NOTES
Clinic Care Coordination Contact  [facility] [[email or web address]]/[email or web address]    Clinical Data: Care Coordinator Outreach    Outreach Documentation Number of Outreach Attempt   5/29/2024  11:00 AM 1       Left message on patient's voicemail with call back information and requested return call.    Plan: Care Coordinator will try to reach patient again in 3-5 business days.    Arline Torres [facility]  Social Work Care Coordinator  Phone: [phone]
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
## 2024-05-30 ENCOUNTER — HOSPITAL ENCOUNTER (INPATIENT)
Facility: CLINIC | Age: 84
LOS: 5 days | Discharge: HOME HEALTH | DRG: 884 | End: 2024-06-05
Attending: EMERGENCY MEDICINE | Admitting: HOSPITALIST
Payer: COMMERCIAL

## 2024-05-30 ENCOUNTER — APPOINTMENT (OUTPATIENT)
Dept: CT IMAGING | Facility: CLINIC | Age: 84
DRG: 884 | End: 2024-05-30
Attending: EMERGENCY MEDICINE
Payer: COMMERCIAL

## 2024-05-30 ENCOUNTER — APPOINTMENT (OUTPATIENT)
Dept: GENERAL RADIOLOGY | Facility: CLINIC | Age: 84
DRG: 884 | End: 2024-05-30
Attending: EMERGENCY MEDICINE
Payer: COMMERCIAL

## 2024-05-30 DIAGNOSIS — G89.29 CHRONIC NECK AND BACK PAIN: ICD-10-CM

## 2024-05-30 DIAGNOSIS — M54.9 CHRONIC NECK AND BACK PAIN: ICD-10-CM

## 2024-05-30 DIAGNOSIS — I63.9 CEREBROVASCULAR ACCIDENT (CVA), UNSPECIFIED MECHANISM (H): ICD-10-CM

## 2024-05-30 DIAGNOSIS — R53.81 PHYSICAL DECONDITIONING: ICD-10-CM

## 2024-05-30 DIAGNOSIS — K59.00 CONSTIPATION, UNSPECIFIED CONSTIPATION TYPE: ICD-10-CM

## 2024-05-30 DIAGNOSIS — G93.40 ACUTE ENCEPHALOPATHY: Primary | ICD-10-CM

## 2024-05-30 DIAGNOSIS — M54.2 CHRONIC NECK AND BACK PAIN: ICD-10-CM

## 2024-05-30 LAB
ALBUMIN SERPL BCG-MCNC: 4.1 G/DL (ref 3.5–5.2)
ALBUMIN UR-MCNC: NEGATIVE MG/DL
ALP SERPL-CCNC: 80 U/L (ref 40–150)
ALT SERPL W P-5'-P-CCNC: 11 U/L (ref 0–50)
ANION GAP SERPL CALCULATED.3IONS-SCNC: 10 MMOL/L (ref 7–15)
APPEARANCE UR: CLEAR
AST SERPL W P-5'-P-CCNC: 18 U/L (ref 0–45)
ATRIAL RATE - MUSE: 71 BPM
BASE EXCESS BLDV CALC-SCNC: 4 MMOL/L (ref -3–3)
BASOPHILS # BLD AUTO: 0.1 10E3/UL (ref 0–0.2)
BASOPHILS NFR BLD AUTO: 1 %
BILIRUB SERPL-MCNC: 0.2 MG/DL
BILIRUB UR QL STRIP: NEGATIVE
BUN SERPL-MCNC: 16.5 MG/DL (ref 8–23)
CALCIUM SERPL-MCNC: 9 MG/DL (ref 8.8–10.2)
CHLORIDE SERPL-SCNC: 99 MMOL/L (ref 98–107)
COLOR UR AUTO: ABNORMAL
CREAT SERPL-MCNC: 0.64 MG/DL (ref 0.51–0.95)
DEPRECATED HCO3 PLAS-SCNC: 25 MMOL/L (ref 22–29)
DIASTOLIC BLOOD PRESSURE - MUSE: NORMAL MMHG
EGFRCR SERPLBLD CKD-EPI 2021: 87 ML/MIN/1.73M2
EOSINOPHIL # BLD AUTO: 0.1 10E3/UL (ref 0–0.7)
EOSINOPHIL NFR BLD AUTO: 2 %
ERYTHROCYTE [DISTWIDTH] IN BLOOD BY AUTOMATED COUNT: 20.1 % (ref 10–15)
GLUCOSE SERPL-MCNC: 126 MG/DL (ref 70–99)
GLUCOSE UR STRIP-MCNC: NEGATIVE MG/DL
HCO3 BLDV-SCNC: 29 MMOL/L (ref 21–28)
HCT VFR BLD AUTO: 37 % (ref 35–47)
HGB BLD-MCNC: 11.8 G/DL (ref 11.7–15.7)
HGB UR QL STRIP: NEGATIVE
HOLD SPECIMEN: NORMAL
IMM GRANULOCYTES # BLD: 0 10E3/UL
IMM GRANULOCYTES NFR BLD: 0 %
INTERPRETATION ECG - MUSE: NORMAL
KETONES UR STRIP-MCNC: NEGATIVE MG/DL
LACTATE BLD-SCNC: 0.8 MMOL/L
LEUKOCYTE ESTERASE UR QL STRIP: ABNORMAL
LYMPHOCYTES # BLD AUTO: 1.5 10E3/UL (ref 0.8–5.3)
LYMPHOCYTES NFR BLD AUTO: 20 %
MCH RBC QN AUTO: 26.7 PG (ref 26.5–33)
MCHC RBC AUTO-ENTMCNC: 31.9 G/DL (ref 31.5–36.5)
MCV RBC AUTO: 84 FL (ref 78–100)
MONOCYTES # BLD AUTO: 0.8 10E3/UL (ref 0–1.3)
MONOCYTES NFR BLD AUTO: 10 %
NEUTROPHILS # BLD AUTO: 5.2 10E3/UL (ref 1.6–8.3)
NEUTROPHILS NFR BLD AUTO: 67 %
NITRATE UR QL: NEGATIVE
NRBC # BLD AUTO: 0 10E3/UL
NRBC BLD AUTO-RTO: 0 /100
P AXIS - MUSE: 66 DEGREES
PCO2 BLDV: 42 MM HG (ref 40–50)
PH BLDV: 7.44 [PH] (ref 7.32–7.43)
PH UR STRIP: 6.5 [PH] (ref 5–7)
PLATELET # BLD AUTO: 410 10E3/UL (ref 150–450)
PO2 BLDV: 30 MM HG (ref 25–47)
POTASSIUM SERPL-SCNC: 3.8 MMOL/L (ref 3.4–5.3)
PR INTERVAL - MUSE: 124 MS
PROT SERPL-MCNC: 6.9 G/DL (ref 6.4–8.3)
QRS DURATION - MUSE: 72 MS
QT - MUSE: 384 MS
QTC - MUSE: 417 MS
R AXIS - MUSE: 53 DEGREES
RBC # BLD AUTO: 4.42 10E6/UL (ref 3.8–5.2)
RBC URINE: 1 /HPF
SAO2 % BLDV: 60 % (ref 70–75)
SODIUM SERPL-SCNC: 134 MMOL/L (ref 135–145)
SP GR UR STRIP: 1.01 (ref 1–1.03)
SYSTOLIC BLOOD PRESSURE - MUSE: NORMAL MMHG
T AXIS - MUSE: 82 DEGREES
TROPONIN T SERPL HS-MCNC: 23 NG/L
TROPONIN T SERPL HS-MCNC: 29 NG/L
UROBILINOGEN UR STRIP-MCNC: NORMAL MG/DL
VENTRICULAR RATE- MUSE: 71 BPM
WBC # BLD AUTO: 7.7 10E3/UL (ref 4–11)
WBC URINE: 3 /HPF

## 2024-05-30 PROCEDURE — 96374 THER/PROPH/DIAG INJ IV PUSH: CPT

## 2024-05-30 PROCEDURE — 93005 ELECTROCARDIOGRAM TRACING: CPT

## 2024-05-30 PROCEDURE — 82803 BLOOD GASES ANY COMBINATION: CPT

## 2024-05-30 PROCEDURE — 258N000003 HC RX IP 258 OP 636: Performed by: EMERGENCY MEDICINE

## 2024-05-30 PROCEDURE — 99223 1ST HOSP IP/OBS HIGH 75: CPT | Performed by: HOSPITALIST

## 2024-05-30 PROCEDURE — 87040 BLOOD CULTURE FOR BACTERIA: CPT | Performed by: EMERGENCY MEDICINE

## 2024-05-30 PROCEDURE — 84484 ASSAY OF TROPONIN QUANT: CPT | Performed by: EMERGENCY MEDICINE

## 2024-05-30 PROCEDURE — 71045 X-RAY EXAM CHEST 1 VIEW: CPT

## 2024-05-30 PROCEDURE — 250N000011 HC RX IP 250 OP 636: Performed by: EMERGENCY MEDICINE

## 2024-05-30 PROCEDURE — 36415 COLL VENOUS BLD VENIPUNCTURE: CPT | Performed by: EMERGENCY MEDICINE

## 2024-05-30 PROCEDURE — 96361 HYDRATE IV INFUSION ADD-ON: CPT

## 2024-05-30 PROCEDURE — 99285 EMERGENCY DEPT VISIT HI MDM: CPT | Mod: 25

## 2024-05-30 PROCEDURE — 81001 URINALYSIS AUTO W/SCOPE: CPT | Performed by: EMERGENCY MEDICINE

## 2024-05-30 PROCEDURE — 80053 COMPREHEN METABOLIC PANEL: CPT | Performed by: EMERGENCY MEDICINE

## 2024-05-30 PROCEDURE — 85025 COMPLETE CBC W/AUTO DIFF WBC: CPT | Performed by: EMERGENCY MEDICINE

## 2024-05-30 PROCEDURE — 70450 CT HEAD/BRAIN W/O DYE: CPT

## 2024-05-30 RX ORDER — DIAZEPAM 10 MG/2ML
2.5 INJECTION, SOLUTION INTRAMUSCULAR; INTRAVENOUS ONCE
Status: COMPLETED | OUTPATIENT
Start: 2024-05-30 | End: 2024-05-30

## 2024-05-30 RX ORDER — BACLOFEN 10 MG/1
5 TABLET ORAL ONCE
Status: COMPLETED | OUTPATIENT
Start: 2024-05-30 | End: 2024-05-30

## 2024-05-30 RX ADMIN — SODIUM CHLORIDE 500 ML: 9 INJECTION, SOLUTION INTRAVENOUS at 20:44

## 2024-05-30 RX ADMIN — DIAZEPAM 2.5 MG: 5 INJECTION INTRAMUSCULAR; INTRAVENOUS at 21:15

## 2024-05-30 ASSESSMENT — COLUMBIA-SUICIDE SEVERITY RATING SCALE - C-SSRS
1. IN THE PAST MONTH, HAVE YOU WISHED YOU WERE DEAD OR WISHED YOU COULD GO TO SLEEP AND NOT WAKE UP?: NO
6. HAVE YOU EVER DONE ANYTHING, STARTED TO DO ANYTHING, OR PREPARED TO DO ANYTHING TO END YOUR LIFE?: NO
2. HAVE YOU ACTUALLY HAD ANY THOUGHTS OF KILLING YOURSELF IN THE PAST MONTH?: NO

## 2024-05-30 ASSESSMENT — ACTIVITIES OF DAILY LIVING (ADL)
ADLS_ACUITY_SCORE: 39

## 2024-05-31 ENCOUNTER — APPOINTMENT (OUTPATIENT)
Dept: GENERAL RADIOLOGY | Facility: CLINIC | Age: 84
DRG: 884 | End: 2024-05-31
Attending: HOSPITALIST
Payer: COMMERCIAL

## 2024-05-31 ENCOUNTER — APPOINTMENT (OUTPATIENT)
Dept: GENERAL RADIOLOGY | Facility: CLINIC | Age: 84
DRG: 884 | End: 2024-05-31
Attending: INTERNAL MEDICINE
Payer: COMMERCIAL

## 2024-05-31 LAB
ANION GAP SERPL CALCULATED.3IONS-SCNC: 12 MMOL/L (ref 7–15)
BASOPHILS # BLD AUTO: 0.1 10E3/UL (ref 0–0.2)
BASOPHILS NFR BLD AUTO: 1 %
BUN SERPL-MCNC: 11.1 MG/DL (ref 8–23)
CALCIUM SERPL-MCNC: 8.6 MG/DL (ref 8.8–10.2)
CHLORIDE SERPL-SCNC: 105 MMOL/L (ref 98–107)
CREAT SERPL-MCNC: 0.55 MG/DL (ref 0.51–0.95)
DEPRECATED HCO3 PLAS-SCNC: 21 MMOL/L (ref 22–29)
EGFRCR SERPLBLD CKD-EPI 2021: 90 ML/MIN/1.73M2
EOSINOPHIL # BLD AUTO: 0.4 10E3/UL (ref 0–0.7)
EOSINOPHIL NFR BLD AUTO: 5 %
ERYTHROCYTE [DISTWIDTH] IN BLOOD BY AUTOMATED COUNT: 19.9 % (ref 10–15)
GLUCOSE SERPL-MCNC: 112 MG/DL (ref 70–99)
HCT VFR BLD AUTO: 35.8 % (ref 35–47)
HGB BLD-MCNC: 11.3 G/DL (ref 11.7–15.7)
IMM GRANULOCYTES # BLD: 0 10E3/UL
IMM GRANULOCYTES NFR BLD: 0 %
LVEF ECHO: NORMAL
LYMPHOCYTES # BLD AUTO: 1.5 10E3/UL (ref 0.8–5.3)
LYMPHOCYTES NFR BLD AUTO: 20 %
MAGNESIUM SERPL-MCNC: 2.1 MG/DL (ref 1.7–2.3)
MCH RBC QN AUTO: 26.7 PG (ref 26.5–33)
MCHC RBC AUTO-ENTMCNC: 31.6 G/DL (ref 31.5–36.5)
MCV RBC AUTO: 84 FL (ref 78–100)
MONOCYTES # BLD AUTO: 0.8 10E3/UL (ref 0–1.3)
MONOCYTES NFR BLD AUTO: 11 %
NEUTROPHILS # BLD AUTO: 4.7 10E3/UL (ref 1.6–8.3)
NEUTROPHILS NFR BLD AUTO: 63 %
NRBC # BLD AUTO: 0 10E3/UL
NRBC BLD AUTO-RTO: 0 /100
PLATELET # BLD AUTO: 381 10E3/UL (ref 150–450)
POTASSIUM SERPL-SCNC: 3.7 MMOL/L (ref 3.4–5.3)
RBC # BLD AUTO: 4.24 10E6/UL (ref 3.8–5.2)
SODIUM SERPL-SCNC: 138 MMOL/L (ref 135–145)
TROPONIN T SERPL HS-MCNC: 26 NG/L
WBC # BLD AUTO: 7.4 10E3/UL (ref 4–11)

## 2024-05-31 PROCEDURE — 80048 BASIC METABOLIC PNL TOTAL CA: CPT | Performed by: HOSPITALIST

## 2024-05-31 PROCEDURE — G0378 HOSPITAL OBSERVATION PER HR: HCPCS

## 2024-05-31 PROCEDURE — 73560 X-RAY EXAM OF KNEE 1 OR 2: CPT | Mod: LT

## 2024-05-31 PROCEDURE — 250N000013 HC RX MED GY IP 250 OP 250 PS 637: Performed by: INTERNAL MEDICINE

## 2024-05-31 PROCEDURE — 85025 COMPLETE CBC W/AUTO DIFF WBC: CPT | Performed by: HOSPITALIST

## 2024-05-31 PROCEDURE — 96361 HYDRATE IV INFUSION ADD-ON: CPT

## 2024-05-31 PROCEDURE — 250N000013 HC RX MED GY IP 250 OP 250 PS 637: Performed by: HOSPITALIST

## 2024-05-31 PROCEDURE — 99233 SBSQ HOSP IP/OBS HIGH 50: CPT | Performed by: INTERNAL MEDICINE

## 2024-05-31 PROCEDURE — 36415 COLL VENOUS BLD VENIPUNCTURE: CPT | Performed by: HOSPITALIST

## 2024-05-31 PROCEDURE — 120N000001 HC R&B MED SURG/OB

## 2024-05-31 PROCEDURE — 83735 ASSAY OF MAGNESIUM: CPT | Performed by: INTERNAL MEDICINE

## 2024-05-31 PROCEDURE — 258N000003 HC RX IP 258 OP 636: Performed by: HOSPITALIST

## 2024-05-31 PROCEDURE — 74018 RADEX ABDOMEN 1 VIEW: CPT

## 2024-05-31 PROCEDURE — 84484 ASSAY OF TROPONIN QUANT: CPT | Performed by: HOSPITALIST

## 2024-05-31 RX ORDER — HALOPERIDOL 5 MG/1
5 TABLET ORAL AT BEDTIME
Status: DISCONTINUED | OUTPATIENT
Start: 2024-05-31 | End: 2024-05-31

## 2024-05-31 RX ORDER — BUSPIRONE HYDROCHLORIDE 5 MG/1
5 TABLET ORAL 2 TIMES DAILY
Status: DISCONTINUED | OUTPATIENT
Start: 2024-05-31 | End: 2024-06-05 | Stop reason: HOSPADM

## 2024-05-31 RX ORDER — ACETAMINOPHEN 500 MG
1000 TABLET ORAL DAILY PRN
Status: DISCONTINUED | OUTPATIENT
Start: 2024-05-31 | End: 2024-05-31

## 2024-05-31 RX ORDER — ACETAMINOPHEN 650 MG/1
650 SUPPOSITORY RECTAL EVERY 4 HOURS PRN
Status: DISCONTINUED | OUTPATIENT
Start: 2024-05-31 | End: 2024-06-05 | Stop reason: HOSPADM

## 2024-05-31 RX ORDER — MULTIPLE VITAMINS W/ MINERALS TAB 9MG-400MCG
1 TAB ORAL DAILY
Status: DISCONTINUED | OUTPATIENT
Start: 2024-05-31 | End: 2024-06-05 | Stop reason: HOSPADM

## 2024-05-31 RX ORDER — ONDANSETRON 4 MG/1
4 TABLET, ORALLY DISINTEGRATING ORAL EVERY 6 HOURS PRN
Status: DISCONTINUED | OUTPATIENT
Start: 2024-05-31 | End: 2024-06-05 | Stop reason: HOSPADM

## 2024-05-31 RX ORDER — SODIUM CHLORIDE 9 MG/ML
INJECTION, SOLUTION INTRAVENOUS CONTINUOUS
Status: ACTIVE | OUTPATIENT
Start: 2024-05-31 | End: 2024-05-31

## 2024-05-31 RX ORDER — AMOXICILLIN 250 MG
2 CAPSULE ORAL 2 TIMES DAILY PRN
Status: DISCONTINUED | OUTPATIENT
Start: 2024-05-31 | End: 2024-06-05 | Stop reason: HOSPADM

## 2024-05-31 RX ORDER — AMOXICILLIN 250 MG
2 CAPSULE ORAL AT BEDTIME
Status: DISCONTINUED | OUTPATIENT
Start: 2024-05-31 | End: 2024-06-04

## 2024-05-31 RX ORDER — AMLODIPINE BESYLATE 2.5 MG/1
2.5 TABLET ORAL DAILY
Status: DISCONTINUED | OUTPATIENT
Start: 2024-05-31 | End: 2024-06-05 | Stop reason: HOSPADM

## 2024-05-31 RX ORDER — BACLOFEN 10 MG/1
5 TABLET ORAL 3 TIMES DAILY PRN
Status: DISCONTINUED | OUTPATIENT
Start: 2024-05-31 | End: 2024-06-05

## 2024-05-31 RX ORDER — HALOPERIDOL 2 MG/1
2 TABLET ORAL 3 TIMES DAILY
Status: DISCONTINUED | OUTPATIENT
Start: 2024-05-31 | End: 2024-06-04

## 2024-05-31 RX ORDER — ONDANSETRON 2 MG/ML
4 INJECTION INTRAMUSCULAR; INTRAVENOUS EVERY 6 HOURS PRN
Status: DISCONTINUED | OUTPATIENT
Start: 2024-05-31 | End: 2024-06-05 | Stop reason: HOSPADM

## 2024-05-31 RX ORDER — CLONAZEPAM 0.5 MG/1
0.5 TABLET ORAL AT BEDTIME
Status: DISCONTINUED | OUTPATIENT
Start: 2024-05-31 | End: 2024-06-05 | Stop reason: HOSPADM

## 2024-05-31 RX ORDER — ACETAMINOPHEN 325 MG/1
650 TABLET ORAL EVERY 4 HOURS PRN
Status: DISCONTINUED | OUTPATIENT
Start: 2024-05-31 | End: 2024-06-05 | Stop reason: HOSPADM

## 2024-05-31 RX ORDER — FAMOTIDINE 20 MG/1
20 TABLET, FILM COATED ORAL 2 TIMES DAILY PRN
Status: DISCONTINUED | OUTPATIENT
Start: 2024-05-31 | End: 2024-06-03

## 2024-05-31 RX ORDER — LIDOCAINE 4 G/G
2 PATCH TOPICAL EVERY 24 HOURS
Status: DISCONTINUED | OUTPATIENT
Start: 2024-05-31 | End: 2024-06-05 | Stop reason: HOSPADM

## 2024-05-31 RX ORDER — QUETIAPINE FUMARATE 25 MG/1
25 TABLET, FILM COATED ORAL 3 TIMES DAILY PRN
Status: DISCONTINUED | OUTPATIENT
Start: 2024-05-31 | End: 2024-05-31

## 2024-05-31 RX ORDER — BACLOFEN 10 MG/1
5 TABLET ORAL 3 TIMES DAILY
Status: DISCONTINUED | OUTPATIENT
Start: 2024-05-31 | End: 2024-06-05 | Stop reason: HOSPADM

## 2024-05-31 RX ORDER — HALOPERIDOL 5 MG/1
5 TABLET ORAL
Status: DISCONTINUED | OUTPATIENT
Start: 2024-05-31 | End: 2024-06-04

## 2024-05-31 RX ORDER — ASPIRIN 81 MG/1
81 TABLET ORAL DAILY
Status: DISCONTINUED | OUTPATIENT
Start: 2024-05-31 | End: 2024-06-05 | Stop reason: HOSPADM

## 2024-05-31 RX ORDER — ROSUVASTATIN CALCIUM 10 MG/1
10 TABLET, COATED ORAL DAILY
Status: DISCONTINUED | OUTPATIENT
Start: 2024-05-31 | End: 2024-06-05 | Stop reason: HOSPADM

## 2024-05-31 RX ORDER — AMOXICILLIN 250 MG
1 CAPSULE ORAL 2 TIMES DAILY PRN
Status: DISCONTINUED | OUTPATIENT
Start: 2024-05-31 | End: 2024-06-05 | Stop reason: HOSPADM

## 2024-05-31 RX ORDER — LANOLIN ALCOHOL/MO/W.PET/CERES
3 CREAM (GRAM) TOPICAL
Status: DISCONTINUED | OUTPATIENT
Start: 2024-05-31 | End: 2024-06-05 | Stop reason: HOSPADM

## 2024-05-31 RX ADMIN — SODIUM CHLORIDE: 9 INJECTION, SOLUTION INTRAVENOUS at 01:18

## 2024-05-31 RX ADMIN — ASPIRIN 81 MG: 81 TABLET, COATED ORAL at 10:37

## 2024-05-31 RX ADMIN — ACETAMINOPHEN 650 MG: 325 TABLET, FILM COATED ORAL at 10:39

## 2024-05-31 RX ADMIN — AMLODIPINE BESYLATE 2.5 MG: 2.5 TABLET ORAL at 10:37

## 2024-05-31 RX ADMIN — BACLOFEN 5 MG: 10 TABLET ORAL at 20:05

## 2024-05-31 RX ADMIN — QUETIAPINE 25 MG: 25 TABLET ORAL at 10:37

## 2024-05-31 RX ADMIN — HALOPERIDOL 5 MG: 5 TABLET ORAL at 21:25

## 2024-05-31 RX ADMIN — MELATONIN TAB 3 MG 3 MG: 3 TAB at 21:24

## 2024-05-31 RX ADMIN — ACETAMINOPHEN 650 MG: 325 TABLET, FILM COATED ORAL at 05:31

## 2024-05-31 RX ADMIN — OXYCODONE HYDROCHLORIDE 2.5 MG: 5 TABLET ORAL at 06:25

## 2024-05-31 RX ADMIN — ACETAMINOPHEN 650 MG: 325 TABLET, FILM COATED ORAL at 01:07

## 2024-05-31 RX ADMIN — HALOPERIDOL 2 MG: 2 TABLET ORAL at 17:32

## 2024-05-31 RX ADMIN — ROSUVASTATIN CALCIUM 10 MG: 10 TABLET, FILM COATED ORAL at 10:37

## 2024-05-31 RX ADMIN — BACLOFEN 5 MG: 10 TABLET ORAL at 15:46

## 2024-05-31 RX ADMIN — SENNOSIDES AND DOCUSATE SODIUM 2 TABLET: 50; 8.6 TABLET ORAL at 21:26

## 2024-05-31 RX ADMIN — Medication 1 TABLET: at 17:32

## 2024-05-31 RX ADMIN — MELATONIN TAB 3 MG 3 MG: 3 TAB at 01:07

## 2024-05-31 ASSESSMENT — ACTIVITIES OF DAILY LIVING (ADL)
ADLS_ACUITY_SCORE: 57
ADLS_ACUITY_SCORE: 56
ADLS_ACUITY_SCORE: 57
ADLS_ACUITY_SCORE: 53
ADLS_ACUITY_SCORE: 56
ADLS_ACUITY_SCORE: 51
ADLS_ACUITY_SCORE: 39
ADLS_ACUITY_SCORE: 59
ADLS_ACUITY_SCORE: 53
ADLS_ACUITY_SCORE: 59
ADLS_ACUITY_SCORE: 53
ADLS_ACUITY_SCORE: 59
ADLS_ACUITY_SCORE: 59
ADLS_ACUITY_SCORE: 53
ADLS_ACUITY_SCORE: 53
ADLS_ACUITY_SCORE: 56
ADLS_ACUITY_SCORE: 53
ADLS_ACUITY_SCORE: 56
ADLS_ACUITY_SCORE: 59
ADLS_ACUITY_SCORE: 51
ADLS_ACUITY_SCORE: 56

## 2024-05-31 NOTE — H&P
[facility]    History and Physical  Hospitalist       Date of Admission:  5/30/2024  Date of Service (when I saw the patient): 05/30/24    ASSESSMENT  July Huang is a markedly pleasant 83 year old woman with past medical history that is most notable for prior stroke, as well as meningioma, recurrent acute encephalopathy who presents with acute confusion, and is found to have recurrent acute metabolic encephalopathy.    PLAN     Acute metabolic encephalopathy: Of note, Ms. Huang has a longstanding history of cerebral meningioma, for which reportedly she has had prior XRT. In 2020, she was admitted to OSH for left sided weakness and found to have acute stroke. She has ongoing residual left sided weakness with muscle spasms. More recently she has been admitted multiple times for recurrent encephalopathy. She was admitted to [facility] from 2/22/24 through 2/28/24 for acute encephalopathy thought to be due to UTI, with urine cultures growing Proteus mirabilis. This was treated with cephalosporin. Neurology was also consulted, and EEG reportedly was negative. She was then admitted [place] [facility] [place]/1/24 through 3/4/24 for acute encephalopathy and urinary retention, thought to be due to polypharmacy. UA was negative and Brain MRI showed stable large left parafalcine meningioma. MRI of the spine also reportedly showed no evidence of cauda equina syndrome. Abdominal CT showed a non-obstructing kidney stone. Her gabapentin was stopped and Baclofen dose halved, and reportedly then her mental status improved, prior to discharge. Then, she was admitted here from 3/29/24 through 4/4/24 for acute encephalopathy. Empiric broad spectrum antibiotics were started for lactic acidosis seen on admission. CTH showed ongoing meningioma, for which Neurosurgery was consulted, and recommended ongoing outpatient surveillance for that. Blood cultures, UA, COVID/Influenza/RSV tests, CXR, and CT of abdomen and  pelvis were unremarkable (ongoing 8 mm nonobstructing left renal calculus was noted incidentally). Underlying dementia was thought possible and outpatient neuropsychiatry testing recommended at discharge.    Most recently, she was admitted to [facility] on 5/17/2024 after a fall causing chest pain, and found to have hairline sternal fracture that was managed non-operatively. She was discharged home with ordered home care services on 5/22/24. Lidocaine patch was added to existing home prescriptions of Clonazepam and Baclofen.    Now, she presents for acute confusion. In the ED, she is afebrile, with accelerated hypertension and tachycardia. She is not hypoxic. CBC and CMP are largely unremarkable, with normal WBC and also normal Lactate. Troponins are minimally elevated without concerning rate of rise. EKG shows NSR. UA is negative tonight. CXR is obscured partially by overlay of her left arm but shows no clear evidence of acute process and CTH shows no acute intracranial pathology with stable appearing meningoima and chronic appearing prior infarcts.     Overall, therefore, her symptoms of recurrent acute metabolic encephalopathy are of unclear etiology. Polypharmacy could be one cause, as well as ongoing pain from her recent sternal fracture. There are no signs of acute infection thus far but we must monitor closely for those. Last, acute stroke or seizure could be on the differential, perhaps less likely at present.       -- Observation. Fall precautions. Q 4 neuro checks.    -- trial of gentle IVF overnight, 100 ml/hour NS for 10 hours, with  stop date for clinical reassessment in AM    -- Hold off on empiric antibiotics for now; follow up blood cultures    -- Hold outpatient Baclofen for now. Given Ativan tonight; hold Clonazepam    -- Clinical reassessment in AM. If her confusion persists, would consider MRI of the Brain, EEG, and Neurology consultation    -- Repeat CBC and BMP in AM. SW consulted for disposition  "planning.    Elevated Troponins: In the absence of known history of CAD. Long QTc noted in 3/2024 resolved tonight. Suspect demand ischemia due to acute illness. We will rule out ACS.      -- Telemetry, serial enzymes, TTE ordered.     Prior stroke: Resume home ASA when verified.    Hypertension: Resume home norvasc when verified  Hyperlipidemia: Resume home crestor when verified  GERD: Resume pepcid when verified  Chronic depression and anxiety: Resume home buspar when verified and as per mental status stabilization.    Prior history of ovarian and bladder cancer: Noted    Suspected severe protein calorie malnutrition: Nutrition services consulted.    I have spent 75 minutes on the date of service doing chart review, history, examination, documentation, and further activities per the note.    Chief Complaint   Confusion    Unable to obtain a history from the patient due to confusion; history obtained from the ED physician whom I have spoken with    History of Present Illness   July Huang is a markedly pleasant 83 year old woman who presents with confusion. She lives with her , who brought her to the ED tonight and provided history to the ED team, prior to going home; I have called him and reached voicemail currently. She herself remains confused, and Baclofen and IV Valium were given in the ED prior to my interview. Thus current history provision is limited at the present time. It seems that her  has noted new onset over the past several days of acute confusion, associated with progressive generalized weakness, and intermittent headaches. Currently, Ms. Huang herself is awake and alert but unable to participate meaningfully in the interview, other than to answer \"no\" when asked if she has a headache now, or any other pain, or dyspnea, cough, or fever.    In the ED,   05/30 2003 167/85 98.9  F (37.2  C) 81 18 96 %     CBC and CMP were notable for Na 134, Glucose 126, otherwise were within the " normal reference range. WBC was 7.7. Lactate was 0.8. Troponin T levels were 23 and then 29. EKG showed NSR without ST segment changes, and QTc 417 (normal). UA showed 3 WBC, 1 RBC. Blood cultures were sent.    She was given 500 ml IVF in the ED.    Recent Results (from the past 24 hour(s))   XR Chest Port 1 View    Narrative    EXAM: XR CHEST PORT 1 VIEW  LOCATION: [facility]  DATE: 5/30/2024    INDICATION: weakness, recent chest trauma  COMPARISON: 3/30/2020      Impression    IMPRESSION: Patients left arm projects over the lower left chest obscuring some detail. Heart size and pulmonary vessels appear normal. Calcified thoracic aorta. No definite pneumonia. No traumatic injury evident.   CT Head w/o Contrast    Narrative    EXAM: CT HEAD W/O CONTRAST  LOCATION: [facility]  DATE: 5/30/2024    INDICATION: ams, recent fall, HA  COMPARISON: March 29, 2024.  TECHNIQUE: Routine CT Head without IV contrast. Multiplanar reformats. Dose reduction techniques were used.    FINDINGS:  INTRACRANIAL CONTENTS: No acute intracranial hemorrhage or abnormal extra-axial collection. Unchanged size of the large presumed meningioma near the vertex. No CT evidence of acute infarct. Mild to moderate presumed chronic small vessel ischemic changes.   Remote right basal ganglia and right cerebellar hemisphere lacunar infarcts. Mild generalized volume loss. No hydrocephalus.     VISUALIZED ORBITS/SINUSES/MASTOIDS: No intraorbital abnormality. No paranasal sinus mucosal disease. No middle ear or mastoid effusion.    BONES/SOFT TISSUES: No acute abnormality.      Impression    IMPRESSION:  1.  No acute intracranial abnormality.  2.  Chronic changes as above.       PHYSICAL EXAM  Blood pressure (!) 186/98, pulse 96, temperature 98.9  F (37.2  C), temperature source Temporal, resp. rate 18, SpO2 99%, not currently breastfeeding.  Constitutional: Alert and oriented to person only; no apparent  distress; appears very thin and frail  Respiratory: lungs clear to auscultation bilaterally  Cardiovascular: regular S1 S2  GI: abdomen soft non tender non distended bowel sounds positive  Musculoskeletal: no clubbing, cyanosis or edema  Neurologic: extra-ocular muscles intact; left sided weakness noted     DVT Prophylaxis: Pneumatic Compression Devices  Code Status: DNR / DNI by recently signed POLST last month, pending further discussions with her  when available    Disposition: Expected discharge in 0-3 days    Wil Palmer MD, MD    Past Medical History    I have reviewed this patient's medical history and updated it with pertinent information if needed.   Past Medical History:   Diagnosis Date    Bladder cancer (H)     Cancer of ovary (H)     Cerebrovascular accident (CVA) (H) 11/30/2020    R MCA stroke; with residual left sided weakness    Depression, major, single episode, severe (H) 12/23/2022    Eye muscle weakness, left 02/17/2016    GERD (gastroesophageal reflux disease) 07/08/2009    H/O total hysterectomy with bilateral salpingo-oophorectomy (BSO) 1995    ovarian cancer    HTN (hypertension)     Hyperlipidemia     Irregular heart beat 2009    nl CT angiogram    Meningioma (H)     Non-cardiac chest pain 07/08/2009    S/P coronary angiogram 2005    negative    Stress 12/22/2010    Upper back pain 02/17/2016       Past Surgical History   I have reviewed this patient's surgical history and updated it with pertinent information if needed.  Past Surgical History:   Procedure Laterality Date    BIOPSY BREAST      HYSTERECTOMY TOTAL ABDOMINAL, BILATERAL SALPINGO-OOPHORECTOMY, COMBINED  1995    HYSTERECTOMY, PAP NO LONGER INDICATED      ELZBIETA BSO    OVARY SURGERY         Prior to Admission Medications   Prior to Admission Medications   Prescriptions Last Dose Informant Patient Reported? Taking?   Baclofen (LIORESAL) 5 MG tablet 5/30/2024 at am  No Yes   Sig: Take 1 tablet (5 mg) by mouth 3 times  daily as needed for muscle spasms   Lidocaine (LIDOCARE) 4 % Patch  at prn  No Yes   Sig: Place 2 patches onto the skin every 24 hours To prevent lidocaine toxicity, patient should be patch free for 12 hrs daily. Do NOT apply heat over patch   Vitamin D3 (CHOLECALCIFEROL) 25 mcg (1000 units) tablet 5/30/2024 at am  Yes Yes   Sig: Take 1 tablet by mouth daily   acetaminophen (TYLENOL) 500 MG tablet  at prn  Yes Yes   Sig: Take 1,000 mg by mouth daily as needed for pain   amLODIPine (NORVASC) 2.5 MG tablet 5/30/2024 at am  No Yes   Sig: Take 1 tablet (2.5 mg) by mouth daily   aspirin (ASA) 81 MG EC tablet 5/30/2024 at am  Yes Yes   Sig: Take 81 mg by mouth daily   busPIRone (BUSPAR) 5 MG tablet 5/30/2024 at am  No Yes   Sig: Take 1 tablet (5 mg) by mouth 2 times daily   calcium citrate-vitamin D (CITRACAL) 315-5 MG-MCG TABS per tablet 5/30/2024 at am  Yes Yes   Sig: Take 1 tablet by mouth daily   clonazePAM (KLONOPIN) 0.5 MG tablet 5/30/2024 at am  No Yes   Sig: Take 1 tablet (0.5 mg) by mouth at bedtime   cyanocobalamin (VITAMIN B-12) 1000 MCG tablet 5/30/2024 at am  Yes Yes   Sig: Take 1 tablet (1,000 mcg) by mouth daily   diclofenac (VOLTAREN) 1 % topical gel  at prn  No Yes   Sig: Apply 2 g topically 4 times daily To painful area of back   Patient taking differently: Apply 2 g topically daily as needed for other (Pain) To painful area of back   famotidine (PEPCID) 20 MG tablet  at prn  No Yes   Sig: Take 1 tablet (20 mg) by mouth 2 times daily as needed   melatonin 5 MG tablet  at prn  No Yes   Sig: Take 1 tablet (5 mg) by mouth at bedtime   Patient taking differently: Take 5 mg by mouth at bedtime And 5mg at bedtime PRN   rosuvastatin (CRESTOR) 10 MG tablet 5/30/2024 at am  Yes Yes   Sig: Take 10 mg by mouth daily   valACYclovir (VALTREX) 1000 mg tablet  at prn  Yes Yes   Sig: Take 1,000 mg by mouth 2 times daily as needed (HSV ulcer)      Facility-Administered Medications: None     Allergies   Allergies    Allergen Reactions    Diltiazem Palpitations    Hydrochlorothiazide W-Amiloride Palpitations    Lisinopril Palpitations    Losartan Potassium Visual Disturbance    Meclizine Palpitations    Metoprolol Palpitations    Tetracyclines & Related Nausea and Vomiting       Social History   I have reviewed this patient's social history and updated it with pertinent information if needed. July Huang  reports that she quit smoking about 29 years ago. Her smoking use included cigarettes. She has never used smokeless tobacco. She reports current alcohol use. She reports that she does not use drugs.    Family History   Family history assessed and, except as above, is non-contributory.    Family History   Problem Relation Age of Onset    Cerebrovascular Disease Mother     Cancer Sister         lung cancer    Asthma Sister     Neurologic Disorder Sister         parkinson's     Heart Disease Father     Diabetes Father     Lung Cancer Sister     Breast Cancer No family hx of     Colon Cancer No family hx of     Parkinsonism Sister     Asthma Sister        Review of Systems   The 10 point Review of Systems is negative other than noted in the HPI or here.     Primary Care Physician   Denton Barahona    Data   Labs Ordered and Resulted from Time of ED Arrival to Time of ED Departure   TROPONIN T, HIGH SENSITIVITY - Abnormal       Result Value    Troponin T, High Sensitivity 23 (*)    COMPREHENSIVE METABOLIC PANEL - Abnormal    Sodium 134 (*)     Potassium 3.8      Carbon Dioxide (CO2) 25      Anion Gap 10      Urea Nitrogen 16.5      Creatinine 0.64      GFR Estimate 87      Calcium 9.0      Chloride 99      Glucose 126 (*)     Alkaline Phosphatase 80      AST 18      ALT 11      Protein Total 6.9      Albumin 4.1      Bilirubin Total 0.2     ROUTINE UA WITH MICROSCOPIC REFLEX TO CULTURE - Abnormal    Color Urine Straw      Appearance Urine Clear      Glucose Urine Negative      Bilirubin Urine Negative      Ketones Urine  Negative      Specific Gravity Urine 1.012      Blood Urine Negative      pH Urine 6.5      Protein Albumin Urine Negative      Urobilinogen Urine Normal      Nitrite Urine Negative      Leukocyte Esterase Urine Small (*)     RBC Urine 1      WBC Urine 3     CBC WITH PLATELETS AND DIFFERENTIAL - Abnormal    WBC Count 7.7      RBC Count 4.42      Hemoglobin 11.8      Hematocrit 37.0      MCV 84      MCH 26.7      MCHC 31.9      RDW 20.1 (*)     Platelet Count 410      % Neutrophils 67      % Lymphocytes 20      % Monocytes 10      % Eosinophils 2      % Basophils 1      % Immature Granulocytes 0      NRBCs per 100 WBC 0      Absolute Neutrophils 5.2      Absolute Lymphocytes 1.5      Absolute Monocytes 0.8      Absolute Eosinophils 0.1      Absolute Basophils 0.1      Absolute Immature Granulocytes 0.0      Absolute NRBCs 0.0     ISTAT GASES LACTATE VENOUS POCT - Abnormal    Lactic Acid POCT 0.8      Bicarbonate Venous POCT 29 (*)     O2 Sat, Venous POCT 60 (*)     pCO2 Venous POCT 42      pH Venous POCT 7.44 (*)     pO2 Venous POCT 30      Base Excess/Deficit (+/-) POCT 4.0 (*)    TROPONIN T, HIGH SENSITIVITY   BLOOD CULTURE   BLOOD CULTURE       Data reviewed today:  I personally reviewed the EKG tracing showing NSR without ST segment changes, the chest x-ray image(s) showing no acute pathology, and the head CT image(s) showing no acute pathology .

## 2024-05-31 NOTE — UTILIZATION REVIEW
Admission Status; Secondary Review Determination       Under the authority of the Utilization Management Committee, the utilization review process indicated a secondary review on the above patient. The review outcome is based on review of the medical records, discussions with staff, and applying clinical experience noted on the date of the review.     (x) Inpatient Status Appropriate - This patient's medical care is consistent with medical management for inpatient care and reasonable inpatient medical practice.     RATIONALE FOR DETERMINATION     Patient requires inpatient admission versus short stay observation or outpatient treatment for the following reasons:  83 year old woman with past medical history that is most notable for prior stroke, as well as meningioma, recurrent acute encephalopathy who presents with acute confusion, and is found to have recurrent acute metabolic encephalopathy. She is very agitated requiring prn seroquel, IV benzodiazepines and is yelling and hitting staff despite this. Due to severe metabolic encephalopathy causing agitation with the above concerns for patient and others' safety, would advance to inpatient status. Dr. Owens notified via [employer].     The expected length of stay at the time of admission was more than 2 nights because of the severity of illness, intensity of service provided, and risk for adverse outcome. Inpatient admission is appropriate.         This document was produced using voice recognition software       The information on this document is developed by the utilization review team in order for the business office to ensure compliance. This only denotes the appropriateness of proper admission status and does not reflect the quality of care rendered.   The definitions of Inpatient Status and Observation Status used in making the determination above are those provided in the CMS Coverage Manual, Chapter 1 and Chapter 6, section 70.4.   Sincerely,   Kj Ordonez  Jayant DO  Physician Advisor  [facility].

## 2024-05-31 NOTE — PROVIDER NOTIFICATION
"MD Notification    Notified Person: MD    Notified Person Name:DR Owens    Notification Date/Time:1727 5/31/24    Notification Interaction:vocera     Purpose of Notification:FYI, /95, tele shows tachy in 120s with PVCs in trigeminy. Also, had to straight cath pt for 800ml urine.     Orders Received:Per Dr Owens, \"ordered magnesium lab. changed bedtime Haldol to as needed \"    Comments:    "

## 2024-05-31 NOTE — PROVIDER NOTIFICATION
MD Notification    Notified Person: MD    Notified Person Name:Dr Owens    Notification Date/Time:0935 5/31/24    Notification Interaction:[employer] messaging     Purpose of Notification:Pt is more agitated and irritable, yelling, hitting staff, throwing food, pulling on IV; unable to redirect pt. Also, pt is refusing to go down for X ray        Orders Received:Seroquel ordered PRN     Comments:

## 2024-05-31 NOTE — PROGRESS NOTES
Observation goals  PRIOR TO DISCHARGE        Comments: -diagnostic tests and consults completed and resulted: not met, xray, echo, SW/CM, nutrition consult pending   -vital signs normal or at patient baseline: met   -tolerating oral intake to maintain hydration: met   -adequate pain control on oral analgesics: met   -returns to baseline functional status: not met  -safe disposition plan has been identified: not met   Nurse to notify provider when observation goals have been met and patient is ready for discharge.

## 2024-05-31 NOTE — ED TRIAGE NOTES
[facility]  ED Arrival Note    Arrives through triage. ABC's intact. A &O X4. . Pt arrives with c/o generalized weakness that has gotten worse over the course of the day.  reports that she is more confused than normal. Hx of UTIs,  reports that this type of weakness is usually seen with infections.       Visitors during triage: Spouse      Triage Interventions: EKG and IV and labs    Ambulatory: No    Meets Stroke Criteria?: No    Meets Trauma Criteria?: No    Shock Index (HR/SBP): <0.8, for provider reference    Directed to: Triage Lobby    Pronouns: she/her       Triage Assessment (Adult)       Row Name 05/30/24 2006          Triage Assessment    Airway WDL WDL        Respiratory WDL    Respiratory WDL WDL        Skin Circulation/Temperature WDL    Skin Circulation/Temperature WDL WDL        Cardiac WDL    Cardiac WDL WDL        Peripheral/Neurovascular WDL    Peripheral Neurovascular WDL WDL        Cognitive/Neuro/Behavioral WDL    Cognitive/Neuro/Behavioral WDL X        [place] Coma Scale    Best Eye Response 4-->(E4) spontaneous     Best Motor Response 6-->(M6) obeys commands     Best Verbal Response 5-->(V5) oriented     [place] Coma Scale Score 15

## 2024-05-31 NOTE — ED NOTES
[facility]  ED Nurse Handoff Report    ED Chief complaint: Generalized Weakness      ED Diagnosis:   Final diagnoses:   Acute encephalopathy       Code Status: Previously DNR/DNI, discuss upon admission    Allergies:   Allergies   Allergen Reactions    Diltiazem Palpitations    Hydrochlorothiazide W-Amiloride Palpitations    Lisinopril Palpitations    Losartan Potassium Visual Disturbance    Meclizine Palpitations    Metoprolol Palpitations    Tetracyclines & Related Nausea and Vomiting       Patient Story: 83 year old female with history of previous stroke and meningioma who presents with increasing general weakness over the past few days.  She also seems more confused than normal according to her .   Focused Assessment:    Abnormal Labs Resulted from Time of ED Arrival to Time of ED Departure   TROPONIN T, HIGH SENSITIVITY - Abnormal       Result Value    Troponin T, High Sensitivity 23 (*)    COMPREHENSIVE METABOLIC PANEL - Abnormal    Sodium 134 (*)     Potassium 3.8      Carbon Dioxide (CO2) 25      Anion Gap 10      Urea Nitrogen 16.5      Creatinine 0.64      GFR Estimate 87      Calcium 9.0      Chloride 99      Glucose 126 (*)     Alkaline Phosphatase 80      AST 18      ALT 11      Protein Total 6.9      Albumin 4.1      Bilirubin Total 0.2     ROUTINE UA WITH MICROSCOPIC REFLEX TO CULTURE - Abnormal    Color Urine Straw      Appearance Urine Clear      Glucose Urine Negative      Bilirubin Urine Negative      Ketones Urine Negative      Specific Gravity Urine 1.012      Blood Urine Negative      pH Urine 6.5      Protein Albumin Urine Negative      Urobilinogen Urine Normal      Nitrite Urine Negative      Leukocyte Esterase Urine Small (*)     RBC Urine 1      WBC Urine 3     CBC WITH PLATELETS AND DIFFERENTIAL - Abnormal    WBC Count 7.7      RBC Count 4.42      Hemoglobin 11.8      Hematocrit 37.0      MCV 84      MCH 26.7      MCHC 31.9      RDW 20.1 (*)     Platelet Count 410       % Neutrophils 67      % Lymphocytes 20      % Monocytes 10      % Eosinophils 2      % Basophils 1      % Immature Granulocytes 0      NRBCs per 100 WBC 0      Absolute Neutrophils 5.2      Absolute Lymphocytes 1.5      Absolute Monocytes 0.8      Absolute Eosinophils 0.1      Absolute Basophils 0.1      Absolute Immature Granulocytes 0.0      Absolute NRBCs 0.0     ISTAT GASES LACTATE VENOUS POCT - Abnormal    Lactic Acid POCT 0.8      Bicarbonate Venous POCT 29 (*)     O2 Sat, Venous POCT 60 (*)     pCO2 Venous POCT 42      pH Venous POCT 7.44 (*)     pO2 Venous POCT 30      Base Excess/Deficit (+/-) POCT 4.0 (*)      CT Head w/o Contrast   Final Result   IMPRESSION:   1.  No acute intracranial abnormality.   2.  Chronic changes as above.      XR Chest Port 1 View   Final Result   IMPRESSION: Patients left arm projects over the lower left chest obscuring some detail. Heart size and pulmonary vessels appear normal. Calcified thoracic aorta. No definite pneumonia. No traumatic injury evident.            Treatments and/or interventions provided: Valium, NS IVF  Patient's response to treatments and/or interventions: Reduction in leg cramps    To be done/followed up on inpatient unit:  See inpatient orders    Does this patient have any cognitive concerns?: Disoriented to time, Disoriented to place, and Disoriented to situation    Activity level - Baseline/Home:  Walker  Activity Level - Current:   Unknown    Patient's Preferred language: English   Needed?: No    Isolation: None  Infection: Not Applicable  Patient tested for COVID 19 prior to admission: NO  Bariatric?: No    Vital Signs:   Vitals:    05/30/24 2306 05/30/24 2313 05/30/24 2328 05/30/24 2343   BP:       Pulse:       Resp:       Temp:       TempSrc:       SpO2: 99% 100% 100% 99%       Cardiac Rhythm:     Was the PSS-3 completed:   Yes  What interventions are required if any?               Family Comments:  left for the night. Phone  number in demographics  OBS brochure/video discussed/provided to patient/family: No            For the majority of the shift this patient's behavior was Green.   Behavioral interventions performed were None needed. Bed alarm appropriate. Has not interfered with cares.    ED NURSE PHONE NUMBER: [phone]

## 2024-05-31 NOTE — PROVIDER NOTIFICATION
MD Notification    Notified Person: MD    Notified Person Name:Dr Palmer    Notification Date/Time:5/31/24@0553    Notification Interaction:Bleacher Report messaging    Purpose of Notification:Pt came up to the unit earlier this morning screaming of pain on BLE cleopatra on her lt knee, tyl given, she fall asleep, later woke up now very confused and screaming and making incomprehensible statement but still complaining of pain on her lt knee/leg, she is now very confused and acting more like a bob psych pt now, do you want to x-ray her lt knee or something? maybe something more for pain?    Orders Received:OXY 2.5mg once, x-ray lt knee    Comments:

## 2024-05-31 NOTE — PROGRESS NOTES
RECEIVING UNIT ED HANDOFF REVIEW    ED Nurse Handoff Report was reviewed by: Omi Zhao RN on May 31, 2024 at 12:34 AM

## 2024-05-31 NOTE — PLAN OF CARE
Goal Outcome Evaluation:      Plan of Care Reviewed With: patient    Overall Patient Progress: decliningOverall Patient Progress: declining       PRIMARY Concern: weakness and increasing confusion, admitted for acute encephalopathy   Tests/Procedures for NEXT shift: Echo pending, pt is uncooperative with exam  Consults? (Pending/following, signed-off?): CC/SW consult pending   Safety Risk CONCERNS (fall risk, behaviors, etc.): fall risk       Where is patient from? (Home, TCU, etc.): home w/ spouse   Isolation/Type: none   Other Important info for next shift: neuro check q4h, intact  Anticipated DC date & active delays: TBD   SUMMARY NOTE:  Orientation/Cognitive: A/Ox to self, confused and agitated, intermittently yells and hits staff. Difficult to redirect   Observation Goals (Met/ Not Met): inpt  Mobility Level/Assist Equipment: Lift, A2, not OOB today, due to weakness   Antibiotics & Plan (IV/po, length of tx left): none   Pain Management: tylenol for knee pain, lidocaine patches removed by pt   Tele/VS/O2: VSS on room air expt elevated BP   ABNL Lab/BG: hgb 11.3  Diet: regular, needs feeding assistance   Bowel/Bladder: incont, straight cath for 860ml  Skin Concerns: scattered bruises   Drains/Devices: IV SL   Patient Stated Goal for Today: CHANDNI

## 2024-05-31 NOTE — CONSULTS
CLINICAL NUTRITION SERVICES  -  ASSESSMENT NOTE    Recommendations Ordered by Registered Dietitian (RD):   Chocolate ensure TID w/ meals  multivitamin with minerals until adequate po intake   Malnutrition: 5/31  % Weight Loss:  None noted  % Intake:  <75% for > 7 days (moderate malnutrition)  Subcutaneous Fat Loss:  Orbital region mild depletion, Upper arm region mild depletion, and Thoracic region mild depletion  Muscle Loss:  Clavicle bone region moderate depletion, Acromion bone region moderate depletion, Upper arm region moderate  depletion, Dorsal hand region moderate depletion, Patellar region moderate depletion, and Posterior calf region moderate  depletion  Fluid Retention:  None noted    Malnutrition Diagnosis: At least Moderate malnutrition  In Context of:  Acute illness or injury  Chronic illness or disease     REASON FOR ASSESSMENT  July Huang is a 83 year old female seen by Registered Dietitian for Provider Order - Suspected severe protein calorie malnutrition     PMH of: prior stroke, as well as meningioma, recurrent acute encephalopathy who presents with acute confusion, and is found to have recurrent acute metabolic encephalopathy.     NUTRITION HISTORY  - Information obtained from chart review, pt and family member   - Patient is on a regular diet at home  - Diet history - pt couldn't provide a meaningful Hx but family member noted a reduction in po intake as her mentation can distract her from eating  - Supplements - pt was previously sent chocolate Ensure. She endorsed liking chocolate flavors, and family member was OK with sending them TID. Pt said she doesn't like Gel+ and declined Magic cup  - Pt was agitated and relatively mean to her family member, but polite to RD. She made some inappropriate racial comments while discussing sending supplements    CURRENT NUTRITION ORDERS  Diet Order:   Regular     Current Intake/Tolerance: flowsheets show no intakes. There was an untouched meal tray  "at beside. family member said she easily gets distracted and just won't eat at times    NUTRITION FOCUSED PHYSICAL ASSESSMENT FOR DIAGNOSING MALNUTRITION)  Yes    Observed:    Muscle wasting (refer to documentation in Malnutrition section), Subcutaneous fat loss (refer to documentation in Malnutrition section), and Lackluster hair      ANTHROPOMETRICS  Height: 5' 2\"  Weight: 102 lbs 8.22 oz   Body mass index is 18.75 kg/m .  Weight Status:  Normal BMI - low end of range  IBW: 50.1 kg  % IBW: 93%  Weight History:   Wt Readings from Last 20 Encounters:   05/31/24 46.5 kg (102 lb 8.2 oz)   05/08/24 46.5 kg (102 lb 9.6 oz)   05/02/24 46.6 kg (102 lb 12.8 oz)   04/29/24 46.6 kg (102 lb 12.8 oz)   04/25/24 46.6 kg (102 lb 12.8 oz)   04/18/24 46.1 kg (101 lb 9.6 oz)   04/17/24 46.1 kg (101 lb 9.6 oz)   04/12/24 46.2 kg (101 lb 12.8 oz)   04/09/24 46.2 kg (101 lb 12.8 oz)   04/05/24 49 kg (108 lb)   03/29/24 49 kg (108 lb)   02/05/24 49 kg (108 lb)   12/27/23 49.6 kg (109 lb 6.4 oz)   10/31/23 46.5 kg (102 lb 9.6 oz)   08/15/23 40.8 kg (90 lb)   08/04/23 41 kg (90 lb 6.4 oz)   06/12/23 43.1 kg (95 lb)   05/22/23 43.8 kg (96 lb 9.6 oz)   04/07/23 45.6 kg (100 lb 9.6 oz)   01/09/23 46.7 kg (103 lb)     LABS  Labs reviewed     MEDICATIONS  Medications reviewed     ASSESSED NUTRITION NEEDS PER APPROVED PRACTICE GUIDELINES:  Dosing Weight 47 kg (actual)  Estimated Energy Needs: 1850-2347+ kcals (25-30+ Kcal/Kg)  Justification: maintenance + repletion   Estimated Protein Needs: 47-56+ grams protein (1-1.2+ g pro/Kg)  Justification: maintenance + repletion   Estimated Fluid Needs: 6572-7069  mL (1 mL/Kcal)  Justification: maintenance    MALNUTRITION:   % Weight Loss:  None noted  % Intake:  <75% for > 7 days (moderate malnutrition)  Subcutaneous Fat Loss:  Orbital region mild depletion, Upper arm region mild depletion, and Thoracic region mild depletion  Muscle Loss:  Clavicle bone region moderate depletion, Acromion bone region " moderate depletion, Upper arm region moderate  depletion, Dorsal hand region moderate depletion, Patellar region moderate depletion, and Posterior calf region moderate  depletion  Fluid Retention:  None noted    Malnutrition Diagnosis: At least Moderate malnutrition  In Context of:  Acute illness or injury  Chronic illness or disease    NUTRITION DIAGNOSIS:  Inadequate oral intake related to mentation as evidenced by family report confusion/distraction can lead to missed meals, mild fat losses and moderate muscle wasting    NUTRITION INTERVENTIONS  Recommendations / Nutrition Prescription  Chocolate ensure TID w/ meals  multivitamin with minerals until adequate po intake    Implementation  Nutrition education: Per Provider order if indicated   Medical Food Supplement and Multivitamin/Mineral    Nutrition Goals  PO intake - >75% of meal trays, or the equivalent in supplements, TID    MONITORING AND EVALUATION:  Progress towards goals will be monitored and evaluated per protocol and Practice Guidelines    Bogdan Monzon RD, LD

## 2024-05-31 NOTE — PLAN OF CARE
Goal Outcome Evaluation:    Overall Patient Progress: declining    Observation goals  PRIOR TO DISCHARGE        Comments:   -Diagnostic tests and consults completed and resulted-Not met, tests pending  -vital signs normal or at patient baseline-Not met, needing oxygen, elevated B.P  -Tolerating oral intake to maintain hydration-Met  -Adequate pain control on oral analgesics-Met  -Returns to baseline functional status-Not met, needing lift for transfer  -Safe disposition plan has been identified-Not met, SW consult pending  Nurse to notify provider when observation goals have been met and patient is ready for discharge.        PRIMARY Concern: Here with grn weakness and increasing confusion, admitted for acute encephalopathy  SAFETY RISK Concerns (fall risk, behaviors, etc.): Fall  Isolation/Type: n/a  Tests/Procedures for NEXT shift: ECHO, Lt knee x-ray,   Consults? (Pending/following, signed-off?) CC/SW consult pending  Where is patient from? (Home, TCU, etc.): Home, lives with spouse  Other Important info for NEXT shift: Pt becoming increasingly more confused and unable tpo participate in neuro assessment  Anticipated DC date & active delays: TBD  _____________________________________________________________________________  SUMMARY NOTE:  Orientation/Cognitive:Alert, disoriented x4, was oriented to self and place on admission   Observation Goals (Met/ Not Met): Not met, labs,x-rays, echo pending  Mobility Level/Assist Equipment:not OOB, will require lift, screams of leg pain while laying in bed  Antibiotics & Plan (IV/po, length of tx left): n/a  Pain Management: PRN Tyl given x2, one time 2.5 oxy given   Tele/VS/O2: Tele-NSR, elevated B.P, irregular HR, oxygen @1l via NC  ABNL Lab/BG: Trop 29  Diet: Reg  Bowel/Bladder: Incontinent of urine, no BM this shift  Skin Concerns: scattered scabs and bruising  Drains/Devices: PIV with N/S cont@100  Patient Stated Goal for Today:  none

## 2024-05-31 NOTE — PHARMACY-ADMISSION MEDICATION HISTORY
Pharmacy Intern Admission Medication History    Admission medication history is complete. The information provided in this note is only as accurate as the sources available at the time of the update.    Information Source(s): Patient, Family member, and CareEverywhere/SureScripts via in-person    Pertinent Information: None    Changes made to PTA medication list:  Added: None.  Deleted: Polyethylene glycol  Changed: Acetaminophen daily to prn, diclofenac 4 times daily to daily prn,     Allergies reviewed with patient and updates made in EHR: no    Medication History Completed By: Sharon Dunlap 5/30/2024 9:37 PM    PTA Med List   Medication Sig Last Dose    acetaminophen (TYLENOL) 500 MG tablet Take 1,000 mg by mouth daily as needed for pain  at prn    amLODIPine (NORVASC) 2.5 MG tablet Take 1 tablet (2.5 mg) by mouth daily 5/30/2024 at am    aspirin (ASA) 81 MG EC tablet Take 81 mg by mouth daily 5/30/2024 at am    Baclofen (LIORESAL) 5 MG tablet Take 1 tablet (5 mg) by mouth 3 times daily as needed for muscle spasms 5/30/2024 at am    busPIRone (BUSPAR) 5 MG tablet Take 1 tablet (5 mg) by mouth 2 times daily 5/30/2024 at am    calcium citrate-vitamin D (CITRACAL) 315-5 MG-MCG TABS per tablet Take 1 tablet by mouth daily 5/30/2024 at am    clonazePAM (KLONOPIN) 0.5 MG tablet Take 1 tablet (0.5 mg) by mouth at bedtime 5/30/2024 at am    cyanocobalamin (VITAMIN B-12) 1000 MCG tablet Take 1 tablet (1,000 mcg) by mouth daily 5/30/2024 at am    diclofenac (VOLTAREN) 1 % topical gel Apply 2 g topically 4 times daily To painful area of back (Patient taking differently: Apply 2 g topically daily as needed for other (Pain) To painful area of back)  at prn    famotidine (PEPCID) 20 MG tablet Take 1 tablet (20 mg) by mouth 2 times daily as needed  at prn    Lidocaine (LIDOCARE) 4 % Patch Place 2 patches onto the skin every 24 hours To prevent lidocaine toxicity, patient should be patch free for 12 hrs daily. Do NOT apply heat over  patch  at prn    melatonin 5 MG tablet Take 1 tablet (5 mg) by mouth at bedtime (Patient taking differently: Take 5 mg by mouth at bedtime And 5mg at bedtime PRN)  at prn    rosuvastatin (CRESTOR) 10 MG tablet Take 10 mg by mouth daily 5/30/2024 at am    valACYclovir (VALTREX) 1000 mg tablet Take 1,000 mg by mouth 2 times daily as needed (HSV ulcer)  at prn    Vitamin D3 (CHOLECALCIFEROL) 25 mcg (1000 units) tablet Take 1 tablet by mouth daily 5/30/2024 at am

## 2024-05-31 NOTE — PROGRESS NOTES
"[facility]    Medicine Progress Note - Hospitalist Service    Date of Admission:  5/30/2024    Addendum:  RN paged with the following message, \"Pt is more agitated and irritable, yelling, hitting staff, throwing food, pulling on IV; unable to redirect pt. pt agitated when we attempt to provide care or feed pt.\" Per staff, code 21 is not appropriate.  Ordered Seroquel 25 mg 3 times daily for patient's safety (see note regarding pulling on IV) and to allow staff to care for patient safely.  Reviewed EKG from 5/30 showing QTc 417 ms.     See further discussion below.    Assessment & Plan   Acute metabolic encephalopathy: Of note, Ms. Huang has a longstanding history of cerebral meningioma, for which reportedly she has had prior XRT. In 2020, she was admitted to OSH for left sided weakness and found to have acute stroke. She has ongoing residual left sided weakness with muscle spasms. More recently she has been admitted multiple times for recurrent encephalopathy. She was admitted to [facility] from 2/22/24 through 2/28/24 for acute encephalopathy thought to be due to UTI, with urine cultures growing Proteus mirabilis. This was treated with cephalosporin. Neurology was also consulted, and EEG reportedly was negative.   She was then admitted to [facility] from 3/1/24 through 3/4/24 for acute encephalopathy and urinary retention, thought to be due to polypharmacy. UA was negative and Brain MRI showed stable large left parafalcine meningioma. MRI of the spine also reportedly showed no evidence of cauda equina syndrome. Abdominal CT showed a non-obstructing kidney stone. Her gabapentin was stopped and Baclofen dose halved, and reportedly then her mental status improved, prior to discharge. Then, she was admitted here from 3/29/24 through 4/4/24 for acute encephalopathy. Empiric broad spectrum antibiotics were started for lactic acidosis seen on admission. CTH showed ongoing meningioma, for which " "Neurosurgery was consulted, and recommended ongoing outpatient surveillance for that. Blood cultures, UA, COVID/Influenza/RSV tests, CXR, and CT of abdomen and pelvis were unremarkable (ongoing 8 mm nonobstructing left renal calculus was noted incidentally). Note from 4/29 indicates: \"Reviewed SLUMS 17/30, as well as option for Neurology/Neuropsych referral for further cognitive testing.\"      Most recently, she was admitted to [facility] on 5/17/2024 after a fall causing chest pain, and found to have hairline sternal fracture that was managed non-operatively. She was discharged home with ordered home care services on 5/22/24. Lidocaine patch was added to existing home prescriptions of Clonazepam and Baclofen.     Now, she presents for acute confusion. In the ED, she is afebrile, with accelerated hypertension and tachycardia. She is not hypoxic. CBC and CMP are largely unremarkable, with normal WBC and also normal Lactate. Troponins are minimally elevated without concerning rate of rise. EKG shows NSR. UA is negative tonight. CXR is obscured partially by overlay of her left arm but shows no clear evidence of acute process and CTH shows no acute intracranial pathology with stable appearing meningoima and chronic appearing prior infarcts.      Overall, therefore, her symptoms of recurrent acute metabolic encephalopathy are of unclear etiology. Polypharmacy could be one cause, as well as ongoing pain from her recent sternal fracture. There are no signs of acute infection thus far but we must monitor closely for those. Last, acute stroke or seizure could be on the differential, perhaps less likely at present.     Observation. Fall precautions. Q 4 neuro checks.  trial of gentle IVF overnight, 100 ml/hour NS for 10 hours, now discontinued  Hold off on empiric antibiotics for now; follow up blood cultures  Patient reports patient has severe spasticity involving the left leg from prior stroke.  He says she takes baclofen 5 mg 3 " "times daily at home.  We discussed the potential sedating effect of baclofen, he feels benefit exceeds the risk.  Restarted baclofen, noting relatively low dose and the potential for severe discomfort from spasticity  She has residual left-sided weakness from her stroke but no obvious new focal neurologic findings.  Noncontrast head CT done 5/30 had no new findings.  There is no obvious indication for additional CNS imaging at this point  Repeat CBC and BMP are unremarkable  I had a lengthy bedside conversation with her .  He had an explosive outburst of temper, including shouting and profanity, directed at me (\"You did not do it but you are on the receiving end of it.\")  He complains bitterly about much of her medical care up until this point, including her TCU stay in January and subsequent medical visits.  He feels that she suffers from constipation which is frequently overlooked.  He says that he was called to bring her home from the hospital in April and just prior to discharge she developed severe sepsis, \"and then they took it seriously.\"  He feels that her symptoms are frequently attributed to \"her being looney.\"  He feels overwhelmed with her care, \"how my supposed to do it?\"  We reviewed that there is currently no sign that she has an active infection and no single obvious reason has been identified for her altered mental status.  We reviewed that her cognitive impairment at baseline makes her more vulnerable to small changes, including medication changes  He asks if \"there is something she can take.\"  We discussed use of very low-dose antipsychotic medications that can help her relax and reduce the risk of self injury but also have the risk of sedation.  He says he understands and accepts the risks.  He recognizes the drug Haldol, and says she was treated with Haldol at [facility] and, \"it helped her snap out of it.\"  Haldol 2 mg 3 times daily ordered with 5 mg at bedtime..    Obstipation   talked " "at some length about her problems with intermittent constipation  Checked portable abdominal x-ray.  This shows, \"moderate amount of stool throughout the colon may indicate a degree of constipation.  No obstruction.\"  She does not have scheduled cathartics at home.  Add senna twice daily    Elevated Troponins: In the absence of known history of CAD. Long QTc noted in 3/2024 resolved tonight. Suspect demand ischemia due to acute illness. We will rule out ACS.  Telemetry, serial enzymes, TTE ordered.      Prior stroke  Resume PTA ASA      Hypertension  Resume PTA norvasc     Hyperlipidemia  Resume PTA crestor     GERD  Resume PTA pepcid     Chronic depression and anxiety  Resume PTA buspar as mental status permits     Prior history of ovarian and bladder cancer: Noted     At least moderate protein calorie malnutrition  Nutrition service consult requested        Observation Goals: -diagnostic tests and consults completed and resulted, -vital signs normal or at patient baseline, -tolerating oral intake to maintain hydration, -adequate pain control on oral analgesics, -returns to baseline functional status, -safe disposition plan has been identified, Nurse to notify provider when observation goals have been met and patient is ready for discharge.  Diet: Regular Diet Adult    DVT Prophylaxis: Pneumatic Compression Devices  Jordan Catheter: Not present  Lines: None     Cardiac Monitoring: ACTIVE order. Indication: Chest pain/ ACS rule out (24 hours)  Code Status: No CPR- Do NOT Intubate      Clinically Significant Risk Factors Present on Admission                # Drug Induced Platelet Defect: home medication list includes an antiplatelet medication   # Hypertension: Noted on problem list          # Financial/Environmental Concerns:           Disposition Plan     Medically Ready for Discharge: Anticipated in 2-4 Days         Nhi Owens MD  Hospitalist Service  [facility]  Securely message " "with Vocera (more info)  Text page via [facility] Paging/Directory   ______________________________________________________________________    Interval History   \"You have a bastard child!  You have a bastard child!.\"  Patient yells throughout much of our interview.  She also frequently tries to bang her head or her fists on the bed rails.  She listened in the remarks made by her  and when we were discussing use of baclofen, she calmly said she takes it, \"every 6 hours.\"  Cannot obtain meaningful review of systems from patient due to encephalopathy.    Physical Exam   Vital Signs: Temp: (!) 96.7  F (35.9  C) Temp src: Axillary BP: (!) 146/88 Pulse: 82   Resp: 17 SpO2: 95 % O2 Device: Nasal cannula Oxygen Delivery: 1 LPM  Weight: 102 lbs 8.22 oz    Constitutional: Awake, alert, confused  Respiratory: She is not tachypneic or using accessory muscles of respiration.  She will not allow me to auscultate her lungs.  Cardiovascular: Patient likewise will not allow me to auscultate her heart  GI: Abdomen seems non-distended  Skin/Integumen: No rash on exposed skin.  No lower extremity edema  Other: Mood is agitated      Medical Decision Making       55 MINUTES SPENT BY ME on the date of service doing chart review, history, exam, documentation & further activities per the note.      Data     I have personally reviewed the following data over the past 24 hrs:    7.4  \   11.3 (L)   / 381     138 105 11.1 /  112 (H)   3.7 21 (L) 0.55 \     ALT: 11 AST: 18 AP: 80 TBILI: 0.2   ALB: 4.1 TOT PROTEIN: 6.9 LIPASE: N/A     Trop: 26 (H) BNP: N/A     Procal: N/A CRP: N/A Lactic Acid: 0.8         Imaging results reviewed over the past 24 hrs:   Recent Results (from the past 24 hour(s))   XR Chest Port 1 View    Narrative    EXAM: XR CHEST PORT 1 VIEW  LOCATION: [facility]  DATE: 5/30/2024    INDICATION: weakness, recent chest trauma  COMPARISON: 3/30/2020      Impression    IMPRESSION: Patients left arm " projects over the lower left chest obscuring some detail. Heart size and pulmonary vessels appear normal. Calcified thoracic aorta. No definite pneumonia. No traumatic injury evident.   CT Head w/o Contrast    Narrative    EXAM: CT HEAD W/O CONTRAST  LOCATION: [facility]  DATE: 5/30/2024    INDICATION: ams, recent fall, HA  COMPARISON: March 29, 2024.  TECHNIQUE: Routine CT Head without IV contrast. Multiplanar reformats. Dose reduction techniques were used.    FINDINGS:  INTRACRANIAL CONTENTS: No acute intracranial hemorrhage or abnormal extra-axial collection. Unchanged size of the large presumed meningioma near the vertex. No CT evidence of acute infarct. Mild to moderate presumed chronic small vessel ischemic changes.   Remote right basal ganglia and right cerebellar hemisphere lacunar infarcts. Mild generalized volume loss. No hydrocephalus.     VISUALIZED ORBITS/SINUSES/MASTOIDS: No intraorbital abnormality. No paranasal sinus mucosal disease. No middle ear or mastoid effusion.    BONES/SOFT TISSUES: No acute abnormality.      Impression    IMPRESSION:  1.  No acute intracranial abnormality.  2.  Chronic changes as above.

## 2024-05-31 NOTE — SIGNIFICANT EVENT
Significant Event Note    Time of event: 5:54 AM May 31, 2024    Description of event:  Paged for new severe pain in left knee with severe agitation    Plan:  2.5 mg Oxycodone carefully ordered. X-rays ordered    Discussed with: bedside nurse    Wil Palmer MD

## 2024-05-31 NOTE — ED PROVIDER NOTES
Emergency Department Note      History of Present Illness     Chief Complaint  Generalized Weakness    ANGEL Huang is a 83 year old female with history of previous stroke and meningioma who presents with increasing general weakness over the past few days.  She also seems more confused than normal according to her .  She does have history of UTIs that presents similar to this.   denies any known fevers, cough, vomiting, diarrhea.  She has been complaining of headache.  She had a fall almost 2 weeks ago and was seen at another hospital.  She was found to have a sternal fracture at that time.   denies that she was started on any new medications since the hospitalization.    Independent Historian   as detailed above.    Review of External Notes  I reviewed the discharge summary from 5/22/2024.  Patient was admitted to [facility] after a fall.  She sustained a fracture of the sternum.  She had imaging of the head and cervical spine which were negative.    Past Medical History   Medical History and Problem List  Past Medical History:   Diagnosis Date    Bladder cancer (H)     Cancer of ovary (H)     Cerebrovascular accident (CVA) (H) 11/30/2020    Depression, major, single episode, severe (H) 12/23/2022    Eye muscle weakness, left 02/17/2016    GERD (gastroesophageal reflux disease) 07/08/2009    H/O total hysterectomy with bilateral salpingo-oophorectomy (BSO) 1995    HTN (hypertension)     Hyperlipidemia     Irregular heart beat 2009    Meningioma (H)     Non-cardiac chest pain 07/08/2009    S/P coronary angiogram 2005    Stress 12/22/2010    Upper back pain 02/17/2016       Medications  acetaminophen (TYLENOL) 500 MG tablet  amLODIPine (NORVASC) 2.5 MG tablet  aspirin (ASA) 81 MG EC tablet  Baclofen (LIORESAL) 5 MG tablet  busPIRone (BUSPAR) 5 MG tablet  calcium citrate-vitamin D (CITRACAL) 315-5 MG-MCG TABS per tablet  clonazePAM (KLONOPIN) 0.5 MG  tablet  cyanocobalamin (VITAMIN B-12) 1000 MCG tablet  diclofenac (VOLTAREN) 1 % topical gel  famotidine (PEPCID) 20 MG tablet  Lidocaine (LIDOCARE) 4 % Patch  melatonin 5 MG tablet  rosuvastatin (CRESTOR) 10 MG tablet  valACYclovir (VALTREX) 1000 mg tablet  Vitamin D3 (CHOLECALCIFEROL) 25 mcg (1000 units) tablet        Surgical History   Past Surgical History:   Procedure Laterality Date    BIOPSY BREAST      HYSTERECTOMY TOTAL ABDOMINAL, BILATERAL SALPINGO-OOPHORECTOMY, COMBINED  1995    HYSTERECTOMY, PAP NO LONGER INDICATED      ELZBIETA BSO    OVARY SURGERY       Physical Exam   Patient Vitals for the past 24 hrs:   BP Temp Temp src Pulse Resp SpO2   05/30/24 2343 -- -- -- -- -- 99 %   05/30/24 2328 -- -- -- -- -- 100 %   05/30/24 2313 -- -- -- -- -- 100 %   05/30/24 2306 -- -- -- -- -- 99 %   05/30/24 2258 -- -- -- -- -- 100 %   05/30/24 2200 (!) 186/98 -- -- 96 -- --   05/30/24 2145 (!) 192/87 -- -- 91 -- --   05/30/24 2130 (!) 177/92 -- -- 87 -- --   05/30/24 2125 -- -- -- -- -- 90 %   05/30/24 2116 -- -- -- -- -- 93 %   05/30/24 2110 -- -- -- -- -- 98 %   05/30/24 2101 (!) 194/106 -- -- 91 -- --   05/30/24 2025 (!) 161/94 -- -- -- -- 96 %   05/30/24 2003 (!) 167/85 98.9  F (37.2  C) Temporal 81 18 96 %     Physical Exam  Vitals and nursing note reviewed.   Constitutional:       General: She is not in acute distress.     Appearance: She is ill-appearing. She is not toxic-appearing.   HENT:      Head: Normocephalic and atraumatic.      Right Ear: External ear normal.      Left Ear: External ear normal.      Nose: Nose normal.      Mouth/Throat:      Mouth: Mucous membranes are moist.   Eyes:      Extraocular Movements: Extraocular movements intact.      Conjunctiva/sclera: Conjunctivae normal.   Cardiovascular:      Rate and Rhythm: Normal rate and regular rhythm.      Heart sounds: No murmur heard.  Pulmonary:      Effort: Pulmonary effort is normal. No respiratory distress.      Breath sounds: Normal breath  sounds. No wheezing, rhonchi or rales.   Abdominal:      General: Abdomen is flat. Bowel sounds are normal. There is no distension.      Palpations: Abdomen is soft.      Tenderness: There is no abdominal tenderness. There is no guarding or rebound.   Musculoskeletal:         General: No deformity or signs of injury.      Cervical back: Normal range of motion and neck supple.   Skin:     General: Skin is warm and dry.      Findings: No rash.   Neurological:      Mental Status: She is alert. She is disoriented.      Cranial Nerves: No cranial nerve deficit.      Motor: Weakness (Chronic left-sided weakness) present.           Diagnostics   Lab Results   Labs Ordered and Resulted from Time of ED Arrival to Time of ED Departure   TROPONIN T, HIGH SENSITIVITY - Abnormal       Result Value    Troponin T, High Sensitivity 23 (*)    COMPREHENSIVE METABOLIC PANEL - Abnormal    Sodium 134 (*)     Potassium 3.8      Carbon Dioxide (CO2) 25      Anion Gap 10      Urea Nitrogen 16.5      Creatinine 0.64      GFR Estimate 87      Calcium 9.0      Chloride 99      Glucose 126 (*)     Alkaline Phosphatase 80      AST 18      ALT 11      Protein Total 6.9      Albumin 4.1      Bilirubin Total 0.2     ROUTINE UA WITH MICROSCOPIC REFLEX TO CULTURE - Abnormal    Color Urine Straw      Appearance Urine Clear      Glucose Urine Negative      Bilirubin Urine Negative      Ketones Urine Negative      Specific Gravity Urine 1.012      Blood Urine Negative      pH Urine 6.5      Protein Albumin Urine Negative      Urobilinogen Urine Normal      Nitrite Urine Negative      Leukocyte Esterase Urine Small (*)     RBC Urine 1      WBC Urine 3     CBC WITH PLATELETS AND DIFFERENTIAL - Abnormal    WBC Count 7.7      RBC Count 4.42      Hemoglobin 11.8      Hematocrit 37.0      MCV 84      MCH 26.7      MCHC 31.9      RDW 20.1 (*)     Platelet Count 410      % Neutrophils 67      % Lymphocytes 20      % Monocytes 10      % Eosinophils 2      %  Basophils 1      % Immature Granulocytes 0      NRBCs per 100 WBC 0      Absolute Neutrophils 5.2      Absolute Lymphocytes 1.5      Absolute Monocytes 0.8      Absolute Eosinophils 0.1      Absolute Basophils 0.1      Absolute Immature Granulocytes 0.0      Absolute NRBCs 0.0     ISTAT GASES LACTATE VENOUS POCT - Abnormal    Lactic Acid POCT 0.8      Bicarbonate Venous POCT 29 (*)     O2 Sat, Venous POCT 60 (*)     pCO2 Venous POCT 42      pH Venous POCT 7.44 (*)     pO2 Venous POCT 30      Base Excess/Deficit (+/-) POCT 4.0 (*)    TROPONIN T, HIGH SENSITIVITY - Abnormal    Troponin T, High Sensitivity 29 (*)    BLOOD CULTURE   BLOOD CULTURE       Imaging  CT Head w/o Contrast   Final Result   IMPRESSION:   1.  No acute intracranial abnormality.   2.  Chronic changes as above.      XR Chest Port 1 View   Final Result   IMPRESSION: Patients left arm projects over the lower left chest obscuring some detail. Heart size and pulmonary vessels appear normal. Calcified thoracic aorta. No definite pneumonia. No traumatic injury evident.          EKG   ECG results from 05/30/24   EKG 12-lead, tracing only     Value    Systolic Blood Pressure     Diastolic Blood Pressure     Ventricular Rate 71    Atrial Rate 71    NY Interval 124    QRS Duration 72        QTc 417    P Axis 66    R AXIS 53    T Axis 82    Interpretation ECG      Sinus rhythm  Nonspecific T wave abnormality  Abnormal ECG  When compared with ECG of 31-MAR-2024 08:01,  Premature ventricular complexes are no longer Present  T wave inversion no longer evident in Inferior leads  T wave inversion no longer evident in Anterior leads  QT has shortened  Confirmed by GENERATED REPORT, COMPUTER ([id number]),  Brown Bryson ([id number]) on 5/30/2024 9:14:22 PM         Independent Interpretation  CT Head: No intracranial hemorrhage.  Stable meningioma.  ED Course    Medications Administered  Medications   sodium chloride 0.9% BOLUS 500 mL (0 mLs Intravenous Stopped  5/30/24 2117)   diazepam (VALIUM) injection 2.5 mg (2.5 mg Intravenous $Given 5/30/24 2115)   baclofen (LIORESAL) half-tab 5 mg (5 mg Oral Not Given 5/30/24 2230)       Procedures  Procedures     Discussion of Management  Admitting Hospitalist, Dr Palmer    Social Determinants of Health adding to complexity of care  None    ED Course  ED Course as of 05/31/24 0033   Thu May 30, 2024   [id number] I discussed with Dr. Palmer with the hospitalist team     Medical Decision Making / Diagnosis   CMS Diagnoses: None    MIPS     None    [facility]  July Huang is a 83 year old female who presents with increasing weakness and confusion over the past few days.  Differential diagnosis includes encephalopathy from an infectious etiology, metabolic etiology, medication side effect, subdural hematoma from delayed bleed from recent fall, less likely stroke given that she does not have any new focal deficits.  Workup was pursued as noted above.  CT shows stable hemangioma but no acute findings.  She does not have a UTI on her labs.  Her troponin is mildly elevated but no signs or symptoms to suggest ACS.  It is unclear what is causing her weakness at this time but given her decline from baseline, we will plan to admit for observation.    Disposition  The patient was admitted to the hospital.     ICD-10 Codes:    ICD-10-CM    1. Acute encephalopathy  G93.40            Discharge Medications  New Prescriptions    No medications on file         MD Lauro Garcia Shaun M, MD  05/31/24 0034

## 2024-06-01 ENCOUNTER — APPOINTMENT (OUTPATIENT)
Dept: CARDIOLOGY | Facility: CLINIC | Age: 84
DRG: 884 | End: 2024-06-01
Attending: HOSPITALIST
Payer: COMMERCIAL

## 2024-06-01 LAB
ANION GAP SERPL CALCULATED.3IONS-SCNC: 14 MMOL/L (ref 7–15)
BUN SERPL-MCNC: 16.9 MG/DL (ref 8–23)
CALCIUM SERPL-MCNC: 9.3 MG/DL (ref 8.8–10.2)
CHLORIDE SERPL-SCNC: 100 MMOL/L (ref 98–107)
CREAT SERPL-MCNC: 0.56 MG/DL (ref 0.51–0.95)
DEPRECATED HCO3 PLAS-SCNC: 22 MMOL/L (ref 22–29)
EGFRCR SERPLBLD CKD-EPI 2021: 90 ML/MIN/1.73M2
ERYTHROCYTE [DISTWIDTH] IN BLOOD BY AUTOMATED COUNT: 19.6 % (ref 10–15)
GLUCOSE SERPL-MCNC: 145 MG/DL (ref 70–99)
HCT VFR BLD AUTO: 37.5 % (ref 35–47)
HGB BLD-MCNC: 12.2 G/DL (ref 11.7–15.7)
MCH RBC QN AUTO: 27 PG (ref 26.5–33)
MCHC RBC AUTO-ENTMCNC: 32.5 G/DL (ref 31.5–36.5)
MCV RBC AUTO: 83 FL (ref 78–100)
PLATELET # BLD AUTO: 427 10E3/UL (ref 150–450)
POTASSIUM SERPL-SCNC: 3.6 MMOL/L (ref 3.4–5.3)
RBC # BLD AUTO: 4.52 10E6/UL (ref 3.8–5.2)
SODIUM SERPL-SCNC: 136 MMOL/L (ref 135–145)
WBC # BLD AUTO: 11.3 10E3/UL (ref 4–11)

## 2024-06-01 PROCEDURE — 93306 TTE W/DOPPLER COMPLETE: CPT

## 2024-06-01 PROCEDURE — 120N000001 HC R&B MED SURG/OB

## 2024-06-01 PROCEDURE — 99232 SBSQ HOSP IP/OBS MODERATE 35: CPT | Performed by: INTERNAL MEDICINE

## 2024-06-01 PROCEDURE — 85027 COMPLETE CBC AUTOMATED: CPT | Performed by: INTERNAL MEDICINE

## 2024-06-01 PROCEDURE — 93306 TTE W/DOPPLER COMPLETE: CPT | Mod: 26 | Performed by: INTERNAL MEDICINE

## 2024-06-01 PROCEDURE — 80048 BASIC METABOLIC PNL TOTAL CA: CPT | Performed by: INTERNAL MEDICINE

## 2024-06-01 PROCEDURE — 36415 COLL VENOUS BLD VENIPUNCTURE: CPT | Performed by: INTERNAL MEDICINE

## 2024-06-01 PROCEDURE — 250N000011 HC RX IP 250 OP 636: Performed by: INTERNAL MEDICINE

## 2024-06-01 PROCEDURE — 250N000013 HC RX MED GY IP 250 OP 250 PS 637: Performed by: INTERNAL MEDICINE

## 2024-06-01 PROCEDURE — 250N000013 HC RX MED GY IP 250 OP 250 PS 637: Performed by: HOSPITALIST

## 2024-06-01 RX ORDER — ENOXAPARIN SODIUM 100 MG/ML
40 INJECTION SUBCUTANEOUS EVERY 24 HOURS
Status: DISCONTINUED | OUTPATIENT
Start: 2024-06-01 | End: 2024-06-05 | Stop reason: HOSPADM

## 2024-06-01 RX ADMIN — Medication 1 TABLET: at 08:21

## 2024-06-01 RX ADMIN — BACLOFEN 5 MG: 10 TABLET ORAL at 13:32

## 2024-06-01 RX ADMIN — BACLOFEN 5 MG: 10 TABLET ORAL at 08:21

## 2024-06-01 RX ADMIN — AMLODIPINE BESYLATE 2.5 MG: 2.5 TABLET ORAL at 08:21

## 2024-06-01 RX ADMIN — SENNOSIDES AND DOCUSATE SODIUM 2 TABLET: 50; 8.6 TABLET ORAL at 21:02

## 2024-06-01 RX ADMIN — BACLOFEN 5 MG: 10 TABLET ORAL at 21:02

## 2024-06-01 RX ADMIN — ASPIRIN 81 MG: 81 TABLET, COATED ORAL at 08:21

## 2024-06-01 RX ADMIN — ENOXAPARIN SODIUM 40 MG: 40 INJECTION SUBCUTANEOUS at 18:22

## 2024-06-01 RX ADMIN — HALOPERIDOL 2 MG: 2 TABLET ORAL at 18:23

## 2024-06-01 RX ADMIN — HALOPERIDOL 2 MG: 2 TABLET ORAL at 05:31

## 2024-06-01 RX ADMIN — ACETAMINOPHEN 650 MG: 325 TABLET, FILM COATED ORAL at 08:35

## 2024-06-01 RX ADMIN — ROSUVASTATIN CALCIUM 10 MG: 10 TABLET, FILM COATED ORAL at 08:21

## 2024-06-01 RX ADMIN — MELATONIN TAB 3 MG 3 MG: 3 TAB at 21:02

## 2024-06-01 ASSESSMENT — ACTIVITIES OF DAILY LIVING (ADL)
ADLS_ACUITY_SCORE: 59
ADLS_ACUITY_SCORE: 54
ADLS_ACUITY_SCORE: 52
ADLS_ACUITY_SCORE: 54
ADLS_ACUITY_SCORE: 54
ADLS_ACUITY_SCORE: 59
ADLS_ACUITY_SCORE: 54
ADLS_ACUITY_SCORE: 54
ADLS_ACUITY_SCORE: 59
ADLS_ACUITY_SCORE: 52
ADLS_ACUITY_SCORE: 56
ADLS_ACUITY_SCORE: 52
ADLS_ACUITY_SCORE: 56
ADLS_ACUITY_SCORE: 52

## 2024-06-01 NOTE — PROGRESS NOTES
"[facility]    Medicine Progress Note - Hospitalist Service    Date of Admission:  5/30/2024      Assessment & Plan   Acute metabolic encephalopathy superimposed on dementia: Of note, Ms. Huang has a longstanding history of cerebral meningioma, for which reportedly she has had prior XRT. In 2020, she was admitted to OSH for left sided weakness and found to have acute stroke. She has ongoing residual left sided weakness with muscle spasms.     More recently she has been admitted multiple times for recurrent encephalopathy. She was admitted to [facility] from 2/22/24 through 2/28/24 for acute encephalopathy thought to be due to UTI, with urine cultures growing Proteus mirabilis. This was treated with cephalosporin. Neurology was also consulted, and EEG reportedly was negative.     She was then admitted to [facility] from 3/1/24 through 3/4/24 for acute encephalopathy and urinary retention, thought to be due to polypharmacy. UA was negative and Brain MRI showed stable large left parafalcine meningioma. MRI of the spine also reportedly showed no evidence of cauda equina syndrome. Abdominal CT showed a non-obstructing kidney stone. Her gabapentin was stopped and Baclofen dose halved, and reportedly then her mental status improved, prior to discharge.     Then, she was admitted here from 3/29/24 through 4/4/24 for acute encephalopathy. Empiric broad spectrum antibiotics were started for lactic acidosis seen on admission. CTH showed ongoing meningioma, for which Neurosurgery was consulted, and recommended ongoing outpatient surveillance for that. Blood cultures, UA, COVID/Influenza/RSV tests, CXR, and CT of abdomen and pelvis were unremarkable (ongoing 8 mm nonobstructing left renal calculus was noted incidentally). Note from 4/29 indicates: \"Reviewed SLUMS 17/30, as well as option for Neurology/Neuropsych referral for further cognitive testing.\"      Most recently, she was admitted to [facility] on " "5/17/2024 after a fall causing chest pain, and found to have hairline sternal fracture that was managed non-operatively. She was discharged home with ordered home care services on 5/22/24. Lidocaine patch was added to existing home prescriptions of Clonazepam and Baclofen.     Now, she presents for acute confusion. In the ED, she is afebrile, with accelerated hypertension and tachycardia. She is not hypoxic. CBC and CMP are largely unremarkable, with normal WBC and also normal Lactate. Troponins are minimally elevated without concerning rate of rise. EKG shows NSR. UA is negative tonight. CXR is obscured partially by overlay of her left arm but shows no clear evidence of acute process and CTH shows no acute intracranial pathology with stable appearing meningoima     Fall precautions. Q 4 neuro checks.  trial of gentle IVF overnight, 100 ml/hour NS for 10 hours, now discontinued  Hold off on empiric antibiotics for now; follow up blood cultures  Patient reports patient has severe spasticity involving the left leg from prior stroke.  He says she takes baclofen 5 mg 3 times daily at home.  We discussed the potential sedating effect of baclofen, he feels benefit exceeds the risk.  Restarted baclofen, noting relatively low dose and the potential for severe discomfort from spasticity  She has residual left-sided weakness from her stroke but no obvious new focal neurologic findings.  Noncontrast head CT done 5/30 had no new findings.  There is no obvious indication for additional CNS imaging at this point  Repeat CBC and BMP are unremarkable  RN paged with the following message, \"Pt is more agitated and irritable, yelling, hitting staff, throwing food, pulling on IV; unable to redirect pt. pt agitated when we attempt to provide care or feed pt.\" Per staff, code 21 is not appropriate.  Ordered Seroquel 25 mg 3 times daily for patient's safety (see note regarding pulling on IV) and to allow staff to care for patient safely.  " "Reviewed EKG from 5/30 showing QTc 417 ms.   I again had a lengthy bedside conversation with her .  We reviewed that there is currently no sign that she has an active infection and no single obvious reason has been identified for her altered mental status.  We reviewed that her cognitive impairment at baseline makes her more vulnerable to small changes, including medication changes  He asks if \"there is something she can take.\"  We discussed use of very low-dose antipsychotic medications that can help her relax and reduce the risk of self injury but also have the risk of sedation.  He says he understands and accepts the risks.  He recognizes the drug Haldol, and says she was treated with Haldol at [facility] and, \"it helped her snap out of it.\"  Haldol 2 mg 3 times daily ordered with 5 mg at bedtime..  6/1: Continue Haldol.   repeats his comments that he thinks Haldol is \"helping her snap out of it.\"  See notes from office visit with primary MD Dr. Barahona on 5/28, \"Recommend palliative care consultation due to progressively worsening function related to sequelae of stroke, dementia, malnutrition and failure to thrive with recurrent hospitalizations and falls.\"  Outpatient palliative care consult was ordered  I discussed palliative care consult with patient and , see additional discussion below.  Palliative care consult requested for 6/3    Obstipation   talked at some length about her problems with intermittent constipation  Checked portable abdominal x-ray.  This shows, \"moderate amount of stool throughout the colon may indicate a degree of constipation.  No obstruction.\"  She does not have scheduled cathartics at home.  Add senna twice daily    Urinary retention  Notes in care everywhere indicates she has had urinary retention in the past  Patient was straight cathed for 800, 900 then 1050 ml  Insert Jordan catheter  Consider voiding trial prior to discharge    Elevated Troponins: In the " "absence of known history of CAD. Long QTc noted in 3/2024 resolved tonight. Suspect demand ischemia due to acute illness. We will rule out ACS.  Continue telemetry  Troponins are flat: 23, 26, 29.  No convincing clinical evidence of ACS   Echo shows EF 60 to 65%, normal left ventricular wall motion, right ventricular systolic function normal     Hemiparesis affecting left side as late effect of stroke  Resume PTA ASA      Hypertension  Resume PTA norvasc     Hyperlipidemia  Resume PTA crestor     GERD  Resume PTA pepcid     Chronic depression and anxiety  Resume PTA buspar as mental status permits     Prior history of ovarian and bladder cancer: Noted     At least moderate protein calorie malnutrition  Nutrition service consult requested           Diet: Regular Diet Adult  Snacks/Supplements Adult: Ensure Enlive; With Meals    DVT Prophylaxis: Patient is immobile, begin chemoprophylaxis with Lovenox  Jordan Catheter: Not present  Lines: None     Cardiac Monitoring: ACTIVE order. Indication: Tachyarrhythmias, acute (48 hours)  Code Status: No CPR- Do NOT Intubate      Clinically Significant Risk Factors Present on Admission                # Drug Induced Platelet Defect: home medication list includes an antiplatelet medication   # Hypertension: Noted on problem list       # Moderate Malnutrition: based on nutrition assessment     # Financial/Environmental Concerns:           Disposition Plan     Medically Ready for Discharge: Anticipated in 2-4 Days     Nhi Owens MD  Hospitalist Service  [facility]  Securely message with [employer] (more info)  Text page via [employer] Paging/Directory   ______________________________________________________________________    Interval History   \"Baclofen helps me.\"  [place]ia was quieter today but did gesture at me using her middle finger.  I asked her if this was intentional, whether she meant to be rude, and told her we would treat her with courtesy and " "respect and would expect the same treatment in return from her and her .    I discussed Dr. Barahona's request for palliative care consult with patient and her , asked if he was aware of the consult.  He initially lashed out and said, \"well, it seems self-serving to me.\"  We discussed that Virginia has chronic illnesses, e.g. prior stroke, hemiparesis, dementia, these would not be expected to improve.  He counters with, \"well, she has had infections.\"      He says his understanding of palliative care is from comments he says were made by a prior neurologist \"who spoke with a foreign accent\" suggested, \"if she gets another infection, maybe we should just let her go.\"  He ultimately conceded that the effects of her stroke would not be expected to improve and he would appreciate a chance to talk with palliative care if that meant more support for them after discharge.      We discussed that [place] has urinary retention.  He initially thought that may be from, \"not enough water.\"  We discussed that her bladder is full and she is needed straight catheterization for more than 800 cc at times, so volume depletion is unlikely to be the cause.  He agrees she should have a Jordan catheter placed.      Physical Exam   Vital Signs: Temp: 98.1  F (36.7  C) Temp src: Axillary BP: (!) 134/91 Pulse: 108   Resp: 16 SpO2: 96 % O2 Device: None (Room air)    Weight: 102 lbs 8.22 oz    Constitutional: Awake, alert  Respiratory: Lungs are quite clear  Cardiovascular: Tachycardic, rate and rhythm  GI: Abdomen seems non-distended  Skin/Integumen: No rash on exposed skin.  No lower extremity edema  Other: Mood is calmer, but she continues making impolite gestures      Medical Decision Making       45 MINUTES SPENT BY ME on the date of service doing chart review, history, exam, documentation & further activities per the note.      Data     I have personally reviewed the following data over the past 24 hrs:    11.3 (H)  \   12.2  "  / 427     136 100 16.9 /  145 (H)   3.6 22 0.56 \       Imaging results reviewed over the past 24 hrs:   Recent Results (from the past 24 hour(s))   XR Knee Left 1/2 Views    Narrative    EXAM: XR KNEE LEFT 1/2 VIEWS  DATE/TIME: 5/31/2024 3:18 PM    INDICATION: left knee pain  COMPARISON: None available.       Impression    IMPRESSION: Diffuse osseous demineralization. Medial and lateral joint  spaces are preserved and normally aligned. No definite fracture on a  single view of the left knee.    GENEVA BABCOCK DO         SYSTEM ID:  [id number]   XR Abdomen Port 1 View    Narrative    ABDOMEN PORTABLE ONE VIEW 5/31/2024 3:20 PM    HISTORY: Rule out constipation.     COMPARISON: 4/1/2024      Impression    IMPRESSION: Moderate amount of stool throughout the colon may indicate  a degree of constipation. No obstruction.     MARY ELLEN MANLEY MD         SYSTEM ID:  [id number]

## 2024-06-01 NOTE — PROGRESS NOTES
PRIMARY Concern: weakness and increasing confusion, admitted for acute encephalopathy   Tests/Procedures for NEXT shift: none  Consults? (Pending/following, signed-off?): CC/SW consult pending   Safety Risk CONCERNS (fall risk, behaviors, etc.): fall risk       Where is patient from? (Home, TCU, etc.): home w/ spouse   Isolation/Type: none   Other Important info for next shift: neuro check q4h, unable to follow some commands  Anticipated DC date & active delays: TBD     SUMMARY NOTE:  Orientation/Cognitive: A/Ox to self, calm and cooperative. Sleepy.  Observation Goals (Met/ Not Met): inpt  Mobility Level/Assist Equipment: Lift, A2 with janneth cardenas, up to BSC   Antibiotics & Plan (IV/po, length of tx left): none   Pain Management: Denies  Tele/VS/O2: VSS on room air    ABNL Lab/BG: See chart  Diet: regular, needs feeding assistance   Bowel/Bladder: rich cath placed, large BM this shift   Skin Concerns: scattered bruises   Drains/Devices: IV removed by pt   Patient Stated Goal for Today: CHANDNI

## 2024-06-01 NOTE — PROVIDER NOTIFICATION
MD Notification    Notified Person: MD    Notified Person Name: Kamar     Notification Date/Time: 5/31/2024 2033    Notification Interaction: [employer] Messaging     Purpose of Notification: Patient received scheduled Haldol at 1700. She does have PRN Haldol. Patient has been increasingly agitated. Patient hitting her head on bed rail and grabbing at staff during cares. Can we get a Zyprexa order? Thanks, Denton ISAAC    Orders Received: Ok to give the prn Haldol.     Comments:

## 2024-06-01 NOTE — PROGRESS NOTES
PRIMARY Concern: acute encephalopathy, dementia   SAFETY RISK Concerns (fall risk, behaviors, etc.): fall risk, Impulsive-pulling tele monitor off, pulling her gown off, pulled IV out this shift, confused-yells/grabs/hits at staff during cares, hitting her own head on side rail(seizure pads applied for safety), PRN Haldol administered at HS with MD paged for behaviors and potential need for additional medication. No new orders received   Isolation/Type: N/A  Tests/Procedures for NEXT shift: AM labs, re attempt Echo?-didn't cooperate to have it completed 5/31  Consults? (Pending/following, signed-off?) CM/SW pending   Where is patient from? (Home, TCU, etc.): Home with    Other Important info for NEXT shift: neuro checks q4  Anticipated DC date & active delays: TBD pending clinical stability   _____________________________________________________________________________  SUMMARY NOTE:  Orientation/Cognitive: alert to self   Observation Goals (Met/ Not Met): Inpatient   Mobility Level/Assist Equipment: total care/lift   Antibiotics & Plan (IV/po, length of tx left): N/A  Pain Management: R knee-minimal complaints  Tele/VS/O2: VSS on RA, BP elevated at times   ABNL Lab/BG: see Epic   Diet: regular, needs feeding assistance   Bowel/Bladder: incontinent, straight catheter for 900 ml  Skin Concerns: scattered bruising, hitting head on side rail-redness noted to forehead   Drains/Devices: pt pulled her PIV out   Patient Stated Goal for Today: CHANDNI

## 2024-06-01 NOTE — PLAN OF CARE
Goal Outcome Evaluation:      Plan of Care Reviewed With: patient    Overall Patient Progress: improvingOverall Patient Progress: improving     PRIMARY Concern: weakness and increasing confusion, admitted for acute encephalopathy   Tests/Procedures for NEXT shift: none  Consults? (Pending/following, signed-off?): CC/SW consult pending   Safety Risk CONCERNS (fall risk, behaviors, etc.): fall risk       Where is patient from? (Home, TCU, etc.): home w/ spouse   Isolation/Type: none   Other Important info for next shift: neuro check q4h, intact  Anticipated DC date & active delays: TBD   SUMMARY NOTE:  Orientation/Cognitive: A/Ox to self, calm and cooperative. Sleepy.  Observation Goals (Met/ Not Met): inpt  Mobility Level/Assist Equipment: Lift, A2 with janneth cardenas, up to BSC   Antibiotics & Plan (IV/po, length of tx left): none   Pain Management: tylenol for knee pain, lidocaine patches removed by pt   Tele/VS/O2: VSS on room air    ABNL Lab/BG: hgb 11.3  Diet: regular, needs feeding assistance   Bowel/Bladder: rich cath placed   Skin Concerns: scattered bruises   Drains/Devices: IV removed by pt   Patient Stated Goal for Today: CHANDNI

## 2024-06-02 LAB
ALBUMIN UR-MCNC: NEGATIVE MG/DL
ANION GAP SERPL CALCULATED.3IONS-SCNC: 11 MMOL/L (ref 7–15)
APPEARANCE UR: CLEAR
BACTERIA #/AREA URNS HPF: ABNORMAL /HPF
BILIRUB UR QL STRIP: NEGATIVE
BUN SERPL-MCNC: 17.4 MG/DL (ref 8–23)
CALCIUM SERPL-MCNC: 9.1 MG/DL (ref 8.8–10.2)
CHLORIDE SERPL-SCNC: 96 MMOL/L (ref 98–107)
COLOR UR AUTO: ABNORMAL
CREAT SERPL-MCNC: 0.62 MG/DL (ref 0.51–0.95)
DEPRECATED HCO3 PLAS-SCNC: 24 MMOL/L (ref 22–29)
EGFRCR SERPLBLD CKD-EPI 2021: 88 ML/MIN/1.73M2
ERYTHROCYTE [DISTWIDTH] IN BLOOD BY AUTOMATED COUNT: 20 % (ref 10–15)
GLUCOSE SERPL-MCNC: 106 MG/DL (ref 70–99)
GLUCOSE UR STRIP-MCNC: NEGATIVE MG/DL
HCT VFR BLD AUTO: 31.9 % (ref 35–47)
HGB BLD-MCNC: 10.3 G/DL (ref 11.7–15.7)
HGB UR QL STRIP: ABNORMAL
KETONES UR STRIP-MCNC: NEGATIVE MG/DL
LEUKOCYTE ESTERASE UR QL STRIP: ABNORMAL
MCH RBC QN AUTO: 26.6 PG (ref 26.5–33)
MCHC RBC AUTO-ENTMCNC: 32.3 G/DL (ref 31.5–36.5)
MCV RBC AUTO: 82 FL (ref 78–100)
NITRATE UR QL: POSITIVE
PH UR STRIP: 7 [PH] (ref 5–7)
PLATELET # BLD AUTO: 355 10E3/UL (ref 150–450)
POTASSIUM SERPL-SCNC: 3.6 MMOL/L (ref 3.4–5.3)
RBC # BLD AUTO: 3.87 10E6/UL (ref 3.8–5.2)
RBC URINE: 2 /HPF
SODIUM SERPL-SCNC: 131 MMOL/L (ref 135–145)
SP GR UR STRIP: 1.01 (ref 1–1.03)
UROBILINOGEN UR STRIP-MCNC: NORMAL MG/DL
WBC # BLD AUTO: 8.7 10E3/UL (ref 4–11)
WBC URINE: 31 /HPF
YEAST #/AREA URNS HPF: ABNORMAL /HPF

## 2024-06-02 PROCEDURE — 250N000013 HC RX MED GY IP 250 OP 250 PS 637: Performed by: INTERNAL MEDICINE

## 2024-06-02 PROCEDURE — 99232 SBSQ HOSP IP/OBS MODERATE 35: CPT | Performed by: INTERNAL MEDICINE

## 2024-06-02 PROCEDURE — 85027 COMPLETE CBC AUTOMATED: CPT | Performed by: INTERNAL MEDICINE

## 2024-06-02 PROCEDURE — 80048 BASIC METABOLIC PNL TOTAL CA: CPT | Performed by: INTERNAL MEDICINE

## 2024-06-02 PROCEDURE — 250N000013 HC RX MED GY IP 250 OP 250 PS 637: Performed by: HOSPITALIST

## 2024-06-02 PROCEDURE — 250N000011 HC RX IP 250 OP 636: Performed by: INTERNAL MEDICINE

## 2024-06-02 PROCEDURE — 87086 URINE CULTURE/COLONY COUNT: CPT | Performed by: INTERNAL MEDICINE

## 2024-06-02 PROCEDURE — 36415 COLL VENOUS BLD VENIPUNCTURE: CPT | Performed by: INTERNAL MEDICINE

## 2024-06-02 PROCEDURE — 81001 URINALYSIS AUTO W/SCOPE: CPT | Performed by: INTERNAL MEDICINE

## 2024-06-02 PROCEDURE — 120N000001 HC R&B MED SURG/OB

## 2024-06-02 RX ORDER — CEFTRIAXONE 1 G/1
1 INJECTION, POWDER, FOR SOLUTION INTRAMUSCULAR; INTRAVENOUS EVERY 24 HOURS
Status: DISCONTINUED | OUTPATIENT
Start: 2024-06-02 | End: 2024-06-04

## 2024-06-02 RX ADMIN — SENNOSIDES AND DOCUSATE SODIUM 1 TABLET: 50; 8.6 TABLET ORAL at 20:33

## 2024-06-02 RX ADMIN — HALOPERIDOL 2 MG: 2 TABLET ORAL at 17:37

## 2024-06-02 RX ADMIN — CEFTRIAXONE SODIUM 1 G: 1 INJECTION, POWDER, FOR SOLUTION INTRAMUSCULAR; INTRAVENOUS at 23:39

## 2024-06-02 RX ADMIN — FAMOTIDINE 20 MG: 20 TABLET, FILM COATED ORAL at 16:26

## 2024-06-02 RX ADMIN — AMLODIPINE BESYLATE 2.5 MG: 2.5 TABLET ORAL at 08:35

## 2024-06-02 RX ADMIN — CLONAZEPAM 0.5 MG: 0.5 TABLET ORAL at 20:33

## 2024-06-02 RX ADMIN — BACLOFEN 5 MG: 10 TABLET ORAL at 08:35

## 2024-06-02 RX ADMIN — ACETAMINOPHEN 650 MG: 325 TABLET, FILM COATED ORAL at 20:33

## 2024-06-02 RX ADMIN — BACLOFEN 5 MG: 10 TABLET ORAL at 14:31

## 2024-06-02 RX ADMIN — HALOPERIDOL 2 MG: 2 TABLET ORAL at 06:02

## 2024-06-02 RX ADMIN — ROSUVASTATIN CALCIUM 10 MG: 10 TABLET, FILM COATED ORAL at 08:35

## 2024-06-02 RX ADMIN — Medication 1 TABLET: at 08:35

## 2024-06-02 RX ADMIN — HALOPERIDOL 2 MG: 2 TABLET ORAL at 12:28

## 2024-06-02 RX ADMIN — ASPIRIN 81 MG: 81 TABLET, COATED ORAL at 08:35

## 2024-06-02 RX ADMIN — ACETAMINOPHEN 650 MG: 325 TABLET, FILM COATED ORAL at 15:04

## 2024-06-02 RX ADMIN — BACLOFEN 5 MG: 10 TABLET ORAL at 19:10

## 2024-06-02 RX ADMIN — ENOXAPARIN SODIUM 40 MG: 40 INJECTION SUBCUTANEOUS at 17:37

## 2024-06-02 ASSESSMENT — ACTIVITIES OF DAILY LIVING (ADL)
ADLS_ACUITY_SCORE: 48
ADLS_ACUITY_SCORE: 48
EQUIPMENT_CURRENTLY_USED_AT_HOME: WALKER, ROLLING
TOILETING_ISSUES: NO
DOING_ERRANDS_INDEPENDENTLY_DIFFICULTY: YES
DRESSING/BATHING: BATHING DIFFICULTY, ASSISTANCE 1 PERSON
DRESSING/BATHING_DIFFICULTY: YES
NUMBER_OF_TIMES_PATIENT_HAS_FALLEN_WITHIN_LAST_SIX_MONTHS: 1
ADLS_ACUITY_SCORE: 48
FALL_HISTORY_WITHIN_LAST_SIX_MONTHS: YES
ADLS_ACUITY_SCORE: 46
ADLS_ACUITY_SCORE: 48
ADLS_ACUITY_SCORE: 45
HEARING_DIFFICULTY_OR_DEAF: NO
ADLS_ACUITY_SCORE: 45
ADLS_ACUITY_SCORE: 46
ADLS_ACUITY_SCORE: 48
CHANGE_IN_FUNCTIONAL_STATUS_SINCE_ONSET_OF_CURRENT_ILLNESS/INJURY: YES
WALKING_OR_CLIMBING_STAIRS_DIFFICULTY: YES
ADLS_ACUITY_SCORE: 48
WALKING_OR_CLIMBING_STAIRS: AMBULATION DIFFICULTY, ASSISTANCE 1 PERSON
ADLS_ACUITY_SCORE: 48
CONCENTRATING,_REMEMBERING_OR_MAKING_DECISIONS_DIFFICULTY: YES
WEAR_GLASSES_OR_BLIND: YES
ADLS_ACUITY_SCORE: 48
DIFFICULTY_EATING/SWALLOWING: NO
ADLS_ACUITY_SCORE: 52
ADLS_ACUITY_SCORE: 48
ADLS_ACUITY_SCORE: 48
ADLS_ACUITY_SCORE: 45
ADLS_ACUITY_SCORE: 48
ADLS_ACUITY_SCORE: 45
VISION_MANAGEMENT: GLASSES
ADLS_ACUITY_SCORE: 46

## 2024-06-02 NOTE — PROGRESS NOTES
"[facility]    Medicine Progress Note - Hospitalist Service    Date of Admission:  5/30/2024      Assessment & Plan   Acute metabolic encephalopathy superimposed on dementia: Of note, Ms. Huang has a longstanding history of cerebral meningioma, for which reportedly she has had prior XRT. In 2020, she was admitted to OSH for left sided weakness and found to have acute stroke. She has ongoing residual left sided weakness with muscle spasms.     More recently she has been admitted multiple times for recurrent encephalopathy. She was admitted to [facility] from 2/22/24 through 2/28/24 for acute encephalopathy thought to be due to UTI, with urine cultures growing Proteus mirabilis. This was treated with cephalosporin. Neurology was also consulted, and EEG reportedly was negative.     She was then admitted to [facility] from 3/1/24 through 3/4/24 for acute encephalopathy and urinary retention, thought to be due to polypharmacy. UA was negative and Brain MRI showed stable large left parafalcine meningioma. MRI of the spine also reportedly showed no evidence of cauda equina syndrome. Abdominal CT showed a non-obstructing kidney stone. Her gabapentin was stopped and Baclofen dose halved, and reportedly then her mental status improved, prior to discharge.     Then, she was admitted here from 3/29/24 through 4/4/24 for acute encephalopathy. Empiric broad spectrum antibiotics were started for lactic acidosis seen on admission. CTH showed ongoing meningioma, for which Neurosurgery was consulted, and recommended ongoing outpatient surveillance for that. Blood cultures, UA, COVID/Influenza/RSV tests, CXR, and CT of abdomen and pelvis were unremarkable (ongoing 8 mm nonobstructing left renal calculus was noted incidentally). Note from 4/29 indicates: \"Reviewed SLUMS 17/30, as well as option for Neurology/Neuropsych referral for further cognitive testing.\"      Most recently, she was admitted to [facility] on " "5/17/2024 after a fall causing chest pain, and found to have hairline sternal fracture that was managed non-operatively.  See note from office visit with Dr. Barahona on 5/28, \"There is the acute issue of how her sternum was fractured and there seems to be some discrepancy about this. I am having our  investigate this further as the details remain uncertain.\"  She was discharged home with ordered home care services on 5/22/24. Lidocaine patch was added to existing home prescriptions of Clonazepam and Baclofen.     Now, she presents for acute confusion. In the ED, she is afebrile, with accelerated hypertension and tachycardia. She is not hypoxic. CBC and CMP are largely unremarkable, with normal WBC and also normal Lactate. Troponins are minimally elevated without concerning rate of rise. EKG shows NSR. UA is negative tonight. CXR is obscured partially by overlay of her left arm but shows no clear evidence of acute process and CTH shows no acute intracranial pathology with stable appearing meningoima     Fall precautions.   Initially on IVF, discontinued  trial of gentle IVF overnight, 100 ml/hour NS for 10 hours, now discontinued  No empiric antibiotics ordered; follow up blood cultures  Patient reports she has severe spasticity involving the left leg from prior stroke.   says she takes baclofen 5 mg 3 times daily at home.  Notes indicate this is a very longstanding problem, with a variety of medication trials since at least 2022.  She saw Dr. Jameson at [facility] in 8/2022 for discussion of Botox injection.  She should follow-up with Dr. Jameson after discharge to see if injections are indicated, ordered  She has residual left-sided weakness from her stroke but no obvious new focal neurologic findings.  Noncontrast head CT done 5/30 had no new findings.  There is no obvious indication for additional CNS imaging at this point  Repeat CBC and BMP are unremarkable  Patient was initially very agitated, " "repeatedly hitting her arms and head on the bed rail.  She was therefore treated with low-dose antipsychotic to reduce the risk of self self-injury  I again had a lengthy bedside conversation with her .  We reviewed that there is currently no sign that she has an active infection and no single obvious reason has been identified for her altered mental status.  We reviewed that her cognitive impairment at baseline makes her more vulnerable to small changes, including medication changes  Urinary retention may contribute to agitation.  Plan to decompress the bladder with Jordan catheter, see below  He asks if \"there is something she can take.\"  We discussed use of very low-dose antipsychotic medications.  Ordered Haldol, patient has used this in the past with benefit  6/1: Continue Haldol.   repeats his comments that he thinks Haldol is \"helping her snap out of it.\"  See notes from office visit with primary MD Dr. Barahona on 5/28, \"Recommend palliative care consultation due to progressively worsening function related to sequelae of stroke, dementia, malnutrition and failure to thrive with recurrent hospitalizations and falls.\"  Outpatient palliative care consult was ordered  I discussed palliative care consult with patient and , see additional discussion below.  Palliative care consult requested for 6/3    Obstipation   talked at some length about her problems with intermittent constipation  Checked portable abdominal x-ray.  This shows, \"moderate amount of stool throughout the colon may indicate a degree of constipation.  No obstruction.\"  She is not on scheduled cathartics at home.  Add senna twice daily    Urinary retention  Notes in care everywhere indicate she has had urinary retention in the past  Patient was straight cathed for 800, 900 then 1050 ml  6/1: Insert Jordan catheter  Consider voiding trial prior to discharge    Elevated Troponins: In the absence of known history of CAD. Long " "QTc noted in 3/2024 resolved tonight. Suspect demand ischemia due to acute illness. We will rule out ACS.  Continue telemetry  Troponins are flat: 23, 26, 29.  No convincing clinical evidence of ACS   Echo shows EF 60 to 65%, normal left ventricular wall motion, right ventricular systolic function normal    Hyponatremia  Now with mild hyponatremia  Add fluid restriction  Recheck sodium    Hemiparesis affecting left side as late effect of stroke  Resume PTA ASA      Hypertension  Resume PTA norvasc     Hyperlipidemia  Resume PTA crestor     GERD  Resume PTA pepcid     Chronic depression and anxiety  Resume PTA buspar as mental status permits     Prior history of ovarian and bladder cancer: Noted     At least moderate protein calorie malnutrition  Nutrition service consult requested           Diet: Regular Diet Adult  Snacks/Supplements Adult: Ensure Enlive; With Meals    DVT Prophylaxis: Patient is immobile, begin chemoprophylaxis with Lovenox  Jordan Catheter: PRESENT, indication: Acute retention or obstruction  Lines: None     Cardiac Monitoring: ACTIVE order. Indication: Tachyarrhythmias, acute (48 hours)  Code Status: No CPR- Do NOT Intubate      Clinically Significant Risk Factors                  # Hypertension: Noted on problem list         # Moderate Malnutrition: based on nutrition assessment, PRESENT ON ADMISSION   # Financial/Environmental Concerns:           Disposition Plan     Medically Ready for Discharge: Medically ready for discharge when her mental status is closer to baseline, likely 1 to 2 days     Nhi Owens MD  Hospitalist Service  [facility]  Securely message with [employer] (more info)  Text page via [place] Paging/Directory   ______________________________________________________________________    Interval History   \"Maybe I could finish eating.\"  Patient is awake, alert, aide is feeding her lunch.  Virginia thinks she is, \"not better,\" but cannot be more " "specific.     came to the bedside and says, \"so we have an appointment?  I am still waiting to see what this Palliative care thing is really all about.\"  We again reviewed that Virginia has been in the hospital or other care setting almost all of 2024.  We reviewed that while she has received a great deal of comprehensive medical care, it is also appropriate to review her goals of care and make sure the medical treatments align with goals of care as much as possible.  He again presents as volatile, easily upset, and mistrusting.      He agrees her condition is improved since Friday when she was repeatedly hitting her hands and head on the bed rail, throwing food at staff members and screaming.  He says, \"so she has a Haldol deficiency?\" but acknowledges that Haldol has helped her behavior.  She says it makes her feel \"less anxious.\"    Review of prior hospital encounters (see for example note from [facility] on 3/1) reveals almost identical presentation, workup and treatment.  Note that patient and  seem to present all of these as new problems (e.g. urinary retention, encephalopathy, prior stroke and spasticity).    Physical Exam   Vital Signs: Temp: 97.9  F (36.6  C) Temp src: Oral BP: 108/58 Pulse: 94   Resp: 18 SpO2: 95 % O2 Device: None (Room air)    Weight: 102 lbs 8.22 oz    Constitutional: Awake, alert  Respiratory: Lungs are quite clear  Cardiovascular: rate and rhythm  GI: Abdomen seems non-distended  Skin/Integumen: No rash on exposed skin.  No lower extremity edema  Other: Mood is quiet      Medical Decision Making       45 MINUTES SPENT BY ME on the date of service doing chart review, history, exam, documentation & further activities per the note.      Data         Imaging results reviewed over the past 24 hrs:   No results found for this or any previous visit (from the past 24 hour(s)).    "

## 2024-06-02 NOTE — PLAN OF CARE
DATE & TIME: 6/1/24 3494-8877    Cognitive Concerns/ Orientation : Pt A/Ox2, disoriented to place at times and situation   BEHAVIOR & AGGRESSION TOOL COLOR: Green   ABNL VS/O2: VSS on RA  MOBILITY: Assist x2 with janneth steady or lift, fall risk. Reposition q2h.  PAIN MANAGEMENT: Complaints of generalized pain, managed with scheduled Baclofen  DIET: Regular  BOWEL/BLADDER: Jordan patent. Urine meir with mucus. No BM this shift.  ABNL LAB/BG: WBC 11.3  DRAIN/DEVICES: No IV access  TELEMETRY RHYTHM: SR with PVCs  SKIN: Scattered bruises  D/C DATE: Discharge pending progress  OTHER IMPORTANT INFO: Neuros intact except confusion. Pt awake intermittently overnight. Pt became tearful forgetting where she was and what was happening.

## 2024-06-02 NOTE — PROVIDER NOTIFICATION
MD Notification    Notified Person: MD    Notified Person Name: Roman     Notification Date/Time: 6/2/2024, 1345     Notification Interaction: selvin     Purpose of Notification:     Hi Dr. Owens, shortly after you left the patient developed some discomfort to the right side of her chest. Vitals were stable except soft BP 80-90s systolic. She said she has never had this before. She said the pain is a little better now, but still present. Just wondering if there is anything you want me to do. Tele is SR with occasional PVCs. Thanks.       Orders Received:  thank you for letting me know. she has had ACS workup this hospital stay and I will not repeat it at this time. Please tell patient to notify us if pain is persistent or severe     Comments:

## 2024-06-02 NOTE — PLAN OF CARE
Goal Outcome Evaluation:      Plan of Care Reviewed With: patient    SUMMARY: Presents with acute confusion.   DATE & TIME: 6/2/24, 3280-6520   Cognitive Concerns/ Orientation : Pt A/Ox2, disoriented to place at times and situation. Irritable, tearful, frustrated at times. Calls frequently for small tasks.   BEHAVIOR & AGGRESSION TOOL COLOR: Green   ABNL VS/O2: VSS on RA ex soft BP, 80-90s systolic- MD aware.   MOBILITY: Assist x2 with janneth steady or lift, fall risk. Reposition q2h- refused all day, frequent weight shifting provided. Up in the chair for breakfast. Reported intermittent dizziness  PAIN MANAGEMENT: Complaints of generalized pain to lower back, managed with scheduled Baclofen. Offered tylenol and lidocaine patches-refused in the morning. Took Tylenol this afternoon for 10/10 headache, encouraged rest.   DIET: Regular, tolerating  BOWEL/BLADDER: Jordan patent. Urine meir and odorous- need urine sample collected- joann clamp in place until enough urine is able to be collected . No BM this shift.  ABNL LAB/BG: hgb 10.3, Na 131  DRAIN/DEVICES: No IV access  TELEMETRY RHYTHM: SR with PVCs  SKIN: Scattered bruises, intact   D/C DATE: Discharge pending progress  OTHER IMPORTANT INFO: Neuros intact except confusion. Reported new left sided chest discomfort, subsided- MD notified see additional note. Palliative consulted, PT/SW following.

## 2024-06-03 ENCOUNTER — APPOINTMENT (OUTPATIENT)
Dept: PHYSICAL THERAPY | Facility: CLINIC | Age: 84
DRG: 884 | End: 2024-06-03
Attending: INTERNAL MEDICINE
Payer: COMMERCIAL

## 2024-06-03 LAB
ANION GAP SERPL CALCULATED.3IONS-SCNC: 11 MMOL/L (ref 7–15)
BUN SERPL-MCNC: 14.8 MG/DL (ref 8–23)
CALCIUM SERPL-MCNC: 9 MG/DL (ref 8.8–10.2)
CHLORIDE SERPL-SCNC: 99 MMOL/L (ref 98–107)
CREAT SERPL-MCNC: 0.56 MG/DL (ref 0.51–0.95)
DEPRECATED HCO3 PLAS-SCNC: 25 MMOL/L (ref 22–29)
EGFRCR SERPLBLD CKD-EPI 2021: 90 ML/MIN/1.73M2
ERYTHROCYTE [DISTWIDTH] IN BLOOD BY AUTOMATED COUNT: 19.5 % (ref 10–15)
GLUCOSE SERPL-MCNC: 116 MG/DL (ref 70–99)
HCT VFR BLD AUTO: 31.6 % (ref 35–47)
HGB BLD-MCNC: 10.2 G/DL (ref 11.7–15.7)
MCH RBC QN AUTO: 26.8 PG (ref 26.5–33)
MCHC RBC AUTO-ENTMCNC: 32.3 G/DL (ref 31.5–36.5)
MCV RBC AUTO: 83 FL (ref 78–100)
PLATELET # BLD AUTO: 353 10E3/UL (ref 150–450)
POTASSIUM SERPL-SCNC: 3.5 MMOL/L (ref 3.4–5.3)
RBC # BLD AUTO: 3.81 10E6/UL (ref 3.8–5.2)
SODIUM SERPL-SCNC: 135 MMOL/L (ref 135–145)
WBC # BLD AUTO: 10.5 10E3/UL (ref 4–11)

## 2024-06-03 PROCEDURE — 82374 ASSAY BLOOD CARBON DIOXIDE: CPT | Performed by: INTERNAL MEDICINE

## 2024-06-03 PROCEDURE — 99232 SBSQ HOSP IP/OBS MODERATE 35: CPT | Performed by: INTERNAL MEDICINE

## 2024-06-03 PROCEDURE — 36415 COLL VENOUS BLD VENIPUNCTURE: CPT | Performed by: INTERNAL MEDICINE

## 2024-06-03 PROCEDURE — 84295 ASSAY OF SERUM SODIUM: CPT | Performed by: INTERNAL MEDICINE

## 2024-06-03 PROCEDURE — 250N000013 HC RX MED GY IP 250 OP 250 PS 637: Performed by: HOSPITALIST

## 2024-06-03 PROCEDURE — 97530 THERAPEUTIC ACTIVITIES: CPT | Mod: GP

## 2024-06-03 PROCEDURE — 250N000013 HC RX MED GY IP 250 OP 250 PS 637: Performed by: INTERNAL MEDICINE

## 2024-06-03 PROCEDURE — 85018 HEMOGLOBIN: CPT | Performed by: INTERNAL MEDICINE

## 2024-06-03 PROCEDURE — 99497 ADVNCD CARE PLAN 30 MIN: CPT | Mod: 25 | Performed by: REGISTERED NURSE

## 2024-06-03 PROCEDURE — 99223 1ST HOSP IP/OBS HIGH 75: CPT | Mod: 25 | Performed by: REGISTERED NURSE

## 2024-06-03 PROCEDURE — 120N000001 HC R&B MED SURG/OB

## 2024-06-03 PROCEDURE — 250N000011 HC RX IP 250 OP 636: Performed by: INTERNAL MEDICINE

## 2024-06-03 PROCEDURE — 97161 PT EVAL LOW COMPLEX 20 MIN: CPT | Mod: GP

## 2024-06-03 RX ORDER — FAMOTIDINE 20 MG/1
20 TABLET, FILM COATED ORAL DAILY PRN
Status: DISCONTINUED | OUTPATIENT
Start: 2024-06-03 | End: 2024-06-05 | Stop reason: HOSPADM

## 2024-06-03 RX ADMIN — Medication 1 TABLET: at 08:00

## 2024-06-03 RX ADMIN — LIDOCAINE 2 PATCH: 4 PATCH TOPICAL at 19:37

## 2024-06-03 RX ADMIN — CLONAZEPAM 0.5 MG: 0.5 TABLET ORAL at 20:19

## 2024-06-03 RX ADMIN — CEFTRIAXONE SODIUM 1 G: 1 INJECTION, POWDER, FOR SOLUTION INTRAMUSCULAR; INTRAVENOUS at 22:30

## 2024-06-03 RX ADMIN — ROSUVASTATIN CALCIUM 10 MG: 10 TABLET, FILM COATED ORAL at 08:00

## 2024-06-03 RX ADMIN — HALOPERIDOL 2 MG: 2 TABLET ORAL at 17:06

## 2024-06-03 RX ADMIN — BACLOFEN 5 MG: 10 TABLET ORAL at 08:00

## 2024-06-03 RX ADMIN — HALOPERIDOL 2 MG: 2 TABLET ORAL at 06:11

## 2024-06-03 RX ADMIN — BACLOFEN 5 MG: 10 TABLET ORAL at 14:11

## 2024-06-03 RX ADMIN — AMLODIPINE BESYLATE 2.5 MG: 2.5 TABLET ORAL at 08:00

## 2024-06-03 RX ADMIN — BACLOFEN 5 MG: 10 TABLET ORAL at 19:23

## 2024-06-03 RX ADMIN — SENNOSIDES AND DOCUSATE SODIUM 2 TABLET: 50; 8.6 TABLET ORAL at 20:19

## 2024-06-03 RX ADMIN — ASPIRIN 81 MG: 81 TABLET, COATED ORAL at 08:00

## 2024-06-03 RX ADMIN — ENOXAPARIN SODIUM 40 MG: 40 INJECTION SUBCUTANEOUS at 17:06

## 2024-06-03 RX ADMIN — ACETAMINOPHEN 650 MG: 325 TABLET, FILM COATED ORAL at 19:23

## 2024-06-03 RX ADMIN — HALOPERIDOL 2 MG: 2 TABLET ORAL at 12:54

## 2024-06-03 ASSESSMENT — ACTIVITIES OF DAILY LIVING (ADL)
ADLS_ACUITY_SCORE: 51
ADLS_ACUITY_SCORE: 45
ADLS_ACUITY_SCORE: 51
ADLS_ACUITY_SCORE: 47
ADLS_ACUITY_SCORE: 50
ADLS_ACUITY_SCORE: 47
ADLS_ACUITY_SCORE: 45
ADLS_ACUITY_SCORE: 51
ADLS_ACUITY_SCORE: 51
ADLS_ACUITY_SCORE: 47
ADLS_ACUITY_SCORE: 47
ADLS_ACUITY_SCORE: 49
ADLS_ACUITY_SCORE: 45
ADLS_ACUITY_SCORE: 50
ADLS_ACUITY_SCORE: 50
ADLS_ACUITY_SCORE: 46
ADLS_ACUITY_SCORE: 51
ADLS_ACUITY_SCORE: 50
ADLS_ACUITY_SCORE: 45
ADLS_ACUITY_SCORE: 51

## 2024-06-03 NOTE — PROGRESS NOTES
[facility]    PROGRESS NOTE - Hospitalist Service    ASSESSMENT AND PLAN     Active Problems:    Acute encephalopathy    July Huang is a 83 year old female with past medical history that is most notable for prior stroke, as well as meningioma, recurrent acute encephalopathy who presents with acute confusion, and is found to have recurrent acute metabolic encephalopathy.     Acute metabolic encephalopathy superimposed on dementia: Of note, Ms. Huang has a longstanding history of cerebral meningioma, for which reportedly she has had prior XRT. In 2020, she was admitted to OSH for left sided weakness and found to have acute stroke. She has ongoing residual left sided weakness with muscle spasms.      More recently she has been admitted multiple times for recurrent encephalopathy. She was admitted to [facility] from 2/22/24 through 2/28/24 for acute encephalopathy thought to be due to UTI, with urine cultures growing Proteus mirabilis. This was treated with cephalosporin. Neurology was also consulted, and EEG reportedly was negative.      She was then [place] [facility] [place] through 3/4/24 for acute encephalopathy and urinary retention, thought to be due to polypharmacy. UA was negative and Brain MRI showed stable large left parafalcine meningioma. MRI of the spine also reportedly showed no evidence of cauda equina syndrome. Abdominal CT showed a non-obstructing kidney stone. Her gabapentin was stopped and Baclofen dose halved, and reportedly then her mental status improved, prior to discharge.      Then, she was admitted here from 3/29/24 through 4/4/24 for acute encephalopathy. Empiric broad spectrum antibiotics were started for lactic acidosis seen on admission. CTH showed ongoing meningioma, for which Neurosurgery was consulted, and recommended ongoing outpatient surveillance for that. Blood cultures, UA, COVID/Influenza/RSV tests, CXR, and CT of abdomen and pelvis were  "unremarkable (ongoing 8 mm nonobstructing left renal calculus was noted incidentally). Note from 4/29 indicates: \"Reviewed SLUMS 17/30, as well as option for Neurology/Neuropsych referral for further cognitive testing.\"      Most recently, she was admitted to [facility] on 5/17/2024 after a fall causing chest pain, and found to have hairline sternal fracture that was managed non-operatively.  See note from office visit with Dr. Barahona on 5/28, \"There is the acute issue of how her sternum was fractured and there seems to be some discrepancy about this. I am having our  investigate this further as the details remain uncertain.\"  She was discharged home with ordered home care services on 5/22/24. Lidocaine patch was added to existing home prescriptions of Clonazepam and Baclofen.     Now, she presents for acute confusion. In the ED, she is afebrile, with accelerated hypertension and tachycardia. She is not hypoxic. CBC and CMP are largely unremarkable, with normal WBC and also normal Lactate. Troponins are minimally elevated without concerning rate of rise. EKG shows NSR. UA is negative tonight. CXR is obscured partially by overlay of her left arm but shows no clear evidence of acute process and CTH shows no acute intracranial pathology with stable appearing meningoima     Fall precautions.   Initially on IVF, discontinued  trial of gentle IVF overnight, 100 ml/hour NS for 10 hours, now discontinued  No empiric antibiotics ordered; follow up blood cultures  Patient reports she has severe spasticity involving the left leg from prior stroke.   says she takes baclofen 5 mg 3 times daily at home.  Notes indicate this is a very longstanding problem, with a variety of medication trials since at least 2022.  She saw Dr. Jameson at [facility] in 8/2022 for discussion of Botox injection.  She should follow-up with Dr. Jameson after discharge to see if injections are indicated, ordered  She has residual left-sided " "weakness from her stroke but no obvious new focal neurologic findings.  Noncontrast head CT done 5/30 had no new findings.  There is no obvious indication for additional CNS imaging at this point  Repeat CBC and BMP are unremarkable  Patient was initially very agitated, repeatedly hitting her arms and head on the bed rail.  She was therefore treated with low-dose antipsychotic to reduce the risk of self self-injury  I again had a lengthy bedside conversation with her .  We reviewed that there is currently no sign that she has an active infection and no single obvious reason has been identified for her altered mental status.  We reviewed that her cognitive impairment at baseline makes her more vulnerable to small changes, including medication changes  Urinary retention may contribute to agitation.  Plan to decompress the bladder with Jordan catheter, see below  He asks if \"there is something she can take.\"  We discussed use of very low-dose antipsychotic medications.  Ordered Haldol, patient has used this in the past with benefit  6/1: Continue Haldol.   repeats his comments that he thinks Haldol is \"helping her snap out of it.\"  See notes from office visit with primary MD Dr. Barahona on 5/28, \"Recommend palliative care consultation due to progressively worsening function related to sequelae of stroke, dementia, malnutrition and failure to thrive with recurrent hospitalizations and falls.\"  Outpatient palliative care consult was ordered  I discussed palliative care consult with patient and , see additional discussion below.  Palliative care consult requested for 6/3    UTI  UA suspicious for urinary tract infection 6/2  Initiated on ceftriaxone  Previous UA on admission without signs of infection.  Possibly related to 3 straight caths?     Obstipation   talked at some length about her problems with intermittent constipation  Checked portable abdominal x-ray.  This shows, \"moderate amount of " "stool throughout the colon may indicate a degree of constipation.  No obstruction.\"  She is not on scheduled cathartics at home.  Add senna twice daily     Urinary retention  Notes in care everywhere indicate she has had urinary retention in the past  Patient was straight cathed for 800, 900 then 1050 ml  6/1: Insert Jordan catheter  Consider voiding trial prior to discharge     Elevated Troponins: In the absence of known history of CAD. Long QTc noted in 3/2024 resolved tonight. Suspect demand ischemia due to acute illness. We will rule out ACS.  Continue telemetry  Troponins are flat: 23, 26, 29.  No convincing clinical evidence of ACS   Echo shows EF 60 to 65%, normal left ventricular wall motion, right ventricular systolic function normal     Hyponatremia  Now with mild hyponatremia  Add fluid restriction  Recheck sodium- resolved 6/3     Hemiparesis affecting left side as late effect of stroke  Resume PTA ASA      Hypertension  Resume PTA norvasc      Hyperlipidemia  Resume PTA crestor      GERD  Resume PTA pepcid      Chronic depression and anxiety  Resume PTA buspar as mental status permits     Prior history of ovarian and bladder cancer: Noted     At least moderate protein calorie malnutrition  Nutrition service consult requested     Barriers to discharge: Improvement in mentation, trial of void    Anticipated length of stay: 2 days      Present on Admission:   Acute encephalopathy      Medically Ready for Discharge: Anticipated in 2-4 Days    Subjective:  Patient resting comfortably in her chair.  No breathing issues.  Notes some chest discomfort from her sternal fracture.  No other complaints at this time.  Pleasant.    PHYSICAL EXAM  Temp:  [97.5  F (36.4  C)-97.9  F (36.6  C)] 97.7  F (36.5  C)  Pulse:  [85-96] 96  Resp:  [18-20] 20  BP: ()/(44-65) 134/65  SpO2:  [96 %-97 %] 97 %  Wt Readings from Last 1 Encounters:   05/31/24 46.5 kg (102 lb 8.2 oz)       Intake/Output Summary (Last 24 hours) at " 6/3/2024 1247  Last data filed at 6/3/2024 0930  Gross per 24 hour   Intake 1020 ml   Output 2350 ml   Net -1330 ml      Body mass index is 18.75 kg/m .    GENRL: Alert and answering questions. Not in acute distress. Sitting in a chair   CHEST: Clear to auscultation bilaterally. No wheezes, rhonchi or crackles. Breathing easily   HEART: Regular rate   EXTRM: trace pedal edema  NEURO: Following commands moving extremities.    PSYCH: pleasant affect and mood.   INTGM: No skin rash    Medical Decision Making       35 MINUTES SPENT BY ME on the date of service doing chart review, history, exam, documentation & further activities per the note.      PERTINENT LABS/IMAGING:  Results for orders placed or performed during the hospital encounter of 05/30/24   CT Head w/o Contrast    Impression    IMPRESSION:  1.  No acute intracranial abnormality.  2.  Chronic changes as above.   XR Chest Port 1 View    Impression    IMPRESSION: Patients left arm projects over the lower left chest obscuring some detail. Heart size and pulmonary vessels appear normal. Calcified thoracic aorta. No definite pneumonia. No traumatic injury evident.   XR Knee Left 1/2 Views    Impression    IMPRESSION: Diffuse osseous demineralization. Medial and lateral joint  spaces are preserved and normally aligned. No definite fracture on a  single view of the left knee.    GENEVA BABCOCK DO         SYSTEM ID:  [id number]   XR Abdomen Port 1 View    Impression    IMPRESSION: Moderate amount of stool throughout the colon may indicate  a degree of constipation. No obstruction.     MARY ELLEN MANLEY MD         SYSTEM ID:  [id number]   Echocardiogram Complete   Result Value Ref Range    LVEF  60-65%      Most Recent 3 CBC's:  Recent Labs   Lab Test 06/03/24  1143 06/02/24  1118 06/01/24  0643   WBC 10.5 8.7 11.3*   HGB 10.2* 10.3* 12.2   MCV 83 82 83    355 427     Most Recent 3 BMP's:  Recent Labs   Lab Test 06/03/24  1143 06/02/24  1118 06/01/24  0643   NA  "135 131* 136   POTASSIUM 3.5 3.6 3.6   CHLORIDE 99 96* 100   CO2 25 24 22   BUN 14.8 17.4 16.9   CR 0.56 0.62 0.56   ANIONGAP 11 11 14   TOMEKA 9.0 9.1 9.3   * 106* 145*     Most Recent 2 LFT's:  Recent Labs   Lab Test 05/30/24  2024 04/22/24  0909   AST 18 16   ALT 11 9   ALKPHOS 80 87   BILITOTAL 0.2 0.2       Recent Labs   Lab Test 08/15/19  0827   CHOL 276*   HDL 39*   *   TRIG 169*     Recent Labs   Lab Test 08/15/19  0827 06/07/19  0916   * 190*     Recent Labs   Lab Test 06/03/24  1143      POTASSIUM 3.5   CHLORIDE 99   CO2 25   *   BUN 14.8   CR 0.56   GFRESTIMATED 90   TOMEKA 9.0     No results for input(s): \"A1C\" in the last 88767 hours.  Recent Labs   Lab Test 06/03/24  1143 06/02/24  1118 06/01/24  0643   HGB 10.2* 10.3* 12.2     No results for input(s): \"TROPONINI\" in the last 04727 hours.  No results for input(s): \"BNP\", \"NTBNPI\", \"NTBNP\" in the last 39965 hours.  Recent Labs   Lab Test 04/22/24  0909   TSH 3.30     No results for input(s): \"INR\" in the last 37539 hours.    Mari Manuel, DO  Hospitalist Service  [facility]      "

## 2024-06-03 NOTE — PROGRESS NOTES
"   06/03/24 1530   Appointment Info   Signing Clinician's Name / Credentials (PT) Toi Macias, PT, DPT       Present no   Living Environment   People in Home spouse   Current Living Arrangements house   Home Accessibility stairs to enter home   Number of Stairs, Main Entrance 4   Stair Railings, Main Entrance railings safe and in good condition;railings on both sides of stairs   Transportation Anticipated family or friend will provide   Living Environment Comments Pt lives in a house with her spouse. [address] with all necessary needs met on main level of the home. Pt noting that a large neighbor often comes over and assists when navigating the stairs into the house.   Self-Care   Usual Activity Tolerance moderate   Current Activity Tolerance fair   Equipment Currently Used at Home shower chair;walker, standard;wheelchair, manual   Fall history within last six months yes   Number of times patient has fallen within last six months 1   Activity/Exercise/Self-Care Comment Pt noting that spouse assists with all transfers at home. Pt utilizes a transport chair for mobility in the house that patient's spouse pushes. Pt uses a shower chair for bathing. Of note, patient has spent most of 2024 in and out of hospitalizations and TCU stays. Pt reporting that she was admitted from home prior to current admission.   General Information   Onset of Illness/Injury or Date of Surgery 05/30/24   Referring Physician Nhi Owens MD   Patient/Family Therapy Goals Statement (PT) return home   Pertinent History of Current Problem (include personal factors and/or comorbidities that impact the POC) Per chart review, \"July Huang is a 83 year old female with past medical history that is most notable for prior stroke, as well as meningioma, recurrent acute encephalopathy who presents with acute confusion, and is found to have recurrent acute metabolic encephalopathy.\"   Existing Precautions/Restrictions " fall   Cognition   Affect/Mental Status (Cognition) WFL;confused   Orientation Status (Cognition) oriented x 4   Follows Commands (Cognition) follows one-step commands;75-90% accuracy   Cognitive Status Comments Pt with mild confusion during session, at times needing directions repeated.   Integumentary/Edema   Integumentary/Edema no deficits were identifed   Posture    Posture Forward head position;Protracted shoulders;Scoliosis   Range of Motion (ROM)   Range of Motion ROM deficits secondary to weakness   ROM Comment LE AROM grossly limited 2/2 weaknes, moderate functional strength deficits noted with mobility   Bed Mobility   Comment, (Bed Mobility) min A supine>sit   Transfers   Comment, (Transfers) mod A sit>stand w/ FWW and stand pivot transfer   Gait/Stairs (Locomotion)   Comment, (Gait/Stairs) pt is unable to ambulate at baseline 2/2 weakness   Balance   Balance Comments fair static sitting balance, impaired dynamic balance   Sensory Examination   Sensory Perception patient reports no sensory changes   Clinical Impression   Criteria for Skilled Therapeutic Intervention Yes, treatment indicated   PT Diagnosis (PT) impaired functional mobility   Influenced by the following impairments impaired strength, balance, activity tolerance   Functional limitations due to impairments limited IND with mobility   Clinical Presentation (PT Evaluation Complexity) stable   Clinical Presentation Rationale clinical judgement   Clinical Decision Making (Complexity) moderate complexity   Planned Therapy Interventions (PT) balance training;bed mobility training;gait training;home exercise program;neuromuscular re-education;patient/family education;ROM (range of motion);stair training;strengthening;transfer training;progressive activity/exercise;home program guidelines   Risk & Benefits of therapy have been explained care plan/treatment goals reviewed;evaluation/treatment results reviewed;risks/benefits reviewed;current/potential  barriers reviewed;participants voiced agreement with care plan;participants included;patient   PT Total Evaluation Time   PT Eval, Low Complexity Minutes (01176) 15   Physical Therapy Goals   PT Frequency 5x/week   PT Predicted Duration/Target Date for Goal Attainment 06/13/24   PT Goals Bed Mobility;Transfers;Stairs;Gait   PT: Bed Mobility Supervision/stand-by assist;Supine to/from sit   PT: Transfers Minimal assist;Sit to/from stand;Bed to/from chair;Assistive device   PT: Gait Minimal assist;Rolling walker;10 feet   PT: Stairs Minimal assist;4 stairs;Rail on both sides   Interventions   Interventions Quick Adds Therapeutic Activity   Therapeutic Activity   Therapeutic Activities: dynamic activities to improve functional performance Minutes (68769) 30   Symptoms Noted During/After Treatment Fatigue   Treatment Detail/Skilled Intervention Greeted pt upon arrival to room. Pt agreeable to working with PT. Rehab Rasheed escalante, present to assist with mobility. Supine>sit w/ min A. Cueing and encouragement throughout for patient to complete as much of the transition under their own power as they can. Sit>stand w/ Ana Rubin and CGA. Noting good functional strength with standing with Ana Rubin. Stand pivot transfer x3 repetitions performed from bed<>recliner w/ mod A each time. Specific cueing for placement of patient hands throughout the transfer to improve efficiency and to turn hips while transferring. Sit<>stand x2 repetitions from recliner w/ FWW and min A. Cueing for safe and efficient sit<>stand procedure including scooting to the front edge of the chair, to lean forward with nose over toes, and hand and foot placement. Stood x1 min each time while performing mini marching x4 repetitions on each leg. Pt left with all needs met and call light within reach.   PT Discharge Planning   PT Plan caregiver training and education w/ spouse present if possible, stand pivot transfers, standing tolerance w/ FWW, 4 stairs   PT  Discharge Recommendation (DC Rec) Transitional Care Facility;home with assist;home with home care physical therapy   PT Rationale for DC Rec Patient appears to be below baseline mobility levels at this time. Currently, patient is assist of 1 for bed mobility and stand pivot transfers. Spouse has james providing this level of assist at home prior to admission. Patient also has 4 stairs to enter their home. Patient reporting that a strong neighbor typically assists with navigating the stairs into and out of the home. Pending PT session with spouse, patient may be able to discharge home with assist from spouse if spouse is able to safely assist with transfers and stair navigation. If spouse is unable to provide this assist, would recommend discharge to TCU for continued strengthening and mobility progressions. Based on multiple hospitalizations this year, patient may ultimately be more appropriate for higher level of care such as LTC setting if unable to progress with therapies.   PT Brief overview of current status A x1 w/ SS transfers with nursing, A x1 sit>stand w/ FWW   Total Session Time   Timed Code Treatment Minutes 30   Total Session Time (sum of timed and untimed services) 45

## 2024-06-03 NOTE — PLAN OF CARE
SUMMARY: Presents with acute confusion.   DATE & TIME: 6/03/24 2077-1330  Cognitive Concerns/ Orientation : A&O x 3, disoriented to situation. Calm and cooperative this shift.   BEHAVIOR & AGGRESSION TOOL COLOR: Green   ABNL VS/O2: Soft BP (93/46) on RA    MOBILITY: Assist x2 with janneth steady or lift, fall risk. Turn/repo, refuses at times.  PAIN MANAGEMENT: Denies pain this shift  DIET: Regular, tolerating  BOWEL/BLADDER: Jordan for retention, urine meir/odorous; up to BSC for BM, no bm this shift.  ABNL LAB/BG:  Urinalysis with positive results, started on Rocephin.  DRAIN/DEVICES: PIV saline locked  TELEMETRY RHYTHM: SR with PVCs  SKIN: Scattered bruises, intact   D/C DATE: Discharge pending progress  OTHER IMPORTANT INFO: Neuros intact except confusion at times. Palliative consulted, PT/SW following.

## 2024-06-03 NOTE — PROGRESS NOTES
.Notified provider about indwelling rich catheter discussed removal or continued need.    Did provider choose to remove indwelling rich catheter? NO    Provider's rich indication for keeping indwelling rich catheter: Indication for continued use: Retention    Is there an order for indwelling rich catheter? YES    *If there is a plan to keep rich catheter in place at discharge daily notification with provider is not necessary, but please add a notation in the treatment team sticky note that the patient will be discharging with the catheter.

## 2024-06-03 NOTE — PLAN OF CARE
Goal Outcome Evaluation:      Plan of Care Reviewed With: patient, spouse    Overall Patient Progress: no changeOverall Patient Progress: no change     SUMMARY: Presents with acute confusion.   DATE & TIME: 6/2/24, 1500- 2330   Cognitive Concerns/ Orientation : Pt A/Ox3, disoriented to situation. Irritable, tearful, frustrated at times. Calls frequently for small tasks.   BEHAVIOR & AGGRESSION TOOL COLOR: Green   ABNL VS/O2: VSS on RA    MOBILITY: Assist x2 with janneth steady or lift, fall risk. Reposition q2h- refused all day, frequent weight shifting provided. Used bedside commode for BM. Reported intermittent dizziness  PAIN MANAGEMENT: Complaints of generalized pain to lower back, managed with scheduled Baclofen. Offered tylenol and lidocaine patches-refused  DIET: Regular, tolerating  BOWEL/BLADDER: Jordan readjusted by adding 2ml extra NS to balloon port since it was leaking, no leakage noted since then, Urine meir and odorous. 2 BMs this shift.  ABNL LAB/BG: hgb 10.3, , Urinalysis with positive results, notified Dr. Owens via VM  DRAIN/DEVICES: new 22g IV to RFA  TELEMETRY RHYTHM: SR with PVCs  SKIN: Scattered bruises, intact   D/C DATE: Discharge pending progress  OTHER IMPORTANT INFO: Neuros intact except confusion at times. Palliative consulted, PT/SW following.

## 2024-06-03 NOTE — CONSULTS
Palliative Care Consultation Note  [facility]      Patient: July Huang  Date of Admission:  5/30/2024    Requesting Clinician / Team: Dr Owens  Reason for consult: Goals of care     Recommendations & Counseling     GOALS OF CARE:   Life-prolonging with limits   Discussed goals of care today.  Virginia has POLST document indicates a comfort focused plan and to avoid hospitalization if possible-states these are her current wishes.  However, as patient has had 4 hospitalizations over the past several months reviewed hospice as an option to avoid this pattern.    Virginia and Deny do not have any firm direction in which they would want to go regarding patient's medical goals of care at this time but are contemplating the information.  Will check back over the next day or so to see if they have any follow-up questions and/or if they wish to continue goals of care discussions.    ADVANCE CARE PLANNING:  Advance Directive has been requested.  There is a POLST form on file, this was reviewed and current.  Code status: No CPR- Do NOT Intubate    MEDICAL MANAGEMENT:   #Pain,chronic of back and neck. Rated as 0-5.   Opioids:Had a dose of oxycodone 2.5 mg but on hold due to confusion.   Acetaminophen (Tylenol), PRN;  consider scheduled.  Muscle Relaxers:Baclofen  Anti-convulsants:   Clonazepam 0.5 mg Q HS  Heat     #Delirium   #Agitation: symptoms have improved over last two days/  Avoid benzodiazepines, antihistamines, anticholinergics if able.  Lights on and blinds open during the day.  Reorient frequently.  Lights & TV off during the night.  Promote normal circadian rhythm.  Limit sensory deprivation - utilize hearing aids, glasses, etc.  Frequently assess basic needs such as temperature, elimination, thirst/hunger, pain  Consider bedside attendant (if able) for additional safety  Currently taking Haloperidol (Haldol) 2 mg TID and PRN     #Weakness related to hospitalization, immobility, hx  "of stroke/left sided hemiparesis  -PT/OT as tolerated for strengthening.  -Assist with transfers and reposition for comfort.    #Constipation,chronic constipation  Continue Sennoside (Senokot)/Docusate (Colace) 2 tabs Q HS and PRN  Encourage hydration     PSYCHOSOCIAL/SPIRITUAL SUPPORT:  Family: Has 4 children. 3 live in [place] and one son lives in [place].    Palliative Care will continue to follow. Thank you for the consult and allowing us to aid in the care of July Huang.    Reviewed patient case with hospitalist Dr Manuel via text, and patient's RN,     Susan Jaquez, CNS  Securely message with [employer] (more info)  Text page via [employer] Paging/Directory      During regular M-F work hours ([phone]) -- please contact me on [employer]   In my absence, securely message our team via Vocera \"[facility]\" or go to On Call tab in [place] [facility], search for \"[facility]\"   After regular work hours and on weekends/holidays, to reach our on call physician, securely message via [employer] \"[facility]\" or go to On Call tab in [place] [facility], search for \"[facility]\"     Palliative Summary/HPI     July Huang is a 83 year old female with a past medical history of benign meningiomas, ovarian cancer s/p [id number] (no chemo/radiation), urinary retention constipation,GERD, stroke with left sided hemiparesis, chronic pain, left hip fracture s/p hip arthroplasty 12/2023, HTN, HLD, depression/anxiety,  who presented on 5/30/24 with altered mental status and worsening weakness.  Upon further assessment was found to have possible UTI and initiated on ceftriaxone on 6/02.  During hospitalization has had some agitation related to encephalopathy but has improved with scheduled Haldol.    Recent hospitalizations:   ANW: 5/17/24 fall with hairline sternal fx  3/29-4/04/24 acute encephalopathy, ongoing menangioma SLUMS 17/30.  3/01-3/04: acute enceph and urinary retention related to " polypharmacy.  2/22-28: acute encephalopathy due to UTI    Today, the patient was seen for:  Goals of care    Palliative Care Summary:   Met with  Virginia and  John.   Introduced the role of palliative care as an interdisciplinary team that cares for patients with serious illness to help support symptom management, communication, coping for patients and their families as well as support with medical decision making.    Prognosis, Goals, & Planning:    Functional Status just prior to this current hospitalization:  has been hospitalized 4 times in the past 6 months for recurrent encephalopathy, UTIs, fall. There is not reported or documented decline in patient's function.  There has been a decline in daily function including increased hospitalizations, worsening weakness.  There is no reported or documented weight loss.   Outpatient Palliative Performance Score (PPS) 40%  Extensive disease. Mainly in bed w/little ambulation. ADLs/activities mainly w/assistance. Normal or reduced intake. Full LOC or drowsy or confused.   ECOG3 (Capable of only limited self-care; needs help with ADLs; in bed/chair >50% of waking hours)    Prognosis, Goals, and/or Advance Care Planning:  We discussed general treatment options (full/restorative, selective/conservatives, and comfort only/hospice). We then discussed how these specifically apply to Virginia's medical condition. Discussed what continuing restorative/life-prolonging care entails, including continued (re)admissions to the hospital, continuing with preventative and primary care, seeing disease/organ specific specialty consultations for medical treatments in hopes to prolong life for as long as possible.    I inquired as to whether Virginia had a healthcare directive as we do not appear to have one on file here.  As she has been at [facility] much of the time she thinks it is possibly in those healthcare systems.  Requested a copy be brought in to scanned  in chart if possible.  We reviewed Virginia's POLST document on file with focus on comfort and avoiding frequent hospitalizations.  Both John and Virginia reviewed and feel it is current.  However questioned if this was accurate due to having 4 hospitalizations over the past several months.   there were no choices indicated for feeding tubes or antibiotics.  I reviewed feeding tubes in general and the situations in which they are considered.  Also reviewed how IV antibiotics usually will could require hospitalizations to administer.  We discussed how it is possible to avoid ongoing hospitalizations if the focus of care is comfort focused.  It was explained to Virginia and John that comfort care is a good option when the burdens of aggressive treatments outweigh the benefits and are unwanted by patient, or not in their best interest. Comfort care focuses on optimizing quality of life and relief from distressing symptoms such as pain, shortness of breath, nausea, and anxiety and allowing a natural dying process. When comfort care is initiated, restorative focused care and all artificial means to sustain life are discontinued, including further diagnostic testing, lab draws, blood glucose testing/insulin, tube feedings, IV fluids, antibiotics, medications not for comfort, and vital signs.      It was explainedthat hospice is a medical team that provides comfort care outside of the hospital setting. Education provided regarding hospice philosophy, prognostic,and eligibility criteria. Discussed what services are provided and those that are not,  Discussed common misconceptions. We explored the various disposition options where they can receive hospice care (home, residential hospice homes, LTC with hospice) including subsequent financial and familial implications. Discussed typical anticipated timing of discharge.  Virginia and Deny did not have any firm direction in which they would want to go regarding patient's  medical goals of care at this time but are contemplating the information.  Will check back over the next day or so to see if they have any follow-up questions and to continue goals of care discussions if needed.  Advance Care Planning Discussion 6/3/2024. Susan BELLA CNS met with Patient and their spouse today at the hospital to discuss Advance Care Planning. Jluy Huang does have decisional capacity and was present for this discussion.  Those present were informed of the voluntary nature of this discussion and wished to proceed.  The discussion included: See documentation in Serious Illness Conversation section of the ACP Activity. This discussion began at 1:00 PM and ended at 1:30 PM for a total of 30 minutes.  .Code Status was addressed today:   Yes, confirmed DNR/DNI    Patient's decision making preferences: shared with support from loved ones        Patient has decision-making capacity today for complex decisions:Questionable due to fluctuating mental status related to encephalopathy.  4/29 SLUMS 17/30. However during our conversation she was able to follow the conversation and discuss her medical history.          Coping, Meaning, & Spirituality:   Mood, coping, and/or meaning in the context of serious illness were addressed today: Yes    Social:   Living situation:lives with significant other/spouse  Important relationships/caregivers:  John.  However due to frequent hospitalizations she has been staying in rehab facilities alternating with hospitalization and short times at home    Medications:  I have reviewed this patient's medication profile and medications from this hospitalization. Notable medications:     Noted scheduled meds are:  Current Facility-Administered Medications   Medication Dose Route Frequency Provider Last Rate Last Admin    amLODIPine (NORVASC) tablet 2.5 mg  2.5 mg Oral Daily Nhi Owens MD   2.5 mg at 06/03/24 0800    aspirin EC tablet 81 mg  81 mg  Oral Daily Nhi Owens MD   81 mg at 06/03/24 0800    baclofen (LIORESAL) half-tab 5 mg  5 mg Oral TID Nhi Owens MD   5 mg at 06/03/24 0800    [Held by provider] busPIRone (BUSPAR) tablet 5 mg  5 mg Oral BID Nhi Owens MD        cefTRIAXone (ROCEPHIN) 1 g vial to attach to  mL bag for ADULTS or NS 50 mL bag for PEDS  1 g Intravenous Q24H Sirisha Galvin MD   1 g at 06/02/24 2339    clonazePAM (klonoPIN) tablet 0.5 mg  0.5 mg Oral At Bedtime Nhi Owens MD   0.5 mg at 06/02/24 2033    enoxaparin ANTICOAGULANT (LOVENOX) injection 40 mg  40 mg Subcutaneous Q24H Nhi Owens MD   40 mg at 06/02/24 1737    haloperidol (HALDOL) tablet 2 mg  2 mg Oral TID Nhi Owens MD   2 mg at 06/03/24 0611    Lidocaine (LIDOCARE) 4 % Patch 2 patch  2 patch Transdermal Q24H Nhi Owens MD        multivitamin w/minerals (THERA-VIT-M) tablet 1 tablet  1 tablet Oral Daily Nhi Owens MD   1 tablet at 06/03/24 0800    rosuvastatin (CRESTOR) tablet 10 mg  10 mg Oral Daily Nhi Owens MD   10 mg at 06/03/24 0800    senna-docusate (SENOKOT-S/PERICOLACE) 8.6-50 MG per tablet 2 tablet  2 tablet Oral At Bedtime Nhi Owens MD   2 tablet at 06/01/24 2102       Noted PRN meds are:  Current Facility-Administered Medications   Medication Dose Route Frequency Provider Last Rate Last Admin    acetaminophen (TYLENOL) tablet 650 mg  650 mg Oral Q4H PRN Wil Palmer MD   650 mg at 06/02/24 2033    Or    acetaminophen (TYLENOL) Suppository 650 mg  650 mg Rectal Q4H PRN Wil Palmer MD        [Held by provider] baclofen (LIORESAL) half-tab 5 mg  5 mg Oral TID PRN Nhi Owens MD        famotidine (PEPCID) tablet 20 mg  20 mg Oral BID PRN Nhi Owens MD   20 mg at 06/02/24 1626    haloperidol (HALDOL) tablet 5 mg  5 mg Oral At Bedtime PRN Nhi Owens MD   5 mg at 05/31/24 2125    melatonin tablet 3  mg  3 mg Oral At Bedtime Wil Alejandra MD   3 mg at 06/01/24 2102    ondansetron (ZOFRAN ODT) ODT tab 4 mg  4 mg Oral Q6H PRWil Barrett MD        Or    ondansetron (ZOFRAN) injection 4 mg  4 mg Intravenous Q6H PRWil Barrett MD        senna-docusate (SENOKOT-S/PERICOLACE) 8.6-50 MG per tablet 1 tablet  1 tablet Oral BID Wil Alejandra MD   1 tablet at 06/02/24 2033    Or    senna-docusate (SENOKOT-S/PERICOLACE) 8.6-50 MG per tablet 2 tablet  2 tablet Oral BID Wil Alejandra MD         ROS: Comprehensive ROS is  reviewed and is negative except as here & per HPI:     PHYSICAL EXAM:  Vital Signs: Temp: 97.7  F (36.5  C) Temp src: Oral BP: 134/65 Pulse: 96   Resp: 20 SpO2: 97 % O2 Device: None (Room air)    Weight: 102 lbs 8.22 oz  Wt Readings from Last 4 Encounters:   05/31/24 46.5 kg (102 lb 8.2 oz)   05/08/24 46.5 kg (102 lb 9.6 oz)   05/02/24 46.6 kg (102 lb 12.8 oz)   04/29/24 46.6 kg (102 lb 12.8 oz)     GENERAL:  Alert, fatigued, no distress, fatigued, frail, cachectic.  HEAD: Normocephalic atraumatic  SCLERA: Anicteric  EXTREMITIES: Warm; no edema  RESPIRATORY: Breathing non labored  NEUROLOGIC: Alert.Left sided weakness.  PSYCH: Calm, cooperative, flat affect.    Data reviewed:  Lab Results   Component Value Date    WBC 8.7 06/02/2024    WBC 11.3 (H) 06/01/2024    WBC 7.4 05/31/2024    HGB 10.3 (L) 06/02/2024    HGB 12.2 06/01/2024    HGB 11.3 (L) 05/31/2024    HCT 31.9 (L) 06/02/2024    HCT 37.5 06/01/2024    HCT 35.8 05/31/2024     06/02/2024     06/01/2024     05/31/2024     (L) 06/02/2024     06/01/2024     05/31/2024    POTASSIUM 3.6 06/02/2024    POTASSIUM 3.6 06/01/2024    POTASSIUM 3.7 05/31/2024    CHLORIDE 96 (L) 06/02/2024    CHLORIDE 100 06/01/2024    CHLORIDE 105 05/31/2024    CO2 24 06/02/2024    CO2 22 06/01/2024    CO2 21 (L) 05/31/2024    BUN 17.4 06/02/2024    BUN 16.9 06/01/2024    BUN 11.1  05/31/2024    CR 0.62 06/02/2024    CR 0.56 06/01/2024    CR 0.55 05/31/2024     (H) 06/02/2024     (H) 06/01/2024     (H) 05/31/2024    SED 17 03/14/2022    SED 17 08/09/2021    AST 18 05/30/2024    AST 16 04/22/2024    AST 33 04/01/2024    ALT 11 05/30/2024    ALT 9 04/22/2024    ALT 15 04/01/2024    ALKPHOS 80 05/30/2024    ALKPHOS 87 04/22/2024    ALKPHOS 58 04/01/2024    BILITOTAL 0.2 05/30/2024    BILITOTAL 0.2 04/22/2024    BILITOTAL 0.3 04/01/2024      Lab Results   Component Value Date    ALBUMIN 4.1 05/30/2024    ALBUMIN 4.7 12/03/2021     Results for orders placed or performed during the hospital encounter of 05/30/24   CT Head w/o Contrast    Impression    IMPRESSION:  1.  No acute intracranial abnormality.  2.  Chronic changes as above.   XR Chest Port 1 View    Impression    IMPRESSION: Patients left arm projects over the lower left chest obscuring some detail. Heart size and pulmonary vessels appear normal. Calcified thoracic aorta. No definite pneumonia. No traumatic injury evident.   XR Knee Left 1/2 Views    Impression    IMPRESSION: Diffuse osseous demineralization. Medial and lateral joint  spaces are preserved and normally aligned. No definite fracture on a  single view of the left knee.    GENEVA BABCOCK DO         SYSTEM ID:  [id number]   XR Abdomen Port 1 View    Impression    IMPRESSION: Moderate amount of stool throughout the colon may indicate  a degree of constipation. No obstruction.     MARY ELLEN MANLEY MD         SYSTEM ID:  [id number]   Echocardiogram Complete   Result Value Ref Range    LVEF  60-65%        Medical Decision Making       Please see A&P for additional details of medical decision making.  MANAGEMENT DISCUSSED with the following over the past 24 hours: Dr Manuel, RN   NOTE(S)/MEDICAL RECORDS REVIEWED over the past 24 hours: ED, Medicine, Nursing Hospitalist, Nutrition,   Tests REVIEWED in the past 24 hours:  - See lab/imaging results included in the  data section of the note  SUPPLEMENTAL HISTORY, in addition to the patient's history, over the past 24 hours obtained from:   - Spouse or significant other        Much or all of the text in this note was generated through the use of Dragon dictation, voice to text software. Errors in spelling or words which appear to be out of context are unintentional and may be present due to having escaped editing.

## 2024-06-03 NOTE — PLAN OF CARE
Goal Outcome Evaluation:      Plan of Care Reviewed With: patient, spouse    Overall Patient Progress: improvingOverall Patient Progress: improving         SUMMARY: Presents with acute confusion.     DATE & TIME: 6/03/24   Cognitive Concerns/ Orientation : A&O x3, disoriented to situation. Calm and coop. Using call button for requests.   BEHAVIOR & AGGRESSION TOOL COLOR: Green   ABNL VS/O2: VSS on RA    MOBILITY: Assist x 1-2 with janneth steady or lift, fall risk. Turn/repo, refuses at times. Recliner chair for meals.   PAIN MANAGEMENT: Denies.  DIET: Regular, tolerating, requested to be fed at breakfast.  assisting with lunch. Fair to Good appetite.  BOWEL/BLADDER: Jordan for retention; up to BSC for BM, gas only/no BM this shift.  ABNL LAB: N/A  DRAIN/DEVICES: PIV SL  TELEMETRY RHYTHM: SR with occ PVCs.  SKIN: Scattered bruises, intact.  D/C DATE: Pend home with , care management involved.   OTHER IMPORTANT INFO: Rocephin for +UA, UC results pending. Up in recliner all shift, offered repos and back to bed many times, pt declined. Mentation appears to be improving, TID haldol, q4h neuros no longer needed. Content watching tv and using phone. Palliative met with pt and spouse at bedside. PT sched for next shift.

## 2024-06-04 ENCOUNTER — APPOINTMENT (OUTPATIENT)
Dept: PHYSICAL THERAPY | Facility: CLINIC | Age: 84
DRG: 884 | End: 2024-06-04
Payer: COMMERCIAL

## 2024-06-04 LAB
BACTERIA BLD CULT: NO GROWTH
BACTERIA UR CULT: NORMAL
CREAT SERPL-MCNC: 0.62 MG/DL (ref 0.51–0.95)
EGFRCR SERPLBLD CKD-EPI 2021: 88 ML/MIN/1.73M2
PLATELET # BLD AUTO: 342 10E3/UL (ref 150–450)

## 2024-06-04 PROCEDURE — 97530 THERAPEUTIC ACTIVITIES: CPT | Mod: GP

## 2024-06-04 PROCEDURE — 250N000013 HC RX MED GY IP 250 OP 250 PS 637: Performed by: HOSPITALIST

## 2024-06-04 PROCEDURE — 250N000011 HC RX IP 250 OP 636: Performed by: INTERNAL MEDICINE

## 2024-06-04 PROCEDURE — 36415 COLL VENOUS BLD VENIPUNCTURE: CPT | Performed by: INTERNAL MEDICINE

## 2024-06-04 PROCEDURE — 99233 SBSQ HOSP IP/OBS HIGH 50: CPT | Performed by: HOSPITALIST

## 2024-06-04 PROCEDURE — 82565 ASSAY OF CREATININE: CPT | Performed by: INTERNAL MEDICINE

## 2024-06-04 PROCEDURE — 250N000011 HC RX IP 250 OP 636: Performed by: HOSPITALIST

## 2024-06-04 PROCEDURE — 250N000013 HC RX MED GY IP 250 OP 250 PS 637: Performed by: INTERNAL MEDICINE

## 2024-06-04 PROCEDURE — 85049 AUTOMATED PLATELET COUNT: CPT | Performed by: INTERNAL MEDICINE

## 2024-06-04 PROCEDURE — 120N000001 HC R&B MED SURG/OB

## 2024-06-04 RX ORDER — POLYETHYLENE GLYCOL 3350 17 G/17G
17 POWDER, FOR SOLUTION ORAL 2 TIMES DAILY PRN
Status: DISCONTINUED | OUTPATIENT
Start: 2024-06-04 | End: 2024-06-05 | Stop reason: HOSPADM

## 2024-06-04 RX ORDER — AMOXICILLIN 250 MG
2 CAPSULE ORAL 2 TIMES DAILY
Status: DISCONTINUED | OUTPATIENT
Start: 2024-06-04 | End: 2024-06-05 | Stop reason: HOSPADM

## 2024-06-04 RX ORDER — CEFTRIAXONE 2 G/1
2 INJECTION, POWDER, FOR SOLUTION INTRAMUSCULAR; INTRAVENOUS ONCE
Status: COMPLETED | OUTPATIENT
Start: 2024-06-04 | End: 2024-06-04

## 2024-06-04 RX ORDER — HALOPERIDOL 0.5 MG/1
0.5 TABLET ORAL 3 TIMES DAILY
Status: DISCONTINUED | OUTPATIENT
Start: 2024-06-04 | End: 2024-06-04

## 2024-06-04 RX ORDER — HALOPERIDOL 0.5 MG/1
0.5 TABLET ORAL EVERY 8 HOURS PRN
Status: DISCONTINUED | OUTPATIENT
Start: 2024-06-04 | End: 2024-06-05 | Stop reason: HOSPADM

## 2024-06-04 RX ORDER — BISACODYL 10 MG
10 SUPPOSITORY, RECTAL RECTAL DAILY PRN
Status: DISCONTINUED | OUTPATIENT
Start: 2024-06-04 | End: 2024-06-05 | Stop reason: HOSPADM

## 2024-06-04 RX ADMIN — CEFTRIAXONE SODIUM 2 G: 2 INJECTION, POWDER, FOR SOLUTION INTRAMUSCULAR; INTRAVENOUS at 14:44

## 2024-06-04 RX ADMIN — CLONAZEPAM 0.5 MG: 0.5 TABLET ORAL at 20:28

## 2024-06-04 RX ADMIN — ACETAMINOPHEN 650 MG: 325 TABLET, FILM COATED ORAL at 20:28

## 2024-06-04 RX ADMIN — Medication 1 TABLET: at 08:29

## 2024-06-04 RX ADMIN — BACLOFEN 5 MG: 10 TABLET ORAL at 08:29

## 2024-06-04 RX ADMIN — BUSPIRONE HYDROCHLORIDE 5 MG: 5 TABLET ORAL at 20:28

## 2024-06-04 RX ADMIN — ROSUVASTATIN CALCIUM 10 MG: 10 TABLET, FILM COATED ORAL at 08:29

## 2024-06-04 RX ADMIN — HALOPERIDOL 0.5 MG: 0.5 TABLET ORAL at 13:00

## 2024-06-04 RX ADMIN — MELATONIN TAB 3 MG 3 MG: 3 TAB at 20:29

## 2024-06-04 RX ADMIN — ASPIRIN 81 MG: 81 TABLET, COATED ORAL at 08:29

## 2024-06-04 RX ADMIN — AMLODIPINE BESYLATE 2.5 MG: 2.5 TABLET ORAL at 08:29

## 2024-06-04 RX ADMIN — SENNOSIDES AND DOCUSATE SODIUM 2 TABLET: 50; 8.6 TABLET ORAL at 20:28

## 2024-06-04 RX ADMIN — ENOXAPARIN SODIUM 40 MG: 40 INJECTION SUBCUTANEOUS at 18:16

## 2024-06-04 RX ADMIN — HALOPERIDOL 2 MG: 2 TABLET ORAL at 06:16

## 2024-06-04 RX ADMIN — ACETAMINOPHEN 650 MG: 325 TABLET, FILM COATED ORAL at 11:03

## 2024-06-04 RX ADMIN — BACLOFEN 5 MG: 10 TABLET ORAL at 20:28

## 2024-06-04 RX ADMIN — BACLOFEN 5 MG: 10 TABLET ORAL at 13:58

## 2024-06-04 ASSESSMENT — ACTIVITIES OF DAILY LIVING (ADL)
ADLS_ACUITY_SCORE: 49
ADLS_ACUITY_SCORE: 49
ADLS_ACUITY_SCORE: 48
ADLS_ACUITY_SCORE: 49
ADLS_ACUITY_SCORE: 49
ADLS_ACUITY_SCORE: 48
ADLS_ACUITY_SCORE: 49
ADLS_ACUITY_SCORE: 48
ADLS_ACUITY_SCORE: 49
ADLS_ACUITY_SCORE: 49
ADLS_ACUITY_SCORE: 48
ADLS_ACUITY_SCORE: 49
ADLS_ACUITY_SCORE: 48
ADLS_ACUITY_SCORE: 49
ADLS_ACUITY_SCORE: 49
ADLS_ACUITY_SCORE: 48

## 2024-06-04 NOTE — PROGRESS NOTES
"CLINICAL NUTRITION SERVICES - REASSESSMENT NOTE    Recommendations Ordered by Registered Dietitian (RD):   - Ordered vanilla Ensure Enlive with breakfast  - Placed patient care order for nursing staff to help assist pt w/ feedings as it's impacting her ability to get enough nutrition   Malnutrition: (5/31)  % Weight Loss:  None noted  % Intake:  <75% for > 7 days (moderate malnutrition)  Subcutaneous Fat Loss:  Orbital region mild depletion, Upper arm region mild depletion, and Thoracic region mild depletion  Muscle Loss:  Clavicle bone region moderate depletion, Acromion bone region moderate depletion, Upper arm region moderate  depletion, Dorsal hand region moderate depletion, Patellar region moderate depletion, and Posterior calf region moderate  depletion  Fluid Retention:  None noted     Malnutrition Diagnosis: At least Moderate malnutrition  In Context of:  Acute illness or injury  Chronic illness or disease     EVALUATION OF PROGRESS TOWARD GOALS   Diet:  Regular  Room service w/ assist    Intake/Tolerance:    - Mostly % intakes documented per nursing flowsheets  - Ordering TID meals per healthtouch  - Spoke with patient at bedside. Per patient, eating is going \"fine.\" She said the only concern she has is she needs help with eating and she said the nurses here are too busy to feed her, she has been eating less because of this - placed patient care order for nursing staff to help assist pt w/ feedings as this is impacting her nutrition. She did say her  feeds her at dinner. She said she has some chocolate Ensure piling up in room and only wants 1 per day (now wants vanilla).     ASSESSED NUTRITION NEEDS:  Dosing Weight 47 kg (actual)  Estimated Energy Needs: 0528-9148+ kcals (25-30+ Kcal/Kg)  Justification: maintenance + repletion   Estimated Protein Needs: 47-56+ grams protein (1-1.2+ g pro/Kg)  Justification: maintenance + repletion   Estimated Fluid Needs: 6260-2844  mL (1 " mL/Kcal)  Justification: maintenance    NEW FINDINGS:   Meds: MVI/M, senokot    Dispo: anticipated discharge tomorrow,  would like pt to discharge to home    Labs reviewed    Previous Goals:   PO intake - >75% of meal trays, or the equivalent in supplements, TID   Evaluation: Met    Previous Nutrition Diagnosis:   Inadequate oral intake related to mentation as evidenced by family report confusion/distraction can lead to missed meals, mild fat losses and moderate muscle wasting   Evaluation: No change    CURRENT NUTRITION DIAGNOSIS  Inadequate oral intake related to mentation, inability to handle meals on own as evidenced by pt report of no assistance with meals, family report of confusion/distraction leading to missed meals     INTERVENTIONS  Recommendations / Nutrition Prescription  See above    Implementation  Medical Food Supplement - ordered    Goals  Patient to consume >75% of TID meals    MONITORING AND EVALUATION:  Progress towards goals will be monitored and evaluated per protocol and Practice Guidelines    Kelle Verma RD, LD  Clinical Dietitian - [facility]

## 2024-06-04 NOTE — PROGRESS NOTES
"[facility]  Hospitalist Progress Note   06/04/2024          Assessment and Plan:       July Huang is a 83 year old female with history of ischemic stroke with residual left-sided weakness, meningioma, hypertension, hyperlipidemia, hyponatremia admitted on 5/30/2024 with confusion.     --Admitted to [facility] from 2/22 - 2/28/24 for acute encephalopathy. Then evaluated by neurology, undergone CT imaging, EEG no epileptiform activity.  Treated for Proteus mirabilis UTI with cephalosporins.    --Admitted to [facility] from 3/1 - 3/4/24 for acute encephalopathy and urinary retention, thought to be due to polypharmacy. UA was negative and Brain MRI showed stable large left parafalcine meningioma. MRI of the spine also reportedly showed no evidence of cauda equina syndrome. Abdominal CT showed a non-obstructing kidney stone. Her gabapentin was stopped and Baclofen dose halved, and reportedly then her mental status improved, prior to discharge.     --Readmitted  at [facility] from 3/29/24 - 4/4/24 for acute encephalopathy. CTH showed ongoing meningioma, for which Neurosurgery was consulted, and recommended ongoing outpatient surveillance for that. Empiric broad spectrum antibiotics were started for lactic acidosis seen on admission.  Blood cultures, UA, COVID/Influenza/RSV tests, CXR, and CT of abdomen and pelvis were unremarkable (ongoing 8 mm nonobstructing left renal calculus was noted incidentally). Note from 4/29 indicates:SLUMS 17/30.    --Readmitted to [facility] on 5/17 -5/22/2024 after a fall causing chest pain, and found to have hairline sternal fracture that was managed non-operatively.  See note from office visit with Dr. Barahona on 5/28, \"There is the acute issue of how her sternum was fractured and there seems to be some discrepancy about this. I am having our  investigate this further as the details remain uncertain.\"  She was discharged home with ordered home care services on 5/22/24. " Lidocaine patch was added to existing home prescriptions of Clonazepam and Baclofen.    Acute encephalopathy likely metabolic, toxic, progressive dementia.  Physical deconditioning from medical illness, senile frailty.  History of dementia with SLUMS of 17/30 in April/2024.   Benign meningioma of the brain status post XRT.  History of anxiety, depression.  History of CVA with left hemiparesis 2020.  ---Now presented to hospital with--confusion, generalized weakness.  Sodium 134, potassium 3.8, creatinine 0.64.  Liver enzymes within normal limits.  Glucose 126, WBC 7.7.  Lactic acid 0.8.  Blood cultures no growth to date.  CT head 5/30 no acute pathology.  Troponins minimally elevated, EKG normal sinus rhythm.  Telemetry no acute events.  CXR is obscured partially by overlay of her left arm but shows no clear evidence of acute process.  -- Patient initially admitted to observation unit, then switched to inpatient.  --Empirically treated for UTI, urinary retention, obstipation as discussed below.  --I have taken over patient's care on 6/4.  This morning patient awake oriented, continues to require assistance of 1-2 with lift for minimal ambulation per nursing report.  Jordan catheter in place.  Having bowel movements.   --Impression patient likely having progressive dementia with behavioral disturbance.  --Continue PTA baclofen 5 mg 3 times daily.   Resume PTA BuSpar 5 mg twice daily.  Holding PTA Klonopin since admission.  Continue PTA aspirin and statin therapy  As needed melatonin ordered.  As needed Haldol 0.5 mg every 8 hours as needed.  As needed oral Zyprexa.  --PT recommending TCU versus home for rehabilitation.  -OT evaluation requested.  Discussed with patient's , declines TCU.  Would prefer to discharge home.,  See goals of care discussion below.   Fall precautions, delirium precautions.  Sleep promotion.  Recommend neurology, neurocognitive assessment as outpatient.  Recommend psychiatry evaluation  "as outpatient for behavioral disturbance.    Goals of care discussion.  Hospitalist team has been having goals of care discussion with patient's family.  Restorative care.  Palliative team followed 6/3, initiated discussions on hospice care.  Await follow-up 6/4.  Discussed with patient's  Deny on 6/4 afternoon, clear that he would like patient to discharge home with home care versus home hospice.  Reports is unsure if he would like to opt for hospice at this time.    History of chronic neck and back pain.    History of muscle spasms  Per chart review followed up with pain clinic, had multiple procedures in the past.  Was on opioids in the past, recently discontinued given falls and encephalopathy during previous admissions.  Continue PTA baclofen.  As needed Tylenol.  Topical Voltaren gel.    Pyuria with cultures negative.  UA on admission with large leukocyte esterase, many bacteria, 31 WBCs.  Urine cultures with greater than 100,000 mixed missy.  Was on IV ceftriaxone 6/2, 6/3, continue on 6/4 to complete 3-day course of antibiotics.     Obstipation  Abdominal x-ray.  This shows, \"moderate amount of stool throughout the colon may indicate a degree of constipation.  No obstruction.\"  Scheduled docusate senna twice daily.  As needed MiraLAX, Dulcolax suppository ordered.  Monitor for daily bowel movement    Urinary retention  Status post Jordan catheterization 6/1 for failing voiding trial.  Voiding trial 6/4.  If fails voiding trial will replace Jordan catheter and have urology follow-up as outpatient.  Encourage timed voiding.     Elevated troponin in the setting of demand ischemia from fall prior to admission  Hypertension.  Hyperlipidemia.  Troponins are flat: 23, 26, 29.  No convincing clinical evidence of ACS.  EKG sinus rhythm, nonspecific T wave changes.  QTc 417.  Echo shows EF 60 to 65%, normal left ventricular wall motion, right ventricular systolic function normal  Continue PTA Norvasc.   Continue " PTA aspirin and Crestor.    Hyponatremia resolved.  Sodium 131, improved.  On chart review has had on and off hyponatremia.  Regular diet    Chronic normocytic anemia.  Baseline hemoglobin in the 9-10 range.  Recent vitamin B12 folic acid within normal limits.  Monitor hemoglobin levels periodically    Hypertension  Continue PTA Norvasc     Hyperlipidemia  Continue PTA Crestor     GERD  Continue PTA PRN Pepcid     Prior history of ovarian and bladder cancer:   Noted     Moderate protein calorie malnutrition.  Nutrition following, dietary supplements    Orders Placed This Encounter      Regular Diet Adult      DVT Prophylaxis: SCDs, subcutaneous Lovenox.  Code Status: No CPR- Do NOT Intubate  Disposition: Expected discharge anticipate discharge home with or without home hospice 6/5    Medically Ready for Discharge: Anticipated Tomorrow    Discussed with patient, bedside RN, palliative team.  Discussed with patient's  over the telephone 6/4.  >51 minutes spent by me on the date of service doing chart review, history, exam, documentation & further activities per the note.      Jaclyn Ocampo MD        Interval History:        Patient lying in bed.  Awake can answer simple questions.  Denies any chest pain or palpitations.  Complaining of discomfort in her back.   Denies any urinary disturbance, having Jordan catheter.  Per nursing report having bowel movements.  No nausea or vomiting.  Per report tolerating oral diet.  On intravenous antibiotics, urine cultures mixed missy.  Palliative team following, discussed with Susan.  Per report requiring assistance of 1-2 with lift for ambulation.         Physical Exam:        Physical Exam   Temp:  [97.4  F (36.3  C)-97.7  F (36.5  C)] 97.5  F (36.4  C)  Pulse:  [86-89] 86  Resp:  [18] 18  BP: ()/(53-64) 120/64  SpO2:  [92 %-98 %] 92 %    Intake/Output Summary (Last 24 hours) at 6/4/2024 1343  Last data filed at 6/4/2024 1100  Gross per 24 hour   Intake 400 ml    Output 1750 ml   Net -1350 ml     Admission Weight: 46.5 kg (102 lb 8.2 oz)  Current Weight: 46.5 kg (102 lb 8.2 oz)    PHYSICAL EXAM  GENERAL: Patient is in no distress.  Awake can answer most questions, forgetful.  HEENT: Oropharynx pink  HEART: Regular rate and rhythm. S1S2. No murmurs  LUNGS: Bilateral slightly decreased breath sounds, no wheezing or crackles  Respirations unlabored  ABDOMEN: Soft, no abdominal tenderness, bowel sounds heard .  Genitourinary Jordan catheter in place.  NEURO: Moving all extremities.  EXTREMITIES: trace pedal edema.   SKIN: Warm, dry. No rash   PSYCHIATRY Cooperative       Medications:        Current Facility-Administered Medications   Medication Dose Route Frequency Provider Last Rate Last Admin    amLODIPine (NORVASC) tablet 2.5 mg  2.5 mg Oral Daily Nhi Owens MD   2.5 mg at 06/04/24 0829    aspirin EC tablet 81 mg  81 mg Oral Daily Nhi Owens MD   81 mg at 06/04/24 0829    baclofen (LIORESAL) half-tab 5 mg  5 mg Oral TID Nhi Owens MD   5 mg at 06/04/24 0829    [Held by provider] busPIRone (BUSPAR) tablet 5 mg  5 mg Oral BID Nhi Owens MD        clonazePAM (klonoPIN) tablet 0.5 mg  0.5 mg Oral At Bedtime Nhi Owens MD   0.5 mg at 06/03/24 2019    enoxaparin ANTICOAGULANT (LOVENOX) injection 40 mg  40 mg Subcutaneous Q24H Nhi Owens MD   40 mg at 06/03/24 1706    haloperidol (HALDOL) tablet 0.5 mg  0.5 mg Oral TID Jaclyn Ocampo MD   0.5 mg at 06/04/24 1300    Lidocaine (LIDOCARE) 4 % Patch 2 patch  2 patch Transdermal Q24H Nhi Owens MD   2 patch at 06/03/24 1937    multivitamin w/minerals (THERA-VIT-M) tablet 1 tablet  1 tablet Oral Daily Nhi Owens MD   1 tablet at 06/04/24 0829    rosuvastatin (CRESTOR) tablet 10 mg  10 mg Oral Daily Nhi Owens MD   10 mg at 06/04/24 0829    senna-docusate (SENOKOT-S/PERICOLACE) 8.6-50 MG per tablet 2 tablet  2 tablet Oral At Bedtime  Nhi Owens MD   2 tablet at 06/03/24 2019    sodium chloride (PF) 0.9% PF flush 3 mL  3 mL Intracatheter Q8H Jaclyn Ocampo MD   3 mL at 06/04/24 1301     Current Facility-Administered Medications   Medication Dose Route Frequency Provider Last Rate Last Admin    acetaminophen (TYLENOL) tablet 650 mg  650 mg Oral Q4H PRN Wil Palmer MD   650 mg at 06/04/24 1103    Or    acetaminophen (TYLENOL) Suppository 650 mg  650 mg Rectal Q4H PRN Wil Palmer MD        [Held by provider] baclofen (LIORESAL) half-tab 5 mg  5 mg Oral TID PRN Nhi Owens MD        famotidine (PEPCID) tablet 20 mg  20 mg Oral Daily PRN Nhi Owens MD        haloperidol (HALDOL) tablet 5 mg  5 mg Oral At Bedtime PRN Nhi Owens MD   5 mg at 05/31/24 2125    melatonin tablet 3 mg  3 mg Oral At Bedtime PRN Wil Palmer MD   3 mg at 06/01/24 2102    ondansetron (ZOFRAN ODT) ODT tab 4 mg  4 mg Oral Q6H PRN Wil Palmer MD        Or    ondansetron (ZOFRAN) injection 4 mg  4 mg Intravenous Q6H PRN Wil Palmer MD        senna-docusate (SENOKOT-S/PERICOLACE) 8.6-50 MG per tablet 1 tablet  1 tablet Oral BID PRWil Barrett MD   1 tablet at 06/02/24 2033    Or    senna-docusate (SENOKOT-S/PERICOLACE) 8.6-50 MG per tablet 2 tablet  2 tablet Oral BID PRWil Barrett MD                Data:      All new lab and imaging data was reviewed.

## 2024-06-04 NOTE — CONSULTS
Care Management Initial Consult    General Information  Assessment completed with: chart, staff and spouse        Primary Care Provider verified and updated as needed:     Readmission within the last 30 days:           Advance Care Planning:            Communication Assessment  Patient's communication style: spoken language (English or Bilingual)    Hearing Difficulty or Deaf: no   Wear Glasses or Blind: yes    Cognitive  Cognitive/Neuro/Behavioral: .WDL except  Level of Consciousness: alert  Arousal Level: opens eyes spontaneously  Orientation: disoriented to, situation  Mood/Behavior: agitated  Best Language: 0 - No aphasia  Speech: spontaneous    Living Environment:   People in home:   spouse    Current living Arrangements:   house     Able to return to prior arrangements:  yes       Family/Social Support:  Care provided by:    Provides care for:  no one                Description of Support System:           Current Resources:   Patient receiving home care services:  no     Community Resources:    Equipment currently used at home: shower chair, walker, standard, wheelchair, manual  Supplies currently used at home:      Employment/Financial:  Employment Status:          Financial Concerns:             Does the patient's insurance plan have a 3 day qualifying hospital stay waiver?  Yes     Which insurance plan 3 day waiver is available? Alternative insurance waiver    Will the waiver be used for post-acute placement? No    Lifestyle & Psychosocial Needs:  Social Determinants of Health     Food Insecurity: Low Risk  (3/3/2024)    Food Insecurity     Within the past 12 months, did you worry that your food would run out before you got money to buy more?: No     Within the past 12 months, did the food you bought just not last and you didn t have money to get more?: No   Depression: Not at risk (2/5/2024)    PHQ-2     PHQ-2 Score: 0   Housing Stability: Low Risk  (3/3/2024)    Housing Stability     Do you have  housing? : Yes     Are you worried about losing your housing?: No   Tobacco Use: Medium Risk (5/28/2024)    Patient History     Smoking Tobacco Use: Former     Smokeless Tobacco Use: Never     Passive Exposure: Not on file   Financial Resource Strain: High Risk (3/3/2024)    Financial Resource Strain     Within the past 12 months, have you or your family members you live with been unable to get utilities (heat, electricity) when it was really needed?: Yes   Alcohol Use: Not on file   Transportation Needs: Low Risk  (3/3/2024)    Transportation Needs     Within the past 12 months, has lack of transportation kept you from medical appointments, getting your medicines, non-medical meetings or appointments, work, or from getting things that you need?: No   Physical Activity: Not on File (11/10/2021)    Received from [employer] [facility]     Physical Activity     Physical Activity: 0   Interpersonal Safety: Not At Risk (3/2/2024)    [facility]    Humiliation, Afraid, Rape, and Kick questionnaire     Fear of Current or Ex-Partner: No     Emotionally Abused: No     Physically Abused: No     Sexually Abused: No   Stress: Not on File (11/10/2021)    Received from [facility]     Stress     Stress: 0   Social Connections: Unknown (12/22/2021)    [facility]    Social Connections     Frequency of Communication with Friends and Family: Not on file   Health Literacy: Not on file       Functional Status:  Prior to admission patient needed assistance: showers, transfers, w/c mobility, meals             Mental Health Status:          Chemical Dependency Status:                Values/Beliefs:  Spiritual, Cultural Beliefs, Church Practices, Values that affect care:                 Additional Information:  Referral from Palliative Care.  Spouse has decided to bring patient home with hospice care.     Writer spoke with spouse and offered choice of hospice. He is not familiar with any agency and deferred to  to arrange.  Made referral to [facility].  Writer briefly explained the services provided by hospice and explained the benefit doesn't provide extended hours of care. Spouse expressed understanding    Patient has all the necessary DME at home except for hospital bed.  At this time,  declines the need for a hospital bed.   [facility] could have offered enrollment tomorrow however spouse prefers to have patient home and get settled in before they meet with hospice. Thus he has scheduled their visit on Friday. At this time, he will learn more about what hospice has to offer and likely sign enrollment papers.   Spouse has expressed concern how patient will respond to hospice. Writer suggested to share with patient that she will be having home care to help support her wish to not be rehospitalized, to help keep her comfortable and to help her spouse as he cares for her. Spouse is hopeful Hospice staff will be able to provide symptom management for comfort.  Spouse has been here visiting frequently so he is aware of the care patient requires.   He acknowledges staff use the [employer] here but reports at home he lifts patient.  He plans to transport patient home by car.   Discharge is planned for tomorrow and spouse would like to discharge at 1100.        JONNY Sutton

## 2024-06-04 NOTE — PLAN OF CARE
Goal Outcome Evaluation:      Plan of Care Reviewed With: patient, spouse    Overall Patient Progress: improvingOverall Patient Progress: improving     SUMMARY: Presents with acute confusion.     DATE & TIME: 6/03/24 1500 - 2330  Cognitive Concerns/ Orientation : A&O x3, disoriented to situation. Calm and coop. Using call button for requests.   BEHAVIOR & AGGRESSION TOOL COLOR: Green   ABNL VS/O2: VSS on RA    MOBILITY: Assist x 1-2 with janneth steady or lift, fall risk. Turn/repo, refuses at times. Recliner chair for meals. Sat up in the chair after PT session  PAIN MANAGEMENT: patient complained of left knee pain, PRN tylenol, baclofen and lidocaine patch given with hot packs.Verbalized pain is better  DIET: Regular, tolerating,  assisting with meals. Fair to Good appetite.  BOWEL/BLADDER: Jordan for retention; no BM this shift.  ABNL LAB: N/A  DRAIN/DEVICES: PIV SL  TELEMETRY RHYTHM: SR with occ PVCs.  SKIN: Scattered bruises, intact.  D/C DATE: Pend home with , care management involved.   OTHER IMPORTANT INFO: Rocephin for +UA, UC results pending.Mentation appears to be improving, TID haldol, q4h neuros no longer needed. Content watching tv and using phone. Palliative met with pt and spouse at bedside before the shift

## 2024-06-04 NOTE — PROGRESS NOTES
Care Transitions  Working with  to organize a hospice discharge to home tomorrow.  Will complete consult note later today.

## 2024-06-04 NOTE — PROGRESS NOTES
Palliative care brief follow-up note.    I spoke with Virginia's  John on the phone to follow-up from our conversation yesterday to see if he had any questions.  Virginia may be discharged soon and questioning if the plan was for further rehab or more of a comfort/hospice focus.  John stated he does not really want to put Virginia through more rehab at TCU facilities as he felt she was miserable there.  Patient's POLST document indicates comfort as a primary importance with avoiding hospitalizations and Virginia herself has made comments yesterday that she does not wish to come back to the hospital.  John has decided to focus on comfort as priority for Virginia.  He would like to provide hospice at home for Virginia.  I explained that social work would assist in finding a hospice agency to work with them.  They would help determine equipment that would be needed at home to make caring for Virginia as easy as possible.     Discussed this plan of care with Dr. Ocampo and social work.    Susan HILL, CNS  Palliative Medicine   [facility]  Securely message with Rocky

## 2024-06-04 NOTE — PLAN OF CARE
SUMMARY: Presents with acute confusion.     DATE & TIME: 6/03/24, 2300 - 0730  Cognitive Concerns/ Orientation : A&O x3, disoriented to situation. Calm and cooperative  BEHAVIOR & AGGRESSION TOOL COLOR: Green   ABNL VS/O2: VSS on RA    MOBILITY: Up assist x 1-2 with janneth steady or lift, turn/repo  PAIN MANAGEMENT: Denied  DIET: Regular  BOWEL/BLADDER: Jordan for retention; no BM this shift.  ABNL LAB: N/A  DRAIN/DEVICES: PIV SL  TELEMETRY RHYTHM: SR with occ PVCs.  SKIN: Scattered bruises, intact.  D/C DATE: Pending home with   OTHER IMPORTANT INFO:  Patient calling repeatedly on call light this AM. Care management involved. Palliative care following    Goal Outcome Evaluation:      Plan of Care Reviewed With: patient    Overall Patient Progress: no changeOverall Patient Progress: no change

## 2024-06-04 NOTE — PLAN OF CARE
Goal Outcome Evaluation:      Plan of Care Reviewed With: patient    Overall Patient Progress: no changeOverall Patient Progress: no change         SUMMARY: Presents with acute confusion, possible UTI.     DATE & TIME: 6/04/24   Cognitive Concerns/ Orientation : A&O x3, disoriented to situation, forgetful, drowsy at times. Called freq for adjustments in bed.   BEHAVIOR & AGGRESSION TOOL COLOR: Green   ABNL VS/O2: VSS on RA    MOBILITY: Up assist x 2 with janneth steady or lift, turn/repo in bed, prefers to lay on left side.  PAIN MANAGEMENT: C/O back pain, PRN tylenol, repos, distraction and warm packs provided. Sched baclofen effective.   DIET: Regular, fair-good appetite, occ requests to be fed.   BOWEL/BLADDER: Jordan removed at 1100 d/t void; BM x2 this shift.  ABNL LAB: N/A  DRAIN/DEVICES: R PIV SL  TELEMETRY RHYTHM: NSR, no longer needed.   SKIN: Scattered bruises, intact.  D/C DATE: Home with hospice with , SW involved.  OTHER IMPORTANT INFO: Haldol TID dose reduced to 0.5mg. Last dose of  Rocephin given.

## 2024-06-05 VITALS
OXYGEN SATURATION: 96 % | RESPIRATION RATE: 16 BRPM | DIASTOLIC BLOOD PRESSURE: 62 MMHG | WEIGHT: 102.51 LBS | HEIGHT: 62 IN | SYSTOLIC BLOOD PRESSURE: 115 MMHG | TEMPERATURE: 97.2 F | BODY MASS INDEX: 18.86 KG/M2 | HEART RATE: 70 BPM

## 2024-06-05 PROBLEM — R53.81 PHYSICAL DECONDITIONING: Status: ACTIVE | Noted: 2024-06-05

## 2024-06-05 LAB — BACTERIA BLD CULT: NO GROWTH

## 2024-06-05 PROCEDURE — 250N000013 HC RX MED GY IP 250 OP 250 PS 637: Performed by: INTERNAL MEDICINE

## 2024-06-05 PROCEDURE — 99239 HOSP IP/OBS DSCHRG MGMT >30: CPT | Performed by: HOSPITALIST

## 2024-06-05 PROCEDURE — 250N000013 HC RX MED GY IP 250 OP 250 PS 637: Performed by: HOSPITALIST

## 2024-06-05 RX ORDER — POLYETHYLENE GLYCOL 3350 17 G/17G
17 POWDER, FOR SOLUTION ORAL 2 TIMES DAILY PRN
Qty: 30 PACKET | Refills: 0 | Status: SHIPPED | OUTPATIENT
Start: 2024-06-05

## 2024-06-05 RX ORDER — ACETAMINOPHEN 325 MG/1
650 TABLET ORAL EVERY 6 HOURS PRN
Qty: 30 TABLET | Refills: 0 | Status: SHIPPED | OUTPATIENT
Start: 2024-06-05

## 2024-06-05 RX ORDER — HALOPERIDOL 0.5 MG/1
0.5 TABLET ORAL 2 TIMES DAILY PRN
Qty: 5 TABLET | Refills: 0 | Status: SHIPPED | OUTPATIENT
Start: 2024-06-05

## 2024-06-05 RX ORDER — AMOXICILLIN 250 MG
1 CAPSULE ORAL 2 TIMES DAILY
Qty: 30 TABLET | Refills: 0 | Status: SHIPPED | OUTPATIENT
Start: 2024-06-05

## 2024-06-05 RX ORDER — BISACODYL 10 MG
10 SUPPOSITORY, RECTAL RECTAL DAILY PRN
Qty: 10 SUPPOSITORY | Refills: 0 | Status: SHIPPED | OUTPATIENT
Start: 2024-06-05

## 2024-06-05 RX ADMIN — SENNOSIDES AND DOCUSATE SODIUM 2 TABLET: 50; 8.6 TABLET ORAL at 08:50

## 2024-06-05 RX ADMIN — AMLODIPINE BESYLATE 2.5 MG: 2.5 TABLET ORAL at 08:51

## 2024-06-05 RX ADMIN — Medication 1 TABLET: at 08:51

## 2024-06-05 RX ADMIN — ROSUVASTATIN CALCIUM 10 MG: 10 TABLET, FILM COATED ORAL at 08:51

## 2024-06-05 RX ADMIN — BUSPIRONE HYDROCHLORIDE 5 MG: 5 TABLET ORAL at 08:51

## 2024-06-05 RX ADMIN — BACLOFEN 5 MG: 10 TABLET ORAL at 08:51

## 2024-06-05 RX ADMIN — ASPIRIN 81 MG: 81 TABLET, COATED ORAL at 08:51

## 2024-06-05 RX ADMIN — LIDOCAINE 2 PATCH: 4 PATCH TOPICAL at 08:53

## 2024-06-05 ASSESSMENT — ACTIVITIES OF DAILY LIVING (ADL)
ADLS_ACUITY_SCORE: 48

## 2024-06-05 NOTE — DISCHARGE SUMMARY
Discharge Summary  Hospitalist    Date of Admission:  5/30/2024  Date of Discharge:  6/5/2024  Discharging Provider: Jaclyn Ocampo MD    Primary Care Physician   Denton Barahona  Primary Care Provider Phone Number: [phone]  Primary Care Provider Fax Number: [phone]    PRINCIPAL DIAGNOSIS  Acute encephalopathy likely metabolic, toxic, progressive dementia.  Physical deconditioning from medical illness, senile frailty.  Goals of care.  Pyuria with cultures negative.  Urinary retention, failed voiding trials.  Obstipation  Elevated troponin in the setting of demand ischemia from fall prior to admission  Moderate protein calorie malnutrition.    Past Medical History:   Diagnosis Date    Bladder cancer (H)     Cancer of ovary (H)     Cerebrovascular accident (CVA) (H) 11/30/2020    R MCA stroke; with residual left sided weakness    Depression, major, single episode, severe (H) 12/23/2022    Eye muscle weakness, left 02/17/2016    GERD (gastroesophageal reflux disease) 07/08/2009    H/O total hysterectomy with bilateral salpingo-oophorectomy (BSO) 1995    ovarian cancer    HTN (hypertension)     Hyperlipidemia     Irregular heart beat 2009    nl CT angiogram    Meningioma (H)     Non-cardiac chest pain 07/08/2009    S/P coronary angiogram 2005    negative    Stress 12/22/2010    Upper back pain 02/17/2016       History of Present Illness   July Huang is an 83 year old female who presented with Confusion.    Hospital Course     July Huang is a 83 year old female with history of ischemic stroke with residual left-sided weakness, meningioma, hypertension, hyperlipidemia, hyponatremia admitted on 5/30/2024 with confusion.      --Admitted to [facility] from 2/22 - 2/28/24 for acute encephalopathy. Then evaluated by neurology, undergone CT imaging, EEG no epileptiform activity.  Treated for Proteus mirabilis UTI with cephalosporins.     --Admitted to [facility] from 3/1 - 3/4/24 for acute encephalopathy  "and urinary retention, thought to be due to polypharmacy. UA was negative and Brain MRI showed stable large left parafalcine meningioma. MRI of the spine also reportedly showed no evidence of cauda equina syndrome. Abdominal CT showed a non-obstructing kidney stone. Her gabapentin was stopped and Baclofen dose halved, and reportedly then her mental status improved, prior to discharge.      --Readmitted  at [facility] from 3/29/24 - 4/4/24 for acute encephalopathy. CTH showed ongoing meningioma, for which Neurosurgery was consulted, and recommended ongoing outpatient surveillance for that. Empiric broad spectrum antibiotics were started for lactic acidosis seen on admission.  Blood cultures, UA, COVID/Influenza/RSV tests, CXR, and CT of abdomen and pelvis were unremarkable (ongoing 8 mm nonobstructing left renal calculus was noted incidentally). Note from 4/29 indicates:SLUMS 17/30.     --Readmitted to [facility] on 5/17 -5/22/2024 after a fall causing chest pain, and found to have hairline sternal fracture that was managed non-operatively.  See note from office visit with Dr. Barahona on 5/28, \"There is the acute issue of how her sternum was fractured and there seems to be some discrepancy about this. I am having our  investigate this further as the details remain uncertain.\"  She was discharged home with ordered home care services on 5/22/24. Lidocaine patch was added to existing home prescriptions of Clonazepam and Baclofen.     Acute encephalopathy likely metabolic, toxic, progressive dementia.  Physical deconditioning from medical illness, senile frailty.  History of dementia with SLUMS of 17/30 in April/2024.   Benign meningioma of the brain status post XRT.  History of anxiety, depression.  History of CVA with left hemiparesis 2020.  ---Now presented to hospital with--confusion, generalized weakness.  Sodium 134, potassium 3.8, creatinine 0.64.  Liver enzymes within normal limits.  Glucose 126, WBC 7.7.  Lactic " acid 0.8.  Blood cultures no growth to date.  CT head 5/30 no acute pathology.  Troponins minimally elevated, EKG normal sinus rhythm.  Telemetry no acute events.  CXR is obscured partially by overlay of her left arm but shows no clear evidence of acute process.  -- Patient initially admitted to observation unit, then switched to inpatient.  --Empirically treated for UTI, urinary retention, obstipation as discussed below.  --- Over the last 48 hours patient alert oriented, continues to require assistance of 1-2 with lift for minimal ambulation per nursing report.  Jordan catheter in place.  Having bowel movements.   --Impression patient likely having progressive dementia with behavioral disturbance.  Plan for discharge home with close follow-up.  --See goals of care discussion below.  --Continue PTA baclofen 5 mg 3 times daily.   Continue PTA BuSpar 5 mg twice daily.  Continue PTA Klonopin since admission.  Continue PTA aspirin and statin therapy  As needed melatonin at bedtime.  During hospital stay initially was on scheduled Haldol, then switch to as needed.  Continue Haldol 0.5 mg twice daily as needed for agitation.   Would avoid narcotics at this time -tenuous mental status and at risk for decompensation.  If enrolls in hospice can consider narcotics, benzodiazepines and focus on comfort.   --PT recommending TCU versus home for rehabilitation.  Discussed with patient's  at length on 6/4, 6/5, declines TCU.  Opted for discharge home with home care.  Social work assisting with transition plans  Fall precautions, delirium precautions.  Sleep promotion.  Recommend neurology, neurocognitive assessment as outpatient.  Recommend psychiatry evaluation as outpatient for behavioral disturbance.     Goals of care discussion.  Hospitalist team has been having goals of care discussion with patient's family.  Restorative care.  Palliative team followed 6/3, initiated discussions on hospice care.    --Discussed with  "patient's  Deny on 6/4, 6/5, opting to take patient home with home care.  Would like to know information about hospice but is not sure if he would enroll in hospice at this time    Patient has meeting with hospice team on Friday, 6/7/2024.  If opting for hospice recommend to continue Jordan catheter, optimize pain meds and focus on comfort.  If patient, family would like to continue medical care recommend to : Follow up with primary care provider, Denton Barahona, within 7 days for hospital follow- up.      History of chronic neck and back pain.    History of muscle spasms  Per chart review followed up with pain clinic, had multiple procedures in the past.  Was on opioids in the past, recently discontinued given falls and encephalopathy during previous admissions.  Continue PTA baclofen.  As needed Tylenol.  Topical Voltaren gel, lidocaine patch continued     Pyuria with cultures negative.  UA on admission with large leukocyte esterase, many bacteria, 31 WBCs.  Urine cultures with greater than 100,000 mixed missy.  Was on IV ceftriaxone 6/2 - 6/4.     Urinary retention, failed voiding trials.  Status post Jordan catheterization 6/1 for failing voiding trial.  Voiding trial 6/4, failed.  Jordan catheter replaced back on 6/5.  Follow-up with urology as outpatient for voiding trial in 7 to 10 days.    Obstipation  Abdominal x-ray.  This shows, \"moderate amount of stool throughout the colon may indicate a degree of constipation.  No obstruction.\"  Scheduled docusate senna twice daily.  As needed MiraLAX, Dulcolax suppository   Monitor for daily bowel movement     Elevated troponin in the setting of demand ischemia from fall prior to admission  Hypertension.  Hyperlipidemia.  Troponins are flat: 23, 26, 29.  No convincing clinical evidence of ACS.  EKG sinus rhythm, nonspecific T wave changes.  QTc 417.  Echo shows EF 60 to 65%, normal left ventricular wall motion, right ventricular systolic function " normal  Continue PTA Norvasc.   Continue PTA aspirin and Crestor.     Hyponatremia resolved.  Sodium 131, improved.  On chart review has had on and off hyponatremia.  Regular diet.  Monitor in 1 week.     Chronic normocytic anemia.  Baseline hemoglobin in the 9-10 range.  Recent vitamin B12 folic acid within normal limits.  Monitor hemoglobin levels periodically     GERD  Continue PTA PRN Pepcid     Prior history of ovarian and bladder cancer:   Noted     Moderate protein calorie malnutrition.  Nutrition following, dietary supplements     Jaclyn Ocampo MD.    Pending Results   Unresulted Labs Ordered in the Past 30 Days of this Admission       No orders found from 4/30/2024 to 5/31/2024.               Physical Exam   Vitals:    05/31/24 0107   Weight: 46.5 kg (102 lb 8.2 oz)     Vital Signs with Ranges  Temp:  [97.2  F (36.2  C)-97.7  F (36.5  C)] 97.2  F (36.2  C)  Pulse:  [70-85] 70  Resp:  [16] 16  BP: (110-115)/(57-62) 115/62  SpO2:  [94 %-96 %] 96 %  I/O last 3 completed shifts:  In: 640 [P.O.:640]  Out: 860 [Urine:860]  PHYSICAL EXAM  GENERAL: Patient is in no distress.  Awake can answer most questions, forgetful.  HEART: Regular rate and rhythm. S1S2. No murmurs  LUNGS: Bilateral slightly decreased breath sounds, no wheezing or crackles  Respirations unlabored  ABDOMEN: Soft, no abdominal tenderness, bowel sounds heard .  Genitourinary Jordan catheter in place.  NEURO: Moving all extremities.  EXTREMITIES: trace pedal edema.   SKIN: Warm, dry. No rash   PSYCHIATRY Cooperative  )Consultations This Hospital Stay   CARE MANAGEMENT / SOCIAL WORK IP CONSULT  NUTRITION SERVICES ADULT IP CONSULT  PHYSICAL THERAPY ADULT IP CONSULT  CARE MANAGEMENT / SOCIAL WORK IP CONSULT  PALLIATIVE CARE ADULT IP CONSULT  OCCUPATIONAL THERAPY ADULT IP CONSULT  CARE MANAGEMENT / SOCIAL WORK IP CONSULT  OCCUPATIONAL THERAPY ADULT IP CONSULT    Time Spent on this Encounter   Jaclyn BELLA MD, personally saw the patient today  and spent greater than 30 minutes discharging this patient. Discussed with patient, bedside RN, .  Updated patient's  over the telephone 6/5.      Discharge Disposition   Discharged to home  Condition at discharge: Guarded, at risk for readmission.    Discharge Orders      Adult Physical Medicine and Rehab  Referral      Home Care Referral      Reason for your hospital stay    You were admitted to the hospital with confusion.  Followed by hospitalist team, treated empirically for UTI, constipation, urinary retention.  Mental status improving.  Plan to discharge home with close follow-up.     Follow-up and recommended labs and tests     Patient has meeting with hospice team on Friday, 6/7/2024.  If opting for hospice recommend to continue Rich catheter, optimize pain meds and focus on comfort.    If patient, family would like to continue medical care recommend to :  Follow up with primary care provider, Denton Barahona, within 7 days for hospital follow- up.    The following labs/tests are recommended: BMP, CBC.    Follow-up with urology as outpatient for voiding trial in 7 to 10 days.    Consider follow-up with neurology, neurocognitive assessment as outpatient.  Consider psychiatry evaluation as outpatient for behavioral disturbance.    Rehabilitation with PT, OT.     Activity    Your activity upon discharge: activity as tolerated.     Discharge Instructions    Rich catheter care.  Fall precautions.  Delirium precautions.  Monitor for daily bowel movements     Monitor and record    Monitor intermittent blood pressure, heart rate at home if able to, review on provider visit and optimize therapy     Tubes and drains    You are going home with the following tubes or drains: rich catheter.  Tube cares per hospital or home care instructions     Diet    Follow this diet upon discharge: Orders Placed This Encounter      Room Service      Snacks/Supplements Adult: Ensure Enlive; With  Meals      Regular Diet Adult       Discharge Medications   Current Discharge Medication List        START taking these medications    Details   bisacodyl (DULCOLAX) 10 MG suppository Place 1 suppository (10 mg) rectally daily as needed for constipation  Qty: 10 suppository, Refills: 0    Associated Diagnoses: Chronic neck and back pain; Constipation, unspecified constipation type      haloperidol (HALDOL) 0.5 MG tablet Take 1 tablet (0.5 mg) by mouth 2 times daily as needed for agitation  Qty: 5 tablet, Refills: 0    Associated Diagnoses: Acute encephalopathy      polyethylene glycol (MIRALAX) 17 g packet Take 17 g by mouth 2 times daily as needed for constipation (constipation)  Qty: 30 packet, Refills: 0    Associated Diagnoses: Chronic neck and back pain; Constipation, unspecified constipation type      senna-docusate (SENOKOT-S/PERICOLACE) 8.6-50 MG tablet Take 1 tablet by mouth 2 times daily  Qty: 30 tablet, Refills: 0    Associated Diagnoses: Chronic neck and back pain; Constipation, unspecified constipation type           CONTINUE these medications which have CHANGED    Details   acetaminophen (TYLENOL) 325 MG tablet Take 2 tablets (650 mg) by mouth every 6 hours as needed for pain  Qty: 30 tablet, Refills: 0    Associated Diagnoses: Chronic neck and back pain           CONTINUE these medications which have NOT CHANGED    Details   amLODIPine (NORVASC) 2.5 MG tablet Take 1 tablet (2.5 mg) by mouth daily  Qty: 90 tablet, Refills: 0    Associated Diagnoses: Primary hypertension      aspirin (ASA) 81 MG EC tablet Take 81 mg by mouth daily      Baclofen (LIORESAL) 5 MG tablet Take 1 tablet (5 mg) by mouth 3 times daily as needed for muscle spasms  Qty: 90 tablet, Refills: 0    Associated Diagnoses: Chronic neck and back pain      busPIRone (BUSPAR) 5 MG tablet Take 1 tablet (5 mg) by mouth 2 times daily  Qty: 60 tablet, Refills: 0    Associated Diagnoses: Anxiety and depression      calcium citrate-vitamin D  (CITRACAL) 315-5 MG-MCG TABS per tablet Take 1 tablet by mouth daily      clonazePAM (KLONOPIN) 0.5 MG tablet Take 1 tablet (0.5 mg) by mouth at bedtime  Qty: 30 tablet, Refills: 0    Associated Diagnoses: Anxiety and depression      cyanocobalamin (VITAMIN B-12) 1000 MCG tablet Take 1 tablet (1,000 mcg) by mouth daily  Qty: 100 tablet, Refills: 1    Comments: 06/28/22 patient reports taking this      diclofenac (VOLTAREN) 1 % topical gel Apply 2 g topically 4 times daily To painful area of back  Qty: 350 g, Refills: 0    Associated Diagnoses: Chronic neck and back pain      famotidine (PEPCID) 20 MG tablet Take 1 tablet (20 mg) by mouth 2 times daily as needed  Qty: 180 tablet, Refills: 3    Associated Diagnoses: Oropharyngeal dysphagia      Lidocaine (LIDOCARE) 4 % Patch Place 2 patches onto the skin every 24 hours To prevent lidocaine toxicity, patient should be patch free for 12 hrs daily. Do NOT apply heat over patch    Associated Diagnoses: Chronic neck and back pain      melatonin 5 MG tablet Take 1 tablet (5 mg) by mouth at bedtime    Associated Diagnoses: Insomnia, unspecified type      rosuvastatin (CRESTOR) 10 MG tablet Take 10 mg by mouth daily      valACYclovir (VALTREX) 1000 mg tablet Take 1,000 mg by mouth 2 times daily as needed (HSV ulcer)      Vitamin D3 (CHOLECALCIFEROL) 25 mcg (1000 units) tablet Take 1 tablet by mouth daily           Allergies   Allergies   Allergen Reactions    Diltiazem Palpitations    Hydrochlorothiazide W-Amiloride Palpitations    Lisinopril Palpitations    Losartan Potassium Visual Disturbance    Meclizine Palpitations    Metoprolol Palpitations    Tetracyclines & Related Nausea and Vomiting       DATA    Most Recent 3 CBC's:  Recent Labs   Lab Test 06/04/24  0427 06/03/24  1143 06/02/24  1118 06/01/24  0643   WBC  --  10.5 8.7 11.3*   HGB  --  10.2* 10.3* 12.2   MCV  --  83 82 83    353 355 427      Most Recent 3 BMP's:  Recent Labs   Lab Test 06/04/24  0427  06/03/24  1143 06/02/24  1118 06/01/24  0643   NA  --  135 131* 136   POTASSIUM  --  3.5 3.6 3.6   CHLORIDE  --  99 96* 100   CO2  --  25 24 22   BUN  --  14.8 17.4 16.9   CR 0.62 0.56 0.62 0.56   ANIONGAP  --  11 11 14   TOMEKA  --  9.0 9.1 9.3   GLC  --  116* 106* 145*     Most Recent 2 LFT's:  Recent Labs   Lab Test 05/30/24 2024 04/22/24  0909   AST 18 16   ALT 11 9   ALKPHOS 80 87   BILITOTAL 0.2 0.2       Most Recent TSH, T4 and A1c Labs:  Recent Labs   Lab Test 04/22/24  0909   TSH 3.30   T4 1.16     Results for orders placed or performed during the hospital encounter of 05/30/24   CT Head w/o Contrast    Narrative    EXAM: CT HEAD W/O CONTRAST  LOCATION: [facility]  DATE: 5/30/2024    INDICATION: ams, recent fall, HA  COMPARISON: March 29, 2024.  TECHNIQUE: Routine CT Head without IV contrast. Multiplanar reformats. Dose reduction techniques were used.    FINDINGS:  INTRACRANIAL CONTENTS: No acute intracranial hemorrhage or abnormal extra-axial collection. Unchanged size of the large presumed meningioma near the vertex. No CT evidence of acute infarct. Mild to moderate presumed chronic small vessel ischemic changes.   Remote right basal ganglia and right cerebellar hemisphere lacunar infarcts. Mild generalized volume loss. No hydrocephalus.     VISUALIZED ORBITS/SINUSES/MASTOIDS: No intraorbital abnormality. No paranasal sinus mucosal disease. No middle ear or mastoid effusion.    BONES/SOFT TISSUES: No acute abnormality.      Impression    IMPRESSION:  1.  No acute intracranial abnormality.  2.  Chronic changes as above.   XR Chest Port 1 View    Narrative    EXAM: XR CHEST PORT 1 VIEW  LOCATION: [facility]  DATE: 5/30/2024    INDICATION: weakness, recent chest trauma  COMPARISON: 3/30/2020      Impression    IMPRESSION: Patients left arm projects over the lower left chest obscuring some detail. Heart size and pulmonary vessels appear normal. Calcified  thoracic aorta. No definite pneumonia. No traumatic injury evident.   XR Knee Left 1/2 Views    Narrative    EXAM: XR KNEE LEFT 1/2 VIEWS  DATE/TIME: 2024 3:18 PM    INDICATION: left knee pain  COMPARISON: None available.       Impression    IMPRESSION: Diffuse osseous demineralization. Medial and lateral joint  spaces are preserved and normally aligned. No definite fracture on a  single view of the left knee.    GENEVA BABCOCK DO         SYSTEM ID:  [id number]   XR Abdomen Port 1 View    Narrative    ABDOMEN PORTABLE ONE VIEW 2024 3:20 PM    HISTORY: Rule out constipation.     COMPARISON: 2024      Impression    IMPRESSION: Moderate amount of stool throughout the colon may indicate  a degree of constipation. No obstruction.     MARY ELLEN MANLEY MD         SYSTEM ID:  [id number]   Echocardiogram Complete     Value    LVEF  60-65%    Narrative    [id number]  [id number]  [id number]  [id number]^ESTEFANY^ROXANNA^GIOVANNY     [facility]  Echocardiography Laboratory  [address] [place] [address]     Name: PETEY NGUYỄN  MRN: [id number]  : 1940  Study Date: 2024 01:56 PM  Age: 83 yrs  Gender: Female  Patient Location: [facility]  Reason For Study: Chest Pain  Ordering Physician: ROXANNA ALLISON  Referring Physician: Denton Barahona  Performed By: Doc Green     BSA: 1.4 m2  Height: 62 in  Weight: 102 lb  HR: 97  ______________________________________________________________________________  Procedure  Complete Portable Echo Adult.  ______________________________________________________________________________  Interpretation Summary     Echo tech reports patient has dementia and pulled out all IV so no contrast  could be performed. Limited images obtained for interpretation  The left ventricular cavity is small.  The visual ejection fraction is 60-65%.  Normal left ventricular wall motion  The right ventricular systolic function is normal.  Doppler findings do not  suggest pulmonary hypertension.  The rhythm was sinus tachycardia.  ______________________________________________________________________________  Left Ventricle  The left ventricle is normal in size. The left ventricular cavity is small.  There is normal left ventricular wall thickness. The visual ejection fraction  is 60-65%. Diastolic function not assessed due to tachycardia. Normal left  ventricular wall motion.     Right Ventricle  Borderline right ventricular enlargement. The right ventricular systolic  function is normal.     Atria  Normal left atrial size. Right atrial size is normal.     Mitral Valve  The mitral valve leaflets appear normal. There is no evidence of stenosis,  fluttering, or prolapse.     Tricuspid Valve  There is trace tricuspid regurgitation. Doppler findings do not suggest  pulmonary hypertension. IVC diameter <2.1 cm collapsing >50% with sniff  suggests a normal RA pressure of 3 mmHg.     Aortic Valve  The aortic valve is not well visualized. No aortic regurgitation is present.  No hemodynamically significant valvular aortic stenosis.     Pulmonic Valve  The pulmonic valve is not well visualized. The pulmonic valve is not well  seen, but is grossly normal.     Vessels  The aortic root is normal size.     Pericardium  The pericardium appears normal.     Rhythm  The rhythm was sinus tachycardia.  ______________________________________________________________________________  MMode/2D Measurements & Calculations     IVSd: 0.64 cm  LVIDd: 3.5 cm  LVIDs: 2.1 cm  LVPWd: 0.61 cm  FS: 40.8 %  LV mass(C)d: 55.0 grams  LV mass(C)dI: 38.3 grams/m2  Ao root diam: 2.7 cm  LVOT diam: 1.7 cm  LVOT area: 2.4 cm2  Ao root diam index Ht(cm/m): 1.7  Ao root diam index BSA (cm/m2): 1.9  LA Volume (BP): 26.9 ml  LA Volume Index (BP): 18.3 ml/m2     RV Base: 2.9 cm  RWT: 0.34  TAPSE: 2.6 cm     Doppler Measurements & Calculations  MV E max karly: 60.7 cm/sec  MV A max karly: 107.8 cm/sec  MV E/A: 0.56  MV dec  slope: 351.9 cm/sec2  MV dec time: 0.17 sec  PA acc time: 0.13 sec  TR max juan luis: 251.1 cm/sec  TR max P.3 mmHg  E/E' av.7  Lateral E/e': 8.5  Medial E/e': 4.9  RV S Juan Luis: 17.4 cm/sec     ______________________________________________________________________________  Report approved by: Juan Cavazos 2024 09:43 AM

## 2024-06-05 NOTE — PROGRESS NOTES
Notified provider about indwelling rich catheter discussed removal or continued need.    Did provider choose to remove indwelling rich catheter? No    Provider's rich indication for keeping indwelling rich catheter: Retention.    Is there an order for indwelling rich catheter? Yes    *If there is a plan to keep rich catheter in place at discharge daily notification with provider is not necessary, but please add a notation in the treatment team sticky note that the patient will be discharging with the catheter.

## 2024-06-05 NOTE — PROGRESS NOTES
"Care Management Discharge Note    Discharge Date: 06/05/2024       Discharge Disposition: Home Care    Discharge Services:      Discharge DME:      Discharge Transportation:  ()    Private pay costs discussed: Not applicable    Does the patient's insurance plan have a 3 day qualifying hospital stay waiver?  Yes     Which insurance plan 3 day waiver is available? Alternative insurance waiver    Will the waiver be used for post-acute placement? No    PAS Confirmation Code: n/a  Patient/family educated on Medicare website which has current facility and service quality ratings:      Education Provided on the Discharge Plan: Yes  Persons Notified of Discharge Plans: patient/spouse/[facility] and [facility]  Patient/Family in Agreement with the Plan:      Handoff Referral Completed: no    Additional Information:  Met with patient and  this morning.  Patient said \" I am not ready for hospice\".  Writer explained hospice is a home care program with the goal of respecting her right to not return to the hospital and be kept comfortable at home.    upset the insertion of rich catheter will increase her UTI and hasten her death. Reviewed that under hospice her infection can be treated at home or that he can dis enroll from hospice if he feels patient should be brought to the hospital.  Reviewed that under hospice or standard home care he can call the nurse at the first sign of an infection. Explained the nurse will be able to change the rich catheter.  He only needs to empty the bag and provide cate care around the tube.  They both seemed more comfortable after this discussion, however  appears overwhelmed.   Reviewed the MD plan to order skilled services in the event she chooses not to enroll into hospice.    verbalized his interest in meeting with hospice on Friday.  He understands if they decide not to enroll into hospice, [facility] will provide RN,PT and OT.  Call the RN case manager " at [facility] with the plan.    The hospice liaison is here today and updated. Orders were sent to [facility] thru [facility].        Radha Braga, VALERIESW

## 2024-06-05 NOTE — PLAN OF CARE
Discharge    Patient discharged to home with , Deny.  Care plan note    SUMMARY: Presents with acute confusion, possible UTI.      DATE & TIME: 6/05/24 AM shift  Cognitive Concerns/ Orientation : A&O x3, disoriented to situation, forgetful, drowsy at times. Calls frequently.  BEHAVIOR & AGGRESSION TOOL COLOR: Green             ABNL VS/O2: VSS on RA    MOBILITY: Up assist x 2 with janneth steady or lift, turn/repo in bed, prefers to lay on left side.  PAIN MANAGEMENT: C/O L leg pain and lower back pain, offered tylenol but pt declined. Lidocaine patch applied to lower back.   DIET: Regular, good appetite.   BOWEL/BLADDER: Jordan cath placed this morning due to retention since last evening. Jordan is draining, education provided to pt's .   ABNL LAB: N/A  DRAIN/DEVICES: R PIV SL  TELEMETRY RHYTHM: NA  SKIN: Scattered bruises, intact.  D/C DATE: Home with  today, Hospice to see patient at home on Friday. See SW note.   OTHER IMPORTANT INFO: n/a     Listed belongings gathered and given to patient (including from security/pharmacy). Yes  Care Plan and Patient education resolved: Yes  Prescriptions if needed, hard copies sent with patient  NA  Medication Bin checked and emptied on discharge Yes  SW/care coordinator/charge RN aware of discharge: Yes

## 2024-06-05 NOTE — PLAN OF CARE
Goal Outcome Evaluation:  SUMMARY: Presents with acute confusion, possible UTI.     DATE & TIME: 6/04-06/05/24 8265-3471  Cognitive Concerns/ Orientation : A&O x3, disoriented to situation, forgetful, drowsy. Calls frequently.  BEHAVIOR & AGGRESSION TOOL COLOR: Green   ABNL VS/O2: VSS on RA    MOBILITY: Up assist x 2  to BSC w/janneth steady or lift, turn/repo in bed, prefers to lay on left side.  PAIN MANAGEMENT: Has frequent leg pain & spasms. Continue with pain management: PRN tylenol, distraction, warm packs & repositionings. Has Sched baclofen.  DIET: Regular Diet  BOWEL/BLADDER: Bladder scanned 586mL; was unable to void. Straight cathed 560mL; PVR 0mL.Encouraged to go when feels the urge. Had 1 BM this shift.  ABNL LAB: N/A  DRAIN/DEVICES: R PIV SL  TELEMETRY RHYTHM: NA  SKIN: Scattered bruises, intact.  D/C DATE: Home with hospice home care possibly today with , SW involved.  OTHER IMPORTANT INFO: Haldol TID dose reduced to 0.5mg.

## 2024-06-05 NOTE — PLAN OF CARE
Goal Outcome Evaluation:    SUMMARY: Presents with acute confusion, possible UTI.     DATE & TIME: 6/04/24 3439-4185  Cognitive Concerns/ Orientation : A&O x3, disoriented to situation, forgetful, drowsy at times. Calls frequently.  BEHAVIOR & AGGRESSION TOOL COLOR: Green   ABNL VS/O2: VSS on RA    MOBILITY: Up assist x 2 with janneth steady or lift, turn/repo in bed, prefers to lay on left side.  PAIN MANAGEMENT: C/O leg pain & spasms. PRN tylenol, distraction, warm packs & repositionings provided. Sched baclofen.  DIET: Regular   BOWEL/BLADDER: Jordan removed at 1100 today. Has had small voids at a time. Post void bladder scans shown 333 & 380. Encouraged to go when feels the urge.   ABNL LAB: N/A  DRAIN/DEVICES: R PIV SL  TELEMETRY RHYTHM: NA  SKIN: Scattered bruises, intact.  D/C DATE: Home with  tomorrow. Hospice to see patient at home on Friday. See SW note.   OTHER IMPORTANT INFO: Haldol TID dose reduced to 0.5mg.

## 2024-06-05 NOTE — PLAN OF CARE
OT: Order received, chart reviewed and discussed with care team. Pt is pursuing hospice at this time, occupational therapy assessment not necessary. Will complete orders.

## 2024-06-05 NOTE — PLAN OF CARE
Physical Therapy Discharge Summary    Reason for therapy discharge:    Discharged to home with hospice services.    Progress towards therapy goal(s). See goals on Care Plan in Epic electronic health record for goal details.  Goals not met.  Barriers to achieving goals:   discharge from facility.    Therapy recommendation(s):    Patient appears to be below baseline mobility levels at this time. Currently, patient is assist of 1 for bed mobility and stand pivot transfers. Spouse has james providing this level of assist at home prior to admission. Patient also has 4 stairs to enter their home. Patient reporting that a strong neighbor typically assists with navigating the stairs into and out of the home. With family choosing to discharge home with hospice services, recommend continued mobility as able for comfort while at home.

## 2024-06-06 NOTE — PROGRESS NOTES
"Post Hospital discharge note  This morning patient's  called very upset saying the hospital did not send home the bag of supplies in her room and that patient had loose stool last night in bed, leaving her cate area covered in stool. He was upset because patient went home with a leg bag and he didn't have the night bag to use.  Writer spoke with the aide to helped patient to the car yesterday and he said the supplies were put into the car.    was very upset and it was apparent he was overwhelmed with the care his wife needed but at the same time did not want her to return to a SNF.  He said \"all they do is take my money and I spend 1/2 the day caring for her at the facility\".    Writer called [facility] to ask if they could go out to make a nursing visit to assess the situation and help  provide personal cares and get her settled. Their nurses were not available. Writer suggested to  that she can call [facility] and see if they can come out today to meet with him/patient and help him get patient cleaned up and settled with a night time rich bag.  He accepted this.  Writer also validated to  he has taken on a big job caring for his wife.  If he wishes to keep her at home, suggested he consider hiring private duty care to give him help.  He listened and acknowledged he is burnt out.  Waler contacted [facility] liaison at [phone] and she was able to schedule the hospice evaluation today instead of tomorrow and an aide was sent along with the hospice RN,MILNA.  They arrived around 1330.  Later afternoon, liaison called and explained they did help patient clean up, helped with the rich bag and spoke with patient and  about hospice.  Patient strongly declined to enroll.   Writer called [facility] at [phone]. and updated them so they will proceed with making skilled home care visits likely within the next couple of days.    "

## 2024-06-07 ENCOUNTER — OFFICE VISIT (OUTPATIENT)
Dept: FAMILY MEDICINE | Facility: CLINIC | Age: 84
End: 2024-06-07

## 2024-06-07 VITALS
TEMPERATURE: 98.9 F | HEART RATE: 91 BPM | SYSTOLIC BLOOD PRESSURE: 119 MMHG | OXYGEN SATURATION: 96 % | DIASTOLIC BLOOD PRESSURE: 66 MMHG

## 2024-06-07 DIAGNOSIS — R54 FRAILTY SYNDROME IN GERIATRIC PATIENT: ICD-10-CM

## 2024-06-07 DIAGNOSIS — E87.1 HYPONATREMIA: ICD-10-CM

## 2024-06-07 DIAGNOSIS — R33.9 URINARY RETENTION: Primary | ICD-10-CM

## 2024-06-07 DIAGNOSIS — Z97.8 FOLEY CATHETER IN PLACE: ICD-10-CM

## 2024-06-07 DIAGNOSIS — R41.89 ALTERATION IN COGNITION: ICD-10-CM

## 2024-06-07 LAB
ANION GAP SERPL CALCULATED.3IONS-SCNC: 14 MMOL/L (ref 7–15)
BUN SERPL-MCNC: 28 MG/DL (ref 8–23)
CALCIUM SERPL-MCNC: 9.4 MG/DL (ref 8.8–10.2)
CHLORIDE SERPL-SCNC: 100 MMOL/L (ref 98–107)
CREAT SERPL-MCNC: 0.66 MG/DL (ref 0.51–0.95)
DEPRECATED HCO3 PLAS-SCNC: 22 MMOL/L (ref 22–29)
EGFRCR SERPLBLD CKD-EPI 2021: 87 ML/MIN/1.73M2
FASTING STATUS PATIENT QL REPORTED: NO
GLUCOSE SERPL-MCNC: 150 MG/DL (ref 70–99)
POTASSIUM SERPL-SCNC: 4.4 MMOL/L (ref 3.4–5.3)
SODIUM SERPL-SCNC: 136 MMOL/L (ref 135–145)

## 2024-06-07 PROCEDURE — 36415 COLL VENOUS BLD VENIPUNCTURE: CPT | Performed by: FAMILY MEDICINE

## 2024-06-07 PROCEDURE — 80048 BASIC METABOLIC PNL TOTAL CA: CPT | Performed by: FAMILY MEDICINE

## 2024-06-07 PROCEDURE — 99496 TRANSJ CARE MGMT HIGH F2F 7D: CPT | Performed by: FAMILY MEDICINE

## 2024-06-07 PROCEDURE — 80048 BASIC METABOLIC PNL TOTAL CA: CPT | Mod: 90 | Performed by: FAMILY MEDICINE

## 2024-06-07 NOTE — PROGRESS NOTES
Hospital Follow-up Visit:    Hospital/Nursing Home/IP Rehab Facility: [facility]  Date of Admission: 5/30/2024  Date of Discharge: 6/5/2024  Reason(s) for Admission: Confusion, treated empirically for UTI, constipation, urinary retention  Was the patient in the ICU or did the patient experience delirium during hospitalization?  YES- delirium           Do you have any other stressors you would like to discuss with your provider? No    Problems taking medications regularly:  None  Medication changes since discharge: None  Problems adhering to non-medication therapy:  None    Summary of hospitalization:  [facility] discharge summary reviewed  Diagnostic Tests/Treatments reviewed.  Follow up needed: urology  Other Healthcare Providers Involved in Patient s Care:         Specialist appointment - urology  Update since discharge: fluctuating course.         Plan of care communicated with patient and family             Subjective     Virginia is a 83 year old patient who presents to clinic for hospital follow up.  She was admitted with another episode of acute encephalopathy.  She has had an extensive work up previously.  No definite causes have been identified.  She has had medication adjustments including stopping gabapentin and opiates as well as decreasing baclofen which had previously possibly improved her cognition.  It is felt this may be due to progressive dementia.  She was started on scheduled haldol in the hospital and discharged with haldol prn.  She received a dose prior to her appointment today and is quite somnolent and unable to attend to the conversation.  Her  Deny is providing the majority of the history.    She did meet with palliative care in the hospital.  Thus far has been resistant to hospice and palliative care at prior appointments.      She was discharged with a rich catheter due to retention and needs urology follow up.      Review of Systems    Constitutional, HEENT, cardiovascular, pulmonary, GI, , musculoskeletal, neuro, skin, endocrine and psych systems are negative, except as otherwise noted.      Objective    /66   Pulse 91   Temp 98.9  F (37.2  C) (Oral)   SpO2 96%     General: in wheelchair, intermittently falling asleep, NAD  Psych: sedated appearing, arousable        Results for orders placed or performed in visit on 06/07/24   Basic metabolic panel     Status: Abnormal   Result Value Ref Range    Sodium 136 135 - 145 mmol/L    Potassium 4.4 3.4 - 5.3 mmol/L    Chloride 100 98 - 107 mmol/L    Carbon Dioxide (CO2) 22 22 - 29 mmol/L    Anion Gap 14 7 - 15 mmol/L    Urea Nitrogen 28.0 (H) 8.0 - 23.0 mg/dL    Creatinine 0.66 0.51 - 0.95 mg/dL    GFR Estimate 87 >60 mL/min/1.73m2    Calcium 9.4 8.8 - 10.2 mg/dL    Glucose 150 (H) 70 - 99 mg/dL    Patient Fasting > 8hrs? No        Urinary retention  Educated on rich  Urology follow up for voiding trial  - Adult Urology  Referral - To a [facility] Location (Use POS/Location); Future    Rich catheter in place      Hyponatremia  improved  - Basic metabolic panel; Future  - Basic metabolic panel    Alteration in cognition  Recurrent, worrisome.  Uncertain cause.  Has had extensive work up.  Neurology and psychiatry consultations asap.  Recurrent hospitalizations.  Recommend stopping haldol unless behavioral disturbances increase given profound sedation today after receiving dose  - Adult Neurology  Referral; Future  - Adult Mental Health  Referral; Future    Frailty syndrome in geriatric patient    - Adult Neurology  Referral; Future  - Adult Mental Health  Referral; Future    Patient is agreement with the assessment and plan as outlined above.  All questions answered.  Red flag symptoms that should prompt emergent evaluation discussed and understood.      Strongly encourage meeting with hospice and/or palliative care again

## 2024-06-10 DIAGNOSIS — M54.2 CHRONIC NECK AND BACK PAIN: ICD-10-CM

## 2024-06-10 DIAGNOSIS — F41.9 ANXIETY AND DEPRESSION: ICD-10-CM

## 2024-06-10 DIAGNOSIS — M54.9 CHRONIC NECK AND BACK PAIN: ICD-10-CM

## 2024-06-10 DIAGNOSIS — F32.A ANXIETY AND DEPRESSION: ICD-10-CM

## 2024-06-10 DIAGNOSIS — G89.29 CHRONIC NECK AND BACK PAIN: ICD-10-CM

## 2024-06-10 NOTE — TELEPHONE ENCOUNTER
Signed 2 weeks ago (5/28/2024):   Baclofen (LIORESAL) 5 MG tablet   Sig: Take 1 tablet (5 mg) by mouth 3 times daily as needed for muscle spasms   Disp: 90 tablet    Refills: 0   Signed by: Denton Barahona MD   Encounter Details   For Clonazepam  Date/Time Action Taken User Additional Information   05/15/24 1418 Pend Francoise Pack, [facility]    05/15/24 1444 Sign Denton Barahona MD      Duplicate request              Baclofen & Clonazepam    Last visit 06-07-24 Dr Jun Thomas [address]

## 2024-06-12 NOTE — TELEPHONE ENCOUNTER
I don't believe it is a duplicate, but she should not need filled until the end of the month (if she picked up the Rx when written in May).  She has an appointment on 6/12/24.  Will hold this request.

## 2024-06-13 NOTE — PROGRESS NOTES
Clinic Care Coordination Contact  Clinic Care Coordination Contact  OUTREACH    Referral Information:  Referral Source: PCP: [facility] consult received for caregiver resources. Spouse is caregiver to patient who has dementia.    Primary Diagnosis: Psychosocial    Chief Complaint   Patient presents with    Clinic Care Coordination - Follow-up     SW Outreach        [place] Utilization:   Clinic Utilization  Difficulty keeping appointments:: No  Compliance Concerns: No  No-Show Concerns: No  No PCP office visit in Past Year: No  Utilization      No Show Count (past year)  1             ED Visits  2             Hospital Admissions  2                    Current as of: 6/12/2024  6:12 PM                Clinical Concerns:  Current Medical Concerns:  history of ischemic stroke with residual left-sided weakness, meningioma, hypertension, hyperlipidemia, hyponatremia and dementia    Current Behavioral Concerns: Pt recently hospitalized with encephalopathy/confusion and severe agitation.     Education Provided to patient: SW role and resources      Medication Management:  Medication review status: Not reviewed     Functional Status:  Dependent ADLs:: Ambulation-walker, Bathing, Dressing, Eating, Grooming, Toileting, Transfers, Positioning  Dependent IADLs:: Cleaning, Laundry, Cooking, Shopping, Meal Preparation, Medication Management, Money Management, Transportation  Bed or wheelchair confined:: No  Mobility Status: Independent w/Device  Any fall with injury in the past year?: Yes    Living Situation:  Current living arrangement:: I live in a private home with spouse  Type of residence:: Private home - stairs    Lifestyle & Psychosocial Needs:  Pt is a 83 yr old  female who [place] ([place].     Spoke with spouse John over the phone. He admits that he is very stressed in caring for patient and does not have a lot of outside help, he said that an agency was there yesterday and he was questioning  "their ability to have just one caregiver be able to do the transfer, he is concerned that pt will require 2 people. It's also difficult for him to have outside help coming in because of pt's behavior. He could not talk long because pt could be heard yelling/screaming for him in background. John admits that on the depression screening he \"has all of symptoms except suicidal ideation\".    John stated that he is still interested in hospice for pt but admits's scary, writer provided some education on hospice in hopes that it will be less scary and validated feelings. We did have to end to the conversation because pt was calling for John    Social Determinants of Health     Food Insecurity: Low Risk  (3/3/2024)    Food Insecurity     Within the past 12 months, did you worry that your food would run out before you got money to buy more?: No     Within the past 12 months, did the food you bought just not last and you didn t have money to get more?: No   Depression: Not at risk (2/5/2024)    PHQ-2     PHQ-2 Score: 0   Housing Stability: Low Risk  (3/3/2024)    Housing Stability     Do you have housing? : Yes     Are you worried about losing your housing?: No   Tobacco Use: Medium Risk (6/7/2024)    Patient History     Smoking Tobacco Use: Former     Smokeless Tobacco Use: Never     Passive Exposure: Not on file   Financial Resource Strain: High Risk (3/3/2024)    Financial Resource Strain     Within the past 12 months, have you or your family members you live with been unable to get utilities (heat, electricity) when it was really needed?: Yes   Alcohol Use: Not on file   Transportation Needs: Low Risk  (3/3/2024)    Transportation Needs     Within the past 12 months, has lack of transportation kept you from medical appointments, getting your medicines, non-medical meetings or appointments, work, or from getting things that you need?: No   Physical Activity: Not on File (11/10/2021)    Received from [facility]  "    Physical Activity     Physical Activity: 0   Interpersonal Safety: Not At Risk (3/2/2024)    [facility], [facility]    Humiliation, Afraid, Rape, and Kick questionnaire     Fear of Current or Ex-Partner: No     Emotionally Abused: No     Physically Abused: No     Sexually Abused: No   Stress: Not on File (11/10/2021)    Received from [facility]     Stress     Stress: 0   Social Connections: Unknown (12/22/2021)    Received from [employer] [facility], [place]  [employer] [facility]    Social Connections     Frequency of Communication with Friends and Family: Not on file   Health Literacy: Not on file     Inadequate nutrition (GOAL):: No  Tube Feeding: No  Transportation means:: Regular car     Informal Support system:: Children, Spouse           Resources and Interventions:  Current Resources: [facility]  Skilled Home Care Services: Skilled Nursing, Physical Therapy, Occupational Therapy  Community Resources: None  Supplies Currently Used at Home: Incontinence Supplies  Equipment Currently Used at Home: walker, rolling  Employment Status: retired         Advance Care Plan/Directive  Advanced Care Plans/Directives on file:: Yes  Status of record:: On File and Validated  Type Advanced Care Plans/Directives: POLST, DNR/DNI     Plan:  CC will plan to outreach to pt's spouse in 2 weeks.    Arline Torres [facility]  Social Work Care Coordinator  Phone: [phone]

## 2024-06-18 ENCOUNTER — TRANSFERRED RECORDS (OUTPATIENT)
Dept: FAMILY MEDICINE | Facility: CLINIC | Age: 84
End: 2024-06-18

## 2024-06-21 RX ORDER — CLONAZEPAM 0.5 MG/1
0.5 TABLET ORAL AT BEDTIME
Qty: 30 TABLET | Refills: 0 | OUTPATIENT
Start: 2024-06-21

## 2024-06-21 RX ORDER — BACLOFEN 5 MG/1
5 TABLET ORAL 3 TIMES DAILY PRN
Qty: 90 TABLET | Refills: 0 | OUTPATIENT
Start: 2024-06-21

## 2024-07-05 ENCOUNTER — TRANSFERRED RECORDS (OUTPATIENT)
Dept: FAMILY MEDICINE | Facility: CLINIC | Age: 84
End: 2024-07-05

## 2024-08-18 ENCOUNTER — HEALTH MAINTENANCE LETTER (OUTPATIENT)
Age: 84
End: 2024-08-18

## 2025-05-29 ENCOUNTER — DOCUMENTATION ONLY (OUTPATIENT)
Dept: GERIATRICS | Facility: CLINIC | Age: 85
End: 2025-05-29
Payer: COMMERCIAL

## 2025-06-05 ENCOUNTER — NURSING HOME VISIT (OUTPATIENT)
Dept: GERIATRICS | Facility: CLINIC | Age: 85
End: 2025-06-05
Payer: COMMERCIAL

## 2025-06-05 VITALS
WEIGHT: 103.4 LBS | HEART RATE: 84 BPM | TEMPERATURE: 97.8 F | DIASTOLIC BLOOD PRESSURE: 66 MMHG | RESPIRATION RATE: 18 BRPM | SYSTOLIC BLOOD PRESSURE: 128 MMHG | HEIGHT: 60 IN | BODY MASS INDEX: 20.3 KG/M2 | OXYGEN SATURATION: 97 %

## 2025-06-05 DIAGNOSIS — Z86.73 HISTORY OF CEREBROVASCULAR ACCIDENT: ICD-10-CM

## 2025-06-05 DIAGNOSIS — F03.A18 MILD DEMENTIA WITH OTHER BEHAVIORAL DISTURBANCE, UNSPECIFIED DEMENTIA TYPE (H): ICD-10-CM

## 2025-06-05 DIAGNOSIS — Z74.09 IMPAIRED MOBILITY AND ADLS: ICD-10-CM

## 2025-06-05 DIAGNOSIS — I69.354 HEMIPARESIS AFFECTING LEFT SIDE AS LATE EFFECT OF STROKE (H): ICD-10-CM

## 2025-06-05 DIAGNOSIS — D32.0 BENIGN NEOPLASM OF CEREBRAL MENINGES (H): ICD-10-CM

## 2025-06-05 DIAGNOSIS — F32.9 MAJOR DEPRESSIVE DISORDER, REMISSION STATUS UNSPECIFIED, UNSPECIFIED WHETHER RECURRENT: ICD-10-CM

## 2025-06-05 DIAGNOSIS — Z78.9 IMPAIRED MOBILITY AND ADLS: ICD-10-CM

## 2025-06-05 DIAGNOSIS — Z51.5 HOSPICE CARE PATIENT: Primary | ICD-10-CM

## 2025-06-05 PROBLEM — D32.9 MENINGIOMA (H): Status: RESOLVED | Noted: 2024-03-29 | Resolved: 2025-06-05

## 2025-06-05 PROBLEM — F11.20 CONTINUOUS OPIOID DEPENDENCE (H): Status: RESOLVED | Noted: 2023-03-29 | Resolved: 2025-06-05

## 2025-06-05 RX ORDER — CITALOPRAM HYDROBROMIDE 10 MG/1
20 TABLET ORAL DAILY
COMMUNITY

## 2025-06-05 RX ORDER — BACLOFEN 5 MG/1
5 TABLET ORAL 2 TIMES DAILY
COMMUNITY

## 2025-06-05 RX ORDER — HALOPERIDOL 2 MG/ML
0.5 SOLUTION ORAL EVERY 4 HOURS PRN
COMMUNITY

## 2025-06-05 RX ORDER — HYDROMORPHONE HYDROCHLORIDE 2 MG/1
2 TABLET ORAL
COMMUNITY

## 2025-06-05 RX ORDER — CARBOXYMETHYLCELLULOSE SODIUM 10 MG/ML
2 SOLUTION/ DROPS OPHTHALMIC DAILY PRN
COMMUNITY

## 2025-06-05 RX ORDER — GUAIFENESIN 200 MG/1
200 TABLET ORAL EVERY 4 HOURS PRN
COMMUNITY

## 2025-06-05 RX ORDER — AMOXICILLIN 250 MG
2 CAPSULE ORAL 2 TIMES DAILY
COMMUNITY

## 2025-06-05 RX ORDER — METHADONE HYDROCHLORIDE 5 MG/1
2.5 TABLET ORAL 2 TIMES DAILY
COMMUNITY

## 2025-06-05 RX ORDER — DIPHENHYDRAMINE HCL 25 MG/1
2 TABLET, FILM COATED ORAL 3 TIMES DAILY PRN
COMMUNITY

## 2025-06-05 RX ORDER — OXYBUTYNIN CHLORIDE 5 MG/1
5 TABLET ORAL EVERY 6 HOURS PRN
COMMUNITY

## 2025-06-05 RX ORDER — OXYMETAZOLINE HYDROCHLORIDE 0.05 G/100ML
3 SPRAY NASAL 2 TIMES DAILY PRN
COMMUNITY

## 2025-06-05 RX ORDER — OLANZAPINE 2.5 MG/1
2.5 TABLET, FILM COATED ORAL AT BEDTIME
COMMUNITY

## 2025-06-05 RX ORDER — ACETAMINOPHEN 500 MG
1000 TABLET ORAL EVERY 8 HOURS PRN
COMMUNITY

## 2025-06-05 RX ORDER — LIDOCAINE 4 G/G
1 PATCH TOPICAL EVERY 8 HOURS PRN
COMMUNITY

## 2025-06-05 RX ORDER — MECLIZINE HYDROCHLORIDE 25 MG/1
25 TABLET ORAL EVERY 6 HOURS PRN
COMMUNITY

## 2025-06-05 NOTE — LETTER
6/5/2025      July Huang  [address]  [facility] [address]        No notes on file      Sincerely,        Benjamin Rosenstein, MD    Electronically signed

## 2025-06-16 ENCOUNTER — NURSING HOME VISIT (OUTPATIENT)
Dept: GERIATRICS | Facility: CLINIC | Age: 85
End: 2025-06-16
Payer: COMMERCIAL

## 2025-06-16 VITALS
HEIGHT: 60 IN | BODY MASS INDEX: 20.3 KG/M2 | WEIGHT: 103.4 LBS | SYSTOLIC BLOOD PRESSURE: 146 MMHG | HEART RATE: 84 BPM | OXYGEN SATURATION: 97 % | RESPIRATION RATE: 18 BRPM | DIASTOLIC BLOOD PRESSURE: 88 MMHG | TEMPERATURE: 97.8 F

## 2025-06-16 DIAGNOSIS — F03.A18 MILD DEMENTIA WITH OTHER BEHAVIORAL DISTURBANCE, UNSPECIFIED DEMENTIA TYPE (H): ICD-10-CM

## 2025-06-16 DIAGNOSIS — M54.9 CHRONIC NECK AND BACK PAIN: ICD-10-CM

## 2025-06-16 DIAGNOSIS — Z51.5 HOSPICE CARE PATIENT: Primary | ICD-10-CM

## 2025-06-16 DIAGNOSIS — Z74.09 IMPAIRED MOBILITY AND ADLS: ICD-10-CM

## 2025-06-16 DIAGNOSIS — I69.354 HEMIPARESIS AFFECTING LEFT SIDE AS LATE EFFECT OF STROKE (H): ICD-10-CM

## 2025-06-16 DIAGNOSIS — F51.01 PRIMARY INSOMNIA: ICD-10-CM

## 2025-06-16 DIAGNOSIS — Z86.73 HISTORY OF CEREBROVASCULAR ACCIDENT: ICD-10-CM

## 2025-06-16 DIAGNOSIS — F32.9 MAJOR DEPRESSIVE DISORDER, REMISSION STATUS UNSPECIFIED, UNSPECIFIED WHETHER RECURRENT: ICD-10-CM

## 2025-06-16 DIAGNOSIS — Z78.9 IMPAIRED MOBILITY AND ADLS: ICD-10-CM

## 2025-06-16 DIAGNOSIS — M54.2 CHRONIC NECK AND BACK PAIN: ICD-10-CM

## 2025-06-16 DIAGNOSIS — G89.29 CHRONIC NECK AND BACK PAIN: ICD-10-CM

## 2025-06-16 PROCEDURE — 99310 SBSQ NF CARE HIGH MDM 45: CPT | Mod: GV

## 2025-06-16 NOTE — PROGRESS NOTES
"[facility] GERIATRICS    Chief Complaint   Patient presents with    RECHECK        HPI: July Huang is a 84 year old (1940), who is being seen today for an episodic care visit at: [facility] () [[id number]]. HPI information obtained from: facility chart records, facility staff, patient report and [facility] chart review. PMH: Right MCA stroke with left-sided deficits, fall with left hip fracture s/p ORIF Dec 2023, chronic pain on baclofen and opiate, HTN, depression, anxiety, and benign meningioma of brain. Multiple hospitalizations in 2024 due to altered cognition. Last hospitalization in July 2024 due to confusion; discharged home with hospice.   **Moved to this LTC due to increased level of care needs.     Today:  Nursing staff reports no acute medical concerns.     Patient is seen today for a visit in her room. She is sitting in her wheelchair watching television.  Feels \"anxious\"; notes her day has not been going well; reports her  losing his glasses and making a mistake when he was running errands. Medically, she has no concerns. Has ongoing spasms in her feet and legs but well controlled with current pain medications. Appetite is \"good\"; she endorses eating at least half of her meals. Last BM was \"one day\" ago. Jordan is functioning with \"no\" concerns.  Sleeping \"good.. last night\"; requests for medication to help her sleep; receptive to trial melatonin. Mood is \"good\"; \"some\" depression but \"no\" SI. Writer offered in-house ACP referral; patient expressed interest in psychotherapy for emotional support. Denies CP, palpitations, lightheadedness, dizziness, fatigue, SOB, fever, chills, nausea/vomiting concerns today.      Allergies, and PMH/PSH reviewed in [facility] today.  REVIEW OF SYSTEMS:  4 point ROS including Respiratory, CV, GI and , other than that noted in the HPI,  is negative    Objective:   BP (!) 146/88   Pulse 84   Temp 97.8  F (36.6  C)   Resp 18   Ht " 1.524 m (5')   Wt 46.9 kg (103 lb 6.4 oz)   SpO2 97%   BMI 20.19 kg/m    GENERAL APPEARANCE:  Alert, elderly, in no distress  HEENT:  atraumatic, [place], EOM intact, moist mucus membranes  RESP:  non-labored breathing, lungs clear on auscultation, no respiratory distress, no cough  CV:  Rate regular, S1 S2 noted, no edema  ABDOMEN:  soft, non-distended, non-tender, bowel sounds active  M/S:   wheelchair bound, limited mobility on left side, tone equal bilaterally, no calf pain, arthritic changes noted in hands  SKIN:  warm, dry, thin, fragile, no obvious rash, lesions, ulcerations or petechiae   NEURO:   Face is symmetric, examination of sensation by touch normal, follows and tracks, slow speech  PSYCH:  calm, cooperative      Labs reviewed as per Epic and/or Care Everywhere.      Assessment/Plan:     Hospice care patient  [facility] July [facility].   - Appreciate cares and recommendations by hospice team    History of cerebrovascular accident  Hemiparesis affecting left side as late effect of stroke (H)  Patient notes leg spasms but tolerable with current regimen.  - Continue baclofen 5 mg twice daily  - Continue supportive cares    Mild dementia with other behavioral disturbance, unspecified dementia type (H)  - Continue olanzapine 2.5 mg daily  - Monitor mood and behaviors    Major depressive disorder, remission status   unspecified, unspecified whether recurrent  Ongoing. Patient would like emotional support; receptive to seeing psychotherapy.  -Continue citalopram 20 mg daily  -Continue clonazepam 0.5 mg at bedtime  -refer to in-house ACP for psychotherapy    Chronic neck and back pain  No concerns today.  - Continue methadone 2.5 mg twice daily  - pain control per hospice orders  - Monitor effectiveness    Primary insomnia  Ongoing.  Today, patient requested intervention.  -start melatonin 3 mg at bedtime as needed  - Monitor effectiveness    Impaired mobility and ADLs  Secondary to diagnoses  above.  Moved to LTC due to increased level of care needs.  - Continue 24/7 nursing and supportive cares      MED REC REQUIRED  Post Medication Reconciliation Status: patient was not discharged from an inpatient facility or TCU        45 minutes spent on the date of this encounter doing chart review, review labs, discussion with nursing staff, patient visit, and documentation.       Electronically signed by: Dr. Park Wilson, DNP, APRN, AGNP-BC, PHN    This note was completed with the assistance of dictation software. Typos and word substitution-errors are expected and unintended.

## 2025-06-16 NOTE — LETTER
" 6/16/2025      July Huang  [address]  [facility] [address]        [facility] GERIATRICS    Chief Complaint   Patient presents with     RECHECK        HPI: July Huang is a 84 year old (1940), who is being seen today for an episodic care visit at: [facility] () [[id number]]. HPI information obtained from: facility chart records, facility staff, patient report and [facility] chart review. PMH: Right MCA stroke with left-sided deficits, fall with left hip fracture s/p ORIF Dec 2023, chronic pain on baclofen and opiate, HTN, depression, anxiety, and benign meningioma of brain. Multiple hospitalizations in 2024 due to altered cognition. Last hospitalization in July 2024 due to confusion; discharged home with hospice.   **Moved to this LTC due to increased level of care needs.     Today:  Nursing staff reports no acute medical concerns.     Patient is seen today for a visit in her room. She is sitting in her wheelchair watching television.  Feels \"anxious\"; notes her day has not been going well; reports her  losing his glasses and making a mistake when he was running errands. Medically, she has no concerns. Has ongoing spasms in her feet and legs but well controlled with current pain medications. Appetite is \"good\"; she endorses eating at least half of her meals. Last BM was \"one day\" ago. Jordan is functioning with \"no\" concerns.  Sleeping \"good.. last night\"; requests for medication to help her sleep; receptive to trial melatonin. Mood is \"good\"; \"some\" depression but \"no\" SI. Writer offered in-house ACP referral; patient expressed interest in psychotherapy for emotional support. Denies CP, palpitations, lightheadedness, dizziness, fatigue, SOB, fever, chills, nausea/vomiting concerns today.      Allergies, and PMH/PSH reviewed in [facility] today.  REVIEW OF SYSTEMS:  4 point ROS including Respiratory, CV, GI and , other than that noted in the HPI,  is " negative    Objective:   BP (!) 146/88   Pulse 84   Temp 97.8  F (36.6  C)   Resp 18   Ht 1.524 m (5')   Wt 46.9 kg (103 lb 6.4 oz)   SpO2 97%   BMI 20.19 kg/m    GENERAL APPEARANCE:  Alert, elderly, in no distress  HEENT:  atraumatic, [place], EOM intact, moist mucus membranes  RESP:  non-labored breathing, lungs clear on auscultation, no respiratory distress, no cough  CV:  Rate regular, S1 S2 noted, no edema  ABDOMEN:  soft, non-distended, non-tender, bowel sounds active  M/S:   wheelchair bound, limited mobility on left side, tone equal bilaterally, no calf pain, arthritic changes noted in hands  SKIN:  warm, dry, thin, fragile, no obvious rash, lesions, ulcerations or petechiae   NEURO:   Face is symmetric, examination of sensation by touch normal, follows and tracks, slow speech  PSYCH:  calm, cooperative      Labs reviewed as per Epic and/or Care Everywhere.      Assessment/Plan:     Hospice care patient  [facility] since July 2024.   - Appreciate cares and recommendations by hospice team    History of cerebrovascular accident  Hemiparesis affecting left side as late effect of stroke (H)  Patient notes leg spasms but tolerable with current regimen.  - Continue baclofen 5 mg twice daily  - Continue supportive cares    Mild dementia with other behavioral disturbance, unspecified dementia type (H)  - Continue olanzapine 2.5 mg daily  - Monitor mood and behaviors    Major depressive disorder, remission status   unspecified, unspecified whether recurrent  Ongoing. Patient would like emotional support; receptive to seeing psychotherapy.  -Continue citalopram 20 mg daily  -Continue clonazepam 0.5 mg at bedtime  -refer to in-house ACP for psychotherapy    Chronic neck and back pain  No concerns today.  - Continue methadone 2.5 mg twice daily  - pain control per hospice orders  - Monitor effectiveness    Primary insomnia  Ongoing.  Today, patient requested intervention.  -start melatonin 3 mg at bedtime  as needed  - Monitor effectiveness    Impaired mobility and ADLs  Secondary to diagnoses above.  Moved to LTC due to increased level of care needs.  - Continue 24/7 nursing and supportive cares      MED REC REQUIRED  Post Medication Reconciliation Status: patient was not discharged from an inpatient facility or TCU        45 minutes spent on the date of this encounter doing chart review, review labs, discussion with nursing staff, patient visit, and documentation.       Electronically signed by: Dr. Park Wilson, DNP, APRN, AGNP-BC, PHN    This note was completed with the assistance of dictation software. Typos and word substitution-errors are expected and unintended.            Sincerely,        SERGIO Huynh CNP    Electronically signed

## 2025-07-10 ENCOUNTER — PATIENT OUTREACH (OUTPATIENT)
Dept: GERIATRIC MEDICINE | Facility: CLINIC | Age: 85
End: 2025-07-10
Payer: COMMERCIAL

## 2025-07-10 NOTE — LETTER
July 10, 2025      PETEY NGUYỄN  C/O NAVEED NGUYỄN  [address]  [facility] [address]      Dear [place]:    Sha, my name is KIMBERLY Cartagena, RN, PHN, [facility]. You are eligible for [facility] Case Management program through your enrollment in [employer]. We think you may benefit from this program. I am writing to invite you to be in our Case Management program.     The following are a few things the Case Management program can help you with:  Select or change your primary care doctor or primary care clinic  Find a specialist, if needed, near your home  Receive preventive care, such as flu shots  Join programs offered by [facility] that interest you, like wellness programs    As your , I will do the following to enroll you in the Case Management Program:  Schedule a telephone call with you to answer any questions you may have about case management  Conduct an assessment by phone to identify needs case management can help you with  Develop a care plan to address those needs  Help you obtain available care and resources as needed    I will call you soon to discuss your interest in this program and your health care needs.    Being in the Case Management program is voluntary and offered to you at no cost. If you accept being in the Case Management program, you can stop any time by calling me at [phone].    Sincerely,      KIMBERLY Cartagena, RN, PHN, [facility]  [phone]  [email or web address]    [id number]                                     (01/2020)          [address] [facility], [place] [address]  [phone]  fax [phone]  [facility]

## 2025-07-21 ENCOUNTER — PATIENT OUTREACH (OUTPATIENT)
Dept: GERIATRIC MEDICINE | Facility: CLINIC | Age: 85
End: 2025-07-21
Payer: COMMERCIAL

## 2025-07-21 NOTE — PROGRESS NOTES
[employer] enrollment date: 7/1/25    Received new [employer] member enrollment, reviewed EPIC notes.  Multiple hospitalizations in 2024 due to altered cognition. Last hospitalization in July 2024 due to confusion; discharged home with hospice. Currently lives at [facility] where she has 24/7 support.  She has not been in the emergency room or hospitalized since.Member will not be opened to active case management at this time. CM will follow up as needed.      AYE CartagenaN, RN, PHN, [facility]  Care Coord[place] [address]  [email or web address]   [email or web address]     Office: [phone]   Fax: [phone]

## 2025-08-11 ENCOUNTER — NURSING HOME VISIT (OUTPATIENT)
Dept: GERIATRICS | Facility: CLINIC | Age: 85
End: 2025-08-11
Payer: COMMERCIAL

## 2025-08-11 VITALS
BODY MASS INDEX: 22.81 KG/M2 | WEIGHT: 116.2 LBS | DIASTOLIC BLOOD PRESSURE: 92 MMHG | TEMPERATURE: 97.9 F | HEIGHT: 60 IN | HEART RATE: 85 BPM | RESPIRATION RATE: 16 BRPM | OXYGEN SATURATION: 96 % | SYSTOLIC BLOOD PRESSURE: 154 MMHG

## 2025-08-11 DIAGNOSIS — M54.9 CHRONIC NECK AND BACK PAIN: ICD-10-CM

## 2025-08-11 DIAGNOSIS — F03.A18 MILD DEMENTIA WITH OTHER BEHAVIORAL DISTURBANCE, UNSPECIFIED DEMENTIA TYPE (H): ICD-10-CM

## 2025-08-11 DIAGNOSIS — Z74.09 IMPAIRED MOBILITY AND ADLS: ICD-10-CM

## 2025-08-11 DIAGNOSIS — M54.2 CHRONIC NECK AND BACK PAIN: ICD-10-CM

## 2025-08-11 DIAGNOSIS — G89.29 CHRONIC NECK AND BACK PAIN: ICD-10-CM

## 2025-08-11 DIAGNOSIS — D42.0 NEOPLASM OF UNCERTAIN BEHAVIOR OF CEREBRAL MENINGES (H): ICD-10-CM

## 2025-08-11 DIAGNOSIS — F51.01 PRIMARY INSOMNIA: ICD-10-CM

## 2025-08-11 DIAGNOSIS — F32.9 MAJOR DEPRESSIVE DISORDER, REMISSION STATUS UNSPECIFIED, UNSPECIFIED WHETHER RECURRENT: ICD-10-CM

## 2025-08-11 DIAGNOSIS — Z78.9 IMPAIRED MOBILITY AND ADLS: ICD-10-CM

## 2025-08-11 DIAGNOSIS — Z51.5 HOSPICE CARE PATIENT: Primary | ICD-10-CM

## 2025-08-11 DIAGNOSIS — Z86.73 HISTORY OF CEREBROVASCULAR ACCIDENT: ICD-10-CM

## 2025-08-11 DIAGNOSIS — I69.354 HEMIPARESIS AFFECTING LEFT SIDE AS LATE EFFECT OF STROKE (H): ICD-10-CM

## 2025-08-11 PROCEDURE — 99310 SBSQ NF CARE HIGH MDM 45: CPT | Mod: GV
